# Patient Record
Sex: FEMALE | Race: WHITE | NOT HISPANIC OR LATINO | Employment: OTHER | URBAN - METROPOLITAN AREA
[De-identification: names, ages, dates, MRNs, and addresses within clinical notes are randomized per-mention and may not be internally consistent; named-entity substitution may affect disease eponyms.]

---

## 2018-11-11 ENCOUNTER — HOSPITAL ENCOUNTER (EMERGENCY)
Facility: MEDICAL CENTER | Age: 78
End: 2018-11-11
Attending: EMERGENCY MEDICINE
Payer: MEDICARE

## 2018-11-11 ENCOUNTER — APPOINTMENT (OUTPATIENT)
Dept: RADIOLOGY | Facility: MEDICAL CENTER | Age: 78
End: 2018-11-11
Attending: EMERGENCY MEDICINE
Payer: MEDICARE

## 2018-11-11 VITALS
RESPIRATION RATE: 16 BRPM | HEIGHT: 64 IN | BODY MASS INDEX: 23.94 KG/M2 | DIASTOLIC BLOOD PRESSURE: 93 MMHG | TEMPERATURE: 98.5 F | WEIGHT: 140.21 LBS | OXYGEN SATURATION: 94 % | HEART RATE: 75 BPM | SYSTOLIC BLOOD PRESSURE: 135 MMHG

## 2018-11-11 DIAGNOSIS — S01.81XA FACIAL LACERATION, INITIAL ENCOUNTER: ICD-10-CM

## 2018-11-11 PROCEDURE — 90715 TDAP VACCINE 7 YRS/> IM: CPT | Performed by: EMERGENCY MEDICINE

## 2018-11-11 PROCEDURE — 303353 HCHG DERMABOND SKIN ADHESIVE

## 2018-11-11 PROCEDURE — 99283 EMERGENCY DEPT VISIT LOW MDM: CPT

## 2018-11-11 PROCEDURE — 700102 HCHG RX REV CODE 250 W/ 637 OVERRIDE(OP)

## 2018-11-11 PROCEDURE — 90471 IMMUNIZATION ADMIN: CPT

## 2018-11-11 PROCEDURE — 70450 CT HEAD/BRAIN W/O DYE: CPT

## 2018-11-11 PROCEDURE — 304999 HCHG REPAIR-SIMPLE/INTERMED LEVEL 1

## 2018-11-11 PROCEDURE — A9270 NON-COVERED ITEM OR SERVICE: HCPCS

## 2018-11-11 PROCEDURE — 700111 HCHG RX REV CODE 636 W/ 250 OVERRIDE (IP): Performed by: EMERGENCY MEDICINE

## 2018-11-11 RX ORDER — IBUPROFEN 600 MG/1
600 TABLET ORAL ONCE
Status: COMPLETED | OUTPATIENT
Start: 2018-11-11 | End: 2018-11-11

## 2018-11-11 RX ADMIN — IBUPROFEN 600 MG: 600 TABLET, FILM COATED ORAL at 17:00

## 2018-11-11 RX ADMIN — CLOSTRIDIUM TETANI TOXOID ANTIGEN (FORMALDEHYDE INACTIVATED), CORYNEBACTERIUM DIPHTHERIAE TOXOID ANTIGEN (FORMALDEHYDE INACTIVATED), BORDETELLA PERTUSSIS TOXOID ANTIGEN (GLUTARALDEHYDE INACTIVATED), BORDETELLA PERTUSSIS FILAMENTOUS HEMAGGLUTININ ANTIGEN (FORMALDEHYDE INACTIVATED), BORDETELLA PERTUSSIS PERTACTIN ANTIGEN, AND BORDETELLA PERTUSSIS FIMBRIAE 2/3 ANTIGEN 0.5 ML: 5; 2; 2.5; 5; 3; 5 INJECTION, SUSPENSION INTRAMUSCULAR at 15:34

## 2018-11-11 ASSESSMENT — LIFESTYLE VARIABLES: DO YOU DRINK ALCOHOL: NO

## 2018-11-11 ASSESSMENT — PAIN SCALES - GENERAL: PAINLEVEL_OUTOF10: 7

## 2018-11-11 NOTE — ED PROVIDER NOTES
"ED Provider Note    CHIEF COMPLAINT  Chief Complaint   Patient presents with   • T-5000 GLF       HPI  Regla Meier is a 78 y.o. female who presents with a headache.  The patient was walking to go to a restaurant when she tripped and struck the left side of her head on the ground.  This caused a approximate 3 center laceration above her left eyebrow.  She states she did not have a loss of conscious but she does have a moderate to severe headache.  She has not any vomiting.  She denies visual changes.  She does have some pain in the left lateral neck region but no midline cervical discomfort.  The patient also has some abrasions to her hands on the palmar aspect with minimal discomfort.  Otherwise she is unaware of any other injuries.  She does not remember the last time she had tetanus prophylaxis.    REVIEW OF SYSTEMS  See HPI for further details. All other systems are negative.     PAST MEDICAL HISTORY  Past Medical History:   Diagnosis Date   • Hyperlipemia        SOCIAL HISTORY  Social History     Social History   • Marital status:      Spouse name: N/A   • Number of children: N/A   • Years of education: N/A     Social History Main Topics   • Smoking status: Never Smoker   • Smokeless tobacco: Not on file   • Alcohol use Yes      Comment: occ   • Drug use: No   • Sexual activity: Not on file     Other Topics Concern   • Not on file     Social History Narrative   • No narrative on file           PHYSICAL EXAM  VITAL SIGNS: /90   Pulse 95   Temp 36.9 °C (98.5 °F) (Temporal)   Resp 14   Ht 1.626 m (5' 4\")   Wt 63.6 kg (140 lb 3.4 oz)   SpO2 96%   BMI 24.07 kg/m²   Constitutional: Well developed, Well nourished, No acute distress, Non-toxic appearance.   HENT: 3 cm laceration to the left side of the forehead, tympanic membranes are intact and nonerythematous bilaterally, Oropharynx moist without exudates or erythema, Nose normal.   Eyes: PERRLA, EOMI, Conjunctiva normal.  Neck: Slight pain " left trapezius region but no midline discomfort and or step-offs  Lymphatic: No lymphadenopathy noted.   Cardiovascular: Normal heart rate, Normal rhythm, No murmurs, No rubs, No gallops.   Thorax & Lungs: Normal breath sounds, No respiratory distress, No wheezing, No chest tenderness.   Abdomen: Bowel sounds normal, Soft, No tenderness, no rebound, no guarding, no distention, No masses, No pulsatile masses.   Skin: Laceration described above, mild abrasions to both hands on the palmar aspect.   Back: No tenderness, No CVA tenderness.   Extremities: Pain to the palmar aspect of both hands that seems to be very mild, no pain with pressure to the dorsal aspect of the metacarpals.  Extremities otherwise atraumatic with symmetric distal pulses, No edema, No tenderness, No cyanosis, No clubbing.   Neurologic: GCS of 15.   Psychiatric: Affect normal, Judgment normal, Mood normal.     RADIOLOGY/  CT-HEAD W/O   Final Result      1.  No evidence of acute intracranial process.      2.  Mild periventricular chronic small vessel ischemic change.        PROCEDURES laceration repair  Wound was irrigated by myself with gauze and saline.  I then closed the laceration with Dermabond.  COURSE & MEDICAL DECISION MAKING  Pertinent Labs & Imaging studies reviewed. (See chart for details)  This a 78-year-old female who presents after a fall.  She did have a headache on my exam and based on her age and the risk of an intracranial hemorrhage a CT scan was ordered.  This does not show any acute hemorrhagic injury.  The patient did have primary closure of the facial laceration.  She will be discharged home with wound care instructions.  She also some tenderness the lateral aspect of the cervical spine but no midline tenderness to support a fracture.  At the time of discharge the patient continues to be neurovascular intact.  She will be discharged with clear instructions to return if she is acutely worse.    The patient also received tetanus  prophylaxis.    FINAL IMPRESSION  1.  Mild concussion  2.  Cervical strain  3.  3 cm facial laceration     Disposition  The patient will be discharged in stable condition    Electronically signed by: Hieu Watson, 11/11/2018 3:22 PM

## 2018-11-11 NOTE — ED TRIAGE NOTES
"Chief Complaint   Patient presents with   • T-5000 GLF     Pt was walking to  chinese food, states that she tripped on a speed bump and fell striking her left forehead, bilateral hands, and left knee and shoulder. Pt reports lac to left eyelid. Ambulatory with steady gait.   Blood pressure 129/90, pulse 95, temperature 36.9 °C (98.5 °F), temperature source Temporal, resp. rate 14, height 1.626 m (5' 4\"), weight 63.6 kg (140 lb 3.4 oz), SpO2 96 %.    Pt informed of wait times. Educated on triage process.  Asked to return to triage RN for any new or worsening of symptoms. Thanked for patience.        "

## 2018-11-12 ENCOUNTER — PATIENT OUTREACH (OUTPATIENT)
Dept: HEALTH INFORMATION MANAGEMENT | Facility: OTHER | Age: 78
End: 2018-11-12

## 2018-11-12 NOTE — ED NOTES
Patient was educated on discharge instructions.  Patient was informed about diagnosis, symptom management, risks, and home care instructions.  Patient verbalized understanding and signed discharge instructions. Copy of discharge instructions in chart.  Patient ambulated out with steady gait.  Patient has personal belongings.

## 2019-02-04 ENCOUNTER — TELEPHONE (OUTPATIENT)
Dept: SCHEDULING | Facility: IMAGING CENTER | Age: 79
End: 2019-02-04

## 2019-04-10 ENCOUNTER — OFFICE VISIT (OUTPATIENT)
Dept: MEDICAL GROUP | Facility: PHYSICIAN GROUP | Age: 79
End: 2019-04-10
Payer: MEDICARE

## 2019-04-10 VITALS
SYSTOLIC BLOOD PRESSURE: 120 MMHG | HEIGHT: 63 IN | WEIGHT: 134 LBS | BODY MASS INDEX: 23.74 KG/M2 | OXYGEN SATURATION: 98 % | RESPIRATION RATE: 14 BRPM | HEART RATE: 76 BPM | TEMPERATURE: 98.4 F | DIASTOLIC BLOOD PRESSURE: 72 MMHG

## 2019-04-10 DIAGNOSIS — Z86.79 HISTORY OF RETINAL VEIN OCCLUSION: ICD-10-CM

## 2019-04-10 DIAGNOSIS — Z23 NEED FOR VACCINATION: ICD-10-CM

## 2019-04-10 DIAGNOSIS — Z12.31 ENCOUNTER FOR SCREENING MAMMOGRAM FOR BREAST CANCER: ICD-10-CM

## 2019-04-10 DIAGNOSIS — R51.9 GENERALIZED HEADACHES: ICD-10-CM

## 2019-04-10 DIAGNOSIS — H57.9 EYE DISORDER: ICD-10-CM

## 2019-04-10 DIAGNOSIS — M85.80 OSTEOPENIA, UNSPECIFIED LOCATION: ICD-10-CM

## 2019-04-10 PROBLEM — H34.8192 HISTORY OF RETINAL VEIN OCCLUSION: Status: ACTIVE | Noted: 2019-04-10

## 2019-04-10 PROCEDURE — 99214 OFFICE O/P EST MOD 30 MIN: CPT | Performed by: PHYSICIAN ASSISTANT

## 2019-04-10 ASSESSMENT — PATIENT HEALTH QUESTIONNAIRE - PHQ9: CLINICAL INTERPRETATION OF PHQ2 SCORE: 0

## 2019-04-10 NOTE — PROGRESS NOTES
Chief Complaint   Patient presents with   • Establish Care     pt states no concerns       HISTORY OF THE PRESENT ILLNESS: Regla Meier is a 78 y.o. female new patient to our practice. This pleasant patient is here today to establish care and to discuss the evaluation and management of:    Patient is a pleasant 78-year-old female here today to establish care.  She tells me she has a positive medical history for left retinal vein occlusion and osteopenia.  She also mentions that she had another I disorder of right eye that was followed closely by a retinal specialist.  States she had broken blood vessels of retina that was treated with laser therapy in 2017.  States she was following closely with her retinal specialist every 4 months.  She tells me she has loss of central vision left eye due to vein occlusion.  States symptoms are still improving.  Patient does not need a referral to an ophthalmologist.  She tells me that she is seeing in Dr. Chavez at [facility] and will follow up closely.  She tells me that she uses vision essentials gold 2 capsules by mouth twice daily.  States she buys medication from [facility] off-line.  States she is been taking medication for several years and finds it beneficial.    States last DEXA scan was 2 years ago.  Positive for osteopenia but states osteopenia has been stable for many years.  She tells me that she was advised to take 2 packets of gold daily regimen but she forgets to take the second packets of 6 months ago she started taking 1 packet/day.  Patient advised medications from [facility] off-line.  States she has been taking medication for several years and finds it beneficial.  Would like to continue.    She mentions that she gets generalized headaches a few times a week.  States during those times she will take over-the-counter Excedrin and experiences alleviation of symptoms.  She tells me that diet is healthy and she is a regular exercise  routine.  Exercise routine consist of walking and stretching.  Admits that she could work on hydration.        Past Medical History:   Diagnosis Date   • Hyperlipemia        Past Surgical History:   Procedure Laterality Date   • CYSTECTOMY     • CATARACT EXTRACTION WITH IOL         Family Status   Relation Status   • Mo    • Fa    • Bro    • Bro Alive   • Aquilino Alive   • Aquilino Alive     Family History   Problem Relation Age of Onset   • Other Mother         TIA    • Other Father         Abdominal aneurysm    • Alcohol abuse Brother    • Heart Disease Brother    • No Known Problems Daughter    • No Known Problems Daughter        Social History   Substance Use Topics   • Smoking status: Never Smoker   • Smokeless tobacco: Never Used   • Alcohol use Yes      Comment: occ       Allergies: Pcn [penicillins]    Current Outpatient Prescriptions Ordered in [facility]   Medication Sig Dispense Refill   • Zoster Vac Recomb Adjuvanted (SHINGRIX) 50 MCG/0.5ML Recon Susp 0.5 mL by Intramuscular route Once for 1 dose. 0.5 mL 1     No current [facility]-ordered facility-administered medications on file.        Review of Systems   Constitutional: Negative for fever, chills, weight loss and malaise/fatigue.   HENT: Negative for ear pain, nosebleeds, congestion, sore throat and neck pain.    Eyes: Negative for blurred vision.   Respiratory: Negative for cough, sputum production, shortness of breath and wheezing.    Cardiovascular: Negative for chest pain, palpitations, orthopnea and leg swelling.   Gastrointestinal: Negative for heartburn, nausea, vomiting and abdominal pain.   Genitourinary: Negative for dysuria, urgency and frequency.   Musculoskeletal: Negative for myalgias, back pain and joint pain.   Skin: Negative for rash and itching.   Neurological: Negative for dizziness, tingling, tremors, sensory change, focal weakness and headaches.   Endo/Heme/Allergies: Does not bruise/bleed easily.   Psychiatric/Behavioral:  "Negative for depression, anxiety, or memory loss.     All other systems reviewed and are negative except as in HPI.    Exam: /72   Pulse 76   Temp 36.9 °C (98.4 °F)   Resp 14   Ht 1.6 m (5' 3\")   Wt 60.8 kg (134 lb)   SpO2 98%  Body mass index is 23.74 kg/m².  General: Normal appearing. No distress.  HEENT: Normocephalic. Eyes conjunctiva clear lids without ptosis, pupils equal and reactive to light accommodation, ears normal shape and contour, canals are clear bilaterally, tympanic membranes are benign, nasal mucosa benign, oropharynx is without erythema, edema or exudates.  Patient is wearing glasses during today's appointment.  Neck: Supple without JVD or bruit. Thyroid is not enlarged.  Pulmonary: Clear to ausculation.  Normal effort. No rales, ronchi, or wheezing.  Cardiovascular: Regular rate and rhythm without murmur.   Abdomen: Soft, nontender, nondistended. Normal bowel sounds. Liver and spleen are not palpable  Neurologic: Grossly nonfocal  Lymph: No cervical, supraclavicular or axillary lymph nodes are palpable  Skin: Warm and dry.  No obvious lesions.  Musculoskeletal: Normal gait. No extremity cyanosis, clubbing, or edema.  Psych: Normal mood and affect. Alert and oriented x3. Judgment and insight is normal.      Medical decision-making and discussion:  1. History of retinal vein occlusion  Continue following up with ophthalmology as indicated.  Continue current medication regimen.  Continue to monitor.    2. Eye disorder  Same as # 1.    3. Osteopenia, unspecified location  Continue current medication.  Advised patient to make sure she is getting adequate vitamin D and calcium.  Continue to monitor.    4. Generalized headaches  Continue work on diet, exercise, hydration, and sleep hygiene.  Continue taking Excedrin as needed.  Continue to monitor.    5. Encounter for screening mammogram for breast cancer  [place] importance of being screened for breast cancer with patient.  Mammogram has been " ordered.  - MA-SCREEN MAMMO W/CAD-BILAT; Future    6. Need for vaccination  She was provided a Shingrix prescription during today's appointment.  Advised her to contact her medical insurance to discuss cost of vaccinations.  - Zoster Vac Recomb Adjuvanted (SHINGRIX) 50 MCG/0.5ML Recon Susp; 0.5 mL by Intramuscular route Once for 1 dose.  Dispense: 0.5 mL; Refill: 1      She tells me that her colonoscopy is up-to-date.  Patient states her last provider should be sending medical records.  States pneumonia vaccinations are up-to-date.    Please note that this dictation was created using voice recognition software. I have made every reasonable attempt to correct obvious errors, but I expect that there are errors of grammar and possibly content that I did not discover before finalizing the note.        Return in about 6 months (around 10/10/2019).

## 2019-11-26 ENCOUNTER — OFFICE VISIT (OUTPATIENT)
Dept: MEDICAL GROUP | Facility: PHYSICIAN GROUP | Age: 79
End: 2019-11-26
Payer: MEDICARE

## 2019-11-26 VITALS
TEMPERATURE: 98.4 F | WEIGHT: 134.26 LBS | BODY MASS INDEX: 23.79 KG/M2 | HEIGHT: 63 IN | RESPIRATION RATE: 16 BRPM | SYSTOLIC BLOOD PRESSURE: 120 MMHG | HEART RATE: 79 BPM | OXYGEN SATURATION: 98 % | DIASTOLIC BLOOD PRESSURE: 70 MMHG

## 2019-11-26 DIAGNOSIS — L23.1 ALLERGIC CONTACT DERMATITIS DUE TO ADHESIVES: ICD-10-CM

## 2019-11-26 DIAGNOSIS — H93.8X3 SENSATION OF FULLNESS IN BOTH EARS: ICD-10-CM

## 2019-11-26 DIAGNOSIS — Z86.79 HISTORY OF RETINAL VEIN OCCLUSION: ICD-10-CM

## 2019-11-26 DIAGNOSIS — E78.2 MIXED HYPERLIPIDEMIA: ICD-10-CM

## 2019-11-26 DIAGNOSIS — Z00.00 MEDICARE ANNUAL WELLNESS VISIT, SUBSEQUENT: ICD-10-CM

## 2019-11-26 DIAGNOSIS — H54.8 LEGALLY BLIND IN LEFT EYE, AS DEFINED IN USA: ICD-10-CM

## 2019-11-26 PROBLEM — E78.5 HYPERLIPEMIA: Status: ACTIVE | Noted: 2019-11-26

## 2019-11-26 PROCEDURE — G0439 PPPS, SUBSEQ VISIT: HCPCS | Performed by: PHYSICIAN ASSISTANT

## 2019-11-26 PROCEDURE — 69210 REMOVE IMPACTED EAR WAX UNI: CPT | Performed by: PHYSICIAN ASSISTANT

## 2019-11-26 RX ORDER — VIT C/E/CUPERIC/ZINC/LUTEIN 226-90-0.8
CAPSULE ORAL
Refills: 3 | COMMUNITY
Start: 2019-11-04 | End: 2022-07-09

## 2019-11-26 ASSESSMENT — ACTIVITIES OF DAILY LIVING (ADL): BATHING_REQUIRES_ASSISTANCE: 0

## 2019-11-26 ASSESSMENT — PATIENT HEALTH QUESTIONNAIRE - PHQ9: CLINICAL INTERPRETATION OF PHQ2 SCORE: 0

## 2019-11-26 ASSESSMENT — ENCOUNTER SYMPTOMS: GENERAL WELL-BEING: EXCELLENT

## 2019-11-26 NOTE — LETTER
[facility], [facility]  Amaris Sanchez P.A.-C.  [address]  [place] [address]  Fax: [phone]   Authorization for Release/Disclosure of   Protected Health Information   Name: DANIELITO MEIER : 1940 SSN: [id number]   Address: [address] [place], [place] [address] [place] [address] Phone:    [phone] (home)    I authorize the entity listed below to release/disclose the PHI below to:   [facility]/Amaris Sanchez P.A.-C. and Amaris Sanchez P.A.-C.   Provider or Entity Name:  [facility]-   Address   [facility], [place], [place] Phone:      Fax:     Reason for request: continuity of care   Information to be released:    [  ] LAST COLONOSCOPY,  including any PATH REPORT and follow-up  [  ] LAST FIT/COLOGUARD RESULT [  ] LAST DEXA  [  ] LAST MAMMOGRAM  [  ] LAST PAP  [  ] LAST LABS [  ] RETINA EXAM REPORT  [  ] IMMUNIZATION RECORDS  [XXX  ] Release all info      [  ] Check here and initial the line next to each item to release ALL health information INCLUDING  _____ Care and treatment for drug and / or alcohol abuse  _____ HIV testing, infection status, or AIDS  _____ Genetic Testing    DATES OF SERVICE OR TIME PERIOD TO BE DISCLOSED: _____________  I understand and acknowledge that:  * This Authorization may be revoked at any time by you in writing, except if your health information has already been used or disclosed.  * Your health information that will be used or disclosed as a result of you signing this authorization could be re-disclosed by the recipient. If this occurs, your re-disclosed health information may no longer be protected by State or Federal laws.  * You may refuse to sign this Authorization. Your refusal will not affect your ability to obtain treatment.  * This Authorization becomes effective upon signing and will  on (date) __________.      If no date is indicated, this Authorization will  one (1) year from the signature date.    Name: Danielito Meier    Signature:   Date:     11/26/2019       PLEASE FAX REQUESTED RECORDS BACK TO: [phone]

## 2019-11-26 NOTE — PROGRESS NOTES
Chief Complaint   Patient presents with   • Annual Wellness Visit         HPI:  Regla is a 79 y.o. here for Medicare Annual Wellness Visit    During today's appointment patient is complaining of bilateral ear fullness.  States in the past she has had ear lavages.  States several months ago she is over-the-counter Debrox but symptoms have not improved.  Patient is inquiring about ear lavage.    She tells me one week ago she developed an erythematous itchy rash of left clavicle region.  States rash developed after she placed a Band-Aid over actinic keratosis lesion that she been treating with oregano oil.  States she is tried over-the-counter Benadryl with improvement in itching symptoms.  States symptoms have gradually improved.  Patient is inquiring what treatment options.    Patient Active Problem List    Diagnosis Date Noted   • Hyperlipemia 11/26/2019   • Legally blind in left eye, as defined in [place] 11/26/2019   • History of retinal vein occlusion 04/10/2019       Current Outpatient Medications   Medication Sig Dispense Refill   • Multiple Vitamins-Minerals (PRESERVISION/LUTEIN) Cap TAKE 1 CAPSULE BY MOUTH TWICE DAILY FOR 6 MONTHS  3     No current facility-administered medications for this visit.         The patient is not taking medication(s) due to:  Patient takes OTC Multiple Vitamins-Minerals (PRESERVISION/LUTEIN) Cap once daily.  Current supplements as per medication list.     Allergies: Pcn [penicillins]    Current social contact/activities: travel, family,      Is patient current with immunizations? No, due for FLU. Patient is interested in receiving NONE today.    She  reports that she has never smoked. She has never used smokeless tobacco. She reports current alcohol use of about 0.6 oz of alcohol per week. She reports that she does not use drugs.  Counseling given: Yes        DPA/Advanced directive: Patient has Advanced Directive on file.     ROS:    Gait: Uses no assistive device   Ostomy: No    Other tubes: No   Amputations: No   Chronic oxygen use No   Last eye exam 10/19 Pt goes Q 4 months   Wears hearing aids: No   : Denies any urinary leakage during the last 6 months      Screening:      Little interest or pleasure in doing things?  0 - not at all  Feeling down, depressed, or hopeless? 0 - not at all  Patient Health Questionnaire Score: 0    If depressive symptoms identified deferred to follow up visit unless specifically addressed in assessment and plan.    Interpretation of PHQ-9 Total Score   Score Severity   1-4 No Depression   5-9 Mild Depression   10-14 Moderate Depression   15-19 Moderately Severe Depression   20-27 Severe Depression    Screening for Cognitive Impairment    Three Minute Recall (village, kitchen, baby)  3/3 Village kitchen baby  Oscar clock face with all 12 numbers and set the hands to show 10 past 10.  Yes Time 10:10  5/5  If cognitive concerns identified, deferred for follow up unless specifically addressed in assessment and plan.    Fall Risk Assessment    Has the patient had two or more falls in the last year or any fall with injury in the last year?  Yes  If fall risk identified, deferred for follow up unless specifically addressed in assessment and plan.    Safety Assessment    Throw rugs on floor.  Yes  Handrails on all stairs.  No  Good lighting in all hallways.  Yes  Difficulty hearing.  No  Patient counseled about all safety risks that were identified.    Functional Assessment ADLs    Are there any barriers preventing you from cooking for yourself or meeting nutritional needs?  No.    Are there any barriers preventing you from driving safely or obtaining transportation?  No.    Are there any barriers preventing you from using a telephone or calling for help?  No.    Are there any barriers preventing you from shopping?  No.    Are there any barriers preventing you from taking care of your own finances?  No.    Are there any barriers preventing you from managing your  "medications?  No.    Are there any barriers preventing you from showering, bathing or dressing yourself?  No.    Are you currently engaging in any exercise or physical activity?  Yes.  Walking 45 min 3 time a week and stretches daily.   What is your perception of your health?  Excellent.    Health Maintenance Summary                Annual Wellness Visit Overdue 1940     IMM ZOSTER VACCINES Overdue 10/19/1991     BONE DENSITY Overdue 10/19/2006     IMM PNEUMOCOCCAL VACCINE: 65+ Years Overdue 10/19/2006     IMM INFLUENZA Overdue 9/1/2019     IMM DTaP/Tdap/Td Vaccine Next Due 11/11/2028      Done 11/11/2018 Imm Admin: Tdap Vaccine          Patient Care Team:  Amaris Sanchez P.A.-C. as PCP - General (Family Medicine)    Social History     Tobacco Use   • Smoking status: Never Smoker   • Smokeless tobacco: Never Used   Substance Use Topics   • Alcohol use: Yes     Alcohol/week: 0.6 oz     Types: 1 Glasses of wine per week     Comment: occ   • Drug use: No     Family History   Problem Relation Age of Onset   • Other Mother         TIA    • Other Father         Abdominal aneurysm    • Alcohol abuse Brother    • Heart Disease Brother    • No Known Problems Daughter    • No Known Problems Daughter      She  has a past medical history of Hyperlipemia.   Past Surgical History:   Procedure Laterality Date   • CYSTECTOMY  1963   • CATARACT EXTRACTION WITH IOL             Exam:     /70 (BP Location: Right arm, Patient Position: Sitting, BP Cuff Size: Adult)   Pulse 79   Temp 36.9 °C (98.4 °F) (Temporal)   Resp 16   Ht 1.588 m (5' 2.5\")   Wt 60.9 kg (134 lb 4.2 oz)   SpO2 98%  Body mass index is 24.17 kg/m².    Hearing good.    Dentition good  Alert, oriented in no acute distress.  Eye contact is good, speech goal directed, affect calm    Assessment and Plan. The following treatment and monitoring plan is recommended:      1. Medicare annual wellness visit, subsequent  HRA reviewed and appropriate. Reviewed " medical history and current medications with patient. Reviewed immunizations with patient.  Ambulatory and Anticipatory Guidelines have been discussed with patient, see discussion below.      - Comp Metabolic Panel; Future  - CBC WITH DIFFERENTIAL; Future  - Lipid Profile; Future    2. History of retinal vein occlusion  Chronic with stable problem.  Patient follows up with an optometrist regularly.  She tells me that several years ago she had a retinal vein occlusion and unfortunately due to complications she lost her central vision but can see peripherally out of the left eye.  Patient takes over-the-counter eye vitamins daily.    3. Legally blind in left eye, as defined in [place]  Same as #2.    4. Mixed hyperlipidemia  Patient is past due for lab work.  She tells me that she has a positive medical history for hyperlipidemia.  States her HDL is always high and she has never needed a statin medication.  She tells me that her diet is healthy and she is a regular exercise routine.  Lab work has been ordered to further evaluate patient.    - Comp Metabolic Panel; Future  - CBC WITH DIFFERENTIAL; Future  - Lipid Profile; Future    5. 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  Do not use more than 2 weeks consecutively.  After using hydrocortisone cream using oil-based lotion on top of hydrocortisone cream.  Suggested Aquaphor, Aveeno, Cetaphil, CeraVea, Vaseline.  Just for patient to use over-the-counter Benadryl and Zantac.  Avoid known irritants.    Follow-up for worsening symptoms,lack of expected recovery, or should new symptoms or problems arise.      6. Sensation of fullness in both ears  Patient has bilateral cerumen impaction. It is affecting the patient's hearing. It is also bothersome to the patient. After discussing the risks benefits alternatives and obtaining informed consent procedure for cerumen impaction was  undertaken. Both ears were flushed with water using a 50 mL syringe and tube. Cerumen was then disimpacted  using a plastic loop personally by me. Reinspection showed adequate removal.      She tells me that her bone density scan is up-to-date.  States bone density was completed 2 years ago.  No abnormal findings.  HUEY has been signed by patient to obtain past medical records.      Services suggested: No services needed at this time  Health Care Screening recommendations as per orders if indicated.  Referrals offered: PT/OT/Nutrition counseling/Behavioral Health/Smoking cessation as per orders if indicated.    Discussion today about general wellness and lifestyle habits:    · Prevent falls and reduce trip hazards; Cautioned about securing or removing rugs.  · Have a working fire alarm and carbon monoxide detector;   · Engage in regular physical activity and social activities       Follow-up: Return in about 1 year (around 11/26/2020), or if symptoms worsen or fail to improve.

## 2019-12-02 ENCOUNTER — HOSPITAL ENCOUNTER (OUTPATIENT)
Dept: LAB | Facility: MEDICAL CENTER | Age: 79
End: 2019-12-02
Attending: PHYSICIAN ASSISTANT
Payer: MEDICARE

## 2019-12-02 DIAGNOSIS — Z00.00 MEDICARE ANNUAL WELLNESS VISIT, SUBSEQUENT: ICD-10-CM

## 2019-12-02 DIAGNOSIS — E78.2 MIXED HYPERLIPIDEMIA: ICD-10-CM

## 2019-12-02 LAB
ALBUMIN SERPL BCP-MCNC: 3.4 G/DL (ref 3.2–4.9)
ALBUMIN/GLOB SERPL: 1.3 G/DL
ALP SERPL-CCNC: 48 U/L (ref 30–99)
ALT SERPL-CCNC: 12 U/L (ref 2–50)
ANION GAP SERPL CALC-SCNC: 5 MMOL/L (ref 0–11.9)
AST SERPL-CCNC: 23 U/L (ref 12–45)
BASOPHILS # BLD AUTO: 1.4 % (ref 0–1.8)
BASOPHILS # BLD: 0.07 K/UL (ref 0–0.12)
BILIRUB SERPL-MCNC: 0.5 MG/DL (ref 0.1–1.5)
BUN SERPL-MCNC: 19 MG/DL (ref 8–22)
CALCIUM SERPL-MCNC: 9 MG/DL (ref 8.5–10.5)
CHLORIDE SERPL-SCNC: 104 MMOL/L (ref 96–112)
CHOLEST SERPL-MCNC: 218 MG/DL (ref 100–199)
CO2 SERPL-SCNC: 28 MMOL/L (ref 20–33)
CREAT SERPL-MCNC: 0.8 MG/DL (ref 0.5–1.4)
EOSINOPHIL # BLD AUTO: 0.18 K/UL (ref 0–0.51)
EOSINOPHIL NFR BLD: 3.7 % (ref 0–6.9)
ERYTHROCYTE [DISTWIDTH] IN BLOOD BY AUTOMATED COUNT: 44.3 FL (ref 35.9–50)
FASTING STATUS PATIENT QL REPORTED: NORMAL
GLOBULIN SER CALC-MCNC: 2.6 G/DL (ref 1.9–3.5)
GLUCOSE SERPL-MCNC: 85 MG/DL (ref 65–99)
HCT VFR BLD AUTO: 42.9 % (ref 37–47)
HDLC SERPL-MCNC: 60 MG/DL
HGB BLD-MCNC: 14 G/DL (ref 12–16)
IMM GRANULOCYTES # BLD AUTO: 0.02 K/UL (ref 0–0.11)
IMM GRANULOCYTES NFR BLD AUTO: 0.4 % (ref 0–0.9)
LDLC SERPL CALC-MCNC: 139 MG/DL
LYMPHOCYTES # BLD AUTO: 1.7 K/UL (ref 1–4.8)
LYMPHOCYTES NFR BLD: 34.8 % (ref 22–41)
MCH RBC QN AUTO: 31 PG (ref 27–33)
MCHC RBC AUTO-ENTMCNC: 32.6 G/DL (ref 33.6–35)
MCV RBC AUTO: 95.1 FL (ref 81.4–97.8)
MONOCYTES # BLD AUTO: 0.46 K/UL (ref 0–0.85)
MONOCYTES NFR BLD AUTO: 9.4 % (ref 0–13.4)
NEUTROPHILS # BLD AUTO: 2.46 K/UL (ref 2–7.15)
NEUTROPHILS NFR BLD: 50.3 % (ref 44–72)
NRBC # BLD AUTO: 0 K/UL
NRBC BLD-RTO: 0 /100 WBC
PLATELET # BLD AUTO: 194 K/UL (ref 164–446)
PMV BLD AUTO: 10 FL (ref 9–12.9)
POTASSIUM SERPL-SCNC: 4.5 MMOL/L (ref 3.6–5.5)
PROT SERPL-MCNC: 6 G/DL (ref 6–8.2)
RBC # BLD AUTO: 4.51 M/UL (ref 4.2–5.4)
SODIUM SERPL-SCNC: 137 MMOL/L (ref 135–145)
TRIGL SERPL-MCNC: 94 MG/DL (ref 0–149)
WBC # BLD AUTO: 4.9 K/UL (ref 4.8–10.8)

## 2019-12-02 PROCEDURE — 80053 COMPREHEN METABOLIC PANEL: CPT

## 2019-12-02 PROCEDURE — 80061 LIPID PANEL: CPT

## 2019-12-02 PROCEDURE — 85025 COMPLETE CBC W/AUTO DIFF WBC: CPT

## 2019-12-02 PROCEDURE — 36415 COLL VENOUS BLD VENIPUNCTURE: CPT

## 2019-12-15 ENCOUNTER — OFFICE VISIT (OUTPATIENT)
Dept: URGENT CARE | Facility: PHYSICIAN GROUP | Age: 79
End: 2019-12-15
Payer: MEDICARE

## 2019-12-15 VITALS
WEIGHT: 134 LBS | HEART RATE: 74 BPM | OXYGEN SATURATION: 98 % | BODY MASS INDEX: 23.74 KG/M2 | RESPIRATION RATE: 16 BRPM | TEMPERATURE: 98.6 F | DIASTOLIC BLOOD PRESSURE: 78 MMHG | HEIGHT: 63 IN | SYSTOLIC BLOOD PRESSURE: 120 MMHG

## 2019-12-15 DIAGNOSIS — M79.18 PAIN OF RIGHT DELTOID: ICD-10-CM

## 2019-12-15 DIAGNOSIS — M89.8X2 PAIN OF RIGHT HUMERUS: ICD-10-CM

## 2019-12-15 PROCEDURE — 99214 OFFICE O/P EST MOD 30 MIN: CPT | Performed by: PHYSICIAN ASSISTANT

## 2019-12-15 ASSESSMENT — ENCOUNTER SYMPTOMS
TINGLING: 0
NAUSEA: 0
FEVER: 0
VOMITING: 0
CHILLS: 0
MUSCLE WEAKNESS: 0
NUMBNESS: 0

## 2019-12-16 ENCOUNTER — HOSPITAL ENCOUNTER (OUTPATIENT)
Dept: RADIOLOGY | Facility: MEDICAL CENTER | Age: 79
End: 2019-12-16
Attending: PHYSICIAN ASSISTANT
Payer: MEDICARE

## 2019-12-16 DIAGNOSIS — M79.18 PAIN OF RIGHT DELTOID: ICD-10-CM

## 2019-12-16 DIAGNOSIS — M89.8X2 PAIN OF RIGHT HUMERUS: ICD-10-CM

## 2019-12-16 PROCEDURE — 73060 X-RAY EXAM OF HUMERUS: CPT | Mod: RT

## 2019-12-16 NOTE — PROGRESS NOTES
Subjective:   Regla Meier is a 79 y.o. female who presents for Shoulder Injury (Rt shoulder, fell, not sure if landed on shoulder or not X today )        Patient states she was 3 steps up on a step stool when she lost her balance.  She fell and is not certain how she landed.  Possibly on an outstretched hand.  She is unsure if she directly impacted her shoulder.  Since then she is been experiencing significant upper arm pain that is worse with movement.  Mild pain over right thenar eminence as well.  She denies distal numbness and tingling, decreased strength, pain with movement of the wrist.  She has history of osteopenia.    Shoulder Injury    The incident occurred at home. The right shoulder is affected. The incident occurred less than 1 hour ago. The injury mechanism was a fall. The quality of the pain is described as aching. The pain does not radiate. The pain is moderate. Pertinent negatives include no chest pain, muscle weakness, numbness or tingling. The symptoms are aggravated by movement, overhead lifting and palpation. She has tried ice and NSAIDs for the symptoms. The treatment provided moderate relief.     Review of Systems   Constitutional: Negative for chills and fever.   Cardiovascular: Negative for chest pain.   Gastrointestinal: Negative for nausea and vomiting.   Neurological: Negative for tingling and numbness.       PMH:  has a past medical history of Hyperlipemia.  MEDS:   Current Outpatient Medications:   •  Multiple Vitamins-Minerals (PRESERVISION/LUTEIN) Cap, TAKE 1 CAPSULE BY MOUTH TWICE DAILY FOR 6 MONTHS, Disp: , Rfl: 3  ALLERGIES:   Allergies   Allergen Reactions   • Pcn [Penicillins] Hives     SURGHX:   Past Surgical History:   Procedure Laterality Date   • CYSTECTOMY  1963   • CATARACT EXTRACTION WITH IOL       SOCHX:  reports that she has never smoked. She has never used smokeless tobacco. She reports current alcohol use of about 0.6 oz of alcohol per week. She reports that  "she does not use drugs.  FH: Family history was reviewed, no pertinent findings to report   Objective:   /78   Pulse 74   Temp 37 °C (98.6 °F)   Resp 16   Ht 1.588 m (5' 2.5\")   Wt 60.8 kg (134 lb)   SpO2 98%   BMI 24.12 kg/m²   Physical Exam  Vitals signs reviewed.   Constitutional:       General: She is not in acute distress.     Appearance: Normal appearance. She is well-developed. She is not toxic-appearing.   HENT:      Head: Normocephalic and atraumatic.      Right Ear: External ear normal.      Left Ear: External ear normal.      Nose: Nose normal.   Eyes:      General: Lids are normal.      Conjunctiva/sclera: Conjunctivae normal.   Neck:      Musculoskeletal: Neck supple.   Cardiovascular:      Rate and Rhythm: Normal rate and regular rhythm.   Pulmonary:      Effort: Pulmonary effort is normal. No respiratory distress.      Breath sounds: Normal breath sounds.   Musculoskeletal:      Comments: Patient moderately tender to palpation over right deltoid and significantly tender to palpation over proximal humerus.  Shoulder range of motion significantly limited.  Cross body movement elicits most pain.  GHJ, AC joint, clavicle, elbow, wrist nontender to palpation.  Mildly tender over right thenar eminence.  No snuffbox tenderness, no pain with resisted extension.  Radial, median, ulnar nerves intact bilaterally.  Radial pulses +2 bilaterally.   Skin:     General: Skin is warm and dry.      Capillary Refill: Capillary refill takes less than 2 seconds.   Neurological:      Mental Status: She is alert and oriented to person, place, and time.      Cranial Nerves: No cranial nerve deficit.      Sensory: No sensory deficit.   Psychiatric:         Speech: Speech normal.         Behavior: Behavior normal.         Thought Content: Thought content normal.         Judgment: Judgment normal.           Assessment/Plan:   1. Pain of right humerus  - DX-HUMERUS 2+ RIGHT; Future    2. Pain of right deltoid  - " DX-HUMERUS 2+ RIGHT; Future  Patient immobilized in sling.  X-ray is down in clinic.  She will have x-rays done tomorrow at [facility].    XR: No fracture or dislocation by my read.   Radiology review:  12/16/2019 10:07 AM     HISTORY/REASON FOR EXAM:  Pain/Deformity Following Trauma. Fall yesterday with pain     TECHNIQUE/EXAM DESCRIPTION AND NUMBER OF VIEWS:  2 views of the RIGHT humerus.     COMPARISON: None     FINDINGS:  No acute fracture or subluxation is seen.     Bone marrow density is diminished     Normal soft tissues     IMPRESSION:     No radiographic evidence of acute traumatic injury.        Patient contacted with results-I received patient's voicemail.  I did leave a message indicating that x-rays are negative and I would like her to perform arm pendulums and wall walks gently, within pain limitations.  She may use sling as needed when out and about, however I do not want her to overuse it.  I would like her to follow-up with her primary care later this week or early next week to ensure that symptoms are resolving.  If patient has any concerns prior to seeing PCP or develops new symptoms I would like her to be seen again sooner either by primary or returning to clinic for reevaluation.    Differential diagnosis, natural history, supportive care, and indications for immediate follow-up discussed.

## 2020-02-15 ENCOUNTER — OFFICE VISIT (OUTPATIENT)
Dept: URGENT CARE | Facility: PHYSICIAN GROUP | Age: 80
End: 2020-02-15
Payer: MEDICARE

## 2020-02-15 VITALS
HEART RATE: 60 BPM | TEMPERATURE: 98.6 F | SYSTOLIC BLOOD PRESSURE: 124 MMHG | HEIGHT: 63 IN | DIASTOLIC BLOOD PRESSURE: 68 MMHG | RESPIRATION RATE: 16 BRPM | OXYGEN SATURATION: 98 % | WEIGHT: 134 LBS | BODY MASS INDEX: 23.74 KG/M2

## 2020-02-15 DIAGNOSIS — R39.9 SYMPTOMS INVOLVING URINARY SYSTEM: ICD-10-CM

## 2020-02-15 DIAGNOSIS — N30.01 ACUTE CYSTITIS WITH HEMATURIA: ICD-10-CM

## 2020-02-15 LAB
APPEARANCE UR: NORMAL
BILIRUB UR STRIP-MCNC: NORMAL MG/DL
COLOR UR AUTO: YELLOW
GLUCOSE UR STRIP.AUTO-MCNC: NORMAL MG/DL
KETONES UR STRIP.AUTO-MCNC: NORMAL MG/DL
LEUKOCYTE ESTERASE UR QL STRIP.AUTO: NORMAL
NITRITE UR QL STRIP.AUTO: NORMAL
PH UR STRIP.AUTO: 5.5 [PH] (ref 5–8)
PROT UR QL STRIP: NORMAL MG/DL
RBC UR QL AUTO: NORMAL
SP GR UR STRIP.AUTO: 1.02
UROBILINOGEN UR STRIP-MCNC: NORMAL MG/DL

## 2020-02-15 PROCEDURE — 81002 URINALYSIS NONAUTO W/O SCOPE: CPT | Performed by: EMERGENCY MEDICINE

## 2020-02-15 PROCEDURE — 99202 OFFICE O/P NEW SF 15 MIN: CPT | Performed by: EMERGENCY MEDICINE

## 2020-02-15 RX ORDER — SULFAMETHOXAZOLE AND TRIMETHOPRIM 800; 160 MG/1; MG/1
1 TABLET ORAL EVERY 12 HOURS
Qty: 6 TAB | Refills: 0 | Status: SHIPPED | OUTPATIENT
Start: 2020-02-15 | End: 2020-02-18

## 2020-02-15 ASSESSMENT — ENCOUNTER SYMPTOMS
CHILLS: 0
ABDOMINAL PAIN: 0
ANOREXIA: 0
VOMITING: 0
DIARRHEA: 0
FEVER: 0
FLANK PAIN: 0
CHANGE IN BOWEL HABIT: 0
NAUSEA: 0

## 2020-02-16 NOTE — PROGRESS NOTES
"Subjective:      Regla Meier is a 79 y.o. female who presents with UTI (x 5 days ago, frequency, urgency, blood in urine yesterday and pressure )            UTI   This is a new problem. Episode onset: 5 days. The problem occurs daily. The problem has been gradually worsening. Associated symptoms include urinary symptoms. Pertinent negatives include no abdominal pain, anorexia, change in bowel habit, chills, fever, nausea, rash or vomiting. Nothing aggravates the symptoms. She has tried drinking for the symptoms. The treatment provided mild relief.       Review of Systems   Constitutional: Negative for chills and fever.   Gastrointestinal: Negative for abdominal pain, anorexia, change in bowel habit, diarrhea, nausea and vomiting.   Genitourinary: Positive for dysuria, frequency and hematuria. Negative for flank pain and urgency.        No vaginal discharge or bleeding.   Skin: Negative for rash.     Past Medical History:   Diagnosis Date   • Hyperlipemia       Allergy:  Pcn [penicillins]     Current Outpatient Medications:   •  sulfamethoxazole-trimethoprim, 1 Tab, Oral, Q12HRS  •  PreserVision/Lutein, TAKE 1 CAPSULE BY MOUTH TWICE DAILY FOR 6 MONTHS, Taking   family history includes Alcohol abuse in her brother; Heart Disease in her brother; No Known Problems in her daughter and daughter; Other in her father and mother.   Social History     Tobacco Use   • Smoking status: Never Smoker   • Smokeless tobacco: Never Used   Substance Use Topics   • Alcohol use: Yes     Alcohol/week: 0.6 oz     Types: 1 Glasses of wine per week     Comment: occ   • Drug use: No         Objective:     /68 (BP Location: Right arm, Patient Position: Sitting, BP Cuff Size: Adult)   Pulse 60   Temp 37 °C (98.6 °F) (Tympanic)   Resp 16   Ht 1.588 m (5' 2.5\")   Wt 60.8 kg (134 lb)   SpO2 98%   Breastfeeding No   BMI 24.12 kg/m²      Physical Exam  Constitutional:       General: She is not in acute distress.     " Appearance: She is well-developed. She is not ill-appearing.   Cardiovascular:      Rate and Rhythm: Normal rate and regular rhythm.      Heart sounds: Normal heart sounds.   Pulmonary:      Effort: Pulmonary effort is normal.      Breath sounds: Normal breath sounds.   Abdominal:      General: There is no distension.      Palpations: Abdomen is soft.      Tenderness: There is no abdominal tenderness. There is no right CVA tenderness or left CVA tenderness.   Skin:     General: Skin is warm and dry.   Neurological:      Mental Status: She is alert.   Psychiatric:         Behavior: Behavior is cooperative.                 Assessment/Plan:       1. Acute cystitis with hematuria  Recommended supportive care measures, including rest, increasing oral fluid intake and use of over-the-counter medications for relief of symptoms.  - sulfamethoxazole-trimethoprim (BACTRIM DS) 800-160 MG tablet; Take 1 Tab by mouth every 12 hours for 3 days.  Dispense: 6 Tab; Refill: 0    2. Symptoms involving urinary system  Positive blood, positive LE- POCT Urinalysis

## 2021-01-11 DIAGNOSIS — Z23 NEED FOR VACCINATION: ICD-10-CM

## 2021-01-14 PROCEDURE — 91300 PFIZER SARS-COV-2 VACCINE: CPT

## 2021-01-14 PROCEDURE — 0001A PFIZER SARS-COV-2 VACCINE: CPT

## 2021-01-15 ENCOUNTER — IMMUNIZATION (OUTPATIENT)
Dept: FAMILY PLANNING/WOMEN'S HEALTH CLINIC | Facility: IMMUNIZATION CENTER | Age: 81
End: 2021-01-15
Payer: MEDICARE

## 2021-01-15 DIAGNOSIS — Z23 ENCOUNTER FOR VACCINATION: Primary | ICD-10-CM

## 2021-02-04 ENCOUNTER — IMMUNIZATION (OUTPATIENT)
Dept: FAMILY PLANNING/WOMEN'S HEALTH CLINIC | Facility: IMMUNIZATION CENTER | Age: 81
End: 2021-02-04
Attending: INTERNAL MEDICINE
Payer: MEDICARE

## 2021-02-04 DIAGNOSIS — Z23 ENCOUNTER FOR VACCINATION: Primary | ICD-10-CM

## 2021-02-04 PROCEDURE — 0002A PFIZER SARS-COV-2 VACCINE: CPT | Performed by: INTERNAL MEDICINE

## 2021-02-04 PROCEDURE — 91300 PFIZER SARS-COV-2 VACCINE: CPT | Performed by: INTERNAL MEDICINE

## 2021-05-19 ENCOUNTER — APPOINTMENT (RX ONLY)
Dept: URBAN - METROPOLITAN AREA CLINIC 22 | Facility: CLINIC | Age: 81
Setting detail: DERMATOLOGY
End: 2021-05-19

## 2021-05-19 DIAGNOSIS — L82.0 INFLAMED SEBORRHEIC KERATOSIS: ICD-10-CM

## 2021-05-19 DIAGNOSIS — Z71.89 OTHER SPECIFIED COUNSELING: ICD-10-CM

## 2021-05-19 DIAGNOSIS — D22 MELANOCYTIC NEVI: ICD-10-CM

## 2021-05-19 DIAGNOSIS — L663 OTHER SPECIFIED DISEASES OF HAIR AND HAIR FOLLICLES: ICD-10-CM

## 2021-05-19 DIAGNOSIS — L57.0 ACTINIC KERATOSIS: ICD-10-CM

## 2021-05-19 DIAGNOSIS — L81.4 OTHER MELANIN HYPERPIGMENTATION: ICD-10-CM

## 2021-05-19 DIAGNOSIS — L738 OTHER SPECIFIED DISEASES OF HAIR AND HAIR FOLLICLES: ICD-10-CM

## 2021-05-19 DIAGNOSIS — L82.1 OTHER SEBORRHEIC KERATOSIS: ICD-10-CM

## 2021-05-19 DIAGNOSIS — L73.9 FOLLICULAR DISORDER, UNSPECIFIED: ICD-10-CM

## 2021-05-19 PROBLEM — D22.72 MELANOCYTIC NEVI OF LEFT LOWER LIMB, INCLUDING HIP: Status: ACTIVE | Noted: 2021-05-19

## 2021-05-19 PROBLEM — D22.62 MELANOCYTIC NEVI OF LEFT UPPER LIMB, INCLUDING SHOULDER: Status: ACTIVE | Noted: 2021-05-19

## 2021-05-19 PROBLEM — D22.61 MELANOCYTIC NEVI OF RIGHT UPPER LIMB, INCLUDING SHOULDER: Status: ACTIVE | Noted: 2021-05-19

## 2021-05-19 PROBLEM — D22.5 MELANOCYTIC NEVI OF TRUNK: Status: ACTIVE | Noted: 2021-05-19

## 2021-05-19 PROBLEM — L02.821 FURUNCLE OF HEAD [ANY PART, EXCEPT FACE]: Status: ACTIVE | Noted: 2021-05-19

## 2021-05-19 PROBLEM — D22.71 MELANOCYTIC NEVI OF RIGHT LOWER LIMB, INCLUDING HIP: Status: ACTIVE | Noted: 2021-05-19

## 2021-05-19 PROCEDURE — ? SUNSCREEN TREATMENT REGIMEN

## 2021-05-19 PROCEDURE — 17000 DESTRUCT PREMALG LESION: CPT | Mod: 59

## 2021-05-19 PROCEDURE — ? LIQUID NITROGEN

## 2021-05-19 PROCEDURE — ? COUNSELING

## 2021-05-19 PROCEDURE — 17110 DESTRUCTION B9 LES UP TO 14: CPT

## 2021-05-19 PROCEDURE — 99203 OFFICE O/P NEW LOW 30 MIN: CPT | Mod: 25

## 2021-05-19 ASSESSMENT — LOCATION SIMPLE DESCRIPTION DERM
LOCATION SIMPLE: LEFT EYEBROW
LOCATION SIMPLE: LEFT UPPER ARM
LOCATION SIMPLE: INFERIOR FOREHEAD
LOCATION SIMPLE: UPPER BACK
LOCATION SIMPLE: POSTERIOR SCALP
LOCATION SIMPLE: RIGHT THIGH
LOCATION SIMPLE: ABDOMEN
LOCATION SIMPLE: LEFT THIGH
LOCATION SIMPLE: LEFT FOREARM
LOCATION SIMPLE: RIGHT UPPER ARM
LOCATION SIMPLE: RIGHT UPPER BACK
LOCATION SIMPLE: CHEST
LOCATION SIMPLE: RIGHT FOREARM

## 2021-05-19 ASSESSMENT — LOCATION DETAILED DESCRIPTION DERM
LOCATION DETAILED: RIGHT VENTRAL PROXIMAL FOREARM
LOCATION DETAILED: LOWER STERNUM
LOCATION DETAILED: LEFT ANTERIOR DISTAL THIGH
LOCATION DETAILED: LEFT ANTERIOR PROXIMAL UPPER ARM
LOCATION DETAILED: RIGHT ANTERIOR PROXIMAL UPPER ARM
LOCATION DETAILED: POSTERIOR MID-PARIETAL SCALP
LOCATION DETAILED: LEFT VENTRAL PROXIMAL FOREARM
LOCATION DETAILED: SUBXIPHOID
LOCATION DETAILED: INFERIOR THORACIC SPINE
LOCATION DETAILED: LEFT CENTRAL EYEBROW
LOCATION DETAILED: SUPERIOR THORACIC SPINE
LOCATION DETAILED: RIGHT LATERAL UPPER BACK
LOCATION DETAILED: EPIGASTRIC SKIN
LOCATION DETAILED: RIGHT ANTERIOR PROXIMAL THIGH
LOCATION DETAILED: INFERIOR MID FOREHEAD

## 2021-05-19 ASSESSMENT — LOCATION ZONE DERM
LOCATION ZONE: LEG
LOCATION ZONE: ARM
LOCATION ZONE: TRUNK
LOCATION ZONE: FACE
LOCATION ZONE: SCALP

## 2021-05-19 ASSESSMENT — SEVERITY ASSESSMENT: SEVERITY: MILD

## 2021-05-19 NOTE — PROCEDURE: MIPS QUALITY
Quality 226: Preventive Care And Screening: Tobacco Use: Screening And Cessation Intervention: Patient screened for tobacco use and is an ex/non-smoker
Detail Level: Detailed
Quality 111:Pneumonia Vaccination Status For Older Adults: Pneumococcal Vaccination Previously Received
Quality 130: Documentation Of Current Medications In The Medical Record: Current Medications with Name, Dosage, Frequency, or Route not Documented, Reason not Given

## 2021-05-19 NOTE — PROCEDURE: LIQUID NITROGEN
Render Note In Bullet Format When Appropriate: No
Post-Care Instructions: I reviewed with the patient in detail post-care instructions. Patient is to wear sunprotection, and avoid picking at any of the treated lesions. Pt may apply Vaseline to crusted or scabbing areas.
Detail Level: Detailed
Number Of Freeze-Thaw Cycles: 2 freeze-thaw cycles
Duration Of Freeze Thaw-Cycle (Seconds): 0
Consent: The patient's consent was obtained including but not limited to risks of crusting, scabbing, blistering, scarring, darker or lighter pigmentary change, recurrence, incomplete removal and infection.
Medical Necessity Clause: This procedure was medically necessary because the lesions that were treated were:
Medical Necessity Information: It is in your best interest to select a reason for this procedure from the list below. All of these items fulfill various CMS LCD requirements except the new and changing color options.

## 2021-08-24 ENCOUNTER — APPOINTMENT (OUTPATIENT)
Dept: MEDICAL GROUP | Facility: PHYSICIAN GROUP | Age: 81
End: 2021-08-24
Payer: MEDICARE

## 2021-08-24 ENCOUNTER — OFFICE VISIT (OUTPATIENT)
Dept: MEDICAL GROUP | Facility: PHYSICIAN GROUP | Age: 81
End: 2021-08-24

## 2021-08-24 VITALS
TEMPERATURE: 97.7 F | WEIGHT: 129.2 LBS | BODY MASS INDEX: 22.06 KG/M2 | HEART RATE: 80 BPM | DIASTOLIC BLOOD PRESSURE: 68 MMHG | OXYGEN SATURATION: 93 % | HEIGHT: 64 IN | SYSTOLIC BLOOD PRESSURE: 118 MMHG | RESPIRATION RATE: 20 BRPM

## 2021-08-24 DIAGNOSIS — Z78.0 POSTMENOPAUSAL: ICD-10-CM

## 2021-08-24 DIAGNOSIS — R60.0 PEDAL EDEMA: ICD-10-CM

## 2021-08-24 PROCEDURE — 99213 OFFICE O/P EST LOW 20 MIN: CPT | Performed by: FAMILY MEDICINE

## 2021-08-24 RX ORDER — FUROSEMIDE 20 MG/1
TABLET ORAL
COMMUNITY
Start: 2021-06-22 | End: 2021-12-02 | Stop reason: SDUPTHER

## 2021-08-24 ASSESSMENT — FIBROSIS 4 INDEX: FIB4 SCORE: 2.7

## 2021-08-24 ASSESSMENT — PATIENT HEALTH QUESTIONNAIRE - PHQ9: CLINICAL INTERPRETATION OF PHQ2 SCORE: 0

## 2021-08-24 NOTE — LETTER
[facility]  Amaris Sanchez P.A.-C.  [address]  [place] [address]  Fax: [phone]   Authorization for Release/Disclosure of   Protected Health Information   Name: DANIELITO CRAWFORD : 1940 SSN: [id number]   Address: [address] [place] [address] [place] [address] Phone:    [phone] (home)    I authorize the entity listed below to release/disclose the PHI below to:   [facility]/Amaris Sanchez P.A.-C. and Miladis Cisneros M.D.   Provider or Entity Name:  [facility] - [place], [place]  Urgent Care visit in    Address   City, State, Zip   Phone:      Fax:     Reason for request: continuity of care   Information to be released:    [  ] LAST COLONOSCOPY,  including any PATH REPORT and follow-up  [  ] LAST FIT/COLOGUARD RESULT [  ] LAST DEXA  [  ] LAST MAMMOGRAM  [  ] LAST PAP  [  ] LAST LABS [  ] RETINA EXAM REPORT  [  ] IMMUNIZATION RECORDS  [XX] Release all info      [  ] Check here and initial the line next to each item to release ALL health information INCLUDING  _____ Care and treatment for drug and / or alcohol abuse  _____ HIV testing, infection status, or AIDS  _____ Genetic Testing    DATES OF SERVICE OR TIME PERIOD TO BE DISCLOSED: _____________  I understand and acknowledge that:  * This Authorization may be revoked at any time by you in writing, except if your health information has already been used or disclosed.  * Your health information that will be used or disclosed as a result of you signing this authorization could be re-disclosed by the recipient. If this occurs, your re-disclosed health information may no longer be protected by State or Federal laws.  * You may refuse to sign this Authorization. Your refusal will not affect your ability to obtain treatment.  * This Authorization becomes effective upon signing and will  on (date) __________.      If no date is indicated, this Authorization will  one (1) year from the signature date.     Name: Regla Karen Renea    Signature:   Date:     8/24/2021       PLEASE FAX REQUESTED RECORDS BACK TO: [phone]

## 2021-08-24 NOTE — PROGRESS NOTES
"Subjective:     CC: pedal edema    HPI:   Regla presents today with     Pedal edema  Onset: 2 months  Location: bilateral feet/ankles  Duration: intermittent  Timing: improves overnight  Quality: swelling  Triggers: worse - heat/humidity, being on feet  Associated symptoms: no orthopnea, no PND, +toe numbness - when feet are swollen    She [place] for 2 [place]. She saw urgent care and was prescribed furosemide which she has been using as needed. She reports they did blood work. They told her to use compression stockings. If she uses the compression stockings ad stays off her feet, it works. She flew back a few days ago and had swelling, but didn't use compression stockings.      Current Outpatient Medications Ordered in Epic   Medication Sig Dispense Refill   • furosemide (LASIX) 20 MG Tab      • Multiple Vitamins-Minerals (PRESERVISION/LUTEIN) Cap TAKE 1 CAPSULE BY MOUTH TWICE DAILY FOR 6 MONTHS  3     No current [facility]-ordered facility-administered medications on file.       Health Maintenance: Completed    ROS:  Gen: no fevers/chills   ENT: no bloody nose  CV: no chest pain      Objective:     Exam:  /68 (BP Location: Left arm, Patient Position: Sitting, BP Cuff Size: Adult)   Pulse 80   Temp 36.5 °C (97.7 °F) (Temporal)   Resp 20   Ht 1.626 m (5' 4\")   Wt 58.6 kg (129 lb 3.2 oz)   SpO2 93%   BMI 22.18 kg/m²  Body mass index is 22.18 kg/m².    Gen: Alert and oriented, No apparent distress.  Neck: Neck is supple without lymphadenopathy.  Lungs: Normal effort, CTA bilaterally, no wheezes, rhonchi, or rales  CV: Regular rate and rhythm. No murmurs, rubs, or gallops.  Ext: No clubbing, cyanosis, edema.    Assessment & Plan:     80 y.o. female with the following -     1. Pedal edema  This is an acute condition. [place], for approximately 12 months. While she was there she noticed intermittent swelling of her " bilateral feet and ankles. It would improve overnight. Seem to be worse with the heat and humidity and when she would stand her feet for long time. No associated orthopnea or paroxysmal nocturnal dyspnea. She has no swelling today during her visit. Therefore, I suspect she has chronic venous insufficiency. She was seen in urgent care while in [place] and they told her to use compression stockings and provided furosemide for her.  -I have encouraged her to continue using compression stockings  -She can use the furosemide as needed if the swelling is bad    2. Postmenopausal  She reports has been more than 5 years since her last DEXA so 1 has been ordered today.  - DS-BONE DENSITY STUDY (DEXA); Future    Return if symptoms worsen or fail to improve.    Please note that this dictation was created using voice recognition software. I have made every reasonable attempt to correct obvious errors, but I expect that there are errors of grammar and possibly content that I did not discover before finalizing the note.

## 2021-08-24 NOTE — ASSESSMENT & PLAN NOTE
Onset: 2 months  Location: bilateral feet/ankles  Duration: intermittent  Timing: improves overnight  Quality: swelling  Triggers: worse - heat/humidity, being on feet  Associated symptoms: no orthopnea, no PND, +toe numbness - when feet are swollen    She [place] had been [place] for 2 [place]. She saw urgent care and was prescribed furosemide which she has been using as needed. She reports they did blood work. They told her to use compression stockings. If she uses the compression stockings ad stays off her feet, it works. She flew back a few days ago and had swelling, but didn't use compression stockings.

## 2021-08-31 ENCOUNTER — HOSPITAL ENCOUNTER (OUTPATIENT)
Dept: RADIOLOGY | Facility: MEDICAL CENTER | Age: 81
End: 2021-08-31
Attending: FAMILY MEDICINE
Payer: MEDICARE

## 2021-08-31 DIAGNOSIS — Z78.0 POSTMENOPAUSAL: ICD-10-CM

## 2021-08-31 DIAGNOSIS — Z00.00 WELLNESS EXAMINATION: ICD-10-CM

## 2021-08-31 DIAGNOSIS — E78.2 MIXED HYPERLIPIDEMIA: ICD-10-CM

## 2021-08-31 PROCEDURE — 77080 DXA BONE DENSITY AXIAL: CPT

## 2021-09-03 ENCOUNTER — HOSPITAL ENCOUNTER (OUTPATIENT)
Dept: LAB | Facility: MEDICAL CENTER | Age: 81
End: 2021-09-03
Attending: PHYSICIAN ASSISTANT
Payer: MEDICARE

## 2021-09-03 DIAGNOSIS — Z00.00 WELLNESS EXAMINATION: ICD-10-CM

## 2021-09-03 DIAGNOSIS — E78.2 MIXED HYPERLIPIDEMIA: ICD-10-CM

## 2021-09-03 LAB
ALBUMIN SERPL BCP-MCNC: 3.6 G/DL (ref 3.2–4.9)
ALBUMIN/GLOB SERPL: 1.4 G/DL
ALP SERPL-CCNC: 64 U/L (ref 30–99)
ALT SERPL-CCNC: 14 U/L (ref 2–50)
ANION GAP SERPL CALC-SCNC: 14 MMOL/L (ref 7–16)
AST SERPL-CCNC: 32 U/L (ref 12–45)
BASOPHILS # BLD AUTO: 0.9 % (ref 0–1.8)
BASOPHILS # BLD: 0.04 K/UL (ref 0–0.12)
BILIRUB SERPL-MCNC: 0.5 MG/DL (ref 0.1–1.5)
BUN SERPL-MCNC: 17 MG/DL (ref 8–22)
CALCIUM SERPL-MCNC: 9.5 MG/DL (ref 8.5–10.5)
CHLORIDE SERPL-SCNC: 102 MMOL/L (ref 96–112)
CHOLEST SERPL-MCNC: 244 MG/DL (ref 100–199)
CO2 SERPL-SCNC: 24 MMOL/L (ref 20–33)
CREAT SERPL-MCNC: 0.64 MG/DL (ref 0.5–1.4)
EOSINOPHIL # BLD AUTO: 0.21 K/UL (ref 0–0.51)
EOSINOPHIL NFR BLD: 5.2 % (ref 0–6.9)
ERYTHROCYTE [DISTWIDTH] IN BLOOD BY AUTOMATED COUNT: 46.3 FL (ref 35.9–50)
FASTING STATUS PATIENT QL REPORTED: NORMAL
GLOBULIN SER CALC-MCNC: 2.5 G/DL (ref 1.9–3.5)
GLUCOSE SERPL-MCNC: 96 MG/DL (ref 65–99)
HCT VFR BLD AUTO: 43.5 % (ref 37–47)
HDLC SERPL-MCNC: 72 MG/DL
HGB BLD-MCNC: 14 G/DL (ref 12–16)
LDLC SERPL CALC-MCNC: 155 MG/DL
LYMPHOCYTES # BLD AUTO: 1.35 K/UL (ref 1–4.8)
LYMPHOCYTES NFR BLD: 33 % (ref 22–41)
MANUAL DIFF BLD: NORMAL
MCH RBC QN AUTO: 30.7 PG (ref 27–33)
MCHC RBC AUTO-ENTMCNC: 32.2 G/DL (ref 33.6–35)
MCV RBC AUTO: 95.4 FL (ref 81.4–97.8)
METAMYELOCYTES NFR BLD MANUAL: 0.9 %
MONOCYTES # BLD AUTO: 0.43 K/UL (ref 0–0.85)
MONOCYTES NFR BLD AUTO: 10.4 % (ref 0–13.4)
MORPHOLOGY BLD-IMP: NORMAL
NEUTROPHILS # BLD AUTO: 2.03 K/UL (ref 2–7.15)
NEUTROPHILS NFR BLD: 49.6 % (ref 44–72)
NRBC # BLD AUTO: 0 K/UL
NRBC BLD-RTO: 0 /100 WBC
PLATELET # BLD AUTO: 206 K/UL (ref 164–446)
PLATELET BLD QL SMEAR: NORMAL
PMV BLD AUTO: 10.1 FL (ref 9–12.9)
POTASSIUM SERPL-SCNC: 4.7 MMOL/L (ref 3.6–5.5)
PROT SERPL-MCNC: 6.1 G/DL (ref 6–8.2)
RBC # BLD AUTO: 4.56 M/UL (ref 4.2–5.4)
SODIUM SERPL-SCNC: 140 MMOL/L (ref 135–145)
TRIGL SERPL-MCNC: 86 MG/DL (ref 0–149)
WBC # BLD AUTO: 4.1 K/UL (ref 4.8–10.8)

## 2021-09-03 PROCEDURE — 80053 COMPREHEN METABOLIC PANEL: CPT

## 2021-09-03 PROCEDURE — 85027 COMPLETE CBC AUTOMATED: CPT

## 2021-09-03 PROCEDURE — 36415 COLL VENOUS BLD VENIPUNCTURE: CPT

## 2021-09-03 PROCEDURE — 80061 LIPID PANEL: CPT

## 2021-09-03 PROCEDURE — 85007 BL SMEAR W/DIFF WBC COUNT: CPT

## 2021-09-10 ENCOUNTER — OFFICE VISIT (OUTPATIENT)
Dept: MEDICAL GROUP | Facility: PHYSICIAN GROUP | Age: 81
End: 2021-09-10
Payer: MEDICARE

## 2021-09-10 VITALS
OXYGEN SATURATION: 97 % | HEART RATE: 85 BPM | TEMPERATURE: 98 F | DIASTOLIC BLOOD PRESSURE: 60 MMHG | RESPIRATION RATE: 16 BRPM | HEIGHT: 62 IN | BODY MASS INDEX: 23.85 KG/M2 | WEIGHT: 129.6 LBS | SYSTOLIC BLOOD PRESSURE: 116 MMHG

## 2021-09-10 DIAGNOSIS — R60.0 PEDAL EDEMA: ICD-10-CM

## 2021-09-10 PROCEDURE — 99212 OFFICE O/P EST SF 10 MIN: CPT | Performed by: STUDENT IN AN ORGANIZED HEALTH CARE EDUCATION/TRAINING PROGRAM

## 2021-09-10 ASSESSMENT — FIBROSIS 4 INDEX: FIB4 SCORE: 3.28

## 2021-09-10 NOTE — PROGRESS NOTES
Chief Complaint   Patient presents with   • Annual Exam   • Follow-Up     swollen ankles        HPI:  Regla is a 80 y.o. here for Medicare Annual Wellness Visit    ***    Patient Active Problem List    Diagnosis Date Noted   • Pedal edema 08/24/2021   • Hyperlipemia 11/26/2019   • Legally blind in left eye, as defined in [place] 11/26/2019   • History of retinal vein occlusion 04/10/2019       Current Outpatient Medications   Medication Sig Dispense Refill   • furosemide (LASIX) 20 MG Tab      • Multiple Vitamins-Minerals (PRESERVISION/LUTEIN) Cap TAKE 1 CAPSULE BY MOUTH TWICE DAILY FOR 6 MONTHS  3     No current facility-administered medications for this visit.        Patient is taking medications as noted in medication list.  Current supplements as per medication list.     Allergies: Pcn [penicillins]    Current social contact/activities: [facility] family activities      Is patient current with immunizations? Yes.    She  reports that she has never smoked. She has never used smokeless tobacco. She reports current alcohol use of about 0.6 oz of alcohol per week. She reports that she does not use drugs.  Counseling given: Not Answered      DPA/Advanced directive: Patient has Living Will, but it is not on file. Instructed to bring in a copy to scan into their chart.    ROS:    Gait: Uses no assistive device ***  Ostomy: No ***  Other tubes: No ***  Amputations: No ***  Chronic oxygen use No ***  Last eye exam legally blind left eye every 6 mo ***  Wears hearing aids: No ***  : Reports urinary leakage during the last 6 months that has somewhat interfered with their daily activities or sleep.  ***    Screening:  ***  Depression Screening  Little interest or pleasure in doing things?     Feeling down, depressed, or hopeless?    Trouble falling or staying asleep, or sleeping too much?     Feeling tired or having little energy?     Poor appetite or overeating?     Feeling bad about yourself - or that you are a failure  or have let yourself or your family down?    Trouble concentrating on things, such as reading the newspaper or watching television?    Moving or speaking so slowly that other people could have noticed.  Or the opposite - being so fidgety or restless that you have been moving around a lot more than usual?     Thoughts that you would be better off dead, or of hurting yourself?     Patient Health Questionnaire Score:      If depressive symptoms identified deferred to follow up visit unless specifically addressed in assessment and plan.    Interpretation of PHQ-9 Total Score   Score Severity   1-4 No Depression   5-9 Mild Depression   10-14 Moderate Depression   15-19 Moderately Severe Depression   20-27 Severe Depression      Screening for Cognitive Impairment  Three Minute Recall (captain, aixa, picture)   /3    Draw clock face with all 12 numbers and set the hands to show 5 past 8.       If cognitive concerns identified, deferred for follow up unless specifically addressed in assessment and plan.    Fall Risk Assessment  Has the patient had two or more falls in the last year or any fall with injury in the last year?     If fall risk identified, deferred for follow up unless specifically addressed in assessment and plan.    Safety Assessment  Throw rugs on floor.     Handrails on all stairs.     Good lighting in all hallways.     Difficulty hearing.     Patient counseled about all safety risks that were identified.    Functional Assessment ADLs  Are there any barriers preventing you from cooking for yourself or meeting nutritional needs?   .    Are there any barriers preventing you from driving safely or obtaining transportation?   .    Are there any barriers preventing you from using a telephone or calling for help?   .    Are there any barriers preventing you from shopping?   .    Are there any barriers preventing you from taking care of your own finances?   .    Are there any barriers preventing you from managing  "your medications?     .    Are there any barriers preventing you from showering, bathing or dressing yourself?   .    Are you currently engaging in any exercise or physical activity?   .     What is your perception of your health?   .    Health Maintenance Summary                IMM ZOSTER VACCINES Overdue 10/19/1991     IMM PNEUMOCOCCAL VACCINE: 65+ Years Overdue 10/19/2006     Annual Wellness Visit Overdue 12/2/2020      Done 12/2/2019 Visit Dx: Medicare annual wellness visit, subsequent     Patient has more history with this topic...    IMM INFLUENZA Overdue 9/1/2021      Done 3/3/2020 Imm Admin: Influenza Vaccine Adult HD    BONE DENSITY Next Due 8/31/2026      Done 8/31/2021 DS-BONE DENSITY STUDY (DEXA)    IMM DTaP/Tdap/Td Vaccine Next Due 11/11/2028      Done 11/11/2018 Imm Admin: Tdap Vaccine          Patient Care Team:  Amaris Sanchez P.A.-C. as PCP - General (Family Medicine)    Social History     Tobacco Use   • Smoking status: Never Smoker   • Smokeless tobacco: Never Used   Vaping Use   • Vaping Use: Never used   Substance Use Topics   • Alcohol use: Yes     Alcohol/week: 0.6 oz     Types: 1 Glasses of wine per week     Comment: occ   • Drug use: No     Family History   Problem Relation Age of Onset   • Other Mother         TIA    • Other Father         Abdominal aneurysm    • Alcohol abuse Brother    • Heart Disease Brother    • No Known Problems Daughter    • No Known Problems Daughter      She  has a past medical history of Hyperlipemia.   Past Surgical History:   Procedure Laterality Date   • CYSTECTOMY  1963   • CATARACT EXTRACTION WITH IOL           Exam:   /60 (BP Location: Left arm, Patient Position: Sitting, BP Cuff Size: Adult)   Pulse 85   Temp 36.7 °C (98 °F) (Temporal)   Resp 16   Ht 1.575 m (5' 2\")   Wt 58.8 kg (129 lb 9.6 oz)   SpO2 97%  Body mass index is 23.7 kg/m².    Hearing good.    Dentition good  Alert, oriented in no acute distress.  Eye contact is good, speech goal " directed, affect calm  ***    Assessment and Plan. The following treatment and monitoring plan is recommended:  ***  There are no diagnoses linked to this encounter.     Services suggested: { AWV COORDINATION OF SERVICES:03154}  Health Care Screening recommendations as per orders if indicated.  Referrals offered: PT/OT/Nutrition counseling/Behavioral Health/Smoking cessation as per orders if indicated.    Discussion today about general wellness and lifestyle habits:    · Prevent falls and reduce trip hazards; Cautioned about securing or removing rugs.  · Have a working fire alarm and carbon monoxide detector;   · Engage in regular physical activity and social activities     Follow-up: Return if symptoms worsen or fail to improve.

## 2021-09-10 NOTE — PROGRESS NOTES
"CC:  There were no encounter diagnoses.    HISTORY OF THE PRESENT ILLNESS: Patient is a 80 y.o. female. This pleasant patient is here today to discuss ***. His/her  PCP is***.    Onset:***  Location:***  Duration:***  Character:***  Aggravating factors:***  Relieving Factors:***  Treatments attempted:***  Situation:***      No problem-specific Assessment & Plan notes found for this encounter.      Current Outpatient Medications Ordered in Epic   Medication Sig Dispense Refill   • furosemide (LASIX) 20 MG Tab      • Multiple Vitamins-Minerals (PRESERVISION/LUTEIN) Cap TAKE 1 CAPSULE BY MOUTH TWICE DAILY FOR 6 MONTHS  3     No current [facility]-ordered facility-administered medications on file.         ROS:   Gen: no fevers/chills, unplanned changes in weight  Eyes: no changes in vision  ENT: no sore throat, no hearing loss, no bloody nose  Pulm: no sob, no cough  CV: no chest pain/pressure, no palpitations  GI: no nausea/vomiting, no diarrhea  : no dysuria/nocturia > once per night  MSk: no myalgias  Skin: no rash  Neuro: no headaches, no numbness/tingling  Heme/Lymph: no easy bruising      Objective:     Exam: /60 (BP Location: Left arm, Patient Position: Sitting, BP Cuff Size: Adult)   Pulse 85   Temp 36.7 °C (98 °F) (Temporal)   Resp 16   Ht 1.575 m (5' 2\")   Wt 58.8 kg (129 lb 9.6 oz)   SpO2 97%  Body mass index is 23.7 kg/m².    General: Normal appearing. No distress.  HEENT: Normocephalic. Eyes conjunctiva clear lids without ptosis, pupils equal and reactive to light accommodation, ears normal shape and contour, canals are clear bilaterally, tympanic membranes are benign, nasal mucosa benign, oropharynx is without erythema, edema or exudates.   Neck: Supple without JVD or bruit. Thyroid is not enlarged.  Pulmonary: Clear to ausculation.  Normal effort. No rales, ronchi, or wheezing.  Cardiovascular: Regular rate and rhythm without murmur. Radial pulses are intact and equal bilaterally.  Abdomen: Soft, " nontender, nondistended. Normal bowel sounds. Liver and spleen are not palpable  Neurologic: Grossly nonfocal  Lymph: No cervical or supraclavicular lymph nodes are palpable  Skin: Warm and dry.  No obvious lesions.  Musculoskeletal: Normal gait. No extremity cyanosis, clubbing, or edema.  Psych: Normal mood and affect. Alert and oriented x3. Judgment and insight is normal.    A chaperone was offered to the patient during today's exam. {CHAPERONE:[id number]}    Labs: ***    Assessment & Plan:   80 y.o. female with the following -    There are no diagnoses linked to this encounter.    I spent a total of *** minutes with record review, exam, communication with the patient, communication with other providers, and documentation of this encounter.    No follow-ups on file.    Please note that this dictation was created using voice recognition software. I have made every reasonable attempt to correct obvious errors, but I expect that there are errors of grammar and possibly content that I did not discover before finalizing the note.    Antoni Cotter PA-C 9/10/2021

## 2021-09-14 NOTE — PROGRESS NOTES
"CC: Bilateral pedal edema, laboratory follow-up.    HISTORY OF THE PRESENT ILLNESS: Patient is a 80 y.o. female.  Her  PCP is Amaris Sanchez PA-C.    1.  Bilateral ankle swelling.  This is been ongoing since her recent travels, several months now.  She has had increased swelling of the lower leg and ankles.  Equal bilaterally without any pain.  She has been using furosemide 20 mg daily as needed to alleviate symptoms.  She says that this does work pretty well.    2.  Laboratory follow-up  Ms. Meier wishes to have her CBC, CMP and lipid profile from 9/3/2021 explained.    No problem-specific Assessment & Plan notes found for this encounter.      Current Outpatient Medications Ordered in Epic   Medication Sig Dispense Refill   • furosemide (LASIX) 20 MG Tab      • Multiple Vitamins-Minerals (PRESERVISION/LUTEIN) Cap TAKE 1 CAPSULE BY MOUTH TWICE DAILY FOR 6 MONTHS  3     No current [facility]-ordered facility-administered medications on file.     ROS:   Gen: no fevers/chills, unplanned changes in weight  Eyes: no changes in vision  ENT: no sore throat, no hearing loss, no bloody nose  Pulm: no sob, no cough  CV: no chest pain/pressure, no palpitations  GI: no nausea/vomiting, no diarrhea  : no dysuria/nocturia > once per night  MSk: No myalgias, bilateral pedal swelling.  Skin: no rash  Neuro: no headaches, no numbness/tingling  Heme/Lymph: no easy bruising      Objective:     Exam: /60 (BP Location: Left arm, Patient Position: Sitting, BP Cuff Size: Adult)   Pulse 85   Temp 36.7 °C (98 °F) (Temporal)   Resp 16   Ht 1.575 m (5' 2\")   Wt 58.8 kg (129 lb 9.6 oz)   SpO2 97%  Body mass index is 23.7 kg/m².    General: Normal appearing. No distress.  HEENT: Normocephalic. Eyes conjunctiva clear lids without ptosis, pupils equal and reactive to light accommodation, ears normal shape and contour, canals are clear bilaterally, tympanic membranes are benign, nasal mucosa benign, oropharynx is without erythema, edema or " exudates.   Neck: Supple without JVD or bruit. Thyroid is not enlarged.  Pulmonary: Clear to ausculation.  Normal effort. No rales, ronchi, or wheezing.  Cardiovascular: Regular rate and rhythm without murmur. Radial pulses are intact and equal bilaterally.  Abdomen: Soft, nontender, nondistended. Normal bowel sounds. Liver and spleen are not palpable  Neurologic: Grossly nonfocal  Lymph: No cervical or supraclavicular lymph nodes are palpable  Skin: Warm and dry.  No obvious lesions.  Musculoskeletal: Normal gait. No extremity cyanosis, clubbing, or edema.  Psych: Normal mood and affect. Alert and oriented x3. Judgment and insight is normal.    A chaperone was offered to the patient during today's exam. Patient declined chaperone.    Labs:   9/3/2021:  -CBC demonstrates diminished WBCs of 4.1  -CMP is within normal limits  -Lipid panel shows cholesterol 244, triglycerides of 86, HDL of 72, LDL of 155.    Assessment & Plan:   80 y.o. female with the following -    1.  Bilateral pedal edema  -Chronic, stable.  Ms. Meier has been satisfied with the quality of therapy that she is perceived by using furosemide 20 mg daily as needed for the resolution of her pedal edema.  She was just concerned about taking this medication long-term.  We discussed the benefits and potential side effects and she seems content with continuing this medication.    2.  Laboratory follow-up  -I spent significant amount of time answering Mrs. Meier questions about her recent laboratory studies.  We discussed her lipid profile in particular and I offered her numerous treatment options.  She felt comfortable not treating this for now.    Return if symptoms worsen or fail to improve.    Please note that this dictation was created using voice recognition software. I have made every reasonable attempt to correct obvious errors, but I expect that there are errors of grammar and possibly content that I did not discover before finalizing the  note.    Antoni Cotter PA-C 9/13/2021

## 2021-09-26 ENCOUNTER — APPOINTMENT (OUTPATIENT)
Dept: RADIOLOGY | Facility: MEDICAL CENTER | Age: 81
End: 2021-09-26
Attending: EMERGENCY MEDICINE
Payer: MEDICARE

## 2021-09-26 ENCOUNTER — HOSPITAL ENCOUNTER (EMERGENCY)
Facility: MEDICAL CENTER | Age: 81
End: 2021-09-26
Attending: EMERGENCY MEDICINE
Payer: MEDICARE

## 2021-09-26 VITALS
HEIGHT: 63 IN | RESPIRATION RATE: 16 BRPM | SYSTOLIC BLOOD PRESSURE: 123 MMHG | WEIGHT: 129.63 LBS | DIASTOLIC BLOOD PRESSURE: 76 MMHG | TEMPERATURE: 97.5 F | OXYGEN SATURATION: 96 % | HEART RATE: 68 BPM | BODY MASS INDEX: 22.97 KG/M2

## 2021-09-26 DIAGNOSIS — S32.010A CLOSED COMPRESSION FRACTURE OF BODY OF L1 VERTEBRA (HCC): ICD-10-CM

## 2021-09-26 DIAGNOSIS — S22.080A COMPRESSION FRACTURE OF T12 VERTEBRA, INITIAL ENCOUNTER (HCC): ICD-10-CM

## 2021-09-26 LAB — EKG IMPRESSION: NORMAL

## 2021-09-26 PROCEDURE — 93005 ELECTROCARDIOGRAM TRACING: CPT

## 2021-09-26 PROCEDURE — 72100 X-RAY EXAM L-S SPINE 2/3 VWS: CPT

## 2021-09-26 PROCEDURE — 96374 THER/PROPH/DIAG INJ IV PUSH: CPT

## 2021-09-26 PROCEDURE — 93005 ELECTROCARDIOGRAM TRACING: CPT | Performed by: EMERGENCY MEDICINE

## 2021-09-26 PROCEDURE — 99284 EMERGENCY DEPT VISIT MOD MDM: CPT

## 2021-09-26 PROCEDURE — 36415 COLL VENOUS BLD VENIPUNCTURE: CPT

## 2021-09-26 ASSESSMENT — FIBROSIS 4 INDEX: FIB4 SCORE: 3.28

## 2021-09-26 NOTE — ED PROVIDER NOTES
ED Provider Note    Scribed for Gerard Wu M.D. by Mathieu Leon. 9/26/2021  11:33 AM    Primary care provider: Amaris Sanchez P.A.-C.  Means of arrival: Walk in  History obtained from: Patient  History limited by: None    CHIEF COMPLAINT  Chief Complaint   Patient presents with    Low Back Pain    Electric Shock     Pt may have hit electric fence around animal pen, feels was throw back landing on back       HPI  Regla Meier is a 80 y.o. female with a history of osteopenia who presents to the Emergency Department for lower back pain onset earlier today. Per the patient, she had been harvesting vegetables from her garden and was tossing something into a chicken pen, which is bordered by an electric fence, when she suddenly found herself on her back next to the pen; she notes she did not feel a shock and that she hit the back of her head against the sand around the pen. She reports additional symptoms of numbness in bilateral lower extremities (resolved 10 minutes after the event), and denies loss of consciousness or upper back pain. No other exacerbating or alleviating factors were noted. She has a history of compression fractures.     REVIEW OF SYSTEMS  Pertinent positives include low back pain, bilateral lower extremity numbness.   Pertinent negatives include no loss of consciousness or upper back pain.    All systems otherwise negative. See HPI for further details.     PAST MEDICAL HISTORY   has a past medical history of Hyperlipemia.    SURGICAL HISTORY   has a past surgical history that includes cystectomy (1963) and cataract extraction with iol.    SOCIAL HISTORY  Social History     Tobacco Use    Smoking status: Never Smoker    Smokeless tobacco: Never Used   Vaping Use    Vaping Use: Never used   Substance Use Topics    Alcohol use: Yes     Alcohol/week: 0.6 oz     Types: 1 Glasses of wine per week    Drug use: No      Social History     Substance and Sexual Activity   Drug Use No       FAMILY  "HISTORY  Family History   Problem Relation Age of Onset    Other Mother         TIA     Other Father         Abdominal aneurysm     Alcohol abuse Brother     Heart Disease Brother     No Known Problems Daughter     No Known Problems Daughter        CURRENT MEDICATIONS  Current Outpatient Medications   Medication Instructions    furosemide (LASIX) 20 MG Tab No dose, route, or frequency recorded.    Multiple Vitamins-Minerals (PRESERVISION/LUTEIN) Cap TAKE 1 CAPSULE BY MOUTH TWICE DAILY FOR 6 MONTHS     ALLERGIES  Allergies   Allergen Reactions    Pcn [Penicillins] Hives       PHYSICAL EXAM  VITAL SIGNS: /79   Pulse 81   Temp 36.3 °C (97.4 °F) (Temporal)   Resp 15   Ht 1.6 m (5' 3\")   Wt 58.8 kg (129 lb 10.1 oz)   SpO2 96%   BMI 22.96 kg/m²     Nursing note and vitals reviewed.  Constitutional: Well-developed and well-nourished. No distress.   HENT: Head is normocephalic and atraumatic. Oropharynx is clear and moist without exudate or erythema.   Eyes: Pupils are equal, round, and reactive to light. Conjunctiva are normal.   Cardiovascular: Normal rate and regular rhythm. No murmur heard. Normal radial pulses.  Pulmonary/Chest: Breath sounds normal. No wheezes or rales.   Abdominal: Soft and non-tender. No distention    Musculoskeletal: 5/5 bilateral lower extremity strength, Extremities exhibit normal range of motion without edema or tenderness.   Back: Tenderness over L5, no saddle anesthesia.    Neurological: Awake, alert and oriented to person, place, and time. No focal deficits noted.  Skin: Skin is warm and dry. No rash.   Psychiatric: Normal mood and affect. Appropriate for clinical situation    DIAGNOSTIC STUDIES / PROCEDURES    EKG Interpretation  Interpreted by me as below  Results for orders placed or performed during the hospital encounter of 09/26/21   EKG   Result Value Ref Range    Report       [facility] Emergency Dept.    Test Date:  2021-09-26  Pt Name:    " DANIELITO CRAWFORD              Department: [facility]  MRN:        [id number]                      Room:  Gender:     Female                       Technician: [id number]  :        [id number]                   Requested By:ER TRIAGE PROTOCOL  Order #:    [id number]                    Reading MD: MICHAEL VAN MD    Measurements  Intervals                                Axis  Rate:       76                           P:          71  AK:         170                          QRS:        -65  QRSD:       111                          T:          14  QT:         406  QTc:        457    Interpretive Statements  Sinus rhythm  Atrial premature complex  LAD, consider left anterior fascicular block  Low voltage, extremity and precordial leads  Probable anteroseptal infarct, old  No previous ECG available for comparison  Electronically Signed On 2021 12:40:29 PDT by MICHAEL VAN MD       All labs reviewed by me.    RADIOLOGY  DX-LUMBAR SPINE-2 OR 3 VIEWS   Final Result      1.  T12 and L1 compression fracture      2.  Degenerative subluxation at L4-5      3.  Levoconvex scoliosis      4.  Facet arthropathy at all lumbar level        The radiologist's interpretation of all radiological studies have been reviewed by me.    COURSE & MEDICAL DECISION MAKING  Nursing notes, VS, PMSFHx reviewed in chart.     11:33 AM - Patient seen and examined at bedside.  Ordered DX-lumbar spine and an EKG to evaluate her symptoms. The differential diagnoses include but are not limited to: compression fracture     12:24 PM - Patient's x-ray reveals T12 and L1 compression fractures. Paged ortho for consult.    12:34 PM - Recheck: Patient re-evaluated at beside. Discussed patient's condition and treatment plan. Patient's radiology results discussed. The patient understood and is in agreement. At this time, she states she had a previous L1 compression fracture following a skiing accident years ago     12:40 PM - I discussed the patient's case and the  above findings with Dr. Casey (Spine specialist) who recommends placing the patient in a TLSO brace, and would like the patient to call to schedule a follow up appointment.     12:55 PM - Patient re-evaluated at bedside. Discussed the patient's condition and treatment plan, including my plans for discharge. Patient's radiology results discussed. Advised she follow up with Dr. Casey tomorrow morning. Gave further discharge instructions and return precautions.The patient understood and is comfortable with discharge. At this time, the patient was given the opportunity to ask questions.     The patient will return for new or worsening symptoms and is stable at the time of discharge.    The patient is referred to a primary physician for blood pressure management, diabetic screening, and for all other preventative health concerns.      DISPOSITION:  Patient will be discharged home in stable condition.    FOLLOW UP:  Amaris Sanchez P.A.-C.  [address]  [place] [address] [phone]    Schedule an appointment as soon as possible for a visit       [facility], Emergency Dept  [address]  [facility] [id number] [phone]    If symptoms worsen    Denny Casey M.D.  [address]  [address]  [place] [id number] [phone]    Call on 9/27/2021  on-call spine specialist    FINAL IMPRESSION  1. Compression fracture of T12 vertebra, initial encounter (HCC)    2. Closed compression fracture of body of L1 vertebra (HCC)          Mathieu ZIMMERMAN (Flor), am scribing for, and in the presence of, Gerard Wu M.D..    Electronically signed by: Mathieu Leon (Scribe), 9/26/2021    Gerard ZIMMERMAN M.D. personally performed the services described in this documentation, as scribed by Mathieu Leon in my presence, and it is both accurate and complete.    C    The note accurately reflects work and decisions made by me.  Gerard Wu M.D.  9/26/2021  1:25 PM

## 2021-09-26 NOTE — ED NOTES
Discharged in good condition with follow up instructions, pt verbalizes understanding of all, ambulates out with steady gait accompanied by spouse.

## 2021-09-26 NOTE — ED TRIAGE NOTES
"Chief Complaint   Patient presents with   • Low Back Pain   • Electric Shock     Pt may have hit electric fence around animal pen, feels was throw back landing on back     /79   Pulse 81   Temp 36.3 °C (97.4 °F) (Temporal)   Resp 15   Ht 1.6 m (5' 3\")   Wt 58.8 kg (129 lb 10.1 oz)   SpO2 96%   BMI 22.96 kg/m²     Pt denies LOC, states did feel numb BLE, states is \"better now.\"    Has this patient been vaccinated for COVID YES  If not, would they like to be vaccinated while in the ER if eligible?  NA  Would the patient like to speak with the ERP about the possibility of receiving the COVID vaccine today before making a decision? NA    "

## 2021-09-27 ENCOUNTER — TELEPHONE (OUTPATIENT)
Dept: MEDICAL GROUP | Facility: PHYSICIAN GROUP | Age: 81
End: 2021-09-27

## 2021-09-27 NOTE — TELEPHONE ENCOUNTER
Phone Number Called: [phone]    Call outcome: Left detailed message for patient. Informed to call back with any additional questions.    Message: Would like to schedule follow-up visit with your primary care provider following your ED visit on 9/26, please call [phone] or me @ [phone] to schedule appointment.

## 2021-10-05 ENCOUNTER — TELEPHONE (OUTPATIENT)
Dept: MEDICAL GROUP | Facility: PHYSICIAN GROUP | Age: 81
End: 2021-10-05

## 2021-10-05 NOTE — TELEPHONE ENCOUNTER
Phone conversation with patient & scheduled appointment with primary care provider in follow-up of ED visit on 9/26, patient still having pain.

## 2021-10-06 ENCOUNTER — OFFICE VISIT (OUTPATIENT)
Dept: MEDICAL GROUP | Facility: PHYSICIAN GROUP | Age: 81
End: 2021-10-06
Payer: MEDICARE

## 2021-10-06 VITALS
WEIGHT: 129 LBS | RESPIRATION RATE: 18 BRPM | BODY MASS INDEX: 23.74 KG/M2 | OXYGEN SATURATION: 98 % | HEART RATE: 87 BPM | DIASTOLIC BLOOD PRESSURE: 68 MMHG | HEIGHT: 62 IN | SYSTOLIC BLOOD PRESSURE: 110 MMHG | TEMPERATURE: 98 F

## 2021-10-06 DIAGNOSIS — S32.010A CLOSED COMPRESSION FRACTURE OF BODY OF L1 VERTEBRA (HCC): ICD-10-CM

## 2021-10-06 DIAGNOSIS — S22.080D COMPRESSION FRACTURE OF T12 VERTEBRA WITH ROUTINE HEALING, SUBSEQUENT ENCOUNTER: ICD-10-CM

## 2021-10-06 PROCEDURE — 99214 OFFICE O/P EST MOD 30 MIN: CPT | Performed by: PHYSICIAN ASSISTANT

## 2021-10-06 RX ORDER — TRAMADOL HYDROCHLORIDE 50 MG/1
50-100 TABLET ORAL EVERY 6 HOURS PRN
Qty: 42 TABLET | Refills: 0 | Status: SHIPPED | OUTPATIENT
Start: 2021-10-06 | End: 2021-10-13

## 2021-10-06 ASSESSMENT — FIBROSIS 4 INDEX: FIB4 SCORE: 3.28

## 2021-10-06 ASSESSMENT — PAIN SCALES - GENERAL: PAINLEVEL: 6=MODERATE PAIN

## 2021-10-06 NOTE — PROGRESS NOTES
Chief Complaint   Patient presents with   • Hospital Follow-up     pt fell x 1 wk ago        HISTORY OF PRESENT ILLNESS: Regla Meier is an established 80 y.o. female here to discuss the evaluation and management of:    Patient is a pleasant 80-year-old female here today to follow-up on compression fracture of T12. Patient fell backwards on 9/26/2021 while tossing something into her chicken pen.  She was found by her  and then went to the emergency room.  She was seen in the emergency room on 9/26/2021 and an x-ray was completed.  Results are as follows:    RADIOLOGY  DX-LUMBAR SPINE-2 OR 3 VIEWS   Final Result       1.  T12 and L1 compression fracture       2.  Degenerative subluxation at L4-5       3.  Levoconvex scoliosis       4.  Facet arthropathy at all lumbar level         Patient states in 2000 she sustained an L1 compression fracture.  During emergency room visit patient's case was discussed with spine specialist Dr. Casey and patient was placed in a TLC O brace.  Patient was advised to follow-up with Dr. Casey the following morning.  She tells me that she called to get in with Dr. Casey but was given the run around and now has to do an establishing appointment and cannot be seen until October 28.  Patient still experiencing pain symptoms.  She tells me she is taking Excedrin or Advil every 6 hours with some improvement of pain symptoms but experiencing stomach pain when taking these medications.  She is using a 0 gravity chair, icing, and using TLC O brace when upright as recommended by ER provider.  Patient feels pain symptoms have gradually improved but now pain is predominantly located over bilateral hips and radiates to her lumbar spine region.  Describes pain as a constant aching to throbbing pain.  She denies muscle atrophy, muscle weakness, saddle paresthesia, incontinence.  Patient is inquiring about pain management options.      Patient Active Problem List    Diagnosis Date Noted    • Pedal edema 2021   • Hyperlipemia 2019   • Legally blind in left eye, as defined in [place] 2019   • History of retinal vein occlusion 04/10/2019       Allergies:Pcn [penicillins]    Current Outpatient Medications   Medication Sig Dispense Refill   • traMADol (ULTRAM) 50 MG Tab Take 1-2 Tablets by mouth every 6 hours as needed for Moderate Pain or Severe Pain for up to 7 days. 42 Tablet 0   • furosemide (LASIX) 20 MG Tab      • Multiple Vitamins-Minerals (PRESERVISION/LUTEIN) Cap TAKE 1 CAPSULE BY MOUTH TWICE DAILY FOR 6 MONTHS  3     No current facility-administered medications for this visit.       Social History     Tobacco Use   • Smoking status: Never Smoker   • Smokeless tobacco: Never Used   Vaping Use   • Vaping Use: Never used   Substance Use Topics   • Alcohol use: Yes     Alcohol/week: 0.6 oz     Types: 1 Glasses of wine per week   • Drug use: No       Family Status   Relation Name Status   • Mo 93 y/o    • Fa 92 y/o    • Bro     • Bro  Alive   • Aquilino  Alive   • Aquilino  Alive     Family History   Problem Relation Age of Onset   • Other Mother         TIA    • Other Father         Abdominal aneurysm    • Alcohol abuse Brother    • Heart Disease Brother    • No Known Problems Daughter    • No Known Problems Daughter        ROS:  Review of Systems   Constitutional: Negative for fever, chills, weight loss and malaise/fatigue.   HENT: Negative for ear pain, nosebleeds, congestion, sore throat and neck pain.    Eyes: Negative for blurred vision.   Respiratory: Negative for cough, sputum production, shortness of breath and wheezing.    Cardiovascular: Negative for chest pain, palpitations, orthopnea and leg swelling.   Gastrointestinal: Negative for heartburn, nausea, vomiting and abdominal pain.   Genitourinary: Negative for dysuria, urgency and frequency.   Musculoskeletal: Negative for joint pain.   Skin: Negative for rash and itching.   Neurological: Negative for  "dizziness, tingling, tremors, sensory change, focal weakness and headaches.   Endo/Heme/Allergies: Does not bruise/bleed easily.   Psychiatric/Behavioral: Negative for depression, suicidal ideas and memory loss.  The patient is not nervous/anxious and does not have insomnia.    All other systems reviewed and are negative except as in HPI.    Exam: /68 (BP Location: Left arm, Patient Position: Sitting, BP Cuff Size: Adult)   Pulse 87   Temp 36.7 °C (98 °F) (Temporal)   Resp 18   Ht 1.575 m (5' 2\")   Wt 58.5 kg (129 lb)   SpO2 98%  Body mass index is 23.59 kg/m².  General: Normal appearing. No distress.  HEENT: Normocephalic. Eyes conjunctiva clear lids without ptosis, ears normal shape and contour.  Neck: Supple without JVD. Thyroid is not enlarged.  Pulmonary: Clear to ausculation.  Normal effort. No rales, ronchi, or wheezing.  Cardiovascular: Regular rate and rhythm without murmur.   Abdomen: Nondistended.   Neurologic: Grossly nonfocal.  Cranial nerves are normal.   Skin: Warm and dry.  No rashes or suspicious skin lesions.  Musculoskeletal: No extremity cyanosis, clubbing, or edema.  Patient is wearing a TLC O brace.  Positive for antalgic gait.  Psych: Normal mood and affect. Alert and oriented x3. Judgment and insight is normal.    Medical decision-making and discussion:  1. Compression fracture of T12 vertebra with routine healing, subsequent encounter  2. Closed compression fracture of body of L1 vertebra (HCC)  PDMP reviewed.  No red flags.  During today's appointment patient has been prescribed tramadol 50 mg tab and  given instructions on how to take medication.  Advised patient when taking tramadol to take lowest dose as possible and as infrequent as possible.  Discussed with patient if tramadol 50 mg once every 6 hours does not alleviate pain symptoms to increase to 100 mg every 6 hours as needed.  Discussed side effects and adverse reaction medication with patient.  Advised patient to not " drink alcohol, combine other sedating medications or operate machine while taking prescribed medication.  Patient verbally consented she would take medication as prescribed.  Urgent referral to neurosurgery has been placed.  Advised patient to continue wearing TLCO brace as indicated.  Advised patient avoid lifting/pushing/moving heavy objects.  Advised patient to continue icing as needed.    MRI was not completed during ER visit.    Follow-up for worsening symptoms,lack of expected recovery, or should new symptoms or problems arise.      - REFERRAL TO ORTHOPEDICS  - traMADol (ULTRAM) 50 MG Tab; Take 1-2 Tablets by mouth every 6 hours as needed for Moderate Pain or Severe Pain for up to 7 days.  Dispense: 42 Tablet; Refill: 0      Please note that this dictation was created using voice recognition software. I have made every reasonable attempt to correct obvious errors, but I expect that there are errors of grammar and possibly content that I did not discover before finalizing the note.    Assessment/Plan:  1. Compression fracture of T12 vertebra with routine healing, subsequent encounter  REFERRAL TO ORTHOPEDICS    traMADol (ULTRAM) 50 MG Tab   2. Closed compression fracture of body of L1 vertebra (HCC)  REFERRAL TO ORTHOPEDICS    traMADol (ULTRAM) 50 MG Tab       Return if symptoms worsen or fail to improve.

## 2021-11-18 ENCOUNTER — TELEPHONE (OUTPATIENT)
Dept: SCHEDULING | Facility: IMAGING CENTER | Age: 81
End: 2021-11-18

## 2021-12-02 ENCOUNTER — OFFICE VISIT (OUTPATIENT)
Dept: MEDICAL GROUP | Facility: PHYSICIAN GROUP | Age: 81
End: 2021-12-02
Payer: MEDICARE

## 2021-12-02 VITALS
DIASTOLIC BLOOD PRESSURE: 70 MMHG | BODY MASS INDEX: 23.77 KG/M2 | RESPIRATION RATE: 18 BRPM | TEMPERATURE: 97.8 F | HEART RATE: 93 BPM | HEIGHT: 62 IN | OXYGEN SATURATION: 97 % | WEIGHT: 129.2 LBS | SYSTOLIC BLOOD PRESSURE: 112 MMHG

## 2021-12-02 DIAGNOSIS — Z76.89 ENCOUNTER TO ESTABLISH CARE: ICD-10-CM

## 2021-12-02 DIAGNOSIS — Z23 NEED FOR VACCINATION: ICD-10-CM

## 2021-12-02 DIAGNOSIS — R60.9 PERIPHERAL EDEMA: ICD-10-CM

## 2021-12-02 DIAGNOSIS — R60.0 PEDAL EDEMA: ICD-10-CM

## 2021-12-02 DIAGNOSIS — H54.8 LEGALLY BLIND IN LEFT EYE, AS DEFINED IN USA: ICD-10-CM

## 2021-12-02 DIAGNOSIS — E78.2 MIXED HYPERLIPIDEMIA: ICD-10-CM

## 2021-12-02 DIAGNOSIS — E55.9 VITAMIN D DEFICIENCY: ICD-10-CM

## 2021-12-02 PROCEDURE — G0008 ADMIN INFLUENZA VIRUS VAC: HCPCS | Performed by: NURSE PRACTITIONER

## 2021-12-02 PROCEDURE — 99214 OFFICE O/P EST MOD 30 MIN: CPT | Mod: 25 | Performed by: NURSE PRACTITIONER

## 2021-12-02 PROCEDURE — 90662 IIV NO PRSV INCREASED AG IM: CPT | Performed by: NURSE PRACTITIONER

## 2021-12-02 RX ORDER — VITAMIN B COMPLEX
1000 TABLET ORAL DAILY
COMMUNITY
End: 2022-07-09

## 2021-12-02 RX ORDER — FUROSEMIDE 20 MG/1
20 TABLET ORAL
Qty: 30 TABLET | Refills: 1 | Status: SHIPPED | OUTPATIENT
Start: 2021-12-02 | End: 2022-03-02

## 2021-12-02 ASSESSMENT — FIBROSIS 4 INDEX: FIB4 SCORE: 3.32

## 2021-12-02 NOTE — PROGRESS NOTES
Chief Complaint   Patient presents with   • Establish Care   • Medication Refill     LASIX       HISTORY OF PRESENT ILLNESS: Patient is a 81 y.o. female, established patient who presents today to discuss medical problems as listed below:    Health Maintenance:  COMPLETED     Pedal edema  New to me.  Patient is on furosemide 20 mg twice a week for bilateral ankle swelling.  Most recent labs for September 2021, MP WNL, normal potassium.  She is periodically supplementing with potassium over-the-counter.  Needing refills today.    Hyperlipemia  Results for DANIELITO CRAWFORD (MRN [id number]) as of 12/2/2021 11:47   Ref. Range 9/3/2021 06:39   Cholesterol,Tot Latest Ref Range: 100 - 199 mg/dL 244 (H)   Triglycerides Latest Ref Range: 0 - 149 mg/dL 86   HDL Latest Ref Range: >=40 mg/dL 72   LDL Latest Ref Range: <100 mg/dL 155 (H)   Previously on statin, stopped years ago and currently taking nothing.  Patient declines statins alternatives Rx at this time.  She is taking fish oil supplementation.    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  Blind in left eye, initially diagnosed in early 70s.  Patient had a retinal vein occlusion.  Currently monitored by ophthalmology, [facility] Dr. 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     • CVS LUTEIN-ZEAXANTHIN PO Take 40 mg by mouth.     • QUERCETIN PO Take 475 mg by mouth.     • Coenzyme Q10-Vitamin E (COQ10-VITAMIN E) 100-10 MG-UNIT Cap Take  by mouth.     • vitamin D3 (CHOLECALCIFEROL) 1000 Unit (25 mcg) Tab Take 1,000 Units by mouth every day.     • furosemide (LASIX) 20 MG Tab Take 1 Tablet by mouth two times a week for  90 days. 30 Tablet 1   • Multiple Vitamins-Minerals (PRESERVISION/LUTEIN) Cap TAKE 1 CAPSULE BY MOUTH TWICE DAILY FOR 6 MONTHS  3     No current facility-administered medications for this visit.       Social History     Tobacco Use   • Smoking status: Never Smoker   • Smokeless tobacco: Never Used   Vaping Use   • Vaping Use: Never used   Substance Use Topics   • Alcohol use: Yes     Alcohol/week: 0.6 oz     Types: 1 Glasses of wine per week   • Drug use: No     Social History     Social History Narrative   • Not on file       Family History   Problem Relation Age of Onset   • Other Mother         TIA    • Other Father         Abdominal aneurysm    • Alcohol abuse Brother    • Heart Disease Brother    • No Known Problems Daughter    • No Known Problems Daughter        Allergies, past medical history, past surgical history, family history, social history reviewed and updated.    Review of Systems:     - Constitutional: Negative for fever, chills, unexpected weight change, and fatigue/generalized weakness.     - HEENT: Legally blind in left eye.  Negative for headaches, vision changes, hearing changes, ear pain, ear discharge, rhinorrhea, sinus congestion, sore throat, and neck pain.      - Respiratory: Negative for cough, sputum production, chest congestion, dyspnea, wheezing, and crackles.      - Cardiovascular: Negative for chest pain, palpitations, orthopnea, and bilateral lower extremity edema.     - Gastrointestinal: Negative for heartburn, nausea, vomiting, abdominal pain, hematochezia, melena, diarrhea, constipation, and greasy/foul-smelling stools.     - Genitourinary: Negative for dysuria, polyuria, hematuria, pyuria, urinary urgency, and urinary incontinence.    - Musculoskeletal: Negative for myalgias, back pain, and joint pain.     - Skin: Negative for rash, itching, cyanotic skin color change.     - Neurological: Negative for dizziness, tingling, tremors, focal sensory deficit, focal weakness and  "headaches.     - Endo/Heme/Allergies: Does not bruise/bleed easily.     - Psychiatric/Behavioral: Negative for depression, suicidal/homicidal ideation and memory loss.      All other systems reviewed and are negative    Exam:    /70   Pulse 93   Temp 36.6 °C (97.8 °F) (Temporal)   Resp 18   Ht 1.575 m (5' 2\")   Wt 58.6 kg (129 lb 3.2 oz)   SpO2 97%   BMI 23.63 kg/m²  Body mass index is 23.63 kg/m².    Physical Exam:  Constitutional: Well-developed and well-nourished. Not diaphoretic. No distress.   Skin: Skin is warm and dry. No rash noted.  Head: Atraumatic without lesions.  Eyes: Conjunctivae and extraocular motions are normal. Pupils are equal, round, and reactive to light. No scleral icterus.   Ears:  External ears unremarkable. Tympanic membranes clear and intact.  Nose: Nares patent. Septum midline. Turbinates without erythema nor edema. No discharge.   Mouth/Throat: Dentition is normal. Tongue normal. Oropharynx is clear and moist. Posterior pharynx without erythema or exudates.  Neck: Supple, trachea midline. Normal range of motion. No thyromegaly present. No lymphadenopathy--cervical or supraclavicular.  Cardiovascular: Regular rate and rhythm, S1 and S2 without murmur, rubs, or gallops.    Chest: Effort normal. Clear to auscultation throughout. No adventitious sounds. No CVA tenderness.  Abdomen: Soft, non tender, and without distention. Active bowel sounds in all four quadrants. No rebound, guarding, masses or HSM.  : Negative for dysuria, polyuria, hematuria, pyuria, urinary urgency, and urinary incontinence.  Extremities: No cyanosis, clubbing, erythema, nor edema. Distal pulses intact and symmetric.   Musculoskeletal: All major joints AROM full in all directions without pain.  Neurological: Alert and oriented x 3. DTRs 2+/3 and symmetric. No cranial nerve deficit. 5/5 myotomes. Sensation intact. Negative Rhomberg.  Psychiatric:  Behavior, mood, and affect are appropriate.  MA/nursing " note and vitals reviewed.    LABS: 9/2021  results reviewed and discussed with the patient, questions answered.    Assessment/Plan:  1. Need for vaccination  - Influenza Vaccine, High Dose (65+ Only)    2. Peripheral edema  Stable on current regimen.  Continue.  Refills given.  - furosemide (LASIX) 20 MG Tab; Take 1 Tablet by mouth two times a week for 90 days.  Dispense: 30 Tablet; Refill: 1    3. Pedal edema  Well-controlled with furosemide.  As discussed in 2    4. Mixed hyperlipidemia  - Lipid Profile; Future  - Comp Metabolic Panel; Future    5. Vitamin D deficiency  - VITAMIN D,25 HYDROXY; Future    6. Legally blind in left eye, as defined in [place]  Followed by pulmonology     7. Encounter to establish care       Discussed with patient possible alternative diagnoses, pt is to take all medications as prescribed. If symptoms persist FU w/PCP, if symptoms worsen go to emergency room. If experiencing any side effects from prescribed medications reports to the office immediately or go to emergency room.  Reviewed indication, dosage, usage and potential adverse effects of prescribed medications. Reviewed risks and benefits of treatment plan. Patient verbalizes understanding of all instruction and verbally agrees to plan.    No follow-ups on file. annual

## 2021-12-02 NOTE — ASSESSMENT & PLAN NOTE
New to me.  Patient is on furosemide 20 mg twice a week for bilateral ankle swelling.  Most recent labs for September 2021, MP WNL, normal potassium.  She is periodically supplementing with potassium over-the-counter.  Needing refills today.

## 2021-12-02 NOTE — ASSESSMENT & PLAN NOTE
Results for DANIELITO CRAWFORD (MRN [id number]) as of 12/2/2021 11:47   Ref. Range 9/3/2021 06:39   Cholesterol,Tot Latest Ref Range: 100 - 199 mg/dL 244 (H)   Triglycerides Latest Ref Range: 0 - 149 mg/dL 86   HDL Latest Ref Range: >=40 mg/dL 72   LDL Latest Ref Range: <100 mg/dL 155 (H)   Previously on statin, stopped years ago and currently taking nothing.  Patient declines statins alternatives Rx at this time.  She is taking fish oil supplementation.

## 2021-12-02 NOTE — ASSESSMENT & PLAN NOTE
Chronic problem.  Blind in left eye, initially diagnosed in early 70s.  Patient had a retinal vein occlusion.  Currently monitored by ophthalmology, [facility] Dr. Wheeler

## 2021-12-07 ENCOUNTER — OFFICE VISIT (OUTPATIENT)
Dept: URGENT CARE | Facility: PHYSICIAN GROUP | Age: 81
End: 2021-12-07
Payer: MEDICARE

## 2021-12-07 VITALS
SYSTOLIC BLOOD PRESSURE: 116 MMHG | TEMPERATURE: 98.4 F | HEART RATE: 88 BPM | WEIGHT: 128 LBS | RESPIRATION RATE: 20 BRPM | HEIGHT: 62 IN | BODY MASS INDEX: 23.55 KG/M2 | OXYGEN SATURATION: 96 % | DIASTOLIC BLOOD PRESSURE: 58 MMHG

## 2021-12-07 DIAGNOSIS — Z20.822 CLOSE EXPOSURE TO COVID-19 VIRUS: Primary | ICD-10-CM

## 2021-12-07 DIAGNOSIS — R05.9 COUGH: ICD-10-CM

## 2021-12-07 LAB
EXTERNAL QUALITY CONTROL: NORMAL
SARS-COV+SARS-COV-2 AG RESP QL IA.RAPID: NEGATIVE

## 2021-12-07 PROCEDURE — 87426 SARSCOV CORONAVIRUS AG IA: CPT | Performed by: STUDENT IN AN ORGANIZED HEALTH CARE EDUCATION/TRAINING PROGRAM

## 2021-12-07 PROCEDURE — 99213 OFFICE O/P EST LOW 20 MIN: CPT | Mod: CS | Performed by: STUDENT IN AN ORGANIZED HEALTH CARE EDUCATION/TRAINING PROGRAM

## 2021-12-07 RX ORDER — BENZONATATE 100 MG/1
100 CAPSULE ORAL 3 TIMES DAILY PRN
Qty: 60 CAPSULE | Refills: 0 | Status: SHIPPED | OUTPATIENT
Start: 2021-12-07 | End: 2022-06-03

## 2021-12-07 RX ORDER — DEXTROMETHORPHAN POLISTIREX 30 MG/5ML
60 SUSPENSION ORAL 2 TIMES DAILY
Qty: 280 ML | Refills: 0 | Status: SHIPPED | OUTPATIENT
Start: 2021-12-07 | End: 2022-06-03

## 2021-12-07 ASSESSMENT — ENCOUNTER SYMPTOMS: COUGH: 1

## 2021-12-07 ASSESSMENT — FIBROSIS 4 INDEX: FIB4 SCORE: 3.32

## 2021-12-07 NOTE — PROGRESS NOTES
"Subjective     Relga Meier is a 81 y.o. female who presents with Cough (body aches,x3days,req covid test,exposed to covid positive,)            81-year-old female was mild history of several days of cough no subjective fevers no myalgias patient does have significant history of Covid vaccination x2 last Covid vaccine being received in January 2021.  Presents to clinic for further evaluation and also Covid testing.  Patient subjective cough began approximately 12/3/2021 to 12/4/2021.    Cough        Review of Systems   Respiratory: Positive for cough.    All other systems reviewed and are negative.             Objective     /58 (BP Location: Right arm, Patient Position: Sitting, BP Cuff Size: Adult)   Pulse 88   Temp 36.9 °C (98.4 °F) (Temporal)   Resp 20   Ht 1.575 m (5' 2\")   Wt 58.1 kg (128 lb)   SpO2 96%   BMI 23.41 kg/m²      Physical Exam  Vitals reviewed.   Constitutional:       General: She is not in acute distress.     Appearance: She is not ill-appearing.   Pulmonary:      Effort: Pulmonary effort is normal. No respiratory distress.      Breath sounds: No stridor. No wheezing or rhonchi.                             Assessment & Plan        1. Cough  Symptomatic relief only patient provided with Tessalon Perles in addition to Robitussin continue present medical management patient advised that she should begin to experience shortness of breath immediately call EMS or go to the nearest emergency department.  - POCT SARS-COV Antigen JOSE ALEJANDRO (Symptomatic Only)    2.  Close exposure COVID-19.  Patient with mild cough no subjective fevers myalgias patient provided Tessalon Perles in addition to Robitussin for symptomatic relief.  Continue to monitor closely.  Advised patient that she should become significantly short of breath she should merely call EMS or go to the nearest emergency department.  Patient does have significant risk factors Covid 19 as her  is positive.  Patient given began " experiencing symptoms of cough on 12/4/2021.

## 2022-06-03 ENCOUNTER — OFFICE VISIT (OUTPATIENT)
Dept: MEDICAL GROUP | Facility: PHYSICIAN GROUP | Age: 82
End: 2022-06-03
Payer: MEDICARE

## 2022-06-03 VITALS
DIASTOLIC BLOOD PRESSURE: 68 MMHG | OXYGEN SATURATION: 98 % | TEMPERATURE: 97.5 F | HEART RATE: 78 BPM | RESPIRATION RATE: 20 BRPM | WEIGHT: 126.2 LBS | SYSTOLIC BLOOD PRESSURE: 110 MMHG | BODY MASS INDEX: 23.22 KG/M2 | HEIGHT: 62 IN

## 2022-06-03 DIAGNOSIS — R60.0 EDEMA OF BOTH LOWER EXTREMITIES: ICD-10-CM

## 2022-06-03 DIAGNOSIS — G57.93 NEUROPATHY OF BOTH FEET: ICD-10-CM

## 2022-06-03 PROCEDURE — 99214 OFFICE O/P EST MOD 30 MIN: CPT | Performed by: NURSE PRACTITIONER

## 2022-06-03 RX ORDER — FUROSEMIDE 20 MG/1
20 TABLET ORAL PRN
COMMUNITY
End: 2022-06-03 | Stop reason: SDUPTHER

## 2022-06-03 RX ORDER — FUROSEMIDE 20 MG/1
20 TABLET ORAL DAILY
Qty: 30 TABLET | Refills: 1 | Status: SHIPPED | OUTPATIENT
Start: 2022-06-03 | End: 2022-06-10 | Stop reason: SDUPTHER

## 2022-06-03 RX ORDER — POTASSIUM CHLORIDE 750 MG/1
10 TABLET, FILM COATED, EXTENDED RELEASE ORAL DAILY
Qty: 30 TABLET | Refills: 1 | Status: SHIPPED | OUTPATIENT
Start: 2022-06-03 | End: 2022-06-09 | Stop reason: SDUPTHER

## 2022-06-03 ASSESSMENT — FIBROSIS 4 INDEX: FIB4 SCORE: 3.32

## 2022-06-03 ASSESSMENT — PATIENT HEALTH QUESTIONNAIRE - PHQ9: CLINICAL INTERPRETATION OF PHQ2 SCORE: 0

## 2022-06-03 NOTE — PROGRESS NOTES
Chief Complaint   Patient presents with   • Foot Swelling     Both feet and both legs x 3 wks ago        HISTORY OF PRESENT ILLNESS: Patient is a 81 y.o. female, established patient who presents today to discuss medical problems as listed below:    Health Maintenance:  COMPLETED     Edema of both lower extremities  New problem.  Swelling of bilateral lower extremities for about a month.  No CP or dyspnea, no cough.  Previously bilateral ankle swelling at the end of the day.  Now lower extremity edema is persistent.   Previously was giving furosemide 20 mg as needed for ankle swelling coronary once or twice per month.    Neuropathy of both feet  New problem.  Neuropathy in both feet but in the last year.  Tingling in bilateral feet.  Patient saw functional medicine provider, tested vitamin D and cholesterol.  She was prescribed herbal supplements.  Not interested in prescription at this point would like to try functional medicine first.      Patient Active Problem List    Diagnosis Date Noted   • Edema of both lower extremities 06/03/2022   • Neuropathy of both feet 06/03/2022   • Close exposure to COVID-19 virus 12/07/2021   • Cough 12/07/2021   • Pedal edema 08/24/2021   • Hyperlipemia 11/26/2019   • Legally blind in left eye, as defined in [place] 11/26/2019   • History of retinal vein occlusion 04/10/2019        Allergies: Pcn [penicillins]    Current Outpatient Medications   Medication Sig Dispense Refill   • furosemide (LASIX) 20 MG Tab Take 1 Tablet by mouth every day. 30 Tablet 1   • potassium chloride ER (KLOR-CON) 10 MEQ tablet Take 1 Tablet by mouth every day. 30 Tablet 1   • Turmeric 500 MG Cap Take  by mouth.     • CVS LUTEIN-ZEAXANTHIN PO Take 40 mg by mouth.     • QUERCETIN PO Take 475 mg by mouth.     • Coenzyme Q10-Vitamin E (COQ10-VITAMIN E) 100-10 MG-UNIT Cap Take  by mouth.     • vitamin D3 (CHOLECALCIFEROL) 1000 Unit (25 mcg) Tab Take 1,000 Units by mouth every day.     • Multiple Vitamins-Minerals  "(PRESERVISION/LUTEIN) Cap TAKE 1 CAPSULE BY MOUTH TWICE DAILY FOR 6 MONTHS  3     No current facility-administered medications for this visit.       Social History     Tobacco Use   • Smoking status: Never Smoker   • Smokeless tobacco: Never Used   Vaping Use   • Vaping Use: Never used   Substance Use Topics   • Alcohol use: Yes     Alcohol/week: 0.6 oz     Types: 1 Glasses of wine per week   • Drug use: No     Social History     Social History Narrative   • Not on file       Family History   Problem Relation Age of Onset   • Other Mother         TIA    • Other Father         Abdominal aneurysm    • Alcohol abuse Brother    • Heart Disease Brother    • No Known Problems Daughter    • No Known Problems Daughter        Allergies, past medical history, past surgical history, family history, social history reviewed and updated.    Review of Systems:     - Constitutional: Negative for fever, chills, unexpected weight change, and fatigue/generalized weakness.     - Respiratory: Negative for cough, sputum production, chest congestion, dyspnea, wheezing, and crackles.      - Cardiovascular: bilateral lower extremity edema, neuropathy.Negative for chest pain, palpitations, orthopnea     - Psychiatric/Behavioral: Negative for depression, suicidal/homicidal ideation and memory loss.      All other systems reviewed and are negative    Exam:    /68   Pulse 78   Temp 36.4 °C (97.5 °F) (Temporal)   Resp 20   Ht 1.575 m (5' 2\")   Wt 57.2 kg (126 lb 3.2 oz)   SpO2 98%   BMI 23.08 kg/m²  Body mass index is 23.08 kg/m².    Physical Exam:  Constitutional: Well-developed and well-nourished. Not diaphoretic. No distress.   Cardiovascular: Regular rate and rhythm, S1 and S2 without murmur, rubs, or gallops.    Chest: Effort normal. Clear to auscultation throughout. No adventitious sounds.   Extremities: Bilateral lower extremity edema.  No cyanosis, clubbing, erythema, nor edema. Distal pulses intact and symmetric. "   Neurological: Alert and oriented x 3.   Psychiatric:  Behavior, mood, and affect are appropriate.  MA/nursing note and vitals reviewed.    LABS: 2021  results reviewed and discussed with the patient, questions answered.    Assessment/Plan:  1. Edema of both lower extremities  Will rule out CHF.  Extremity elevation.  Please monitor potassium for symptom control.  We will check kidney function.  Will benefit from sequential device for home.  - EC-ECHOCARDIOGRAM COMPLETE W/O CONT; Future  - Comp Metabolic Panel; Future  - proBrain Natriuretic Peptide, NT; Future  - furosemide (LASIX) 20 MG Tab; Take 1 Tablet by mouth every day.  Dispense: 30 Tablet; Refill: 1  - potassium chloride ER (KLOR-CON) 10 MEQ tablet; Take 1 Tablet by mouth every day.  Dispense: 30 Tablet; Refill: 1    2. Neuropathy of both feet  Patient is currently following with functional medicine.  Not interested in prescription medicine at this time.  will follow-up in 3 months.  Patient will bring list of supplements from functional medicine, will also follow-up on efficacy.       Discussed with patient possible alternative diagnoses, pt is to take all medications as prescribed. If symptoms persist FU w/PCP, if symptoms worsen go to emergency room. If experiencing any side effects from prescribed medications reports to the office immediately or go to emergency room.  Reviewed indication, dosage, usage and potential adverse effects of prescribed medications. Reviewed risks and benefits of treatment plan. Patient verbalizes understanding of all instruction and verbally agrees to plan.    No follow-ups on file. 3 wks

## 2022-06-03 NOTE — ASSESSMENT & PLAN NOTE
New problem.  Swelling of bilateral lower extremities for about a month.  No CP or dyspnea, no cough.  Previously bilateral ankle swelling at the end of the day.  Now lower extremity edema is persistent.   Previously was giving furosemide 20 mg as needed for ankle swelling coronary once or twice per month.

## 2022-06-03 NOTE — ASSESSMENT & PLAN NOTE
New problem.  Neuropathy in both feet but in the last year.  Tingling in bilateral feet.  Patient saw functional medicine provider, tested vitamin D and cholesterol.  She was prescribed herbal supplements.  Not interested in prescription at this point would like to try functional medicine first.

## 2022-06-06 ENCOUNTER — PATIENT MESSAGE (OUTPATIENT)
Dept: MEDICAL GROUP | Facility: PHYSICIAN GROUP | Age: 82
End: 2022-06-06
Payer: MEDICARE

## 2022-06-08 ENCOUNTER — HOSPITAL ENCOUNTER (OUTPATIENT)
Dept: LAB | Facility: MEDICAL CENTER | Age: 82
End: 2022-06-08
Attending: NURSE PRACTITIONER
Payer: MEDICARE

## 2022-06-08 DIAGNOSIS — R60.0 EDEMA OF BOTH LOWER EXTREMITIES: ICD-10-CM

## 2022-06-08 LAB
ALBUMIN SERPL BCP-MCNC: 3.3 G/DL (ref 3.2–4.9)
ALBUMIN/GLOB SERPL: 1.3 G/DL
ALP SERPL-CCNC: 88 U/L (ref 30–99)
ALT SERPL-CCNC: 20 U/L (ref 2–50)
ANION GAP SERPL CALC-SCNC: 10 MMOL/L (ref 7–16)
AST SERPL-CCNC: 42 U/L (ref 12–45)
BILIRUB SERPL-MCNC: 0.4 MG/DL (ref 0.1–1.5)
BUN SERPL-MCNC: 27 MG/DL (ref 8–22)
CALCIUM SERPL-MCNC: 9.3 MG/DL (ref 8.5–10.5)
CHLORIDE SERPL-SCNC: 99 MMOL/L (ref 96–112)
CO2 SERPL-SCNC: 27 MMOL/L (ref 20–33)
CREAT SERPL-MCNC: 0.81 MG/DL (ref 0.5–1.4)
GFR SERPLBLD CREATININE-BSD FMLA CKD-EPI: 73 ML/MIN/1.73 M 2
GLOBULIN SER CALC-MCNC: 2.6 G/DL (ref 1.9–3.5)
GLUCOSE SERPL-MCNC: 82 MG/DL (ref 65–99)
NT-PROBNP SERPL IA-MCNC: 2388 PG/ML (ref 0–125)
POTASSIUM SERPL-SCNC: 4.3 MMOL/L (ref 3.6–5.5)
PROT SERPL-MCNC: 5.9 G/DL (ref 6–8.2)
SODIUM SERPL-SCNC: 136 MMOL/L (ref 135–145)

## 2022-06-08 PROCEDURE — 83880 ASSAY OF NATRIURETIC PEPTIDE: CPT | Mod: GA

## 2022-06-08 PROCEDURE — 36415 COLL VENOUS BLD VENIPUNCTURE: CPT | Mod: GA

## 2022-06-08 PROCEDURE — 80053 COMPREHEN METABOLIC PANEL: CPT

## 2022-06-09 DIAGNOSIS — R60.0 EDEMA OF BOTH LOWER EXTREMITIES: ICD-10-CM

## 2022-06-09 RX ORDER — POTASSIUM CHLORIDE 750 MG/1
10 TABLET, FILM COATED, EXTENDED RELEASE ORAL DAILY
Qty: 30 TABLET | Refills: 1 | Status: SHIPPED | OUTPATIENT
Start: 2022-06-09 | End: 2022-06-10 | Stop reason: SDUPTHER

## 2022-06-10 ENCOUNTER — OFFICE VISIT (OUTPATIENT)
Dept: MEDICAL GROUP | Facility: PHYSICIAN GROUP | Age: 82
End: 2022-06-10
Payer: MEDICARE

## 2022-06-10 VITALS
HEART RATE: 83 BPM | SYSTOLIC BLOOD PRESSURE: 112 MMHG | DIASTOLIC BLOOD PRESSURE: 60 MMHG | OXYGEN SATURATION: 96 % | WEIGHT: 120.4 LBS | BODY MASS INDEX: 22.16 KG/M2 | RESPIRATION RATE: 20 BRPM | TEMPERATURE: 98.2 F | HEIGHT: 62 IN

## 2022-06-10 DIAGNOSIS — R60.0 EDEMA OF BOTH LOWER EXTREMITIES: ICD-10-CM

## 2022-06-10 DIAGNOSIS — I50.810 RIGHT-SIDED CONGESTIVE HEART FAILURE, UNSPECIFIED HF CHRONICITY (HCC): ICD-10-CM

## 2022-06-10 PROCEDURE — 99214 OFFICE O/P EST MOD 30 MIN: CPT | Performed by: NURSE PRACTITIONER

## 2022-06-10 RX ORDER — CARVEDILOL 3.12 MG/1
3.12 TABLET ORAL 2 TIMES DAILY WITH MEALS
Qty: 60 TABLET | Refills: 1 | Status: SHIPPED | OUTPATIENT
Start: 2022-06-10 | End: 2022-07-20

## 2022-06-10 RX ORDER — FUROSEMIDE 20 MG/1
20 TABLET ORAL 2 TIMES DAILY
Qty: 60 TABLET | Refills: 1 | Status: ON HOLD | OUTPATIENT
Start: 2022-06-10 | End: 2022-07-11 | Stop reason: SDUPTHER

## 2022-06-10 RX ORDER — FUROSEMIDE 20 MG/1
20 TABLET ORAL DAILY
Qty: 30 TABLET | Refills: 1 | Status: SHIPPED | OUTPATIENT
Start: 2022-06-10 | End: 2022-06-10 | Stop reason: SDUPTHER

## 2022-06-10 RX ORDER — POTASSIUM CHLORIDE 750 MG/1
10 TABLET, FILM COATED, EXTENDED RELEASE ORAL DAILY
Qty: 30 TABLET | Refills: 1 | Status: SHIPPED | OUTPATIENT
Start: 2022-06-10 | End: 2022-07-09

## 2022-06-10 ASSESSMENT — FIBROSIS 4 INDEX: FIB4 SCORE: 3.65

## 2022-06-10 NOTE — PROGRESS NOTES
Chief Complaint   Patient presents with   • Lab Results       HISTORY OF PRESENT ILLNESS: Patient is a 81 y.o. female, established patient who presents today to discuss medical problems as listed below:    Health Maintenance:  COMPLETED     Edema of both lower extremities   Latest Reference Range & Units 06/08/22 07:42   NT-proBNP 0 - 125 pg/mL 2388 (H)   (H): Data is abnormally high    Echo scheduled for September 29 the first available.  Patient denies CP, dyspnea, cough, wheezing or chest congestion, no orthopnea.  Bilateral ankle swelling decreased but still persist.  Lasix 20 mg helped.  She is taking potassium supplement.        Patient Active Problem List    Diagnosis Date Noted   • Edema of both lower extremities 06/03/2022   • Neuropathy of both feet 06/03/2022   • Close exposure to COVID-19 virus 12/07/2021   • Cough 12/07/2021   • Pedal edema 08/24/2021   • Hyperlipemia 11/26/2019   • Legally blind in left eye, as defined in [place] 11/26/2019   • History of retinal vein occlusion 04/10/2019        Allergies: Pcn [penicillins]    Current Outpatient Medications   Medication Sig Dispense Refill   • potassium chloride ER (KLOR-CON) 10 MEQ tablet Take 1 Tablet by mouth every day. 30 Tablet 1   • carvedilol (COREG) 3.125 MG Tab Take 1 Tablet by mouth 2 times a day with meals. 60 Tablet 1   • furosemide (LASIX) 20 MG Tab Take 1 Tablet by mouth 2 times a day. 60 Tablet 1   • Turmeric 500 MG Cap Take  by mouth.     • CVS LUTEIN-ZEAXANTHIN PO Take 40 mg by mouth.     • QUERCETIN PO Take 475 mg by mouth.     • Coenzyme Q10-Vitamin E (COQ10-VITAMIN E) 100-10 MG-UNIT Cap Take  by mouth.     • vitamin D3 (CHOLECALCIFEROL) 1000 Unit (25 mcg) Tab Take 1,000 Units by mouth every day.     • Multiple Vitamins-Minerals (PRESERVISION/LUTEIN) Cap TAKE 1 CAPSULE BY MOUTH TWICE DAILY FOR 6 MONTHS  3     No current facility-administered medications for this visit.       Social History     Tobacco Use   • Smoking status: Never Smoker  "  • Smokeless tobacco: Never Used   Vaping Use   • Vaping Use: Never used   Substance Use Topics   • Alcohol use: Yes     Alcohol/week: 0.6 oz     Types: 1 Glasses of wine per week   • Drug use: No     Social History     Social History Narrative   • Not on file       Family History   Problem Relation Age of Onset   • Other Mother         TIA    • Other Father         Abdominal aneurysm    • Alcohol abuse Brother    • Heart Disease Brother    • No Known Problems Daughter    • No Known Problems Daughter        Allergies, past medical history, past surgical history, family history, social history reviewed and updated.    Review of Systems:     - Constitutional: Negative for fever, chills, unexpected weight change, and fatigue/generalized weakness.     - Respiratory: Negative for cough, sputum production, chest congestion, dyspnea, wheezing, and crackles.      - Cardiovascular: Negative for chest pain, palpitations, orthopnea.positive for bilateral lower extremity edema.     - Psychiatric/Behavioral: Negative for depression, suicidal/homicidal ideation and memory loss.      All other systems reviewed and are negative    Exam:    /60   Pulse 83   Temp 36.8 °C (98.2 °F) (Temporal)   Resp 20   Ht 1.575 m (5' 2\")   Wt 54.6 kg (120 lb 6.4 oz)   SpO2 96%   BMI 22.02 kg/m²  Body mass index is 22.02 kg/m².    Physical Exam:  Constitutional: Well-developed and well-nourished. Not diaphoretic. No distress.   Cardiovascular: Regular rate and rhythm, S1 and S2 without murmur, rubs, or gallops.    Chest: Effort normal. Clear to auscultation throughout. No adventitious sounds.   Extremities: Bilateral lower extremity edema, erythema in both feet, mildly improved from last visit.  Musculoskeletal: All major joints AROM full in all directions without pain.  Neurological: Alert and oriented x 3.   Psychiatric:  Behavior, mood, and affect are appropriate.  MA/nursing note and vitals reviewed.    LABS: 6/2022  results " reviewed and discussed with the patient, questions answered.    Assessment/Plan:  1. Edema of both lower extremities  - potassium chloride ER (KLOR-CON) 10 MEQ tablet; Take 1 Tablet by mouth every day.  Dispense: 30 Tablet; Refill: 1  - REFERRAL TO CARDIOLOGY  - furosemide (LASIX) 20 MG Tab; Take 1 Tablet by mouth 2 times a day.  Dispense: 60 Tablet; Refill: 1    2. Right-sided congestive heart failure, unspecified HF chronicity (HCC)  Limit salt intake, focus on fresh fruit and vegetables.  Avoid canned foods.  Compression knee highs, sequential compression device for home.  Will start on carvedilol and increase Lasix.  If experiencing dizziness, drop blood pressure, hold the dose.  We will follow-up in 7 to 10 days for efficacy and tolerance.  We will repeat CMP and obtain TSH.  Patient will follow up with cardiology.  Patient's  is seeing Dr. Andrade aL.  - potassium chloride ER (KLOR-CON) 10 MEQ tablet; Take 1 Tablet by mouth every day.  Dispense: 30 Tablet; Refill: 1  - REFERRAL TO CARDIOLOGY  - carvedilol (COREG) 3.125 MG Tab; Take 1 Tablet by mouth 2 times a day with meals.  Dispense: 60 Tablet; Refill: 1  - TSH; Future  - TSH WITH REFLEX TO FT4; Future  - Comp Metabolic Panel; Future  - furosemide (LASIX) 20 MG Tab; Take 1 Tablet by mouth 2 times a day.  Dispense: 60 Tablet; Refill: 1       Discussed with patient possible alternative diagnoses, pt is to take all medications as prescribed. If symptoms persist FU w/PCP, if symptoms worsen go to emergency room. If experiencing any side effects from prescribed medications reports to the office immediately or go to emergency room.  Reviewed indication, dosage, usage and potential adverse effects of prescribed medications. Reviewed risks and benefits of treatment plan. Patient verbalizes understanding of all instruction and verbally agrees to plan.    No follow-ups on file.

## 2022-06-10 NOTE — ASSESSMENT & PLAN NOTE
Latest Reference Range & Units 06/08/22 07:42   NT-proBNP 0 - 125 pg/mL 2388 (H)   (H): Data is abnormally high    Echo scheduled for September 29 the first available.  Patient denies CP, dyspnea, cough, wheezing or chest congestion, no orthopnea.  Bilateral ankle swelling decreased but still persist.  Lasix 20 mg helped.  She is taking potassium supplement.

## 2022-06-15 ENCOUNTER — HOSPITAL ENCOUNTER (OUTPATIENT)
Dept: LAB | Facility: MEDICAL CENTER | Age: 82
End: 2022-06-15
Attending: NURSE PRACTITIONER
Payer: MEDICARE

## 2022-06-15 DIAGNOSIS — I50.810 RIGHT-SIDED CONGESTIVE HEART FAILURE, UNSPECIFIED HF CHRONICITY (HCC): ICD-10-CM

## 2022-06-15 LAB
ALBUMIN SERPL BCP-MCNC: 3.1 G/DL (ref 3.2–4.9)
ALBUMIN/GLOB SERPL: 1.1 G/DL
ALP SERPL-CCNC: 83 U/L (ref 30–99)
ALT SERPL-CCNC: 19 U/L (ref 2–50)
ANION GAP SERPL CALC-SCNC: 9 MMOL/L (ref 7–16)
AST SERPL-CCNC: 37 U/L (ref 12–45)
BILIRUB SERPL-MCNC: 0.4 MG/DL (ref 0.1–1.5)
BUN SERPL-MCNC: 23 MG/DL (ref 8–22)
CALCIUM SERPL-MCNC: 9.2 MG/DL (ref 8.5–10.5)
CHLORIDE SERPL-SCNC: 103 MMOL/L (ref 96–112)
CO2 SERPL-SCNC: 25 MMOL/L (ref 20–33)
CREAT SERPL-MCNC: 0.76 MG/DL (ref 0.5–1.4)
FASTING STATUS PATIENT QL REPORTED: NORMAL
GFR SERPLBLD CREATININE-BSD FMLA CKD-EPI: 79 ML/MIN/1.73 M 2
GLOBULIN SER CALC-MCNC: 2.7 G/DL (ref 1.9–3.5)
GLUCOSE SERPL-MCNC: 85 MG/DL (ref 65–99)
POTASSIUM SERPL-SCNC: 4.1 MMOL/L (ref 3.6–5.5)
PROT SERPL-MCNC: 5.8 G/DL (ref 6–8.2)
SODIUM SERPL-SCNC: 137 MMOL/L (ref 135–145)
TSH SERPL DL<=0.005 MIU/L-ACNC: 4.83 UIU/ML (ref 0.38–5.33)

## 2022-06-15 PROCEDURE — 36415 COLL VENOUS BLD VENIPUNCTURE: CPT

## 2022-06-15 PROCEDURE — 80053 COMPREHEN METABOLIC PANEL: CPT

## 2022-06-15 PROCEDURE — 84443 ASSAY THYROID STIM HORMONE: CPT

## 2022-06-21 ENCOUNTER — OFFICE VISIT (OUTPATIENT)
Dept: MEDICAL GROUP | Facility: PHYSICIAN GROUP | Age: 82
End: 2022-06-21
Payer: MEDICARE

## 2022-06-21 VITALS
BODY MASS INDEX: 22.97 KG/M2 | SYSTOLIC BLOOD PRESSURE: 110 MMHG | HEIGHT: 62 IN | DIASTOLIC BLOOD PRESSURE: 68 MMHG | RESPIRATION RATE: 20 BRPM | HEART RATE: 77 BPM | WEIGHT: 124.8 LBS | OXYGEN SATURATION: 97 % | TEMPERATURE: 98.2 F

## 2022-06-21 DIAGNOSIS — I50.89 OTHER HEART FAILURE (HCC): ICD-10-CM

## 2022-06-21 DIAGNOSIS — E88.09 SERUM ALBUMIN DECREASED: ICD-10-CM

## 2022-06-21 PROCEDURE — 99214 OFFICE O/P EST MOD 30 MIN: CPT | Performed by: NURSE PRACTITIONER

## 2022-06-21 ASSESSMENT — FIBROSIS 4 INDEX: FIB4 SCORE: 3.3

## 2022-06-21 NOTE — ASSESSMENT & PLAN NOTE
Recent heart failure diagnoses.  Last visit was placed on carvedilol twice daily, tolerating well, VSS.  Denies CP, no dyspnea.  Bilateral ankle swelling slightly improving.  On Lasix 20 mg twice daily, only taking once daily.  On potassium supplement.  Recent labs with normal electrolytes.  She is following recommended diet avoiding excessive salt, no canned food.  Plain regular water is okay no restriction.  Avoid sugary or salty drinks, no canned drinks.  Scheduled for echo for September 4.  Scheduled with [facility], Dr. Cherry on September 14.  Patient has a massage chair with leg compressions which helps her.  Was recommended sequential compression device for home.

## 2022-06-21 NOTE — PROGRESS NOTES
Chief Complaint   Patient presents with   • Lab Results       HISTORY OF PRESENT ILLNESS: Patient is a 81 y.o. female, established patient who presents today to discuss medical problems as listed below:    Other heart failure (HCC)  Recent heart failure diagnoses.  Last visit was placed on carvedilol twice daily, tolerating well, VSS.  Denies CP, no dyspnea.  Bilateral ankle swelling slightly improving.  On Lasix 20 mg twice daily, only taking once daily.  On potassium supplement.  Recent labs with normal electrolytes.  She is following recommended diet avoiding excessive salt, no canned food.  Plain regular water is okay no restriction.  Avoid sugary or salty drinks, no canned drinks.  Scheduled for echo for September 4.  Scheduled with [facility], Dr. Cherry on September 14.  Patient has a massage chair with leg compressions which helps her.  Was recommended sequential compression device for home.      Patient Active Problem List    Diagnosis Date Noted   • Other heart failure (HCC) 06/21/2022   • Edema of both lower extremities 06/03/2022   • Neuropathy of both feet 06/03/2022   • Close exposure to COVID-19 virus 12/07/2021   • Cough 12/07/2021   • Pedal edema 08/24/2021   • Hyperlipemia 11/26/2019   • Legally blind in left eye, as defined in [place] 11/26/2019   • History of retinal vein occlusion 04/10/2019        Allergies: Pcn [penicillins]    Current Outpatient Medications   Medication Sig Dispense Refill   • potassium chloride ER (KLOR-CON) 10 MEQ tablet Take 1 Tablet by mouth every day. 30 Tablet 1   • carvedilol (COREG) 3.125 MG Tab Take 1 Tablet by mouth 2 times a day with meals. 60 Tablet 1   • furosemide (LASIX) 20 MG Tab Take 1 Tablet by mouth 2 times a day. 60 Tablet 1   • Turmeric 500 MG Cap Take  by mouth.     • [facility] LUTEIN-ZEAXANTHIN PO Take 40 mg by mouth.     • QUERCETIN PO Take 475 mg by mouth.     • Coenzyme Q10-Vitamin E (COQ10-VITAMIN E) 100-10 MG-UNIT Cap Take  by mouth.     • vitamin D3  "(CHOLECALCIFEROL) 1000 Unit (25 mcg) Tab Take 1,000 Units by mouth every day.     • Multiple Vitamins-Minerals (PRESERVISION/LUTEIN) Cap TAKE 1 CAPSULE BY MOUTH TWICE DAILY FOR 6 MONTHS  3     No current facility-administered medications for this visit.       Social History     Tobacco Use   • Smoking status: Never Smoker   • Smokeless tobacco: Never Used   Vaping Use   • Vaping Use: Never used   Substance Use Topics   • Alcohol use: Yes     Alcohol/week: 0.6 oz     Types: 1 Glasses of wine per week   • Drug use: No     Social History     Social History Narrative   • Not on file       Family History   Problem Relation Age of Onset   • Other Mother         TIA    • Other Father         Abdominal aneurysm    • Alcohol abuse Brother    • Heart Disease Brother    • No Known Problems Daughter    • No Known Problems Daughter        Allergies, past medical history, past surgical history, family history, social history reviewed and updated.    Review of Systems:     - Constitutional: Negative for fever, chills, unexpected weight change, and fatigue/generalized weakness.      - Respiratory: Negative for cough, sputum production, chest congestion, dyspnea, wheezing, and crackles.      - Cardiovascular: Bilateral ankle edema.  Negative for chest pain, palpitations, orthopnea    - Psychiatric/Behavioral: Negative for depression, suicidal/homicidal ideation and memory loss.      All other systems reviewed and are negative    Exam:    /68   Pulse 77   Temp 36.8 °C (98.2 °F) (Temporal)   Resp 20   Ht 1.575 m (5' 2\")   Wt 56.6 kg (124 lb 12.8 oz)   SpO2 97%   BMI 22.83 kg/m²  Body mass index is 22.83 kg/m².    Physical Exam:  Constitutional: Well-developed and well-nourished. Not diaphoretic. No distress.   Cardiovascular: Regular rate and rhythm, S1 and S2 without murmur, rubs, or gallops.    Chest: Effort normal. Clear to auscultation throughout. No adventitious sounds.   Extremities: Bilateral ankle edema, slight " erythema in bilateral feet.  Distal pulses intact.     Neurological: Alert and oriented x 3.   Psychiatric:  Behavior, mood, and affect are appropriate.  MA/nursing note and vitals reviewed.    LABS:  6/15/22 results reviewed and discussed with the patient, questions answered.    Assessment/Plan:  1. Other heart failure (HCC)  Improving.  Okay to increase Lasix to twice daily, increase potassium to twice daily as well.  Frequent elevation of the feet above the heart.  Reviewed HF recommended diet, avoid canned.  Solid food, fried food.  Stable hydrated.  Will monitor kidney function in 3 months  Pending echo in September.  Patient will follow-up with cardiology in September.  - Comp Metabolic Panel; Future    2. Serum albumin decreased  Discussed dietary intervention to increase protein in diet, boiled eggs chicken and fish.  Avoid canned or salty foods.  We will follow-up on labs in 3 months.  - Comp Metabolic Panel; Future       Discussed with patient possible alternative diagnoses, pt is to take all medications as prescribed. If symptoms persist FU w/PCP, if symptoms worsen go to emergency room. If experiencing any side effects from prescribed medications reports to the office immediately or go to emergency room.  Reviewed indication, dosage, usage and potential adverse effects of prescribed medications. Reviewed risks and benefits of treatment plan. Patient verbalizes understanding of all instruction and verbally agrees to plan.    No follow-ups on file. 6 mos

## 2022-07-09 ENCOUNTER — APPOINTMENT (OUTPATIENT)
Dept: RADIOLOGY | Facility: MEDICAL CENTER | Age: 82
DRG: 948 | End: 2022-07-09
Attending: HOSPITALIST
Payer: MEDICARE

## 2022-07-09 ENCOUNTER — HOSPITAL ENCOUNTER (INPATIENT)
Facility: MEDICAL CENTER | Age: 82
LOS: 1 days | DRG: 948 | End: 2022-07-11
Attending: EMERGENCY MEDICINE | Admitting: HOSPITALIST
Payer: MEDICARE

## 2022-07-09 ENCOUNTER — APPOINTMENT (OUTPATIENT)
Dept: RADIOLOGY | Facility: MEDICAL CENTER | Age: 82
DRG: 948 | End: 2022-07-09
Attending: EMERGENCY MEDICINE
Payer: MEDICARE

## 2022-07-09 DIAGNOSIS — R60.0 EDEMA OF BOTH LOWER EXTREMITIES: ICD-10-CM

## 2022-07-09 DIAGNOSIS — R79.89 ELEVATED TROPONIN: ICD-10-CM

## 2022-07-09 DIAGNOSIS — I50.9 CONGESTIVE HEART FAILURE, UNSPECIFIED HF CHRONICITY, UNSPECIFIED HEART FAILURE TYPE (HCC): ICD-10-CM

## 2022-07-09 DIAGNOSIS — I50.810 RIGHT-SIDED CONGESTIVE HEART FAILURE, UNSPECIFIED HF CHRONICITY (HCC): ICD-10-CM

## 2022-07-09 DIAGNOSIS — R60.0 LEG EDEMA: ICD-10-CM

## 2022-07-09 DIAGNOSIS — R60.9 PERIPHERAL EDEMA: ICD-10-CM

## 2022-07-09 LAB
ALBUMIN SERPL BCP-MCNC: 3.4 G/DL (ref 3.2–4.9)
ALBUMIN/GLOB SERPL: 1.2 G/DL
ALP SERPL-CCNC: 92 U/L (ref 30–99)
ALT SERPL-CCNC: 21 U/L (ref 2–50)
ANION GAP SERPL CALC-SCNC: 8 MMOL/L (ref 7–16)
AST SERPL-CCNC: 36 U/L (ref 12–45)
BASOPHILS # BLD AUTO: 1 % (ref 0–1.8)
BASOPHILS # BLD: 0.06 K/UL (ref 0–0.12)
BILIRUB SERPL-MCNC: 0.4 MG/DL (ref 0.1–1.5)
BUN SERPL-MCNC: 27 MG/DL (ref 8–22)
CALCIUM SERPL-MCNC: 9.1 MG/DL (ref 8.4–10.2)
CHLORIDE SERPL-SCNC: 99 MMOL/L (ref 96–112)
CO2 SERPL-SCNC: 28 MMOL/L (ref 20–33)
CREAT SERPL-MCNC: 0.71 MG/DL (ref 0.5–1.4)
EKG IMPRESSION: NORMAL
EOSINOPHIL # BLD AUTO: 0.16 K/UL (ref 0–0.51)
EOSINOPHIL NFR BLD: 2.6 % (ref 0–6.9)
ERYTHROCYTE [DISTWIDTH] IN BLOOD BY AUTOMATED COUNT: 43.7 FL (ref 35.9–50)
GFR SERPLBLD CREATININE-BSD FMLA CKD-EPI: 86 ML/MIN/1.73 M 2
GLOBULIN SER CALC-MCNC: 2.8 G/DL (ref 1.9–3.5)
GLUCOSE SERPL-MCNC: 95 MG/DL (ref 65–99)
HCT VFR BLD AUTO: 40.1 % (ref 37–47)
HGB BLD-MCNC: 13.4 G/DL (ref 12–16)
IMM GRANULOCYTES # BLD AUTO: 0.02 K/UL (ref 0–0.11)
IMM GRANULOCYTES NFR BLD AUTO: 0.3 % (ref 0–0.9)
LYMPHOCYTES # BLD AUTO: 1.91 K/UL (ref 1–4.8)
LYMPHOCYTES NFR BLD: 30.5 % (ref 22–41)
MCH RBC QN AUTO: 31.2 PG (ref 27–33)
MCHC RBC AUTO-ENTMCNC: 33.4 G/DL (ref 33.6–35)
MCV RBC AUTO: 93.3 FL (ref 81.4–97.8)
MONOCYTES # BLD AUTO: 0.63 K/UL (ref 0–0.85)
MONOCYTES NFR BLD AUTO: 10 % (ref 0–13.4)
NEUTROPHILS # BLD AUTO: 3.49 K/UL (ref 2–7.15)
NEUTROPHILS NFR BLD: 55.6 % (ref 44–72)
NRBC # BLD AUTO: 0 K/UL
NRBC BLD-RTO: 0 /100 WBC
NT-PROBNP SERPL IA-MCNC: 3716 PG/ML (ref 0–125)
PLATELET # BLD AUTO: 218 K/UL (ref 164–446)
PMV BLD AUTO: 9.1 FL (ref 9–12.9)
POTASSIUM SERPL-SCNC: 4.3 MMOL/L (ref 3.6–5.5)
PROT SERPL-MCNC: 6.2 G/DL (ref 6–8.2)
RBC # BLD AUTO: 4.3 M/UL (ref 4.2–5.4)
SODIUM SERPL-SCNC: 135 MMOL/L (ref 135–145)
TROPONIN T SERPL-MCNC: 61 NG/L (ref 6–19)
TROPONIN T SERPL-MCNC: 63 NG/L (ref 6–19)
TROPONIN T SERPL-MCNC: 68 NG/L (ref 6–19)
WBC # BLD AUTO: 6.3 K/UL (ref 4.8–10.8)

## 2022-07-09 PROCEDURE — 83880 ASSAY OF NATRIURETIC PEPTIDE: CPT

## 2022-07-09 PROCEDURE — G0378 HOSPITAL OBSERVATION PER HR: HCPCS

## 2022-07-09 PROCEDURE — 99285 EMERGENCY DEPT VISIT HI MDM: CPT

## 2022-07-09 PROCEDURE — 84484 ASSAY OF TROPONIN QUANT: CPT

## 2022-07-09 PROCEDURE — 80053 COMPREHEN METABOLIC PANEL: CPT

## 2022-07-09 PROCEDURE — 96376 TX/PRO/DX INJ SAME DRUG ADON: CPT

## 2022-07-09 PROCEDURE — 93970 EXTREMITY STUDY: CPT

## 2022-07-09 PROCEDURE — 700111 HCHG RX REV CODE 636 W/ 250 OVERRIDE (IP): Performed by: HOSPITALIST

## 2022-07-09 PROCEDURE — 36415 COLL VENOUS BLD VENIPUNCTURE: CPT

## 2022-07-09 PROCEDURE — 700111 HCHG RX REV CODE 636 W/ 250 OVERRIDE (IP): Performed by: EMERGENCY MEDICINE

## 2022-07-09 PROCEDURE — 71045 X-RAY EXAM CHEST 1 VIEW: CPT

## 2022-07-09 PROCEDURE — 85025 COMPLETE CBC W/AUTO DIFF WBC: CPT

## 2022-07-09 PROCEDURE — 99220 PR INITIAL OBSERVATION CARE,LEVL III: CPT | Performed by: HOSPITALIST

## 2022-07-09 PROCEDURE — 96374 THER/PROPH/DIAG INJ IV PUSH: CPT

## 2022-07-09 PROCEDURE — 93005 ELECTROCARDIOGRAM TRACING: CPT | Performed by: EMERGENCY MEDICINE

## 2022-07-09 RX ORDER — ACETAMINOPHEN 325 MG/1
650 TABLET ORAL EVERY 6 HOURS PRN
Status: DISCONTINUED | OUTPATIENT
Start: 2022-07-09 | End: 2022-07-09

## 2022-07-09 RX ORDER — ALUMINA, MAGNESIA, AND SIMETHICONE 2400; 2400; 240 MG/30ML; MG/30ML; MG/30ML
30 SUSPENSION ORAL EVERY 4 HOURS PRN
Status: ACTIVE | OUTPATIENT
Start: 2022-07-09 | End: 2022-07-09

## 2022-07-09 RX ORDER — BISACODYL 10 MG
10 SUPPOSITORY, RECTAL RECTAL
Status: DISCONTINUED | OUTPATIENT
Start: 2022-07-09 | End: 2022-07-11 | Stop reason: HOSPADM

## 2022-07-09 RX ORDER — FUROSEMIDE 40 MG/1
40 TABLET ORAL
Status: DISCONTINUED | OUTPATIENT
Start: 2022-07-09 | End: 2022-07-09

## 2022-07-09 RX ORDER — POTASSIUM CHLORIDE 750 MG/1
10 TABLET, EXTENDED RELEASE ORAL 2 TIMES DAILY
Status: ON HOLD | COMMUNITY
End: 2022-07-11 | Stop reason: SDUPTHER

## 2022-07-09 RX ORDER — FUROSEMIDE 10 MG/ML
40 INJECTION INTRAMUSCULAR; INTRAVENOUS ONCE
Status: COMPLETED | OUTPATIENT
Start: 2022-07-09 | End: 2022-07-09

## 2022-07-09 RX ORDER — FUROSEMIDE 10 MG/ML
40 INJECTION INTRAMUSCULAR; INTRAVENOUS
Status: DISCONTINUED | OUTPATIENT
Start: 2022-07-09 | End: 2022-07-11

## 2022-07-09 RX ORDER — AMOXICILLIN 250 MG
2 CAPSULE ORAL 2 TIMES DAILY
Status: DISCONTINUED | OUTPATIENT
Start: 2022-07-09 | End: 2022-07-11 | Stop reason: HOSPADM

## 2022-07-09 RX ORDER — ACETAMINOPHEN 325 MG/1
650 TABLET ORAL EVERY 6 HOURS PRN
Status: DISCONTINUED | OUTPATIENT
Start: 2022-07-09 | End: 2022-07-11 | Stop reason: HOSPADM

## 2022-07-09 RX ORDER — CARVEDILOL 3.12 MG/1
3.12 TABLET ORAL 2 TIMES DAILY WITH MEALS
Status: DISCONTINUED | OUTPATIENT
Start: 2022-07-09 | End: 2022-07-11 | Stop reason: HOSPADM

## 2022-07-09 RX ORDER — POLYETHYLENE GLYCOL 3350 17 G/17G
1 POWDER, FOR SOLUTION ORAL
Status: DISCONTINUED | OUTPATIENT
Start: 2022-07-09 | End: 2022-07-11 | Stop reason: HOSPADM

## 2022-07-09 RX ADMIN — FUROSEMIDE 40 MG: 10 INJECTION, SOLUTION INTRAMUSCULAR; INTRAVENOUS at 09:49

## 2022-07-09 RX ADMIN — FUROSEMIDE 40 MG: 10 INJECTION, SOLUTION INTRAMUSCULAR; INTRAVENOUS at 17:16

## 2022-07-09 ASSESSMENT — ENCOUNTER SYMPTOMS
CHILLS: 0
FOCAL WEAKNESS: 0
FEVER: 0
DIZZINESS: 0
FLANK PAIN: 0
NAUSEA: 0
NERVOUS/ANXIOUS: 0
BRUISES/BLEEDS EASILY: 0
COUGH: 0
EYE REDNESS: 0
SHORTNESS OF BREATH: 0
STRIDOR: 0
EYE DISCHARGE: 0
ABDOMINAL PAIN: 0
MYALGIAS: 0
HEADACHES: 0
VOMITING: 0

## 2022-07-09 ASSESSMENT — COGNITIVE AND FUNCTIONAL STATUS - GENERAL
SUGGESTED CMS G CODE MODIFIER MOBILITY: CH
DAILY ACTIVITIY SCORE: 24
SUGGESTED CMS G CODE MODIFIER DAILY ACTIVITY: CH
MOBILITY SCORE: 24

## 2022-07-09 ASSESSMENT — LIFESTYLE VARIABLES
HOW MANY TIMES IN THE PAST YEAR HAVE YOU HAD 5 OR MORE DRINKS IN A DAY: 0
ALCOHOL_USE: NO
HAVE YOU EVER FELT YOU SHOULD CUT DOWN ON YOUR DRINKING: NO
TOTAL SCORE: 0
AVERAGE NUMBER OF DAYS PER WEEK YOU HAVE A DRINK CONTAINING ALCOHOL: 0
EVER HAD A DRINK FIRST THING IN THE MORNING TO STEADY YOUR NERVES TO GET RID OF A HANGOVER: NO
TOTAL SCORE: 0
ON A TYPICAL DAY WHEN YOU DRINK ALCOHOL HOW MANY DRINKS DO YOU HAVE: 0
CONSUMPTION TOTAL: NEGATIVE
HAVE PEOPLE ANNOYED YOU BY CRITICIZING YOUR DRINKING: NO
TOTAL SCORE: 0
EVER FELT BAD OR GUILTY ABOUT YOUR DRINKING: NO

## 2022-07-09 ASSESSMENT — PATIENT HEALTH QUESTIONNAIRE - PHQ9
1. LITTLE INTEREST OR PLEASURE IN DOING THINGS: NOT AT ALL
2. FEELING DOWN, DEPRESSED, IRRITABLE, OR HOPELESS: NOT AT ALL
SUM OF ALL RESPONSES TO PHQ9 QUESTIONS 1 AND 2: 0

## 2022-07-09 ASSESSMENT — FIBROSIS 4 INDEX
FIB4 SCORE: 2.88
FIB4 SCORE: 3.3

## 2022-07-09 NOTE — PROGRESS NOTES
4 Eyes Skin Assessment Completed by JULIETTE White and Sneha SEGOVIA.    Head WDL  Ears WDL  Nose WDL  Mouth WDL  Neck WDL  Breast/Chest WDL  Shoulder Blades WDL  Spine WDL  (R) Arm/Elbow/Hand Edema  (L) Arm/Elbow/Hand WDL  Abdomen WDL  Groin WDL  Scrotum/Coccyx/Buttocks rash on right lower back   (R) Leg Weeping  (L) Leg Edema  (R) Heel/Foot/Toe WDL  (L) Heel/Foot/Toe WDL          Devices In Places Tele Box      Interventions In Place N/A    Possible Skin Injury No    Pictures Uploaded Into Epic No, needs to be completed  Wound Consult Placed N/A  RN Wound Prevention Protocol Ordered No

## 2022-07-09 NOTE — PROGRESS NOTES
Report form ER nurse. Pt arrived to room 315-1, Pt AAOx4, patient walked to bed/bathroom, gait steady. Pt denying pain at this time.

## 2022-07-09 NOTE — ASSESSMENT & PLAN NOTE
Concerning for heart failure with the presence of bilateral lower extremity edema  Intravenous diuretics.  We will check echocardiography  Daily strict input and output  Daily standing weights.  Daily BMP to watch renal function, electrolytes.  Replace electrolytes as needed.  F/u echo

## 2022-07-09 NOTE — ED NOTES
Patient resting on gurney in NAD. US at bedside. Patient denies needs. Call light in reach. Patient updated to POC.

## 2022-07-09 NOTE — ASSESSMENT & PLAN NOTE
Concerning for heart failure with the presence of elevated BNP  Intravenous diuretics.  We will check echocardiography  Patient also has pain in her lower extremities, I will check a venous ultrasound to rule out deep vein thrombosis.  Daily strict input and output  Daily standing weights.  Daily BMP to watch renal function, electrolytes.  Replace electrolytes as needed.   US neg for dvt   Low albumin  Cr is wnl.   Likely cardiac in origin f/u echo.  Continue diuresis.

## 2022-07-09 NOTE — PROGRESS NOTES
Telemetry Shift Summary    Rhythm  SR  HR Range 80  Ectopy N/A  Measurements 0.20/0.14/0.42        Normal Values  Rhythm SR  HR Range    Measurements 0.12-0.20 / 0.06-0.10  / 0.30-0.52

## 2022-07-09 NOTE — ED NOTES
Pt medicated per MAR. Updated on POC. Commode at bedside. Pt educated on using call light for needs.

## 2022-07-09 NOTE — ED PROVIDER NOTES
ED Provider Note      Primary care provider: SHANNON Ontiveros  Means of arrival: private vehicle  History obtained from: patient  History limited by: none    CHIEF COMPLAINT  Chief Complaint   Patient presents with   • Leg Swelling     To BLE that worsened overnight       HPI  Regla Meier is a 81 y.o. female who presents to the Emergency Department for evaluation of leg swelling bilaterally.  Patient reports that she has been having swelling to her legs for the last couple of months.  She has followed up with her primary care physician 3 times over the last month.  She was started on Lasix 20 mg once daily, after labs were checked and electrolytes are within normal limits her Lasix was increased to 20 mg twice daily on 6/21/2022.  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  She does have follow-up with cardiology and scheduled echocardiogram for September of this year.  No known history of heart failure or cardiac disease.  She came in today because her leg swelling seem to get slightly worse yesterday after prolonged sitting at a movie theater yesterday for a few hours.  This morning after some rest overnight the leg swelling has improved.    REVIEW OF SYSTEMS  Review of Systems   Constitutional: Negative for chills and fever.   Respiratory: Negative for shortness of breath.    Cardiovascular: Negative for chest pain.   Gastrointestinal: Negative for abdominal pain, nausea and vomiting.   Musculoskeletal:        + leg swelling   Neurological: Negative for dizziness and headaches.   All other systems reviewed and are negative.        PAST MEDICAL HISTORY   has a past medical history of Hyperlipemia.    SURGICAL HISTORY   has a past surgical history that includes cystectomy (1963) and cataract extraction with iol.    SOCIAL HISTORY  Social  "History     Tobacco Use   • Smoking status: Never Smoker   • Smokeless tobacco: Never Used   Vaping Use   • Vaping Use: Never used   Substance Use Topics   • Alcohol use: Yes     Alcohol/week: 0.6 oz     Types: 1 Glasses of wine per week   • Drug use: No      Social History     Substance and Sexual Activity   Drug Use No       FAMILY HISTORY  Family History   Problem Relation Age of Onset   • Other Mother         TIA    • Other Father         Abdominal aneurysm    • Alcohol abuse Brother    • Heart Disease Brother    • No Known Problems Daughter    • No Known Problems Daughter        CURRENT MEDICATIONS  See medication list    ALLERGIES  Allergies   Allergen Reactions   • Pcn [Penicillins] Hives       PHYSICAL EXAM  VITAL SIGNS: /84   Pulse 89   Temp 36.7 °C (98.1 °F) (Temporal)   Resp 18   Ht 1.575 m (5' 2\")   Wt 56.1 kg (123 lb 10.9 oz)   SpO2 98%   BMI 22.62 kg/m²   Vitals reviewed by myself.  Physical Exam  Nursing note and vitals reviewed.  Constitutional: Well-developed and well-nourished. No acute distress.   HENT: Head is normocephalic and atraumatic.  Eyes: extra-ocular movements intact  Cardiovascular: regular rate and  regular rhythm. No murmur heard.  Pulmonary/Chest: Breath sounds normal. No wheezes or rales.   Abdominal: Soft and non-tender. No distention.    Musculoskeletal: Extremities exhibit normal range of motion, pitting edema bilateral lower extremities tracking to the knees  Neurological: Awake and alert  Skin: Skin is warm and dry. No rash.       DIAGNOSTIC STUDIES /  LABS  Labs Reviewed   CBC WITH DIFFERENTIAL - Abnormal; Notable for the following components:       Result Value    MCHC 33.4 (*)     All other components within normal limits   COMP METABOLIC PANEL - Abnormal; Notable for the following components:    Bun 27 (*)     All other components within normal limits   TROPONIN - Abnormal; Notable for the following components:    Troponin T 68 (*)     All other components " within normal limits   PROBRAIN NATRIURETIC PEPTIDE, NT - Abnormal; Notable for the following components:    NT-proBNP 3716 (*)     All other components within normal limits   ESTIMATED GFR   TROPONIN   TROPONIN       All labs reviewed by me.    EKG Interpretation:  Interpreted by myself    12 Lead EKG interpreted by me to show:  EKG at 8:22 AM: Normal sinus rhythm, heart rate 73, left axis deviation, normal intervals, , , QTc 471, no acute ST-T segment changes, no evidence of acute arrhythmia or ischemia  My impression of this EKG: Does not indicate ischemia or arrhythmia at this time.    RADIOLOGY  DX-CHEST-PORTABLE (1 VIEW)   Final Result      Cardiomegaly with interstitial prominence.        The radiologist's interpretation of all radiological studies have been reviewed by me.          COURSE & MEDICAL DECISION MAKING  Nursing notes, VS, PMSFHx reviewed in chart.    Patient is a 81-year-old female who comes in for evaluation of leg swelling.  Differential diagnosis includes peripheral edema, electrolyte abnormality, new onset heart failure.  Diagnostic work-up includes labs, EKG and chest x-ray.    Patient's initial vitals are within normal limits.  EKG returns and demonstrates no evidence of acute arrhythmia or ischemia.  Labs returned notable for an elevated BNP of 3716 with elevated troponin of 68.  This is likely new heart failure, troponin elevation likely secondary to fluid overload as patient has no chest pain and EKG is nonischemic.  Patient will be hospitalized for management of new heart failure.  She is given dose of Lasix in the emergency department.  Discussed the case with Dr. Villafana who has accepted patient for hospitalization.  Patient is in guarded condition.      FINAL IMPRESSION  1. Peripheral edema    2. Congestive heart failure, unspecified HF chronicity, unspecified heart failure type (HCC)    3. Elevated troponin

## 2022-07-09 NOTE — ASSESSMENT & PLAN NOTE
In the indeterminate range 68  Denies chest pain.  EKG which shows a sinus rhythm with a rate of 73, there is no ST elevation, there is flattening of T wave in lead II, 3 and aVF, low voltage QRS.  QTc 471.  Stat EKG, troponin for chest pain or shortness of breath  Continuous cardiac monitoring, will trend troponins and check echocardiography  May need a stress test inpatient versus outpatient according to clinical course and echo results  She will need stress test in am.   F/u echo.

## 2022-07-09 NOTE — ED TRIAGE NOTES
Pt in with C/O BLE leg swelling. Pt states that her leg swelling was worse last night and her legs were also throbbing. She states that the leg swelling has been ongoing for a few months but was concerned last night.

## 2022-07-09 NOTE — ED NOTES
Med rec updated and complete, per pt   Allergies reviewed, per pt   Pt reports that she takes her POTASSIUM 10MEQ 1 tablet twice a day, not once a day

## 2022-07-09 NOTE — H&P
"Hospital Medicine History & Physical Note    Date of Service  7/9/2022    Primary Care Physician  SHANNON Ontiveros    Consultants  None     Code Status  Full Code    Chief Complaint  Chief Complaint   Patient presents with   • Leg Swelling     To BLE that worsened overnight     History of Presenting Illness  Regla Meier is a 81 y.o. female with a past medical history of hyperlipidemia who presented 7/9/2022 with progressively worsening lower extremity swelling.  Patient reports that she has been having progressively worsening lower extremity swelling over the past 2 months however on the day prior to admission and after returning from watching a movie \"[employer]\" she started to develop pain in addition to worsening swelling in both of her lower extremities.  Pain is mild, described as tight in character.  Pain does not radiate to other places.  No relieving or aggravating factors.  Patient denies having shortness of breath palpitations, orthopnea or paroxysmal nocturnal dyspnea.    I discussed the plan of care with emergency department physician, the patient and patient  who was present at bedside in the emergency room.    Review of Systems  Review of Systems   Constitutional: Negative for chills and fever.   Eyes: Negative for discharge and redness.   Respiratory: Negative for cough, shortness of breath and stridor.    Cardiovascular: Positive for leg swelling (and pain ). Negative for chest pain.   Gastrointestinal: Negative for abdominal pain and vomiting.   Genitourinary: Negative for flank pain.   Musculoskeletal: Negative for myalgias.   Skin: Negative.    Neurological: Negative for focal weakness.   Endo/Heme/Allergies: Does not bruise/bleed easily.   Psychiatric/Behavioral: The patient is not nervous/anxious.      Past Medical History   has a past medical history of Hyperlipemia.    Surgical History   has a past surgical history that includes cystectomy (1963) and cataract extraction " with iol.     Family History  family history includes Alcohol abuse in her brother; Heart Disease in her brother; No Known Problems in her daughter and daughter; Other in her father and mother.     Social History   reports that she has never smoked. She has never used smokeless tobacco. She reports current alcohol use of about 0.6 oz of alcohol per week. She reports that she does not use drugs.    Allergies  Allergies   Allergen Reactions   • Pcn [Penicillins] Hives     Medications  Prior to Admission Medications   Prescriptions Last Dose Informant Patient Reported? Taking?   [facility] LUTEIN-ZEAXANTHIN PO 7/8/2022 at 0800 Patient Yes No   Sig: Take 1 Capsule by mouth every day.   TURMERIC PO 7/8/2022 at 0800 Patient Yes No   Sig: Take 1 Capsule by mouth every day.   asa/apap/caffeine (EXCEDRIN) 250-250-65 MG Tab 7/9/2022 at 0600 Patient Yes Yes   Sig: Take 1-2 Tablets by mouth every 6 hours as needed for Headache.   carvedilol (COREG) 3.125 MG Tab 7/8/2022 at 1900 Patient No No   Sig: Take 1 Tablet by mouth 2 times a day with meals.   furosemide (LASIX) 20 MG Tab 7/8/2022 at 1900 Patient No No   Sig: Take 1 Tablet by mouth 2 times a day.   potassium chloride SA (K-DUR) 10 MEQ Tab CR 7/8/2022 at 1900 Patient Yes Yes   Sig: Take 10 mEq by mouth 2 times a day.      Facility-Administered Medications: None     Physical Exam  Temp:  [36.7 °C (98.1 °F)] 36.7 °C (98.1 °F)  Pulse:  [89] 89  Resp:  [18] 18  BP: (126)/(84) 126/84  SpO2:  [98 %] 98 %  Blood Pressure : 126/84   Temperature: 36.7 °C (98.1 °F)   Pulse: 89   Respiration: 18   Pulse Oximetry: 98 %     Physical Exam  Constitutional:       General: She is not in acute distress.  HENT:      Head: Normocephalic and atraumatic.      Right Ear: External ear normal.      Left Ear: External ear normal.      Nose: No congestion or rhinorrhea.      Mouth/Throat:      Mouth: Mucous membranes are moist.      Pharynx: No oropharyngeal exudate or posterior oropharyngeal erythema.    Eyes:      General: No scleral icterus.        Right eye: No discharge.         Left eye: No discharge.      Conjunctiva/sclera: Conjunctivae normal.      Pupils: Pupils are equal, round, and reactive to light.   Cardiovascular:      Heart sounds:     No friction rub. No gallop.   Pulmonary:      Effort: Pulmonary effort is normal.   Abdominal:      General: Abdomen is flat. There is no distension.      Tenderness: There is no guarding.   Musculoskeletal:         General: Swelling present.      Cervical back: Neck supple. No rigidity. No muscular tenderness.      Right lower leg: Edema present.      Left lower leg: Edema present.   Skin:     Capillary Refill: Capillary refill takes 2 to 3 seconds.      Coloration: Skin is not jaundiced or pale.      Findings: No bruising or erythema.   Neurological:      Mental Status: She is alert and oriented to person, place, and time.   Psychiatric:         Mood and Affect: Mood normal.         Judgment: Judgment normal.       Laboratory:  Recent Labs     07/09/22  0826   WBC 6.3   RBC 4.30   HEMOGLOBIN 13.4   HEMATOCRIT 40.1   MCV 93.3   MCH 31.2   MCHC 33.4*   RDW 43.7   PLATELETCT 218   MPV 9.1     Recent Labs     07/09/22  0826   SODIUM 135   POTASSIUM 4.3   CHLORIDE 99   CO2 28   GLUCOSE 95   BUN 27*   CREATININE 0.71   CALCIUM 9.1     Recent Labs     07/09/22  0826   ALTSGPT 21   ASTSGOT 36   ALKPHOSPHAT 92   TBILIRUBIN 0.4   GLUCOSE 95         Recent Labs     07/09/22  0826   NTPROBNP 3716*         Recent Labs     07/09/22  0826   TROPONINT 68*     Imaging:  DX-CHEST-PORTABLE (1 VIEW)   Final Result      Cardiomegaly with interstitial prominence.      US-EXTREMITY VENOUS LOWER BILAT    (Results Pending)   EC-ECHOCARDIOGRAM COMPLETE W/O CONT    (Results Pending)     I personally reviewed patient EKG which shows a sinus rhythm with a rate of 73, there is no ST elevation, there is flattening of T wave in lead II, 3 and aVF, low voltage QRS.  QTc  471.    Assessment/Plan:  Justification for Admission Status  I anticipate this patient is appropriate for observation status at this time because Likely discharge after 1 midnight    * Lower extremity edema concerning for possible heart failure- (present on admission)  Assessment & Plan  Concerning for heart failure with the presence of elevated BNP  Intravenous diuretics.  We will check echocardiography  Patient also has pain in her lower extremities, I will check a venous ultrasound to rule out deep vein thrombosis.  Daily strict input and output  Daily standing weights.  Daily BMP to watch renal function, electrolytes.  Replace electrolytes as needed.     Elevated troponin- (present on admission)  Assessment & Plan  In the indeterminate range 68  Denies chest pain.  EKG which shows a sinus rhythm with a rate of 73, there is no ST elevation, there is flattening of T wave in lead II, 3 and aVF, low voltage QRS.  QTc 471.  Stat EKG, troponin for chest pain or shortness of breath  Continuous cardiac monitoring, will trend troponins and check echocardiography  May need a stress test inpatient versus outpatient according to clinical course and echo results    Elevated brain natriuretic peptide (BNP) level- (present on admission)  Assessment & Plan  Concerning for heart failure with the presence of bilateral lower extremity edema  Intravenous diuretics.  We will check echocardiography  Daily strict input and output  Daily standing weights.  Daily BMP to watch renal function, electrolytes.  Replace electrolytes as needed.    Hyperlipemia- (present on admission)  Assessment & Plan  Cardiac diet    VTE prophylaxis: SCDs/TEDs and Xarelto 10 mg daily as prophylaxis

## 2022-07-10 ENCOUNTER — APPOINTMENT (OUTPATIENT)
Dept: CARDIOLOGY | Facility: MEDICAL CENTER | Age: 82
DRG: 948 | End: 2022-07-10
Attending: HOSPITALIST
Payer: MEDICARE

## 2022-07-10 PROBLEM — R60.9 EDEMA: Status: ACTIVE | Noted: 2022-07-10

## 2022-07-10 LAB
ALBUMIN SERPL BCP-MCNC: 2.9 G/DL (ref 3.2–4.9)
ALBUMIN/GLOB SERPL: 1.2 G/DL
ALP SERPL-CCNC: 73 U/L (ref 30–99)
ALT SERPL-CCNC: 16 U/L (ref 2–50)
ANION GAP SERPL CALC-SCNC: 11 MMOL/L (ref 7–16)
AST SERPL-CCNC: 30 U/L (ref 12–45)
BILIRUB SERPL-MCNC: 0.5 MG/DL (ref 0.1–1.5)
BUN SERPL-MCNC: 24 MG/DL (ref 8–22)
CALCIUM SERPL-MCNC: 8.5 MG/DL (ref 8.4–10.2)
CHLORIDE SERPL-SCNC: 100 MMOL/L (ref 96–112)
CO2 SERPL-SCNC: 27 MMOL/L (ref 20–33)
CREAT SERPL-MCNC: 0.71 MG/DL (ref 0.5–1.4)
ERYTHROCYTE [DISTWIDTH] IN BLOOD BY AUTOMATED COUNT: 43.4 FL (ref 35.9–50)
GFR SERPLBLD CREATININE-BSD FMLA CKD-EPI: 86 ML/MIN/1.73 M 2
GLOBULIN SER CALC-MCNC: 2.5 G/DL (ref 1.9–3.5)
GLUCOSE SERPL-MCNC: 89 MG/DL (ref 65–99)
HCT VFR BLD AUTO: 37.1 % (ref 37–47)
HGB BLD-MCNC: 12.4 G/DL (ref 12–16)
LV EJECT FRACT  99904: 65
LV EJECT FRACT MOD 2C 99903: 61.16
LV EJECT FRACT MOD 4C 99902: 59.89
LV EJECT FRACT MOD BP 99901: 58.59
MAGNESIUM SERPL-MCNC: 2 MG/DL (ref 1.5–2.5)
MCH RBC QN AUTO: 31.1 PG (ref 27–33)
MCHC RBC AUTO-ENTMCNC: 33.4 G/DL (ref 33.6–35)
MCV RBC AUTO: 93 FL (ref 81.4–97.8)
PLATELET # BLD AUTO: 193 K/UL (ref 164–446)
PMV BLD AUTO: 9.3 FL (ref 9–12.9)
POTASSIUM SERPL-SCNC: 3.6 MMOL/L (ref 3.6–5.5)
PROT SERPL-MCNC: 5.4 G/DL (ref 6–8.2)
RBC # BLD AUTO: 3.99 M/UL (ref 4.2–5.4)
SODIUM SERPL-SCNC: 138 MMOL/L (ref 135–145)
TROPONIN T SERPL-MCNC: 59 NG/L (ref 6–19)
WBC # BLD AUTO: 5.7 K/UL (ref 4.8–10.8)

## 2022-07-10 PROCEDURE — 85027 COMPLETE CBC AUTOMATED: CPT

## 2022-07-10 PROCEDURE — 99233 SBSQ HOSP IP/OBS HIGH 50: CPT | Performed by: HOSPITALIST

## 2022-07-10 PROCEDURE — 93306 TTE W/DOPPLER COMPLETE: CPT

## 2022-07-10 PROCEDURE — 96376 TX/PRO/DX INJ SAME DRUG ADON: CPT

## 2022-07-10 PROCEDURE — A9270 NON-COVERED ITEM OR SERVICE: HCPCS | Performed by: HOSPITALIST

## 2022-07-10 PROCEDURE — 770020 HCHG ROOM/CARE - TELE (206)

## 2022-07-10 PROCEDURE — 83735 ASSAY OF MAGNESIUM: CPT

## 2022-07-10 PROCEDURE — 700102 HCHG RX REV CODE 250 W/ 637 OVERRIDE(OP): Performed by: HOSPITALIST

## 2022-07-10 PROCEDURE — 80053 COMPREHEN METABOLIC PANEL: CPT

## 2022-07-10 PROCEDURE — 93306 TTE W/DOPPLER COMPLETE: CPT | Mod: 26 | Performed by: INTERNAL MEDICINE

## 2022-07-10 PROCEDURE — 84484 ASSAY OF TROPONIN QUANT: CPT

## 2022-07-10 PROCEDURE — 700111 HCHG RX REV CODE 636 W/ 250 OVERRIDE (IP): Performed by: HOSPITALIST

## 2022-07-10 RX ADMIN — SENNOSIDES AND DOCUSATE SODIUM 2 TABLET: 50; 8.6 TABLET ORAL at 05:49

## 2022-07-10 RX ADMIN — FUROSEMIDE 40 MG: 10 INJECTION, SOLUTION INTRAMUSCULAR; INTRAVENOUS at 16:16

## 2022-07-10 RX ADMIN — FUROSEMIDE 40 MG: 10 INJECTION, SOLUTION INTRAMUSCULAR; INTRAVENOUS at 05:49

## 2022-07-10 ASSESSMENT — ENCOUNTER SYMPTOMS
SHORTNESS OF BREATH: 0
BACK PAIN: 0
VOMITING: 0
DIZZINESS: 0
WHEEZING: 0
DEPRESSION: 0
PND: 0
BLURRED VISION: 0
CLAUDICATION: 0
HEARTBURN: 0
CHILLS: 0
MYALGIAS: 0
PALPITATIONS: 0
COUGH: 0
NAUSEA: 0
HEADACHES: 0
BRUISES/BLEEDS EASILY: 0
FEVER: 0
DOUBLE VISION: 0
HEMOPTYSIS: 0

## 2022-07-10 NOTE — PROGRESS NOTES
"Hospital Medicine Daily Progress Note    Date of Service  7/10/2022    Chief Complaint  Regla Meier is a 81 y.o. female admitted 7/9/2022 with lower extremity edema.     Hospital Course  Regla Meier is a 81 y.o. female with a past medical history of hyperlipidemia who presented 7/9/2022 with progressively worsening lower extremity swelling.  Patient reports that she has been having progressively worsening lower extremity swelling over the past 2 months however on the day prior to admission and after returning from watching a movie \"Top-Gun\" she started to develop pain in addition to worsening swelling in both of her lower extremities.  Pain is mild, described as tight in character.  Pain does not radiate to other places.  No relieving or aggravating factors.  Patient denies having shortness of breath palpitations, orthopnea or paroxysmal nocturnal dyspnea.      Interval Problem Update  7/10 in bed, troponin elevated but no chest pain, lower ext edema improved with diuresis, Cr is wnl, will f/u echo, she will need stress test most likely, no sob, no palpitation no abdominal pain.     I have discussed this patient's plan of care and discharge plan at IDT rounds today with Case Management, Nursing, Nursing leadership, and other members of the IDT team.    Consultants/Specialty  none    Code Status  Full Code    Disposition  Patient is not medically cleared for discharge.   Anticipate discharge to to home with close outpatient follow-up.  I have placed the appropriate orders for post-discharge needs.    Review of Systems  Review of Systems   Constitutional: Negative for chills and fever.   HENT: Negative for congestion and nosebleeds.    Eyes: Negative for blurred vision and double vision.   Respiratory: Negative for cough, hemoptysis, shortness of breath and wheezing.    Cardiovascular: Positive for leg swelling. Negative for chest pain, palpitations, claudication and PND.   Gastrointestinal: " Negative for heartburn, nausea and vomiting.   Genitourinary: Negative for hematuria and urgency.   Musculoskeletal: Negative for back pain and myalgias.   Skin: Negative for rash.   Neurological: Negative for dizziness and headaches.   Endo/Heme/Allergies: Does not bruise/bleed easily.   Psychiatric/Behavioral: Negative for depression.        Physical Exam  Temp:  [36.1 °C (97 °F)-36.7 °C (98.1 °F)] 36.1 °C (97 °F)  Pulse:  [72-77] 72  Resp:  [16-22] 18  BP: ()/(53-74) 108/53  SpO2:  [96 %-100 %] 100 %    Physical Exam  Vitals and nursing note reviewed.   Constitutional:       General: She is not in acute distress.     Appearance: Normal appearance.   HENT:      Head: Normocephalic.      Mouth/Throat:      Pharynx: Oropharynx is clear.   Eyes:      General: No scleral icterus.        Right eye: No discharge.         Left eye: No discharge.      Conjunctiva/sclera: Conjunctivae normal.   Cardiovascular:      Rate and Rhythm: Normal rate and regular rhythm.      Pulses: Normal pulses.      Heart sounds: Normal heart sounds.   Pulmonary:      Effort: Pulmonary effort is normal. No respiratory distress.      Breath sounds: Normal breath sounds. No wheezing or rales.   Abdominal:      General: Bowel sounds are normal. There is no distension.      Palpations: Abdomen is soft.      Tenderness: There is no abdominal tenderness. There is no guarding.   Musculoskeletal:      Cervical back: Normal range of motion and neck supple.      Right lower leg: Edema present.      Left lower leg: Edema present.   Skin:     General: Skin is warm and dry.      Capillary Refill: Capillary refill takes less than 2 seconds.      Coloration: Skin is not jaundiced.   Neurological:      General: No focal deficit present.      Mental Status: She is alert and oriented to person, place, and time.      Cranial Nerves: No cranial nerve deficit.   Psychiatric:         Mood and Affect: Mood normal.         Fluids    Intake/Output Summary (Last  24 hours) at 7/10/2022 1055  Last data filed at 7/10/2022 0800  Gross per 24 hour   Intake 860 ml   Output 1500 ml   Net -640 ml       Laboratory  Recent Labs     07/09/22  0826 07/10/22  0202   WBC 6.3 5.7   RBC 4.30 3.99*   HEMOGLOBIN 13.4 12.4   HEMATOCRIT 40.1 37.1   MCV 93.3 93.0   MCH 31.2 31.1   MCHC 33.4* 33.4*   RDW 43.7 43.4   PLATELETCT 218 193   MPV 9.1 9.3     Recent Labs     07/09/22  0826 07/10/22  0202   SODIUM 135 138   POTASSIUM 4.3 3.6   CHLORIDE 99 100   CO2 28 27   GLUCOSE 95 89   BUN 27* 24*   CREATININE 0.71 0.71   CALCIUM 9.1 8.5                   Imaging  EC-ECHOCARDIOGRAM COMPLETE W/O CONT         US-EXTREMITY VENOUS LOWER BILAT   Final Result      No evidence of bilateral lower extremity deep venous thrombosis.      DX-CHEST-PORTABLE (1 VIEW)   Final Result      Cardiomegaly with interstitial prominence.           Assessment/Plan  * Lower extremity edema concerning for possible heart failure- (present on admission)  Assessment & Plan  Concerning for heart failure with the presence of elevated BNP  Intravenous diuretics.  We will check echocardiography  Patient also has pain in her lower extremities, I will check a venous ultrasound to rule out deep vein thrombosis.  Daily strict input and output  Daily standing weights.  Daily BMP to watch renal function, electrolytes.  Replace electrolytes as needed.   US neg for dvt   Low albumin  Cr is wnl.   Likely cardiac in origin f/u echo.  Continue diuresis.     Elevated troponin- (present on admission)  Assessment & Plan  In the indeterminate range 68  Denies chest pain.  EKG which shows a sinus rhythm with a rate of 73, there is no ST elevation, there is flattening of T wave in lead II, 3 and aVF, low voltage QRS.  QTc 471.  Stat EKG, troponin for chest pain or shortness of breath  Continuous cardiac monitoring, will trend troponins and check echocardiography  May need a stress test inpatient versus outpatient according to clinical course and  echo results  She will need stress test in am.   F/u echo.     Elevated brain natriuretic peptide (BNP) level- (present on admission)  Assessment & Plan  Concerning for heart failure with the presence of bilateral lower extremity edema  Intravenous diuretics.  We will check echocardiography  Daily strict input and output  Daily standing weights.  Daily BMP to watch renal function, electrolytes.  Replace electrolytes as needed.  F/u echo    Hyperlipemia- (present on admission)  Assessment & Plan  Cardiac diet         VTE prophylaxis: Xarelto 10 mg daily as prophylaxis    I have performed a physical exam and reviewed and updated ROS and Plan today (7/10/2022). In review of yesterday's note (7/9/2022), there are no changes except as documented above.

## 2022-07-10 NOTE — FLOWSHEET NOTE
Telemetry Shift Summary     Rhythm: SR  HR Range: 70  Ectopy: rPVC, rPAC  Measurements: 0.20/0.14/0.42              Normal Values  Rhythm SR  HR Range    Measurements 0.12-0.20 / 0.06-0.10  / 0.30-0.52

## 2022-07-10 NOTE — HOSPITAL COURSE
"Regla Meier is a 81 y.o. female with a past medical history of hyperlipidemia who presented 7/9/2022 with progressively worsening lower extremity swelling.  Patient reports that she has been having progressively worsening lower extremity swelling over the past 2 months however on the day prior to admission and after returning from watching a movie \"Top-Gun\" she started to develop pain in addition to worsening swelling in both of her lower extremities.  Pain is mild, described as tight in character.  Pain does not radiate to other places.  No relieving or aggravating factors.  Patient denies having shortness of breath palpitations, orthopnea or paroxysmal nocturnal dyspnea.  "

## 2022-07-11 ENCOUNTER — APPOINTMENT (OUTPATIENT)
Dept: RADIOLOGY | Facility: MEDICAL CENTER | Age: 82
DRG: 948 | End: 2022-07-11
Attending: HOSPITALIST
Payer: MEDICARE

## 2022-07-11 VITALS
DIASTOLIC BLOOD PRESSURE: 54 MMHG | BODY MASS INDEX: 22.8 KG/M2 | RESPIRATION RATE: 18 BRPM | OXYGEN SATURATION: 96 % | SYSTOLIC BLOOD PRESSURE: 95 MMHG | TEMPERATURE: 98.5 F | WEIGHT: 123.9 LBS | HEIGHT: 62 IN | HEART RATE: 80 BPM

## 2022-07-11 PROBLEM — R79.89 ELEVATED TROPONIN: Status: RESOLVED | Noted: 2022-07-09 | Resolved: 2022-07-11

## 2022-07-11 PROBLEM — R60.9 EDEMA: Status: RESOLVED | Noted: 2022-07-10 | Resolved: 2022-07-11

## 2022-07-11 PROBLEM — R79.89 ELEVATED BRAIN NATRIURETIC PEPTIDE (BNP) LEVEL: Status: RESOLVED | Noted: 2022-07-09 | Resolved: 2022-07-11

## 2022-07-11 PROBLEM — R60.0 LOWER EXTREMITY EDEMA: Status: RESOLVED | Noted: 2022-07-09 | Resolved: 2022-07-11

## 2022-07-11 PROCEDURE — 94760 N-INVAS EAR/PLS OXIMETRY 1: CPT

## 2022-07-11 PROCEDURE — A9270 NON-COVERED ITEM OR SERVICE: HCPCS | Performed by: HOSPITALIST

## 2022-07-11 PROCEDURE — 700111 HCHG RX REV CODE 636 W/ 250 OVERRIDE (IP): Performed by: HOSPITALIST

## 2022-07-11 PROCEDURE — 700102 HCHG RX REV CODE 250 W/ 637 OVERRIDE(OP): Performed by: HOSPITALIST

## 2022-07-11 PROCEDURE — A9502 TC99M TETROFOSMIN: HCPCS

## 2022-07-11 PROCEDURE — 700111 HCHG RX REV CODE 636 W/ 250 OVERRIDE (IP)

## 2022-07-11 PROCEDURE — 78452 HT MUSCLE IMAGE SPECT MULT: CPT | Mod: 26 | Performed by: INTERNAL MEDICINE

## 2022-07-11 PROCEDURE — 99239 HOSP IP/OBS DSCHRG MGMT >30: CPT | Performed by: HOSPITALIST

## 2022-07-11 PROCEDURE — 93018 CV STRESS TEST I&R ONLY: CPT | Performed by: INTERNAL MEDICINE

## 2022-07-11 RX ORDER — FUROSEMIDE 20 MG/1
20 TABLET ORAL DAILY
Qty: 30 TABLET | Refills: 0
Start: 2022-07-11 | End: 2022-12-06

## 2022-07-11 RX ORDER — FUROSEMIDE 10 MG/ML
40 INJECTION INTRAMUSCULAR; INTRAVENOUS
Status: DISCONTINUED | OUTPATIENT
Start: 2022-07-12 | End: 2022-07-11

## 2022-07-11 RX ORDER — REGADENOSON 0.08 MG/ML
INJECTION, SOLUTION INTRAVENOUS
Status: DISCONTINUED
Start: 2022-07-11 | End: 2022-07-11

## 2022-07-11 RX ORDER — POTASSIUM CHLORIDE 750 MG/1
10 TABLET, EXTENDED RELEASE ORAL DAILY
Qty: 1 EACH | Refills: 0
Start: 2022-07-11 | End: 2022-11-02

## 2022-07-11 RX ADMIN — FUROSEMIDE 40 MG: 10 INJECTION, SOLUTION INTRAMUSCULAR; INTRAVENOUS at 05:32

## 2022-07-11 RX ADMIN — REGADENOSON 0.4 MG: 0.08 INJECTION, SOLUTION INTRAVENOUS at 09:44

## 2022-07-11 RX ADMIN — SENNOSIDES AND DOCUSATE SODIUM 2 TABLET: 50; 8.6 TABLET ORAL at 05:32

## 2022-07-11 NOTE — FLOWSHEET NOTE
Telemetry Shift Summary     Rhythm: SR  HR Range: 70-80  Ectopy: rPAC, rPVC  Measurements: 0.20/0.14/0.40              Normal Values  Rhythm SR  HR Range    Measurements 0.12-0.20 / 0.06-0.10  / 0.30-0.52

## 2022-07-11 NOTE — PROGRESS NOTES
Telemetry Shift Summary     Rhythm  SR, BBB  HR Range 77-83  Ectopy rPVC/PAC  Measurements 0.20/0.12/0.44           Normal Values  Rhythm SR  HR Range    Measurements 0.12-0.20 / 0.06-0.10  / 0.30-0.52

## 2022-07-11 NOTE — CARE PLAN
The patient is Stable - Low risk of patient condition declining or worsening    Shift Goals  Clinical Goals: monitor output  Patient Goals: to discharge soon    Progress made toward(s) clinical / shift goals:        Problem: Knowledge Deficit - Standard  Goal: Patient and family/care givers will demonstrate understanding of plan of care, disease process/condition, diagnostic tests and medications  Outcome: Progressing     Problem: Communication  Goal: The ability to communicate needs accurately and effectively will improve  Outcome: Progressing     Problem: Hemodynamics  Goal: Patient's hemodynamics, fluid balance and neurologic status will be stable or improve  Outcome: Progressing     Problem: Respiratory  Goal: Patient will achieve/maintain optimum respiratory ventilation and gas exchange  Outcome: Progressing     Problem: Urinary Elimination  Goal: Establish and maintain regular urinary output  Outcome: Progressing     Problem: Bowel Elimination  Goal: Establish and maintain regular bowel function  Outcome: Progressing     Problem: Mobility  Goal: Patient's capacity to carry out activities will improve  Outcome: Progressing     Problem: Self Care  Goal: Patient will have the ability to perform ADLs independently or with assistance (bathe, groom, dress, toilet and feed)  Outcome: Progressing       Patient is not progressing towards the following goals:

## 2022-07-11 NOTE — DISCHARGE PLANNING
Case Management Discharge Planning    Admission Date: 7/9/2022  GMLOS: 2.2  ALOS: 1    6-Clicks ADL Score: 24  6-Clicks Mobility Score: 24      Anticipated Discharge Dispo:      DME Needed: No    Action(s) Taken:     1030: Per MD during rounds, patient is pending stress test. If patient is negative, patient can discharge to home with no needs.     RN CM will continue to follow as needed.     Escalations Completed: None    Medically Clear: No    Next Steps: pending results of stress test    Barriers to Discharge: medical clearance     Is the patient up for discharge tomorrow: No, today

## 2022-07-12 NOTE — DOCUMENTATION QUERY
[facility]                                                                       Query Response Note      PATIENT:               DANIELITO CRAWFORD  ACCT #:                  [id number]  MRN:                     [id number]  :                      1940  ADMIT DATE:       2022 8:03 AM  DISCH DATE:        2022 6:44 PM  RESPONDING  PROVIDER #:        [id number]           QUERY TEXT:    Congestive Heart Failure is documented in the Medical Record. Please document the type and acuity (includes probable or suspected).     NOTE:  If an appropriate response is not listed below, please respond with a new note.    Thank you.    The patient's Clinical Indicators include:  81 y.o. female admitted 2022 with lower extremity edema. - Dr. Henning, Hospitalist Note, 7/10/22    Patient will be hospitalized for management of new heart failure.  She is given a dose of Lasix. - Dr. Vincent, ED Note 22    Discharge diagnosis: lower extremity edema concerning for possible heart failure. - Dr. Henning, Discharge summary, 22    ECHO 7/10/22: EF 65%    BNP: 3716    Risk factors: elevated BNP, lower extremity edema    Treatment: IV Lasix    Thank you,  Ceasar Perea  Clinical   Connect via [employer]  Options provided:   -- Acute Diastolic heart failure   -- Chronic Diastolic heart failure   -- Acute on Chronic Diastolic heart failure   -- Acute Systolic heart failure   -- Chronic Systolic heart failure   -- Acute on Chronic Systolic heart failure   -- Acute Systolic and Diastolic heart failure   -- Chronic Systolic and Diastolic heart failure   -- Acute on Chronic Systolic and diastolic heart failure   -- Other, please specify   -- Unable to determine      Query created by: Ceasar Perea on 2022 7:50 AM    RESPONSE TEXT:    Other, please specify          Electronically signed by:  HOOD HENNING MD  7/12/2022 4:28 PM

## 2022-07-12 NOTE — DISCHARGE INSTRUCTIONS
Discharge Instructions    Discharged to home by car with relative. Discharged via wheelchair, hospital escort: Yes.  Special equipment needed: Not Applicable    Be sure to schedule a follow-up appointment with your primary care doctor or any specialists as instructed.     Discharge Plan:   Diet Plan: Discussed  Activity Level: Discussed  Confirmed Follow up Appointment: Patient to Call and Schedule Appointment  Confirmed Symptoms Management: Discussed  Medication Reconciliation Updated: Yes    I understand that a diet low in cholesterol, fat, and sodium is recommended for good health. Unless I have been given specific instructions below for another diet, I accept this instruction as my diet prescription.   Other diet: Cardiac    Special Instructions: None    -Is this patient being discharged with medication to prevent blood clots?  No    Is patient discharged on Warfarin / Coumadin?   No

## 2022-07-12 NOTE — DISCHARGE SUMMARY
"Discharge Summary    CHIEF COMPLAINT ON ADMISSION  Chief Complaint   Patient presents with   • Leg Swelling     To BLE that worsened overnight       Reason for Admission  Swelling      Admission Date  7/9/2022    CODE STATUS  Full Code    HPI & HOSPITAL COURSE      Please see original H&P for specific information.   Regla Meier is a 81 y.o. female with a past medical history of hyperlipidemia who presented 7/9/2022 with progressively worsening lower extremity swelling.  Patient reports that she has been having progressively worsening lower extremity swelling over the past 2 months however on the day prior to admission and after returning from watching a movie \"Top-Gun\" she started to develop pain in addition to worsening swelling in both of her lower extremities.  Pain is mild, described as tight in character.  Pain does not radiate to other places.  No relieving or aggravating factors.  Patient denies having shortness of breath palpitations, orthopnea or paroxysmal nocturnal dyspnea.    Patient was started on IV Lasix, there was concern for possible heart failure, patient had echocardiogram that showed normal ejection fraction without wall motion abnormalities, patient had elevated troponin and elevated BNP there was concern for possible ischemic changes due to these laboratory findings, patient went for stress test that came back negative, patient's kidney function is within normal limits, patient has low albumin no protein in urine likely dietary deficiency, patient was advised to increase protein in her diet and decrease salt intake, patient's blood pressure is soft but she is completely asymptomatic she is able to ambulate without any dizziness or lightheadedness she is feeling well and is eager to go back home, patient is going to be discharged home today she is asked to hold her Coreg for this evening and to restart tomorrow if her blood pressure is more stable meaning higher than 110/60, also " holding her Lasix at least for 2 more days and dose has been decreased to once daily as well as holding her potassium supplementation which should be taken only when she is on Lasix, patient has been advised to follow-up with her primary care physician, patient has expressed understanding of her plan of care and agree with either question have been answered.    Heart failure ruled out    Therefore, she is discharged in good and stable condition to home with close outpatient follow-up.    The patient met 2-midnight criteria for an inpatient stay at the time of discharge.    Discharge Date  7/11/2022    FOLLOW UP ITEMS POST DISCHARGE  Primary care physician    DISCHARGE DIAGNOSES  Principal Problem (Resolved):    Lower extremity edema concerning for possible heart failure POA: Yes  Active Problems:    Hyperlipemia POA: Yes  Resolved Problems:    Elevated brain natriuretic peptide (BNP) level POA: Yes    Elevated troponin POA: Yes    Edema POA: Yes      FOLLOW UP  Future Appointments   Date Time Provider Department Center   9/9/2022  2:15 PM V EXAM 9 ECHO [facility]   9/14/2022  1:15 PM Andrade Cherry M.D. [facility] None     No follow-up provider specified.    MEDICATIONS ON DISCHARGE     Medication List      CHANGE how you take these medications      Instructions   furosemide 20 MG Tabs  What changed: when to take this  Commonly known as: LASIX   Doctor's comments: No new prescription, modified from 20 mg twice a day to once daily.  Take 1 Tablet by mouth every day.  Dose: 20 mg     potassium chloride SA 10 MEQ Tbcr  What changed: when to take this  Commonly known as: K-DUR   Doctor's comments: No new prescription, change from  twice a day to once daily.  Take 1 Tablet by mouth every day.  Dose: 10 mEq        CONTINUE taking these medications      Instructions   asa/apap/caffeine 250-250-65 MG Tabs  Commonly known as: EXCEDRIN   Take 1-2 Tablets by mouth every 6 hours as needed for Headache.  Dose: 1-2  Tablet     carvedilol 3.125 MG Tabs  Commonly known as: COREG   Take 1 Tablet by mouth 2 times a day with meals.  Dose: 3.125 mg     [facility] LUTEIN-ZEAXANTHIN PO   Take 1 Capsule by mouth every day.  Dose: 1 Capsule     TURMERIC PO   Take 1 Capsule by mouth every day.  Dose: 1 Capsule            Allergies  Allergies   Allergen Reactions   • Pcn [Penicillins] Hives       DIET  Orders Placed This Encounter   Procedures   • Diet Order Diet: Cardiac; Miscellaneous modifications: (optional): No Decaf, No Caffeine(for test), Gluten Free     Standing Status:   Standing     Number of Occurrences:   1     Order Specific Question:   Diet:     Answer:   Cardiac [6]     Order Specific Question:   Miscellaneous modifications: (optional)     Answer:   No Decaf, No Caffeine(for test) [11]     Order Specific Question:   Miscellaneous modifications: (optional)     Answer:   Gluten Free [9]       ACTIVITY  As tolerated.  Weight bearing as tolerated    CONSULTATIONS  None    PROCEDURES  None    LABORATORY  Lab Results   Component Value Date    SODIUM 138 07/10/2022    POTASSIUM 3.6 07/10/2022    CHLORIDE 100 07/10/2022    CO2 27 07/10/2022    GLUCOSE 89 07/10/2022    BUN 24 (H) 07/10/2022    CREATININE 0.71 07/10/2022        Lab Results   Component Value Date    WBC 5.7 07/10/2022    HEMOGLOBIN 12.4 07/10/2022    HEMATOCRIT 37.1 07/10/2022    PLATELETCT 193 07/10/2022        Total time of the discharge process exceeds 40 minutes.

## 2022-07-20 ENCOUNTER — OFFICE VISIT (OUTPATIENT)
Dept: MEDICAL GROUP | Facility: PHYSICIAN GROUP | Age: 82
End: 2022-07-20
Payer: MEDICARE

## 2022-07-20 VITALS
SYSTOLIC BLOOD PRESSURE: 102 MMHG | DIASTOLIC BLOOD PRESSURE: 58 MMHG | HEIGHT: 62 IN | HEART RATE: 77 BPM | OXYGEN SATURATION: 98 % | WEIGHT: 124.8 LBS | RESPIRATION RATE: 18 BRPM | TEMPERATURE: 98.6 F | BODY MASS INDEX: 22.97 KG/M2

## 2022-07-20 DIAGNOSIS — Z09 HOSPITAL DISCHARGE FOLLOW-UP: ICD-10-CM

## 2022-07-20 DIAGNOSIS — E88.09 PROTEINS SERUM PLASMA LOW: ICD-10-CM

## 2022-07-20 PROCEDURE — 99214 OFFICE O/P EST MOD 30 MIN: CPT | Performed by: NURSE PRACTITIONER

## 2022-07-20 ASSESSMENT — FIBROSIS 4 INDEX: FIB4 SCORE: 3.11

## 2022-07-20 NOTE — PROGRESS NOTES
Chief Complaint   Patient presents with   • Follow-Up     Fv from her stay at the hospital       HISTORY OF PRESENT ILLNESS: Patient is a 81 y.o. female, established patient who presents today to discuss medical problems as listed below:    Health Maintenance:  COMPLETED     Hospital discharge follow-up  Hospital records reviewed from [facility] 7/9/22. Pt was seen in ED for swollen b/l lower extremities. This issues started 2 mos ago and worsened after she came back from the movie theater.   ECHO with normal EF, BNP and trop evelated, stress test negative.  Pt was advised to decrease NA and increase protein intake.   She was also advise to stop Coreg and Lasiz with K for a few days.  Her VSS WNL today.    Pt restarted Lasix and K last wk.  Did not start Coreg as BP today 102/58, HR 77.  Scheduled appt with cardiology Dr. Cherry,  Gema CP, SOB.       Patient Active Problem List    Diagnosis Date Noted   • Hospital discharge follow-up 07/20/2022   • Leg edema 07/09/2022   • Other heart failure (HCC) 06/21/2022   • Edema of both lower extremities 06/03/2022   • Neuropathy of both feet 06/03/2022   • Close exposure to COVID-19 virus 12/07/2021   • Cough 12/07/2021   • Pedal edema 08/24/2021   • Hyperlipemia 11/26/2019   • Legally blind in left eye, as defined in [place] 11/26/2019   • History of retinal vein occlusion 04/10/2019        Allergies: Pcn [penicillins]    Current Outpatient Medications   Medication Sig Dispense Refill   • furosemide (LASIX) 20 MG Tab Take 1 Tablet by mouth every day. 30 Tablet 0   • potassium chloride SA (K-DUR) 10 MEQ Tab CR Take 1 Tablet by mouth every day. 1 Each 0   • asa/apap/caffeine (EXCEDRIN) 250-250-65 MG Tab Take 1-2 Tablets by mouth every 6 hours as needed for Headache.     • TURMERIC PO Take 1 Capsule by mouth every day.     • [facility] LUTEIN-ZEAXANTHIN PO Take 1 Capsule by mouth every day.       No current facility-administered medications for this visit.       Social History     Tobacco Use  "  • Smoking status: Never Smoker   • Smokeless tobacco: Never Used   Vaping Use   • Vaping Use: Never used   Substance Use Topics   • Alcohol use: Yes     Alcohol/week: 0.6 oz     Types: 1 Glasses of wine per week   • Drug use: No     Social History     Social History Narrative   • Not on file       Family History   Problem Relation Age of Onset   • Other Mother         TIA    • Other Father         Abdominal aneurysm    • Alcohol abuse Brother    • Heart Disease Brother    • No Known Problems Daughter    • No Known Problems Daughter        Allergies, past medical history, past surgical history, family history, social history reviewed and updated.    Review of Systems:     - Constitutional: Negative for fever, chills, unexpected weight change, and fatigue/generalized weakness.     - Respiratory: Negative for cough, sputum production, chest congestion, dyspnea, wheezing, and crackles.      - Cardiovascular: Negative for chest pain, palpitations, orthopnea, and bilateral lower extremity edema.       - Psychiatric/Behavioral: Negative for depression, suicidal/homicidal ideation and memory loss.      All other systems reviewed and are negative    Exam:    /58 (BP Location: Left arm, Patient Position: Sitting)   Pulse 77   Temp 37 °C (98.6 °F) (Temporal)   Resp 18   Ht 1.575 m (5' 2\")   Wt 56.6 kg (124 lb 12.8 oz)   SpO2 98%   BMI 22.83 kg/m²  Body mass index is 22.83 kg/m².    Physical Exam:  Constitutional: Well-developed and well-nourished. Not diaphoretic. No distress.   Cardiovascular: Regular rate and rhythm, S1 and S2 without murmur, rubs, or gallops.    Chest: Effort normal. Clear to auscultation throughout. No adventitious sounds.   Neurological: Alert and oriented x 3.   Psychiatric:  Behavior, mood, and affect are appropriate.  MA/nursing note and vitals reviewed.       Assessment/Plan:  1. Hospital discharge follow-up  - Comp Metabolic Panel; Future    2. Proteins serum plasma low  - Comp " Metabolic Panel; Future       Discussed with patient possible alternative diagnoses, pt is to take all medications as prescribed. If symptoms persist FU w/PCP, if symptoms worsen go to emergency room. If experiencing any side effects from prescribed medications reports to the office immediately or go to emergency room.  Reviewed indication, dosage, usage and potential adverse effects of prescribed medications. Reviewed risks and benefits of treatment plan. Patient verbalizes understanding of all instruction and verbally agrees to plan.    No follow-ups on file. 2 mos

## 2022-07-20 NOTE — ASSESSMENT & PLAN NOTE
Hospital records reviewed from [facility] 7/9/22. Pt was seen in ED for swollen b/l lower extremities. This issues started 2 mos ago and worsened after she came [place] from the movie theater.   ECHO with normal EF, BNP and trop evelated, stress test negative.  Pt was advised to decrease NA and increase protein intake.   She was also advise to stop Coreg and Lasiz with K for a few days.  Her VSS WNL today.    Pt restarted Lasix and K last wk.  Did not start Coreg as BP today 102/58, HR 77.  Scheduled appt with cardiology Dr. Cherry,  Gema CP, SOB.

## 2022-08-15 ENCOUNTER — HOSPITAL ENCOUNTER (INPATIENT)
Facility: MEDICAL CENTER | Age: 82
LOS: 4 days | DRG: 062 | End: 2022-08-19
Attending: EMERGENCY MEDICINE | Admitting: INTERNAL MEDICINE
Payer: MEDICARE

## 2022-08-15 ENCOUNTER — APPOINTMENT (OUTPATIENT)
Dept: RADIOLOGY | Facility: MEDICAL CENTER | Age: 82
DRG: 062 | End: 2022-08-15
Attending: EMERGENCY MEDICINE
Payer: MEDICARE

## 2022-08-15 DIAGNOSIS — I63.9 ACUTE CVA (CEREBROVASCULAR ACCIDENT) (HCC): Primary | ICD-10-CM

## 2022-08-15 LAB
ABO + RH BLD: NORMAL
ABO GROUP BLD: NORMAL
ALBUMIN SERPL BCP-MCNC: 3.5 G/DL (ref 3.2–4.9)
ALBUMIN/GLOB SERPL: 1.5 G/DL
ALP SERPL-CCNC: 85 U/L (ref 30–99)
ALT SERPL-CCNC: 24 U/L (ref 2–50)
ANION GAP SERPL CALC-SCNC: 10 MMOL/L (ref 7–16)
APTT PPP: 36.9 SEC (ref 24.7–36)
AST SERPL-CCNC: 36 U/L (ref 12–45)
BASOPHILS # BLD AUTO: 1 % (ref 0–1.8)
BASOPHILS # BLD: 0.06 K/UL (ref 0–0.12)
BILIRUB SERPL-MCNC: 0.3 MG/DL (ref 0.1–1.5)
BLD GP AB SCN SERPL QL: NORMAL
BUN SERPL-MCNC: 22 MG/DL (ref 8–22)
CALCIUM SERPL-MCNC: 8.7 MG/DL (ref 8.5–10.5)
CHLORIDE SERPL-SCNC: 102 MMOL/L (ref 96–112)
CO2 SERPL-SCNC: 22 MMOL/L (ref 20–33)
CREAT SERPL-MCNC: 0.74 MG/DL (ref 0.5–1.4)
EKG IMPRESSION: NORMAL
EOSINOPHIL # BLD AUTO: 0.07 K/UL (ref 0–0.51)
EOSINOPHIL NFR BLD: 1.2 % (ref 0–6.9)
ERYTHROCYTE [DISTWIDTH] IN BLOOD BY AUTOMATED COUNT: 44.4 FL (ref 35.9–50)
GFR SERPLBLD CREATININE-BSD FMLA CKD-EPI: 81 ML/MIN/1.73 M 2
GLOBULIN SER CALC-MCNC: 2.3 G/DL (ref 1.9–3.5)
GLUCOSE SERPL-MCNC: 151 MG/DL (ref 65–99)
HCT VFR BLD AUTO: 50.2 % (ref 37–47)
HGB BLD-MCNC: 16.8 G/DL (ref 12–16)
IMM GRANULOCYTES # BLD AUTO: 0.01 K/UL (ref 0–0.11)
IMM GRANULOCYTES NFR BLD AUTO: 0.2 % (ref 0–0.9)
INR PPP: 0.99 (ref 0.87–1.13)
LYMPHOCYTES # BLD AUTO: 1.91 K/UL (ref 1–4.8)
LYMPHOCYTES NFR BLD: 33.3 % (ref 22–41)
MCH RBC QN AUTO: 31.1 PG (ref 27–33)
MCHC RBC AUTO-ENTMCNC: 33.5 G/DL (ref 33.6–35)
MCV RBC AUTO: 93 FL (ref 81.4–97.8)
MONOCYTES # BLD AUTO: 0.45 K/UL (ref 0–0.85)
MONOCYTES NFR BLD AUTO: 7.8 % (ref 0–13.4)
NEUTROPHILS # BLD AUTO: 3.24 K/UL (ref 2–7.15)
NEUTROPHILS NFR BLD: 56.5 % (ref 44–72)
NRBC # BLD AUTO: 0 K/UL
NRBC BLD-RTO: 0 /100 WBC
PLATELET # BLD AUTO: 193 K/UL (ref 164–446)
PMV BLD AUTO: 9.3 FL (ref 9–12.9)
POTASSIUM SERPL-SCNC: 4 MMOL/L (ref 3.6–5.5)
PROT SERPL-MCNC: 5.8 G/DL (ref 6–8.2)
PROTHROMBIN TIME: 13.1 SEC (ref 12–14.6)
RBC # BLD AUTO: 5.4 M/UL (ref 4.2–5.4)
RH BLD: NORMAL
SODIUM SERPL-SCNC: 134 MMOL/L (ref 135–145)
TROPONIN T SERPL-MCNC: 55 NG/L (ref 6–19)
TSH SERPL DL<=0.005 MIU/L-ACNC: 3.9 UIU/ML (ref 0.38–5.33)
WBC # BLD AUTO: 5.7 K/UL (ref 4.8–10.8)

## 2022-08-15 PROCEDURE — 94760 N-INVAS EAR/PLS OXIMETRY 1: CPT

## 2022-08-15 PROCEDURE — 85730 THROMBOPLASTIN TIME PARTIAL: CPT

## 2022-08-15 PROCEDURE — 84443 ASSAY THYROID STIM HORMONE: CPT

## 2022-08-15 PROCEDURE — 700117 HCHG RX CONTRAST REV CODE 255: Performed by: EMERGENCY MEDICINE

## 2022-08-15 PROCEDURE — 0042T CT-CEREBRAL PERFUSION ANALYSIS: CPT

## 2022-08-15 PROCEDURE — 70450 CT HEAD/BRAIN W/O DYE: CPT | Mod: MG

## 2022-08-15 PROCEDURE — 96374 THER/PROPH/DIAG INJ IV PUSH: CPT

## 2022-08-15 PROCEDURE — 82962 GLUCOSE BLOOD TEST: CPT

## 2022-08-15 PROCEDURE — 86900 BLOOD TYPING SEROLOGIC ABO: CPT

## 2022-08-15 PROCEDURE — 93005 ELECTROCARDIOGRAM TRACING: CPT | Performed by: EMERGENCY MEDICINE

## 2022-08-15 PROCEDURE — 36415 COLL VENOUS BLD VENIPUNCTURE: CPT

## 2022-08-15 PROCEDURE — 770022 HCHG ROOM/CARE - ICU (200)

## 2022-08-15 PROCEDURE — 70498 CT ANGIOGRAPHY NECK: CPT | Mod: MG

## 2022-08-15 PROCEDURE — 700111 HCHG RX REV CODE 636 W/ 250 OVERRIDE (IP): Mod: JG | Performed by: STUDENT IN AN ORGANIZED HEALTH CARE EDUCATION/TRAINING PROGRAM

## 2022-08-15 PROCEDURE — 70496 CT ANGIOGRAPHY HEAD: CPT | Mod: MG

## 2022-08-15 PROCEDURE — 99291 CRITICAL CARE FIRST HOUR: CPT | Performed by: STUDENT IN AN ORGANIZED HEALTH CARE EDUCATION/TRAINING PROGRAM

## 2022-08-15 PROCEDURE — 71045 X-RAY EXAM CHEST 1 VIEW: CPT

## 2022-08-15 PROCEDURE — 86901 BLOOD TYPING SEROLOGIC RH(D): CPT

## 2022-08-15 PROCEDURE — 99291 CRITICAL CARE FIRST HOUR: CPT

## 2022-08-15 PROCEDURE — 3E03317 INTRODUCTION OF OTHER THROMBOLYTIC INTO PERIPHERAL VEIN, PERCUTANEOUS APPROACH: ICD-10-PCS | Performed by: STUDENT IN AN ORGANIZED HEALTH CARE EDUCATION/TRAINING PROGRAM

## 2022-08-15 PROCEDURE — 80053 COMPREHEN METABOLIC PANEL: CPT

## 2022-08-15 PROCEDURE — 85025 COMPLETE CBC W/AUTO DIFF WBC: CPT

## 2022-08-15 PROCEDURE — 85610 PROTHROMBIN TIME: CPT

## 2022-08-15 PROCEDURE — 86850 RBC ANTIBODY SCREEN: CPT

## 2022-08-15 PROCEDURE — 700105 HCHG RX REV CODE 258: Performed by: INTERNAL MEDICINE

## 2022-08-15 PROCEDURE — 99291 CRITICAL CARE FIRST HOUR: CPT | Performed by: INTERNAL MEDICINE

## 2022-08-15 PROCEDURE — 84484 ASSAY OF TROPONIN QUANT: CPT

## 2022-08-15 RX ORDER — SODIUM CHLORIDE 9 MG/ML
500 INJECTION, SOLUTION INTRAVENOUS ONCE
Status: COMPLETED | OUTPATIENT
Start: 2022-08-15 | End: 2022-08-15

## 2022-08-15 RX ORDER — PERPHENAZINE/AMITRIPTYLINE HCL 4 MG-25 MG
40 TABLET ORAL DAILY
COMMUNITY

## 2022-08-15 RX ADMIN — SODIUM CHLORIDE 500 ML: 9 INJECTION, SOLUTION INTRAVENOUS at 17:00

## 2022-08-15 RX ADMIN — IOHEXOL 40 ML: 350 INJECTION, SOLUTION INTRAVENOUS at 14:43

## 2022-08-15 RX ADMIN — TENECTEPLASE 14 MG: KIT at 14:51

## 2022-08-15 RX ADMIN — IOHEXOL 95 ML: 350 INJECTION, SOLUTION INTRAVENOUS at 14:49

## 2022-08-15 ASSESSMENT — LIFESTYLE VARIABLES
CONSUMPTION TOTAL: INCOMPLETE
TOTAL SCORE: 0
TOTAL SCORE: 0
DOES PATIENT WANT TO STOP DRINKING: NO
HAVE YOU EVER FELT YOU SHOULD CUT DOWN ON YOUR DRINKING: NO
TOTAL SCORE: 0
HAVE PEOPLE ANNOYED YOU BY CRITICIZING YOUR DRINKING: NO
EVER FELT BAD OR GUILTY ABOUT YOUR DRINKING: NO
SUBSTANCE_ABUSE: 0
EVER HAD A DRINK FIRST THING IN THE MORNING TO STEADY YOUR NERVES TO GET RID OF A HANGOVER: NO
ALCOHOL_USE: YES

## 2022-08-15 ASSESSMENT — COGNITIVE AND FUNCTIONAL STATUS - GENERAL
SUGGESTED CMS G CODE MODIFIER MOBILITY: CL
STANDING UP FROM CHAIR USING ARMS: A LOT
MOVING FROM LYING ON BACK TO SITTING ON SIDE OF FLAT BED: A LOT
TURNING FROM BACK TO SIDE WHILE IN FLAT BAD: A LOT
PERSONAL GROOMING: A LOT
EATING MEALS: A LOT
DAILY ACTIVITIY SCORE: 12
SUGGESTED CMS G CODE MODIFIER DAILY ACTIVITY: CL
WALKING IN HOSPITAL ROOM: A LOT
CLIMB 3 TO 5 STEPS WITH RAILING: A LOT
TOILETING: A LOT
DRESSING REGULAR LOWER BODY CLOTHING: A LOT
MOBILITY SCORE: 12
DRESSING REGULAR UPPER BODY CLOTHING: A LOT
HELP NEEDED FOR BATHING: A LOT
MOVING TO AND FROM BED TO CHAIR: A LOT

## 2022-08-15 ASSESSMENT — ENCOUNTER SYMPTOMS
NAUSEA: 0
BLURRED VISION: 0
PHOTOPHOBIA: 0
SPEECH CHANGE: 1
BACK PAIN: 0
ORTHOPNEA: 0
DEPRESSION: 0
HEADACHES: 0
ABDOMINAL PAIN: 0
WEAKNESS: 1
VOMITING: 0
FEVER: 0
HALLUCINATIONS: 0
MYALGIAS: 0
DOUBLE VISION: 0
SEIZURES: 0
CLAUDICATION: 0
COUGH: 0
FOCAL WEAKNESS: 1
DIZZINESS: 1
TREMORS: 1
LOSS OF CONSCIOUSNESS: 0
SPUTUM PRODUCTION: 0
SHORTNESS OF BREATH: 0
SINUS PAIN: 0
TINGLING: 1

## 2022-08-15 ASSESSMENT — FIBROSIS 4 INDEX
FIB4 SCORE: 3.11
FIB4 SCORE: 3.05

## 2022-08-15 ASSESSMENT — PAIN DESCRIPTION - PAIN TYPE
TYPE: ACUTE PAIN
TYPE: ACUTE PAIN

## 2022-08-15 NOTE — ASSESSMENT & PLAN NOTE
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  Discussed with neurology who recommends holding off on aspirin for a few days.  Can add DVT prophylaxis tomorrow, 8/17 if her neuro exam remains unchanged.  Concern for residual clot in her PCA.  -serial neurologic exams  -statin  -maintain normothermia, normoglycemia, normonatremia  -ECHO pending  -PT OT SLP

## 2022-08-15 NOTE — CONSULTS
Neurology Initial Consult H&P  Neurohospitalist Service, [facility]    Referring Physician: Araceli Ace M.D.    Chief Complaint   Patient presents with    Possible Stroke     Pt arrives w/ L sided weakness, LKW 1400. Bs  mg/dL per EMS. Pt arrives AAOx4 with a GCS of 15.         HPI: Regla Meier is a 81 y.o. woman with a history of hypertension who presents with acute onset left-sided weakness and right gaze preference.  She reports that she is not sure exactly what happened or when it started, but according to EMS symptom onset was at 2:00 PM per family.    Patient states that she feels funny but otherwise does not know what is happening.  She denies any recent head traumas, surgeries, history of bleeding into her brain, blood thinner use.  She does not have any major medical issues and has never had any symptoms like this in the past.    Review of systems: In addition to what is detailed in the HPI above, all other systems reviewed and are negative.    Past Medical History:    has a past medical history of Hyperlipemia.    FHx:  family history includes Alcohol abuse in her brother; Heart Disease in her brother; No Known Problems in her daughter and daughter; Other in her father and mother.    SHx:   reports that she has never smoked. She has never used smokeless tobacco. She reports current alcohol use of about 0.6 oz per week. She reports that she does not use drugs.    Allergies:  Allergies   Allergen Reactions    Pcn [Penicillins] Hives       Medications:    Current Facility-Administered Medications:     tenecteplase (TNKASE) injection (stroke) 14 mg, 0.25 mg/kg, Intravenous, Once, Mak Marcum D.O.    Current Outpatient Medications:     furosemide (LASIX) 20 MG Tab, Take 1 Tablet by mouth every day., Disp: 30 Tablet, Rfl: 0    potassium chloride SA (K-DUR) 10 MEQ Tab CR, Take 1 Tablet by mouth every day., Disp: 1 Each, Rfl: 0    asa/apap/caffeine (EXCEDRIN)  "250-250-65 MG Tab, Take 1-2 Tablets by mouth every 6 hours as needed for Headache., Disp: , Rfl:     TURMERIC PO, Take 1 Capsule by mouth every day., Disp: , Rfl:     [employer] LUTEIN-ZEAXANTHIN PO, Take 1 Capsule by mouth every day., Disp: , Rfl:     Physical Examination:     General: Patient is awake and in no acute distress  Eye: Examination of optic disks not indicated at this time given acuity of consult  Neck: There is normal range of motion  CV: regular rate   Extremities:  clear, dry, intact, without peripheral edema    NEUROLOGICAL EXAM:     Ht 1.575 m (5' 2\")   Wt 55.8 kg (123 lb)   BMI 22.50 kg/m²       Mental status: Awake, alert and fully oriented  Speech and language: Speech is mildly dysarthric and fluent. The patient is able to name and repeat, and follow commands  Cranial nerve exam: Pupils are equal, round and reactive to light bilaterally.  No blink to threat on the left.  There is no nystagmus. Extraocular muscles are intact with a right gaze preference.  Slight left lower facial droop.  Sensation in the face is intact to light touch. Palate elevates symmetrically. Tongue is midline.  Motor exam: Left upper and lower extremity 3/5.  There is drift to the bed of the left arm and leg.  Tone is normal. No abnormal movements were seen on exam.  Sensory exam: Less reaction to noxious stim in the left arm and leg with a spatial neglect.  Deep tendon reflexes:  2+ throughout. Toes down-going bilaterally.  Coordination: No gross dysmetria noted  Gait: Deferred due to patient preference    NIHSS: National Institutes of Health Stroke Scale    [0] 1a:Level of Consciousness    0-alert 1-drowsy   2-stupor   3-coma  [0] 1b:LOC Questions                  0-both  1-one      2-neither  [1] 1c:LOC Commands                   0-both  1-one      2-neither  [1] 2: Best Gaze                     0-nl    1-partial  2-forced  [1] 3: Visual Fields                   0-nl    1-partial  2-complete 3-bilat  [1] 4: Facial Paresis   "              0-nl    1-minor    2-partial  3-full  MOTOR                       0-nl  [0] 5: Right Arm           1-drift  [2] 6: Left Arm             2-some effort vs gravity  [0] 7: Right Leg           3-no effort vs gravity  [2] 8: Left Leg             4-no movement                             x-untestable  [0] 9: Limb Ataxia                    0-abs   1-1_limb   2-2+_limbs       x-untestable  [1] 10:Sensory                        0-nl    1-partial  2-dense  [0] 11:Best Language/Aphasia         0-nl    1-mild/mod 2-severe   3-mute  [1] 12:Dysarthria                     0-nl    1-mild/mod 2-severe       x-untestable  [1] 13:Neglect/Inattention            0-none  1-partial  2-complete  [11] TOTAL    NIH Time 1415     Objective Data:    Labs:  No results found for: PROTHROMBTM, INR   Lab Results   Component Value Date/Time    WBC 5.7 07/10/2022 02:02 AM    RBC 3.99 (L) 07/10/2022 02:02 AM    HEMOGLOBIN 12.4 07/10/2022 02:02 AM    HEMATOCRIT 37.1 07/10/2022 02:02 AM    MCV 93.0 07/10/2022 02:02 AM    MCH 31.1 07/10/2022 02:02 AM    MCHC 33.4 (L) 07/10/2022 02:02 AM    MPV 9.3 07/10/2022 02:02 AM    NEUTSPOLYS 55.60 07/09/2022 08:26 AM    LYMPHOCYTES 30.50 07/09/2022 08:26 AM    MONOCYTES 10.00 07/09/2022 08:26 AM    EOSINOPHILS 2.60 07/09/2022 08:26 AM    BASOPHILS 1.00 07/09/2022 08:26 AM      Lab Results   Component Value Date/Time    SODIUM 138 07/10/2022 02:02 AM    POTASSIUM 3.6 07/10/2022 02:02 AM    CHLORIDE 100 07/10/2022 02:02 AM    CO2 27 07/10/2022 02:02 AM    GLUCOSE 89 07/10/2022 02:02 AM    BUN 24 (H) 07/10/2022 02:02 AM    CREATININE 0.71 07/10/2022 02:02 AM      Lab Results   Component Value Date/Time    CHOLSTRLTOT 244 (H) 09/03/2021 06:39 AM     (H) 09/03/2021 06:39 AM    HDL 72 09/03/2021 06:39 AM    TRIGLYCERIDE 86 09/03/2021 06:39 AM       Lab Results   Component Value Date/Time    ALKPHOSPHAT 73 07/10/2022 02:02 AM    ASTSGOT 30 07/10/2022 02:02 AM    ALTSGPT 16 07/10/2022 02:02 AM     TBILIRUBIN 0.5 07/10/2022 02:02 AM        Imaging/Testing:    I interpreted and/or reviewed the patient's neuroimaging    CT-HEAD W/O   Final Result         1.  No acute intracranial process.      2. Diffuse atrophy and periventricular white matter changes consistent with chronic small vessel disease.      DX-CHEST-PORTABLE (1 VIEW)    (Results Pending)   CT-CEREBRAL PERFUSION ANALYSIS    (Results Pending)   CT-CTA HEAD WITH & W/O-POST PROCESS    (Results Pending)   CT-CTA NECK WITH & W/O-POST PROCESSING    (Results Pending)       Assessment and Plan:    Regla Meier is a 81 y.o. with HTN senting with acute onset left-sided weakness and hemineglect in the setting of a right P2 occlusion.  Last seen normal was at 2:00 PM.  I reviewed all neuroimaging.  CT head reveals no significant obvious area of infarction.  CTA head and neck reveals minimal atherosclerotic burden with a right P2 occlusion.  CT perfusion shows a small area of ischemic penumbra in the right PCA territory without any measured core infarction.    I reviewed the risk and benefits of administering thrombolytics with the patient and her family at bedside.  Risks include but are not limited to 6% risk of hemorrhagic conversion possibly progressing to death.  Patient and her family agree with the administration of tenecteplase.  I also spoke with neuro interventional radiology who feel that they are not able to adequately mechanically remove the clot.    TNK administered 1455    Problem list:  1.  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  -Allow permissive HTN up to 110-180/ for first 24 hours.   -Hold all antiplatelet and anticoagulation medications until repeat CTH.  -Repeat CTH at 24 hours or at the sign of any neuro-change to rule out hemorrhagic conversion. If no hemorrhage at 24 hours, may initiate ASA 81mg daily and DVT  ppx.     - If hemorrhagic conversion is noted, lower BP parameters to below 140/90.    Recommendations:  Observation - Admit to ICU for q1H neurochecks and vitals.  Vitals - Allow permissive HTN up to 110-180/ for first 24 hours. Hold home anti-hypertensives if appropriate. Half home beta blockers to avoid reflex tachycardia if needed for rate control. Telemetry monitoring.  Diet - NPO until appropriate bedside swallow. SLP consult and modified diet or NG tube if fails bedside swallow.  Treatment - Atorvastatin 80mg, or comparable high-intensity statin with a long-term LDL goal < 70.  Laboratory studies - Check Lipid panel, A1C  Other imaging - Check MRI Brain without contrast, ECHOcardiogram  Counselling - Tobacco cessation counseling, if applicable.  Dispo - PT/OT to aid in disposition.      Upon my evaluation, this patient had a high probability of imminent or life-threatening deterioration due to ischemic stroke which required my direct attention, intervention, and personal management.  I personally provided 40 minutes of total critical care time outside of time spent on separately billable/documented procedures. Time includes: review of laboratory data, review of radiology studies, discussion with consultants, discussion with family/patient, monitoring for potential decompensation.  Interventions were performed as documented in the chart.      The evaluation of the patient, and recommended management, was discussed with the resident staff.     Mak Marcmu D.O.  Neurohospitalist, Acute Care Services

## 2022-08-15 NOTE — ED TRIAGE NOTES
Chief Complaint   Patient presents with    Possible Stroke     Pt arrives w/ L sided weakness, LKW 1400. Bs  mg/dL per EMS. Pt arrives AAOx4 with a GCS of 15.

## 2022-08-15 NOTE — ED NOTES
Med Rec completed per patient and family   Allergies reviewed  No ORAL antibiotics in last 30 days

## 2022-08-15 NOTE — CONSULTS
Critical Care Consultation    Date of consult: 8/15/2022    Referring Physician  Araceli Ace M.D    Reason for Consultation  Acute CVA s/p TPA    History of Presenting Illness  81 y.o. female who presented 8/15/2022 with Hx of chronic leg edema on lasix and potassium replacement, dyslipidemia and what sounds like left retinal vein occulusion and some vision loss. No prior stroke, AMI or afib. She developed acute right arm movements at 120pm after lunch. She also noticed slurred speech. She was activated as a stroke code found to have left sided symptoms and neglect. She was perfectly fine this am with no prior symptoms of stroke. In ER she had a NIH of 11, found to have right P2 occlusion and right PCA territory stroke without core. She was given TNK 1455. After discussing with family at bedside she would like to be full code.     Code Status  Full Code    Review of Systems  Review of Systems   Constitutional:  Negative for fever and malaise/fatigue.   HENT:  Negative for hearing loss and sinus pain.    Eyes:  Negative for blurred vision, double vision and photophobia.   Respiratory:  Negative for cough, sputum production and shortness of breath.    Cardiovascular:  Negative for chest pain, orthopnea and claudication.   Gastrointestinal:  Negative for abdominal pain, nausea and vomiting.   Genitourinary:  Negative for dysuria and hematuria.   Musculoskeletal:  Negative for back pain, joint pain and myalgias.   Neurological:  Positive for dizziness, tingling, tremors, speech change, focal weakness and weakness. Negative for seizures, loss of consciousness and headaches.   Psychiatric/Behavioral:  Negative for depression, hallucinations and substance abuse.      Past Medical History   has a past medical history of Hyperlipemia.    Surgical History   has a past surgical history that includes cystectomy (1963) and cataract extraction with iol.    Family History  family history includes Alcohol abuse in her brother;  Heart Disease in her brother; No Known Problems in her daughter and daughter; Other in her father and mother.    Social History   reports that she has never smoked. She has never used smokeless tobacco. She reports current alcohol use of about 0.6 oz per week. She reports that she does not use drugs.    Medications  Home Medications       Reviewed by Hermila Mistry (Pharmacy Tech) on 08/15/22 at 1519  Med List Status: Complete     Medication Last Dose Status   asa/apap/caffeine (EXCEDRIN) 250-250-65 MG Tab 8/15/2022 Active   furosemide (LASIX) 20 MG Tab 8/14/2022 Active   Lutein 40 MG Cap 8/14/2022 Active   potassium chloride SA (K-DUR) 10 MEQ Tab CR 8/14/2022 Active   TURMERIC PO 8/15/2022 Active                  No current facility-administered medications for this encounter.     Current Outpatient Medications   Medication Sig Dispense Refill    Lutein 40 MG Cap Take 40 mg by mouth every day.      furosemide (LASIX) 20 MG Tab Take 1 Tablet by mouth every day. 30 Tablet 0    potassium chloride SA (K-DUR) 10 MEQ Tab CR Take 1 Tablet by mouth every day. 1 Each 0    asa/apap/caffeine (EXCEDRIN) 250-250-65 MG Tab Take 1-2 Tablets by mouth every 6 hours as needed for Headache.      TURMERIC PO Take 1 Capsule by mouth every day.         Allergies  Allergies   Allergen Reactions    Pcn [Penicillins] Hives       Vital Signs last 24 hours  Temp:  [36.4 °C (97.6 °F)] 36.4 °C (97.6 °F)  Pulse:  [79-94] 79  Resp:  [15-20] 15  BP: (104-126)/(62-72) 104/62  SpO2:  [93 %-96 %] 96 %    Physical Exam  Physical Exam  Vitals and nursing note reviewed.   Constitutional:       Appearance: Normal appearance.      Comments: Sitting up with family at bedside   HENT:      Head: Normocephalic.      Mouth/Throat:      Mouth: Mucous membranes are moist.   Eyes:      Pupils: Pupils are equal, round, and reactive to light.   Cardiovascular:      Rate and Rhythm: Normal rate.      Heart sounds: No murmur heard.  Pulmonary:      Effort: No  respiratory distress.      Breath sounds: No stridor. No wheezing or rhonchi.   Abdominal:      General: There is no distension.      Palpations: There is no mass.      Tenderness: There is no abdominal tenderness. There is no guarding or rebound.      Hernia: No hernia is present.   Musculoskeletal:         General: No swelling or tenderness.   Skin:     Coloration: Skin is not jaundiced or pale.   Neurological:      Mental Status: She is alert.      Comments: She is alert and oriented and can provide detailed events with clear fluent speech, she has a right gaze preference and left vision loss that does not cross midline to left. She has no facial droop, tongue midline, she has neglect to left side but this can be overcome with prompting to almost complete full strength, she has a left arm drift, sensation intact but with left neglect.    Psychiatric:         Mood and Affect: Mood normal.       Fluids  No intake or output data in the 24 hours ending 08/15/22 1555    Laboratory  Recent Results (from the past 48 hour(s))   COMP METABOLIC PANEL    Collection Time: 08/15/22  2:25 PM   Result Value Ref Range    Sodium 134 (L) 135 - 145 mmol/L    Potassium 4.0 3.6 - 5.5 mmol/L    Chloride 102 96 - 112 mmol/L    Co2 22 20 - 33 mmol/L    Anion Gap 10.0 7.0 - 16.0    Glucose 151 (H) 65 - 99 mg/dL    Bun 22 8 - 22 mg/dL    Creatinine 0.74 0.50 - 1.40 mg/dL    Calcium 8.7 8.5 - 10.5 mg/dL    AST(SGOT) 36 12 - 45 U/L    ALT(SGPT) 24 2 - 50 U/L    Alkaline Phosphatase 85 30 - 99 U/L    Total Bilirubin 0.3 0.1 - 1.5 mg/dL    Albumin 3.5 3.2 - 4.9 g/dL    Total Protein 5.8 (L) 6.0 - 8.2 g/dL    Globulin 2.3 1.9 - 3.5 g/dL    A-G Ratio 1.5 g/dL   PROTHROMBIN TIME    Collection Time: 08/15/22  2:25 PM   Result Value Ref Range    PT 13.1 12.0 - 14.6 sec    INR 0.99 0.87 - 1.13   APTT    Collection Time: 08/15/22  2:25 PM   Result Value Ref Range    APTT 36.9 (H) 24.7 - 36.0 sec   COD (ADULT)    Collection Time: 08/15/22  2:25 PM    Result Value Ref Range    ABO Grouping Only O     Rh Grouping Only POS     Antibody Screen-Cod NEG    TROPONIN    Collection Time: 08/15/22  2:25 PM   Result Value Ref Range    Troponin T 55 (H) 6 - 19 ng/L   ESTIMATED GFR    Collection Time: 08/15/22  2:25 PM   Result Value Ref Range    GFR (CKD-EPI) 81 >60 mL/min/1.73 m 2   EKG (NOW)    Collection Time: 08/15/22  2:51 PM   Result Value Ref Range    Report       [facility]    Test Date:  2022-08-15  Pt Name:    DANIELITO CRAWFORD              Department: ER  MRN:        [id number]                      Room:       [facility]  Gender:     Female                       Technician: [id number]  :        [id number]                   Requested By:EDUARDO PULLIAM  Order #:    [id number]                    Reading MD: EDUARDO PULLIAM MD    Measurements  Intervals                                Axis  Rate:       89                           P:          72  LA:         168                          QRS:        265  QRSD:       110                          T:          106  QT:         396  QTc:        482    Interpretive Statements  Rate 89, Normal sinus rhythm, no ST elevation or depression, diffuse T wave  flattening, low voltage in limb leads, no significant changes compared to  prior..  Electronically Signed On 8- 15:04:39 PDT by EDUARDO PULLIAM MD     ABO Rh Confirm    Collection Time: 08/15/22  2:55 PM   Result Value Ref Range    ABO Rh Confirm O POS        Imaging  CT reviewed    Assessment/Plan  * Acute CVA (cerebrovascular accident) (HCC)- (present on admission)  Assessment & Plan  Last time normal: 120pm 8/15  TPA given at: 1455  Goal if TPA SBP <180/105   Aspirin per protocol  HD intensity Statin even if negative for dyslipidemia   DVT SCD's/prophylaxis per protocol  Nutrition at goal 48hrs  Blood glucose goal 150-180's check HgA1c  Rx fever or even schedule tylenol (aspiration pna/uti mc)  Neuro checks per protocol  Echo w/ bubble  study  MRI pending  Mobilize > 24hrs post stroke  Consider flat position risk benefits for CNS perfusion          Discussed patient condition and risk of morbidity and/or mortality with Family, RN, Pharmacy, Code status disscussed, and Patient.    The patient remains critically ill from acute stroke s/p TPA with serial neurologic monitoring  Critical care time = 55 minutes in directly providing and coordinating critical care and extensive data review.  No time overlap and excludes procedures.

## 2022-08-15 NOTE — ED PROVIDER NOTES
ED Physician Note    Chief Concern:   Left-sided weakness    HPI:  Regla Meier is a very pleasant 81-year-old woman who presents to the emergency department for evaluation of left-sided weakness.  She was brought in as a stroke activation, which was activated in the prehospital setting.  Last known normal was 2:00 PM, about 20 to 30 minutes prior to arrival.  She developed onset of left-sided is involving the left upper extremity, and left lower extremity.  On arrival to the emergency department she does appear to have some left-sided neglect.  She does not report any associated headache, no chest pain, no thoracic back pain.  She is not currently on any anticoagulation, she has no prior history of CVA.  She has had no recent fevers, no history of atrial fibrillation, no history of other cardiac arrhythmia.  She is unable to identify any reliably exacerbating or alleviating factors.  She does not describe any vision changes, she does not have any speech deficit and is easy to understand, she answers questions appropriately.  She has had no recent fevers.    Review of Systems:  See HPI for pertinent positives and negatives. All other systems negative.    Past Medical History:   has a past medical history of Hyperlipemia.    Social History:  Social History     Tobacco Use    Smoking status: Never    Smokeless tobacco: Never   Vaping Use    Vaping Use: Never used   Substance and Sexual Activity    Alcohol use: Yes     Alcohol/week: 0.6 oz     Types: 1 Glasses of wine per week    Drug use: No    Sexual activity: Yes     Partners: Male     Comment:  for 57 years. Documented on 4/10/19.       Surgical History:   has a past surgical history that includes cystectomy (1963) and cataract extraction with iol.    Current Medications:  Home Medications       Reviewed by Shi Rodriguez R.N. (Registered Nurse) on 08/15/22 at 1435  Med List Status: Partial     Medication Last Dose Status   asa/apap/caffeine  "(EXCEDRIN) 250-250-65 MG Tab  Active   [facility] LUTEIN-ZEAXANTHIN PO  Active   furosemide (LASIX) 20 MG Tab  Active   potassium chloride SA (K-DUR) 10 MEQ Tab CR  Active   TURMERIC PO  Active                    Allergies:  Allergies   Allergen Reactions    Pcn [Penicillins] Hives       Physical Exam:  Vital Signs: /71   Pulse 94   Temp 36.4 °C (97.6 °F) (Temporal)   Resp 16   Ht 1.575 m (5' 2\")   Wt 55.8 kg (123 lb)   SpO2 93%   BMI 22.50 kg/m²   Constitutional: Alert, no acute distress  HENT: Normocephalic, atraumatic, no facial asymmetry  Eyes: Pupils equal and reactive, normal conjunctiva, she does have a rightward gaze at rest  Neck: Supple, normal range of motion, no stridor  Cardiovascular: Extremities are warm and well perfused, no murmur appreciated, normal cardiac auscultation  Pulmonary: No respiratory distress, normal work of breathing, no accessory muscule usage, breath sounds clear and equal bilaterally, no wheezing, no coarse breath sounds  Abdomen: Soft, non-distended, non-tender to palpation, no peritoneal signs  Skin: Warm, dry, no rashes or lesions  Musculoskeletal: Normal range of motion in all extremities, no swelling or deformity noted  Neurologic: No significant facial asymmetry, right-sided gaze deviation at rest, can overcome with extraocular movement testing, no nystagmus, no nausea, no vertigo, 5 out of 5 right upper and right lower extremity strength, 3 out of 5 left upper and left lower extremity strength, she does have left-sided neglect, normal speech, answers questions appropriately  Psychiatric: Normal and appropriate mood and affect    Medical records reviewed for continuity of care.  Most recent primary care visit is from 7/11/2022.    EKG: Rate 89, normal sinus rhythm, no ST elevation or depression, diffuse T wave flattening, low voltage in limb leads, no significant changes compared to prior.    Labs:  Labs Reviewed   COMP METABOLIC PANEL - Abnormal; Notable for the " following components:       Result Value    Sodium 134 (*)     Glucose 151 (*)     Total Protein 5.8 (*)     All other components within normal limits    Narrative:     Indicate which anticoagulants the patient is on:->UNKNOWN   APTT - Abnormal; Notable for the following components:    APTT 36.9 (*)     All other components within normal limits    Narrative:     Indicate which anticoagulants the patient is on:->UNKNOWN   TROPONIN - Abnormal; Notable for the following components:    Troponin T 55 (*)     All other components within normal limits    Narrative:     Indicate which anticoagulants the patient is on:->UNKNOWN   PROTHROMBIN TIME    Narrative:     Indicate which anticoagulants the patient is on:->UNKNOWN   ESTIMATED GFR    Narrative:     Indicate which anticoagulants the patient is on:->UNKNOWN   CBC WITH DIFFERENTIAL    Narrative:     Indicate which anticoagulants the patient is on:->UNKNOWN   COD (ADULT)   ABO RH CONFIRM       Radiology:  CT-CTA NECK WITH & W/O-POST PROCESSING   Final Result      CT angiogram of the neck within normal limits.      CT-CTA HEAD WITH & W/O-POST PROCESS   Final Result      1.  Thrombosed right posterior cerebral artery in the P2 segment. The artery is reconstituted more distally.         Comment: The exam was discussed with Dr. Ace at 2:59 PM.      CT-CEREBRAL PERFUSION ANALYSIS   Final Result      1.  Cerebral blood flow less than 30% likely representing completed infarct = 0 mL.      2.  T Max more than 6 seconds likely representing combination of completed infarct and ischemia = 23 mL.      3.  Mismatched volume likely representing ischemic brain/penumbra = 23 mL      4.  Please note that the cerebral perfusion was performed on the limited brain tissue around the basal ganglia region. Infarct/ischemia outside the CT perfusion sections can be missed in this study.      CT-HEAD W/O   Final Result         1.  No acute intracranial process.      2. Diffuse atrophy and  periventricular white matter changes consistent with chronic small vessel disease.      DX-CHEST-PORTABLE (1 VIEW)    (Results Pending)        ED Medications Administered:  Medications   tenecteplase (TNKASE) injection (stroke) 14 mg (14 mg Intravenous Given 8/15/22 1451)   iohexol (OMNIPAQUE) 350 mg/mL (IV) (40 mL Intravenous Given 8/15/22 1443)   iohexol (OMNIPAQUE) 350 mg/mL (IV) (95 mL Intravenous Given 8/15/22 1449)       Differential diagnosis:  CVA, large vessel occlusion, TIA, electrolyte abnormality, hypoglycemia is a consideration, however point-of-care Accu-Chek in the prehospital setting was within normal limits    MDM:  Ms. Meier presents to the emergency department as a stroke activation.  She was seen in the emergency department on arrival concurrently with Dr. Marcum, stroke neurologist.  NIH stroke scale 11 on arrival, there is concern for large vessel occlusion.  She was taken immediately for CTA of the head and neck, as well as CT perfusion study.    On laboratory evaluation CMP does not show any significant electrolyte abnormalities.  Glucose is 151.  High-sensitivity troponin is slightly elevated to 55.  CBC pending at this time.    CT the head without contrast demonstrates no acute intracranial process.  CT perfusion study demonstrates mismatch volume likely representing ischemic brain/penumbra of 23 mL.  I discussed the CTA with radiologist, this demonstrates a thrombosed right posterior cerebral artery in the P2 segment.  Dr. Marcum discussed the imaging with interventional radiology, clot is not amenable to IR retrieval.  Thrombolytics were administered in the emergency department.    On my reassessment she is not having any headache, no new or worsening symptoms.  Plan at this time is for admission to ICU for continued monitoring after thrombolytic administration, and regular neurochecks.    Critical Care  I spent 30 minutes of critical care time with this patient. This does not include  time spent on separately reported billable procedures.   This includes pulse ox interpretation, medical record review when available, interpretation of labs and imaging, specialist consultation, and hemodynamic monitoring.      Disposition:  Admit to critical care unit in critical condition    Final Impression:  1. Acute CVA (cerebrovascular accident) (HCC)        Electronically signed by: Araceli Ace MD, 8/15/2022 3:08 PM

## 2022-08-16 ENCOUNTER — APPOINTMENT (OUTPATIENT)
Dept: RADIOLOGY | Facility: MEDICAL CENTER | Age: 82
DRG: 062 | End: 2022-08-16
Attending: INTERNAL MEDICINE
Payer: MEDICARE

## 2022-08-16 PROBLEM — H54.8 LEGALLY BLIND IN LEFT EYE, AS DEFINED IN USA: Chronic | Status: ACTIVE | Noted: 2019-11-26

## 2022-08-16 PROBLEM — R60.0 LEG EDEMA: Chronic | Status: ACTIVE | Noted: 2022-07-09

## 2022-08-16 LAB
ANION GAP SERPL CALC-SCNC: 9 MMOL/L (ref 7–16)
BUN SERPL-MCNC: 16 MG/DL (ref 8–22)
CALCIUM SERPL-MCNC: 8.2 MG/DL (ref 8.5–10.5)
CHLORIDE SERPL-SCNC: 106 MMOL/L (ref 96–112)
CHOLEST SERPL-MCNC: 190 MG/DL (ref 100–199)
CO2 SERPL-SCNC: 25 MMOL/L (ref 20–33)
CREAT SERPL-MCNC: 0.65 MG/DL (ref 0.5–1.4)
ERYTHROCYTE [DISTWIDTH] IN BLOOD BY AUTOMATED COUNT: 46.1 FL (ref 35.9–50)
EST. AVERAGE GLUCOSE BLD GHB EST-MCNC: 100 MG/DL
GFR SERPLBLD CREATININE-BSD FMLA CKD-EPI: 88 ML/MIN/1.73 M 2
GLUCOSE BLD STRIP.AUTO-MCNC: 102 MG/DL (ref 65–99)
GLUCOSE BLD STRIP.AUTO-MCNC: 85 MG/DL (ref 65–99)
GLUCOSE BLD STRIP.AUTO-MCNC: 88 MG/DL (ref 65–99)
GLUCOSE SERPL-MCNC: 93 MG/DL (ref 65–99)
HBA1C MFR BLD: 5.1 % (ref 4–5.6)
HCT VFR BLD AUTO: 37.5 % (ref 37–47)
HDLC SERPL-MCNC: 67 MG/DL
HGB BLD-MCNC: 12.4 G/DL (ref 12–16)
LDLC SERPL CALC-MCNC: 106 MG/DL
MCH RBC QN AUTO: 31.7 PG (ref 27–33)
MCHC RBC AUTO-ENTMCNC: 33.1 G/DL (ref 33.6–35)
MCV RBC AUTO: 95.9 FL (ref 81.4–97.8)
PLATELET # BLD AUTO: 184 K/UL (ref 164–446)
PMV BLD AUTO: 9.1 FL (ref 9–12.9)
POTASSIUM SERPL-SCNC: 4 MMOL/L (ref 3.6–5.5)
RBC # BLD AUTO: 3.91 M/UL (ref 4.2–5.4)
SODIUM SERPL-SCNC: 140 MMOL/L (ref 135–145)
TRIGL SERPL-MCNC: 86 MG/DL (ref 0–149)
WBC # BLD AUTO: 5.7 K/UL (ref 4.8–10.8)

## 2022-08-16 PROCEDURE — A9270 NON-COVERED ITEM OR SERVICE: HCPCS | Performed by: NURSE PRACTITIONER

## 2022-08-16 PROCEDURE — 700111 HCHG RX REV CODE 636 W/ 250 OVERRIDE (IP): Performed by: NURSE PRACTITIONER

## 2022-08-16 PROCEDURE — 92610 EVALUATE SWALLOWING FUNCTION: CPT

## 2022-08-16 PROCEDURE — 700102 HCHG RX REV CODE 250 W/ 637 OVERRIDE(OP): Performed by: NURSE PRACTITIONER

## 2022-08-16 PROCEDURE — 82962 GLUCOSE BLOOD TEST: CPT | Mod: 91

## 2022-08-16 PROCEDURE — 99232 SBSQ HOSP IP/OBS MODERATE 35: CPT | Performed by: NURSE PRACTITIONER

## 2022-08-16 PROCEDURE — 80048 BASIC METABOLIC PNL TOTAL CA: CPT

## 2022-08-16 PROCEDURE — 85027 COMPLETE CBC AUTOMATED: CPT

## 2022-08-16 PROCEDURE — 36415 COLL VENOUS BLD VENIPUNCTURE: CPT

## 2022-08-16 PROCEDURE — 80061 LIPID PANEL: CPT

## 2022-08-16 PROCEDURE — 70551 MRI BRAIN STEM W/O DYE: CPT | Mod: MG

## 2022-08-16 PROCEDURE — 83036 HEMOGLOBIN GLYCOSYLATED A1C: CPT

## 2022-08-16 PROCEDURE — 770022 HCHG ROOM/CARE - ICU (200)

## 2022-08-16 PROCEDURE — 99291 CRITICAL CARE FIRST HOUR: CPT | Performed by: NURSE PRACTITIONER

## 2022-08-16 RX ORDER — ONDANSETRON 2 MG/ML
4 INJECTION INTRAMUSCULAR; INTRAVENOUS EVERY 4 HOURS PRN
Status: DISCONTINUED | OUTPATIENT
Start: 2022-08-16 | End: 2022-08-19 | Stop reason: HOSPADM

## 2022-08-16 RX ORDER — OXYCODONE HYDROCHLORIDE 5 MG/1
5 TABLET ORAL EVERY 4 HOURS PRN
Status: DISCONTINUED | OUTPATIENT
Start: 2022-08-16 | End: 2022-08-18

## 2022-08-16 RX ORDER — HYDRALAZINE HYDROCHLORIDE 20 MG/ML
10-20 INJECTION INTRAMUSCULAR; INTRAVENOUS EVERY 4 HOURS PRN
Status: DISCONTINUED | OUTPATIENT
Start: 2022-08-16 | End: 2022-08-19 | Stop reason: HOSPADM

## 2022-08-16 RX ORDER — LABETALOL HYDROCHLORIDE 5 MG/ML
10-20 INJECTION, SOLUTION INTRAVENOUS EVERY 4 HOURS PRN
Status: DISCONTINUED | OUTPATIENT
Start: 2022-08-16 | End: 2022-08-19 | Stop reason: HOSPADM

## 2022-08-16 RX ORDER — ACETAMINOPHEN 325 MG/1
650 TABLET ORAL EVERY 4 HOURS PRN
Status: DISCONTINUED | OUTPATIENT
Start: 2022-08-16 | End: 2022-08-19 | Stop reason: HOSPADM

## 2022-08-16 RX ADMIN — OXYCODONE 5 MG: 5 TABLET ORAL at 15:29

## 2022-08-16 RX ADMIN — ACETAMINOPHEN 650 MG: 325 TABLET, FILM COATED ORAL at 15:29

## 2022-08-16 RX ADMIN — ACETAMINOPHEN 650 MG: 325 TABLET, FILM COATED ORAL at 10:41

## 2022-08-16 RX ADMIN — ONDANSETRON HYDROCHLORIDE 4 MG: 2 SOLUTION INTRAMUSCULAR; INTRAVENOUS at 01:03

## 2022-08-16 RX ADMIN — ACETAMINOPHEN 650 MG: 325 TABLET, FILM COATED ORAL at 19:41

## 2022-08-16 ASSESSMENT — ENCOUNTER SYMPTOMS
CHILLS: 0
PALPITATIONS: 0
MEMORY LOSS: 0
INSOMNIA: 0
NAUSEA: 0
HEMOPTYSIS: 0
EYE PAIN: 0
COUGH: 0
VOMITING: 0
ABDOMINAL PAIN: 0
EYE DISCHARGE: 0
ORTHOPNEA: 0
NECK PAIN: 0
SENSORY CHANGE: 1
TINGLING: 1
NERVOUS/ANXIOUS: 0
BACK PAIN: 0
FEVER: 0

## 2022-08-16 ASSESSMENT — PAIN DESCRIPTION - PAIN TYPE
TYPE: ACUTE PAIN

## 2022-08-16 ASSESSMENT — FIBROSIS 4 INDEX: FIB4 SCORE: 3.19

## 2022-08-16 NOTE — THERAPY
PT Contact Note:    PT consult received. Pt with active bedrest orders until 1520 today. Will initate PT evaluation as medically appropriate and indicated. Thanks    Chanda Fletcher, PT, DPT    [employer] [id number]

## 2022-08-16 NOTE — DISCHARGE PLANNING
[facility] Transitional Care Coordination    Referral from: Dr. Sullivan    Insurance Provider on Facesheet: ZAIRA/Sampson    Potential Rehab Diagnosis: CVA?    Chart review indicates patient may have on going medical management and may have therapy needs to possibly meet inpatient rehab facility criteria with the goal of returning to community.    D/C support will need to be verified: S.I.L.    Physiatry consultation pended per protocol.  W/U & TX pending.      Thank you for the referral.

## 2022-08-16 NOTE — THERAPY
Missed Therapy     Patient Name: Regla Meier  Age:  81 y.o., Sex:  female  Medical Record #: [id number]  Today's Date: 8/16/2022    OT eval acknowledged, pts activity orders set to begin on 8/16 starting at 1520 following TNKase administration, will hold and follow up once patient cleared for OOB activity.      Willy Osorio OTD, OTR/L

## 2022-08-16 NOTE — HOSPITAL COURSE
Regla Meier is an 81 year old female with PMH blindness left eye (retinal occlusion) and chronic BLE edema on Lasix who presented to the ED 8/15/22 with left-sided weakness. LKW was 20-30 minutes prior to arrival. On presentation she had left-sided weakness and right gaze preference. NIH 11. She received TPA at 1455 on 8/15. CT head showed thrombosed right Posterior cerebral artery in the P2 segment.     Brain MRI showing acute infarct right medial temporal lobe and medial occipital lobe as well as acute infarct right thalamus with petechial hemorrhage.  Discussed with neurology who recommends holding off on aspirin for a few days.  Can add DVT prophylaxis tomorrow, 8/17 if her neuro exam remains unchanged.  Concern for residual clot in her PCA.  BP goal 110-160.

## 2022-08-16 NOTE — PROGRESS NOTES
Critical Care Progress Note    Date of admission  8/15/2022    Chief Complaint  Left sided weakness    Hospital Course  Regla Meier is an 81 year old female with PMH blindness left eye (retinal occlusion) and chronic BLE edema on Lasix who presented to the ED 8/15/22 with left-sided weakness. LKW was 20-30 minutes prior to arrival. On presentation she had left-sided weakness and right gaze preference. NIH 11. She received TPA at 1455 on 8/15. CT head showed thrombosed right Posterior cerebral artery in the P2 segment.     Brain MRI showing acute infarct right medial temporal lobe and medial occipital lobe as well as acute infarct right thalamus with petechial hemorrhage.  Discussed with neurology who recommends holding off on aspirin for a few days.  Can add DVT prophylaxis tomorrow, 8/17 if her neuro exam remains unchanged.  Concern for residual clot in her PCA.  BP goal 110-160.    Interval Problem Update  No acute events overnight  Neuro: A/O x 4, left facial droop, left sided neglect  Sensation improving - some numbness/tingling LUE and right hand/fingers. Headaches 3/10.   Chronic left eye blindness. Right visual cut, moving all extremities RUE/RLE 5/5, LUE 3-4/5.  SBP goal 110-160  SR 80's  Passed SLP - regular diet  I/O: 500/1475  Voiding   Mobility 1 until 1455 per TPA protocol    Review of Systems  Review of Systems   Constitutional:  Negative for chills and fever.   HENT: Negative.     Eyes:  Negative for pain and discharge.        Chronic left eye vision loss  Right eye vision loss   Respiratory:  Negative for cough and hemoptysis.    Cardiovascular:  Negative for chest pain, palpitations and orthopnea.   Gastrointestinal:  Negative for abdominal pain, nausea and vomiting.   Genitourinary:  Negative for frequency and urgency.   Musculoskeletal:  Negative for back pain and neck pain.   Neurological:  Positive for tingling and sensory change.   Psychiatric/Behavioral:  Negative for memory loss. The  patient is not nervous/anxious and does not have insomnia.    All other systems reviewed and are negative.     Vital Signs for last 24 hours   Temp:  [35.8 °C (96.4 °F)-36.4 °C (97.6 °F)] 36.1 °C (97 °F)  Pulse:  [64-94] 83  Resp:  [] 105  BP: (104-152)/(55-89) 116/61  SpO2:  [91 %-99 %] 94 %    Hemodynamic parameters for last 24 hours       Respiratory Information for the last 24 hours       Physical Exam   Physical Exam  Vitals and nursing note reviewed.   Constitutional:       General: She is not in acute distress.     Appearance: Normal appearance. She is ill-appearing. She is not toxic-appearing.      Comments: Sitting up in bed in no acute distress eating lunch   HENT:      Head: Normocephalic.      Right Ear: Hearing normal.      Left Ear: Hearing normal.      Nose: Nose normal.      Mouth/Throat:      Lips: Pink. No lesions.      Mouth: Mucous membranes are moist.   Eyes:      Comments: Left eye blindness  Right eye visual cut   Cardiovascular:      Rate and Rhythm: Normal rate and regular rhythm.      Pulses: Normal pulses.      Heart sounds: Normal heart sounds.   Pulmonary:      Effort: Pulmonary effort is normal.      Breath sounds: Normal breath sounds. No wheezing.   Abdominal:      General: Bowel sounds are normal.      Palpations: Abdomen is soft.      Tenderness: There is no abdominal tenderness. There is no guarding or rebound.   Musculoskeletal:      Right lower leg: No edema.      Left lower leg: No edema.      Comments: RUE/RLE 5/5, LUE 3-4/5   Skin:     General: Skin is warm and dry.      Capillary Refill: Capillary refill takes less than 2 seconds.   Neurological:      Mental Status: She is alert.      Cranial Nerves: Cranial nerves 2-12 are intact.      Sensory: Sensory deficit present.   Psychiatric:         Mood and Affect: Mood normal.         Behavior: Behavior is cooperative.         Cognition and Memory: Cognition normal.         Judgment: Judgment normal.        Medications  Current Facility-Administered Medications   Medication Dose Route Frequency Provider Last Rate Last Admin    ondansetron (ZOFRAN) syringe/vial injection 4 mg  4 mg Intravenous Q4HRS PRN Annalee Ericksonona   4 mg at 08/16/22 0103    acetaminophen (Tylenol) tablet 650 mg  650 mg Oral Q4HRS PRN Chela Bradford, A.P.R.N.   650 mg at 08/16/22 1041    oxyCODONE immediate-release (ROXICODONE) tablet 5 mg  5 mg Oral Q4HRS PRN Chela Bradford, A.P.R.N.        hydrALAZINE (APRESOLINE) injection 10-20 mg  10-20 mg Intravenous Q4HRS PRN Chela Bradford, A.P.R.N.        labetalol (NORMODYNE/TRANDATE) injection 10-20 mg  10-20 mg Intravenous Q4HRS PRN Chela Bradford, A.P.R.N.        dextrose 10 % BOLUS 25 g  25 g Intravenous Q15 MIN PRN Annalee Farr           Fluids    Intake/Output Summary (Last 24 hours) at 8/16/2022 1357  Last data filed at 8/16/2022 0600  Gross per 24 hour   Intake 500 ml   Output 1475 ml   Net -975 ml       Laboratory          Recent Labs     08/15/22  1425 08/16/22  0505   SODIUM 134* 140   POTASSIUM 4.0 4.0   CHLORIDE 102 106   CO2 22 25   BUN 22 16   CREATININE 0.74 0.65   CALCIUM 8.7 8.2*     Recent Labs     08/15/22  1425 08/16/22  0505   ALTSGPT 24  --    ASTSGOT 36  --    ALKPHOSPHAT 85  --    TBILIRUBIN 0.3  --    GLUCOSE 151* 93     Recent Labs     08/15/22  1425 08/16/22  0505   WBC 5.7 5.7   NEUTSPOLYS 56.50  --    LYMPHOCYTES 33.30  --    MONOCYTES 7.80  --    EOSINOPHILS 1.20  --    BASOPHILS 1.00  --    ASTSGOT 36  --    ALTSGPT 24  --    ALKPHOSPHAT 85  --    TBILIRUBIN 0.3  --      Recent Labs     08/15/22  1425 08/16/22  0505   RBC 5.40 3.91*   HEMOGLOBIN 16.8* 12.4   HEMATOCRIT 50.2* 37.5   PLATELETCT 193 184   PROTHROMBTM 13.1  --    APTT 36.9*  --    INR 0.99  --        Imaging  X-Ray:  I have personally reviewed the images and compared with prior images.  CT:    Reviewed  MRI:   Reviewed    Assessment/Plan  * Acute CVA (cerebrovascular accident) (HCC)- (present on  admission)  Assessment & Plan  -Last time normal: 120pm 8/15  -TPA given at: 1455  -BP goal 110-160  -Brain MRI showing acute infarct right medial temporal lobe and medial occipital lobe as well as acute infarct right thalamus with petechial hemorrhage.  Discussed with neurology who recommends holding off on aspirin for a few days.  Can add DVT prophylaxis tomorrow, 8/17 if her neuro exam remains unchanged.  Concern for residual clot in her PCA.  -serial neurologic exams  -statin  -maintain normothermia, normoglycemia, normonatremia  -ECHO pending  -PT OT SLP      Leg edema- (present on admission)  Assessment & Plan  -chronic and stable at her baseline  -holding her home Lasix and KCL    Legally blind in left eye, as defined in [place]- (present on admission)  Assessment & Plan  -chronic and stable at her baseline       VTE:  Contraindicated  Ulcer: Not Indicated  Lines: None    I have performed a physical exam and reviewed and updated ROS and Plan today (8/16/2022). In review of yesterday's note (8/15/2022), there are no changes except as documented above.     Discussed patient condition and risk of morbidity and/or mortality with Family, RN, RT, Pharmacy, Charge nurse / hot rounds, Patient, neurology, and my attending Dr. Rosas  The patient remains critically ill.  Critical care time = 49 minutes in directly providing and coordinating critical care and extensive data review.  No time overlap and excludes procedures.    Please note that this dictation was created using voice recognition software. I have made every reasonable attempt to correct obvious errors, but there may be errors of grammar and possibly content that I did not discover before finalizing the note.    MADDIE Wilson.

## 2022-08-16 NOTE — THERAPY
Speech Language Pathology   Clinical Swallow Evaluation     Patient Name: Regla Meier  AGE:  81 y.o., SEX:  female  Medical Record #: [id number]  Today's Date: 2022          Assessment    HPI: 81 y.o. female admitted on 8/15/22 for R PCA stroke s/p TNK @1455 8/15.     CMH: acute CVA.     PMH: chronic leg edema, dyslipidemia. Not previously seen by service per EMR.    Imagin/15/22 CXR: Increased bilateral interstitial markings are suggestive of mild pulmonary edema. Mild cardiomegaly    8/15/22 CT/A Head: Thrombosed right posterior cerebral artery in the P2 segment. The artery is reconstituted more distally.    8/15/22 CT/A Neck: CT angiogram of the neck within normal limits.      Level of Consciousness: Alert  Affect/Behavior: Appropriate, Calm, Cooperative  Follows Directives: Yes  Orientation: Oriented x 4  Hearing: Functional hearing  Vision: Functional vision      Prior Living Situation & Level of Function:  Patient reports living at home with her , Ki. They live in the cottage behind her daughter, Ladonna. Patient reports no prior speech therapy services.      Oral Mechanism Evaluation  Facial Symmetry:  L facial droop  Facial Sensation: Equal  Labial Observations: L sided weakness  Lingual Observations: Midline  Dentition: Good  Comments:      Voice  Quality: WFL  Resonance: WFL  Intensity: Appropriate  Comments:      Current Method of Nutrition   NPO until cleared by speech pathology      Subjective  Patient reports eating a regular diet prior to hospital admission.  and daughter at bedside. Patient reports she is eager to eat.        Assessment  Positioning: Melo's (60-90 degrees)  Bolus Administration: Patient  Oxygen Requirements:  1 L Nasal Cannula  Factor(s) Affecting Performance: None    Swallowing Trials  Ice: WFL  Thin Liquid (TN0): WFL  Pureed (PU4): WFL  Regular (RG7): WFL    Comments: Oral care provided. SLP prompted to self-feed if able. Patient used left hand  most of assessment, though she has LSW. Switched to right hand later in assessment with more efficient self feeding. Adequate oral bolus containment, complete AP transfer, and timely mastication. No clinical indicators of laryngeal penetration/aspiration or signs concerning for pharyngeal inefficiency. Vocal quality clear throughout assessment. Patient appears appropriate to initiate regular/thin liquid diet.        Clinical Impressions  Patient presents with no oral phase deficits, no clinical indicators of laryngeal penetration/aspiration, nor signs concerning for pharyngeal inefficiency. No dysphonia. Recommend initiation of regular/thin liquid diet.        Recommendations  1.  Initiate Regular/Thin liquid diet  2.  Instrumentation: None indicated at this time  3.  Swallowing Instructions & Precautions:   Supervision: Distant supervision - check on patient 2-3 times per meal  Positioning: Fully upright and midline during oral intake  Medication: Whole with liquid  Strategies: Small bites/sips, Slow rate of intake, No straws  Oral Care: BID  4.  SLP to follow to assess diet tolerance and complete cognitive-linguistic assessment.       Plan    Recommend Speech Therapy 3 times per week until therapy goals are met for the following treatments:  Dysphagia Training and Cognitive-Linguistic Training.    Discharge Recommendations: (P)  (TBD pending clinical progress/cognitive evaluation)       Objective       08/16/22 1036   Charge Group   SLP Oral Pharyngeal Evaluation Oral Pharyngeal Evaluation   Total Treatment Time   SLP Time Spent Yes   SLP Oral Pharyngeal Evaluation (Mins) 23   Initial Contact Note    Initial Contact Note  Order Received and Verified, Speech Therapy Evaluation in Progress with Full Report to Follow.   Vitals   O2 (LPM) 1   O2 Delivery Device Silicone Nasal Cannula   Pain 0 - 10 Group   Therapist Pain Assessment Post Activity Pain Same as Prior to Activity;0   Prior Living Situation   Prior Services  "None   Lives with - Patient's Self Care Capacity Spouse  (in cottage behind daughter's house)   Prior Level Of Function   Communication Within Functional Limits   Swallow Within Functional Limits   Dentition Intact   Dentures None   Hearing Within Functional Limits for Evaluation   Hearing Aid None   Vision Within Functional Limits for Evaluation   Patient's Primary Language English   Dysphagia Rating   Nutritional Liquid Intake Rating Scale Non thickened beverages   Nutritional Food Intake Rating Scale Total oral diet with no restrictions   Patient / Family Goals   Patient / Family Goal #1 \"I haven't eaten since yesterday\"   Short Term Goals   Short Term Goal # 1 Patient will consume regular/thin liquid diet without overt indicators of aspiration or decline in pulmonary status.   Education Group   Education Provided Dysphagia;CVA Right Hemisphere   CVA / Rt Hemisphere Patient Response Patient;Family;Significant Other;Acceptance;Explanation;Verbal Demonstration   Dysphagia Patient Response Family;Patient;Significant Other;Eager;Explanation;Verbal Demonstration   Anticipated Discharge Needs   Discharge Recommendations   (TBD pending clinical progress/cognitive evaluation)   Interdisciplinary Plan of Care Collaboration   IDT Collaboration with  Nursing;Family / Caregiver   Patient Position at End of Therapy In Bed;Bed Alarm On;Call Light within Reach;Tray Table within Reach;Phone within Reach;Family / Friend in Room   Collaboration Comments RN updated     "

## 2022-08-16 NOTE — PROGRESS NOTES
4 Eyes Skin Assessment Completed by JULIETTE Hawkins and JULIETTE Ingram.    Head WDL  Ears WDL  Nose WDL  Mouth Redness  Neck WDL  Breast/Chest Bruising  Shoulder Blades WDL  Spine WDL  (R) Arm/Elbow/Hand WDL  (L) Arm/Elbow/Hand WDL  Abdomen WDL  Groin WDL  Scrotum/Coccyx/Buttocks WDL  (R) Leg WDL  (L) Leg WDL  (R) Heel/Foot/Toe WDL  (L) Heel/Foot/Toe WDL          Devices In Places ECG, Blood Pressure Cuff, Pulse Ox, and SCD's      Interventions In Place Sacral Mepilex, Pillows, Q2 Turns, Low Air Loss Mattress, and Heels Loaded W/Pillows    Possible Skin Injury No    Pictures Uploaded Into Epic N/A  Wound Consult Placed N/A  RN Wound Prevention Protocol Ordered No

## 2022-08-16 NOTE — PROGRESS NOTES
Chief Complaint   Patient presents with    Possible Stroke     Pt arrives w/ L sided weakness, LKW 1400. Bs  mg/dL per EMS. Pt arrives AAOx4 with a GCS of 15.           Neurology Daily Progress Note  Neurohospitalist Service, [facility]    History of present illness:  Regla Meier is a 81 y.o. woman with a history of hypertension who presents with acute onset left-sided weakness and right gaze preference.  She reports that she is not sure exactly what happened or when it started, but according to EMS symptom onset was at 2:00 PM per family.    Patient states that she feels funny but otherwise does not know what is happening.  She denies any recent head traumas, surgeries, history of bleeding into her brain, blood thinner use.  She does not have any major medical issues and has never had any symptoms like this in the past.    Past medical history:   Past Medical History:   Diagnosis Date    Hyperlipemia        Past surgical history:   Past Surgical History:   Procedure Laterality Date    CYSTECTOMY  1963    CATARACT EXTRACTION WITH IOL         Family history:   Family History   Problem Relation Age of Onset    Other Mother         TIA     Other Father         Abdominal aneurysm     Alcohol abuse Brother     Heart Disease Brother     No Known Problems Daughter     No Known Problems Daughter        Social history:   Social History     Socioeconomic History    Marital status:      Spouse name: Not on file    Number of children: Not on file    Years of education: Not on file    Highest education level: Not on file   Occupational History    Not on file   Tobacco Use    Smoking status: Never    Smokeless tobacco: Never   Vaping Use    Vaping Use: Never used   Substance and Sexual Activity    Alcohol use: Yes     Alcohol/week: 0.6 oz     Types: 1 Glasses of wine per week    Drug use: No    Sexual activity: Yes     Partners: Male     Comment:  for 57 years. Documented on  4/10/19.   Other Topics Concern    Not on file   Social History Narrative    Not on file     Social Determinants of Health     Financial Resource Strain: Not on file   Food Insecurity: Not on file   Transportation Needs: Not on file   Physical Activity: Not on file   Stress: Not on file   Social Connections: Not on file   Intimate Partner Violence: Not on file   Housing Stability: Not on file       Current medications:   Current Facility-Administered Medications   Medication Dose    ondansetron (ZOFRAN) syringe/vial injection 4 mg  4 mg    dextrose 10 % BOLUS 25 g  25 g       Medication Allergy:  Allergies   Allergen Reactions    Pcn [Penicillins] Hives       Review of systems:   Constitutional: denies fever, night sweats, weight loss.   Eyes: denies acute vision change, eye pain or secretion.   Ears, Nose, Mouth, Throat: denies nasal secretion, nasal bleeding, difficulty swallowing, hearing loss, tinnitus, vertigo, ear pain, acute dental problems, oral ulcers or lesions.   Endocrine: denies recent weight changes, heat or cold intolerance, polyuria, polydypsia, polyphagia,abnormal hair growth.  Cardiovascular: denies new onset of chest pain, palpitations, syncope, or dyspnea of exertion.  Pulmonary: denies shortness of breath, new onset of cough, hemoptysis, wheezing, chest pain or flu-like symptoms.   GI: denies nausea, vomiting, diarrhea, GI bleeding, change in appetite, abdominal pain, and change in bowel habits.  : denies dysuria, urinary incontinence, hematuria.  Heme/oncology: denies history of easy bruising or bleeding. No history of cancer, DVTor PE.  Allergy/immunology: denies hives/urticaria, or itching.   Dermatologic: denies new rash, or new skin lesions.  Musculoskeletal:denies joint swelling or pain, muscle pain, neck and back pain.   Neurologic: denies headaches, acute visual changes, facial droopiness, muscle weakness (focal or generalized), paresthesias, anesthesia, ataxia, change in speech or  language, memory loss, abnormal movements, seizures, loss of consciousness, or episodes of confusion.   Psychiatric: denies symptoms of depression, anxiety, hallucinations, mood swings or changes, suicidal or homicidal thoughts.     Physical examination:   Vitals:    08/16/22 0530 08/16/22 0600 08/16/22 0700 08/16/22 0800   BP: 123/70 126/67 113/67 112/65   Pulse: 66 66 69 77   Resp: 15 15 15 15   Temp:    36.3 °C (97.3 °F)   TempSrc:    Temporal   SpO2: 95% 98% 96% 95%   Weight:       Height:         General: Patient in no acute distress, pleasant and cooperative.  HEENT: Normocephalic, no signs of acute trauma.   Neck: supple, no meningeal signs or carotid bruits. There is normal range of motion. No tenderness on exam.   Chest: clear to auscultation. No cough.   CV: RRR, no murmurs.   Skin: no signs of acute rashes or trauma.   Musculoskeletal: joints exhibit full range of motion, without any pain to palpation. There are no signs of joint or muscle swelling. There is no tenderness to deep palpation of muscles.   Psychiatric: No hallucinatory behavior. Denies symptoms of depression or suicidal ideation. Mood and affect appear normal on exam.     NEUROLOGICAL EXAM:   Mental status, orientation: Awake, alert and fully oriented.   Speech and language: speech clear. The patient is able to name, repeat and comprehend.   Memory: There is intact recollection of recent and remote events.   Cranial nerve exam: Pupils are 3 mm bilaterally and equally reactive to light and accommodation. No blink to threat on left. Left field vision cute in bilateral eyes. There is no nystagmus on primary or secondary gaze. Intact full EOM, gaze midline, no facial droop. Sensation in the face is intact to light touch. Uvula is midline. Palate elevates symmetrically. Tongue is midline and without any signs of tongue biting or fasciculations. Sternocleidomastoid muscles exhibit is normal strength bilaterally. Shoulder shrug is intact  bilaterally.   Motor exam: Strength is 5/5 in all extremities. Tone is normal. No abnormal movements were seen on exam.   Sensory exam reveals normal sense of light touch, proprioception, vibration and pinprick in all extremities.   Deep tendon reflexes:  2+ throughout. Plantar responses are flexor. There is no clonus.   Coordination: No obvious dysmetria. Normal rapidly alternating movements.   Gait: Not examined due to patient condition.      NIH Stroke Scale    1a Level of Consciousness 0  1b Orientation Questions 0  1c Response to Commands 0   2 Gaze 0  3 Visual Fields 1  4 Facial Movement 0  5 Motor Function (arm)   a Left 1  b Right 0  6 Motor Function (leg)   a Left 0  b Right 0  7 Limb Ataxia 0   8 Sensory 0   9 Language 0  10 Articulation0   11 Extinction/Inattention 0    Score: 2    ANCILLARY DATA REVIEWED:     Lab Data Review:  Recent Results (from the past 24 hour(s))   CBC WITH DIFFERENTIAL    Collection Time: 08/15/22  2:25 PM   Result Value Ref Range    WBC 5.7 4.8 - 10.8 K/uL    RBC 5.40 4.20 - 5.40 M/uL    Hemoglobin 16.8 (H) 12.0 - 16.0 g/dL    Hematocrit 50.2 (H) 37.0 - 47.0 %    MCV 93.0 81.4 - 97.8 fL    MCH 31.1 27.0 - 33.0 pg    MCHC 33.5 (L) 33.6 - 35.0 g/dL    RDW 44.4 35.9 - 50.0 fL    Platelet Count 193 164 - 446 K/uL    MPV 9.3 9.0 - 12.9 fL    Neutrophils-Polys 56.50 44.00 - 72.00 %    Lymphocytes 33.30 22.00 - 41.00 %    Monocytes 7.80 0.00 - 13.40 %    Eosinophils 1.20 0.00 - 6.90 %    Basophils 1.00 0.00 - 1.80 %    Immature Granulocytes 0.20 0.00 - 0.90 %    Nucleated RBC 0.00 /100 WBC    Neutrophils (Absolute) 3.24 2.00 - 7.15 K/uL    Lymphs (Absolute) 1.91 1.00 - 4.80 K/uL    Monos (Absolute) 0.45 0.00 - 0.85 K/uL    Eos (Absolute) 0.07 0.00 - 0.51 K/uL    Baso (Absolute) 0.06 0.00 - 0.12 K/uL    Immature Granulocytes (abs) 0.01 0.00 - 0.11 K/uL    NRBC (Absolute) 0.00 K/uL   COMP METABOLIC PANEL    Collection Time: 08/15/22  2:25 PM   Result Value Ref Range    Sodium 134 (L) 135 -  145 mmol/L    Potassium 4.0 3.6 - 5.5 mmol/L    Chloride 102 96 - 112 mmol/L    Co2 22 20 - 33 mmol/L    Anion Gap 10.0 7.0 - 16.0    Glucose 151 (H) 65 - 99 mg/dL    Bun 22 8 - 22 mg/dL    Creatinine 0.74 0.50 - 1.40 mg/dL    Calcium 8.7 8.5 - 10.5 mg/dL    AST(SGOT) 36 12 - 45 U/L    ALT(SGPT) 24 2 - 50 U/L    Alkaline Phosphatase 85 30 - 99 U/L    Total Bilirubin 0.3 0.1 - 1.5 mg/dL    Albumin 3.5 3.2 - 4.9 g/dL    Total Protein 5.8 (L) 6.0 - 8.2 g/dL    Globulin 2.3 1.9 - 3.5 g/dL    A-G Ratio 1.5 g/dL   PROTHROMBIN TIME    Collection Time: 08/15/22  2:25 PM   Result Value Ref Range    PT 13.1 12.0 - 14.6 sec    INR 0.99 0.87 - 1.13   APTT    Collection Time: 08/15/22  2:25 PM   Result Value Ref Range    APTT 36.9 (H) 24.7 - 36.0 sec   COD (ADULT)    Collection Time: 08/15/22  2:25 PM   Result Value Ref Range    ABO Grouping Only O     Rh Grouping Only POS     Antibody Screen-Cod NEG    TROPONIN    Collection Time: 08/15/22  2:25 PM   Result Value Ref Range    Troponin T 55 (H) 6 - 19 ng/L   ESTIMATED GFR    Collection Time: 08/15/22  2:25 PM   Result Value Ref Range    GFR (CKD-EPI) 81 >60 mL/min/1.73 m 2   EKG (NOW)    Collection Time: 08/15/22  2:51 PM   Result Value Ref Range    Report       [facility] Emergency Dept.    Test Date:  2022-08-15  Pt Name:    DANIELITO CRAWFORD              Department: ER  MRN:        [id number]                      Room:       RD 12  Gender:     Female                       Technician: [id number]  :        [id number]                   Requested By:EDUARDO PULLIAM  Order #:    [id number]                    Reading MD: EDUARDO PULLIAM MD    Measurements  Intervals                                Axis  Rate:       89                           P:          72  NE:         168                          QRS:        265  QRSD:       110                          T:          106  QT:         396  QTc:        482    Interpretive Statements  Rate 89, Normal sinus rhythm, no ST  elevation or depression, diffuse T wave  flattening, low voltage in limb leads, no significant changes compared to  prior..  Electronically Signed On 8- 15:04:39 PDT by EDUARDO PULLIAM MD     ABO Rh Confirm    Collection Time: 08/15/22  2:55 PM   Result Value Ref Range    ABO Rh Confirm O POS    POCT glucose device results    Collection Time: 08/15/22  7:55 PM   Result Value Ref Range    POC Glucose, Blood 88 65 - 99 mg/dL   TSH WITH REFLEX TO FT4    Collection Time: 08/15/22  9:50 PM   Result Value Ref Range    TSH 3.900 0.380 - 5.330 uIU/mL   POCT glucose device results    Collection Time: 08/16/22 12:12 AM   Result Value Ref Range    POC Glucose, Blood 102 (H) 65 - 99 mg/dL   POCT glucose device results    Collection Time: 08/16/22  5:04 AM   Result Value Ref Range    POC Glucose, Blood 85 65 - 99 mg/dL   CBC WITHOUT DIFFERENTIAL    Collection Time: 08/16/22  5:05 AM   Result Value Ref Range    WBC 5.7 4.8 - 10.8 K/uL    RBC 3.91 (L) 4.20 - 5.40 M/uL    Hemoglobin 12.4 12.0 - 16.0 g/dL    Hematocrit 37.5 37.0 - 47.0 %    MCV 95.9 81.4 - 97.8 fL    MCH 31.7 27.0 - 33.0 pg    MCHC 33.1 (L) 33.6 - 35.0 g/dL    RDW 46.1 35.9 - 50.0 fL    Platelet Count 184 164 - 446 K/uL    MPV 9.1 9.0 - 12.9 fL   Basic Metabolic Panel    Collection Time: 08/16/22  5:05 AM   Result Value Ref Range    Sodium 140 135 - 145 mmol/L    Potassium 4.0 3.6 - 5.5 mmol/L    Chloride 106 96 - 112 mmol/L    Co2 25 20 - 33 mmol/L    Glucose 93 65 - 99 mg/dL    Bun 16 8 - 22 mg/dL    Creatinine 0.65 0.50 - 1.40 mg/dL    Calcium 8.2 (L) 8.5 - 10.5 mg/dL    Anion Gap 9.0 7.0 - 16.0   Lipid Profile    Collection Time: 08/16/22  5:05 AM   Result Value Ref Range    Cholesterol,Tot 190 100 - 199 mg/dL    Triglycerides 86 0 - 149 mg/dL    HDL 67 >=40 mg/dL     (H) <100 mg/dL   ESTIMATED GFR    Collection Time: 08/16/22  5:05 AM   Result Value Ref Range    GFR (CKD-EPI) 88 >60 mL/min/1.73 m 2       Labs reviewed by me.     Imaging reviewed  by me:     DX-CHEST-PORTABLE (1 VIEW)   Final Result      1.  Increased bilateral interstitial markings are suggestive of mild pulmonary edema.      2.  Mild cardiomegaly      CT-CTA NECK WITH & W/O-POST PROCESSING   Final Result      CT angiogram of the neck within normal limits.      CT-CTA HEAD WITH & W/O-POST PROCESS   Final Result      1.  Thrombosed right posterior cerebral artery in the P2 segment. The artery is reconstituted more distally.         Comment: The exam was discussed with Dr. Ace at 2:59 PM.      CT-CEREBRAL PERFUSION ANALYSIS   Final Result      1.  Cerebral blood flow less than 30% likely representing completed infarct = 0 mL.      2.  T Max more than 6 seconds likely representing combination of completed infarct and ischemia = 23 mL.      3.  Mismatched volume likely representing ischemic brain/penumbra = 23 mL      4.  Please note that the cerebral perfusion was performed on the limited brain tissue around the basal ganglia region. Infarct/ischemia outside the CT perfusion sections can be missed in this study.      CT-HEAD W/O   Final Result         1.  No acute intracranial process.      2. Diffuse atrophy and periventricular white matter changes consistent with chronic small vessel disease.      EC-ECHOCARDIOGRAM COMPLETE W/O CONT    (Results Pending)   MR-BRAIN-W/O    (Results Pending)   CT-HEAD W/O    (Results Pending)         ASSESSMENT AND PLAN:    Regla Meier is a 81 y.o. with HTN senting with acute onset left-sided weakness and hemineglect in the setting of a right P2 occlusion.  Last seen normal was at 2:00 PM.  I reviewed all neuroimaging.  CT head reveals no significant obvious area of infarction.  CTA head and neck reveals minimal atherosclerotic burden with a right P2 occlusion.  CT perfusion shows a small area of ischemic penumbra in the right PCA territory without any measured core infarction. Patient received TNK at 1455 on 08/15/2022.    -Neuro checks and vitals  per protocol, then Q2H neurochecks are okay.  -Allow permissive HTN up to 110-160/90  -Hold all antiplatelet and anticoagulation medications  -Blood glucose management, . Check HgA1C  -Continue atorvastatin 80mg, long term goal of LDL <70  -MRI brain demonstrates acute infarct in the right medial temporal lobe and medial occipital lobe. Acute infarct in the right thalamus with petechial hemorrhage within.  - Hold ASA at this time (3-5 days). Plan for DVT chemoprophylaxis tomorrow if exam is stable    Patient examined and discussed with Dr. Mak Marcum, Neurohospitalist. Please call with any questions.    MADDIE Grady.  [place] [facility]

## 2022-08-17 ENCOUNTER — HOSPITAL ENCOUNTER (INPATIENT)
Facility: REHABILITATION | Age: 82
End: 2022-08-17
Attending: PHYSICAL MEDICINE & REHABILITATION | Admitting: PHYSICAL MEDICINE & REHABILITATION
Payer: MEDICARE

## 2022-08-17 LAB
ANION GAP SERPL CALC-SCNC: 6 MMOL/L (ref 7–16)
BUN SERPL-MCNC: 13 MG/DL (ref 8–22)
CALCIUM SERPL-MCNC: 8.4 MG/DL (ref 8.5–10.5)
CHLORIDE SERPL-SCNC: 102 MMOL/L (ref 96–112)
CO2 SERPL-SCNC: 26 MMOL/L (ref 20–33)
CREAT SERPL-MCNC: 0.62 MG/DL (ref 0.5–1.4)
ERYTHROCYTE [DISTWIDTH] IN BLOOD BY AUTOMATED COUNT: 44 FL (ref 35.9–50)
GFR SERPLBLD CREATININE-BSD FMLA CKD-EPI: 90 ML/MIN/1.73 M 2
GLUCOSE SERPL-MCNC: 107 MG/DL (ref 65–99)
HCT VFR BLD AUTO: 38 % (ref 37–47)
HGB BLD-MCNC: 12.4 G/DL (ref 12–16)
MAGNESIUM SERPL-MCNC: 2 MG/DL (ref 1.5–2.5)
MCH RBC QN AUTO: 30.6 PG (ref 27–33)
MCHC RBC AUTO-ENTMCNC: 32.6 G/DL (ref 33.6–35)
MCV RBC AUTO: 93.8 FL (ref 81.4–97.8)
PHOSPHATE SERPL-MCNC: 3.8 MG/DL (ref 2.5–4.5)
PLATELET # BLD AUTO: 189 K/UL (ref 164–446)
PMV BLD AUTO: 8.9 FL (ref 9–12.9)
POTASSIUM SERPL-SCNC: 4.3 MMOL/L (ref 3.6–5.5)
RBC # BLD AUTO: 4.05 M/UL (ref 4.2–5.4)
SODIUM SERPL-SCNC: 134 MMOL/L (ref 135–145)
WBC # BLD AUTO: 7 K/UL (ref 4.8–10.8)

## 2022-08-17 PROCEDURE — 99222 1ST HOSP IP/OBS MODERATE 55: CPT | Performed by: INTERNAL MEDICINE

## 2022-08-17 PROCEDURE — 700102 HCHG RX REV CODE 250 W/ 637 OVERRIDE(OP): Performed by: NURSE PRACTITIONER

## 2022-08-17 PROCEDURE — 97163 PT EVAL HIGH COMPLEX 45 MIN: CPT

## 2022-08-17 PROCEDURE — 85027 COMPLETE CBC AUTOMATED: CPT

## 2022-08-17 PROCEDURE — 84100 ASSAY OF PHOSPHORUS: CPT

## 2022-08-17 PROCEDURE — 83735 ASSAY OF MAGNESIUM: CPT

## 2022-08-17 PROCEDURE — 700111 HCHG RX REV CODE 636 W/ 250 OVERRIDE (IP): Performed by: INTERNAL MEDICINE

## 2022-08-17 PROCEDURE — 770020 HCHG ROOM/CARE - TELE (206)

## 2022-08-17 PROCEDURE — 97535 SELF CARE MNGMENT TRAINING: CPT

## 2022-08-17 PROCEDURE — A9270 NON-COVERED ITEM OR SERVICE: HCPCS | Performed by: NURSE PRACTITIONER

## 2022-08-17 PROCEDURE — 80048 BASIC METABOLIC PNL TOTAL CA: CPT

## 2022-08-17 PROCEDURE — 700102 HCHG RX REV CODE 250 W/ 637 OVERRIDE(OP): Performed by: INTERNAL MEDICINE

## 2022-08-17 PROCEDURE — A9270 NON-COVERED ITEM OR SERVICE: HCPCS | Performed by: INTERNAL MEDICINE

## 2022-08-17 PROCEDURE — 97167 OT EVAL HIGH COMPLEX 60 MIN: CPT

## 2022-08-17 RX ORDER — ATORVASTATIN CALCIUM 40 MG/1
40 TABLET, FILM COATED ORAL EVERY EVENING
Status: DISCONTINUED | OUTPATIENT
Start: 2022-08-17 | End: 2022-08-19

## 2022-08-17 RX ORDER — ENOXAPARIN SODIUM 100 MG/ML
40 INJECTION SUBCUTANEOUS DAILY
Status: DISCONTINUED | OUTPATIENT
Start: 2022-08-17 | End: 2022-08-19 | Stop reason: HOSPADM

## 2022-08-17 RX ADMIN — ACETAMINOPHEN 650 MG: 325 TABLET, FILM COATED ORAL at 01:55

## 2022-08-17 RX ADMIN — ATORVASTATIN CALCIUM 40 MG: 40 TABLET, FILM COATED ORAL at 17:30

## 2022-08-17 RX ADMIN — ACETAMINOPHEN 650 MG: 325 TABLET, FILM COATED ORAL at 09:19

## 2022-08-17 RX ADMIN — ACETAMINOPHEN 650 MG: 325 TABLET, FILM COATED ORAL at 16:40

## 2022-08-17 RX ADMIN — ENOXAPARIN SODIUM 40 MG: 40 INJECTION SUBCUTANEOUS at 17:30

## 2022-08-17 RX ADMIN — OXYCODONE 5 MG: 5 TABLET ORAL at 16:40

## 2022-08-17 RX ADMIN — OXYCODONE 5 MG: 5 TABLET ORAL at 01:56

## 2022-08-17 ASSESSMENT — ENCOUNTER SYMPTOMS
DIZZINESS: 0
INSOMNIA: 0
HEMOPTYSIS: 0
LOSS OF CONSCIOUSNESS: 0
VOMITING: 0
DIARRHEA: 0
NERVOUS/ANXIOUS: 0
FALLS: 0
BLOOD IN STOOL: 0
MYALGIAS: 0
FOCAL WEAKNESS: 1
NAUSEA: 0
WHEEZING: 0
CHILLS: 0
SHORTNESS OF BREATH: 0
PALPITATIONS: 0
EYE REDNESS: 0
TREMORS: 0
SEIZURES: 0
FEVER: 0
HEADACHES: 0
COUGH: 0
ABDOMINAL PAIN: 0
WEAKNESS: 1
CONSTIPATION: 0
EYE PAIN: 0

## 2022-08-17 ASSESSMENT — PAIN DESCRIPTION - PAIN TYPE
TYPE: ACUTE PAIN
TYPE: CHRONIC PAIN

## 2022-08-17 ASSESSMENT — GAIT ASSESSMENTS
GAIT LEVEL OF ASSIST: MINIMAL ASSIST
ASSISTIVE DEVICE: FRONT WHEEL WALKER
DEVIATION: DECREASED BASE OF SUPPORT;DECREASED HEEL STRIKE;DECREASED TOE OFF;SHUFFLED GAIT;BRADYKINETIC
DISTANCE (FEET): 100

## 2022-08-17 ASSESSMENT — COGNITIVE AND FUNCTIONAL STATUS - GENERAL
DAILY ACTIVITIY SCORE: 18
PERSONAL GROOMING: A LITTLE
WALKING IN HOSPITAL ROOM: A LITTLE
SUGGESTED CMS G CODE MODIFIER DAILY ACTIVITY: CK
MOVING TO AND FROM BED TO CHAIR: A LITTLE
DRESSING REGULAR UPPER BODY CLOTHING: A LITTLE
EATING MEALS: A LITTLE
STANDING UP FROM CHAIR USING ARMS: A LITTLE
DRESSING REGULAR LOWER BODY CLOTHING: A LITTLE
SUGGESTED CMS G CODE MODIFIER MOBILITY: CK
MOVING FROM LYING ON BACK TO SITTING ON SIDE OF FLAT BED: A LITTLE
CLIMB 3 TO 5 STEPS WITH RAILING: A LITTLE
TOILETING: A LITTLE
TURNING FROM BACK TO SIDE WHILE IN FLAT BAD: A LITTLE
HELP NEEDED FOR BATHING: A LITTLE
MOBILITY SCORE: 18

## 2022-08-17 ASSESSMENT — ACTIVITIES OF DAILY LIVING (ADL): TOILETING: INDEPENDENT

## 2022-08-17 NOTE — ASSESSMENT & PLAN NOTE
S/p tPA  MRI brain demonstrates acute infarct in the right medial temporal lobe and medial occipital lobe. Acute infarct in the right thalamus with petechial hemorrhage within  Will start aspirin tomorrow as per neurology  Neurology following, appreciate recommendations  PM&R recommending outpatient PT/OT  Pending echocardiogram  Place order for Zio patch

## 2022-08-17 NOTE — PROGRESS NOTES
Chief Complaint   Patient presents with    Possible Stroke     Pt arrives w/ L sided weakness, LKW 1400. Bs  mg/dL per EMS. Pt arrives AAOx4 with a GCS of 15.           Neurology Daily Progress Note  Neurohospitalist Service, [facility]    History of present illness:  Regla Meier is a 81 y.o. woman with a history of hypertension who presents with acute onset left-sided weakness and right gaze preference.  She reports that she is not sure exactly what happened or when it started, but according to EMS symptom onset was at 2:00 PM per family.    Patient states that she feels funny but otherwise does not know what is happening.  She denies any recent head traumas, surgeries, history of bleeding into her brain, blood thinner use.  She does not have any major medical issues and has never had any symptoms like this in the past.    Interval Update  8/17/2022: Patient seen and examined at bedside. Pleasant demeanor, daughter and  present. Exam continues to improve. MRI brain obtained yesterday demonstrates acute infarct in the right medial temporal lobe and medial occipital lobe, acute infarct in the right thalamus with petechial hemorrhage within. Will continue to hold ASA at this time (3 more days), DVT prophylaxis started today. Echocardiogram pending.      Past medical history:   Past Medical History:   Diagnosis Date    Hyperlipemia        Past surgical history:   Past Surgical History:   Procedure Laterality Date    CYSTECTOMY  1963    CATARACT EXTRACTION WITH IOL         Family history:   Family History   Problem Relation Age of Onset    Other Mother         TIA     Other Father         Abdominal aneurysm     Alcohol abuse Brother     Heart Disease Brother     No Known Problems Daughter     No Known Problems Daughter        Social history:   Social History     Socioeconomic History    Marital status:      Spouse name: Not on file    Number of children: Not on file     Years of education: Not on file    Highest education level: Not on file   Occupational History    Not on file   Tobacco Use    Smoking status: Never    Smokeless tobacco: Never   Vaping Use    Vaping Use: Never used   Substance and Sexual Activity    Alcohol use: Yes     Alcohol/week: 0.6 oz     Types: 1 Glasses of wine per week    Drug use: No    Sexual activity: Yes     Partners: Male     Comment:  for 57 years. Documented on 4/10/19.   Other Topics Concern    Not on file   Social History Narrative    Not on file     Social Determinants of Health     Financial Resource Strain: Not on file   Food Insecurity: Not on file   Transportation Needs: Not on file   Physical Activity: Not on file   Stress: Not on file   Social Connections: Not on file   Intimate Partner Violence: Not on file   Housing Stability: Not on file       Current medications:   Current Facility-Administered Medications   Medication Dose    atorvastatin (LIPITOR) tablet 40 mg  40 mg    enoxaparin (Lovenox) inj 40 mg  40 mg    ondansetron (ZOFRAN) syringe/vial injection 4 mg  4 mg    acetaminophen (Tylenol) tablet 650 mg  650 mg    oxyCODONE immediate-release (ROXICODONE) tablet 5 mg  5 mg    hydrALAZINE (APRESOLINE) injection 10-20 mg  10-20 mg    labetalol (NORMODYNE/TRANDATE) injection 10-20 mg  10-20 mg    dextrose 10 % BOLUS 25 g  25 g       Medication Allergy:  Allergies   Allergen Reactions    Pcn [Penicillins] Hives       Review of systems:   Constitutional: denies fever, night sweats, weight loss.   Eyes: denies acute vision change, eye pain or secretion.   Ears, Nose, Mouth, Throat: denies nasal secretion, nasal bleeding, difficulty swallowing, hearing loss, tinnitus, vertigo, ear pain, acute dental problems, oral ulcers or lesions.   Endocrine: denies recent weight changes, heat or cold intolerance, polyuria, polydypsia, polyphagia,abnormal hair growth.  Cardiovascular: denies new onset of chest pain, palpitations, syncope, or  dyspnea of exertion.  Pulmonary: denies shortness of breath, new onset of cough, hemoptysis, wheezing, chest pain or flu-like symptoms.   GI: denies nausea, vomiting, diarrhea, GI bleeding, change in appetite, abdominal pain, and change in bowel habits.  : denies dysuria, urinary incontinence, hematuria.  Heme/oncology: denies history of easy bruising or bleeding. No history of cancer, DVTor PE.  Allergy/immunology: denies hives/urticaria, or itching.   Dermatologic: denies new rash, or new skin lesions.  Musculoskeletal:denies joint swelling or pain, muscle pain, neck and back pain.   Neurologic: denies headaches, acute visual changes, facial droopiness, muscle weakness (focal or generalized), paresthesias, anesthesia, ataxia, change in speech or language, memory loss, abnormal movements, seizures, loss of consciousness, or episodes of confusion.   Psychiatric: denies symptoms of depression, anxiety, hallucinations, mood swings or changes, suicidal or homicidal thoughts.     Physical examination:   Vitals:    08/17/22 0900 08/17/22 1000 08/17/22 1100 08/17/22 1130   BP: 125/71 130/74 125/73    Pulse:       Resp: 20 (!) 86 (!) 39    Temp:       TempSrc:       SpO2: 96%   (P) 96%   Weight:       Height:         General: Patient in no acute distress, pleasant and cooperative.  HEENT: Normocephalic, no signs of acute trauma.   Neck: supple, no meningeal signs or carotid bruits. There is normal range of motion. No tenderness on exam.   Chest: clear to auscultation. No cough.   CV: RRR, no murmurs.   Skin: no signs of acute rashes or trauma.   Musculoskeletal: joints exhibit full range of motion, without any pain to palpation. There are no signs of joint or muscle swelling. There is no tenderness to deep palpation of muscles.   Psychiatric: No hallucinatory behavior. Denies symptoms of depression or suicidal ideation. Mood and affect appear normal on exam.     NEUROLOGICAL EXAM:   Mental status, orientation: Awake,  alert and fully oriented.   Speech and language: speech clear. The patient is able to name, repeat and comprehend.   Memory: There is intact recollection of recent and remote events.   Cranial nerve exam: Pupils are 3 mm bilaterally and equally reactive to light and accommodation. No blink to threat on left. Left field homonymous superior quadrantanopia. There is no nystagmus on primary or secondary gaze. Intact full EOM, gaze midline, no facial droop. Sensation in the face is intact to light touch. Uvula is midline. Palate elevates symmetrically. Tongue is midline and without any signs of tongue biting or fasciculations. Shoulder shrug is intact bilaterally.   Motor exam: Strength is 5/5 in all extremities. Tone is normal. No abnormal movements were seen on exam.   Sensory exam reveals normal sense of light touch, proprioception, vibration and pinprick in all extremities.   Deep tendon reflexes:  2+ throughout. Plantar responses are flexor. There is no clonus.   Coordination: No obvious dysmetria. Normal rapidly alternating movements.   Gait: Not examined due to patient condition.      NIH Stroke Scale    1a Level of Consciousness 0  1b Orientation Questions 0  1c Response to Commands 0   2 Gaze 0  3 Visual Fields 1  4 Facial Movement 0  5 Motor Function (arm)   a Left 0  b Right 0  6 Motor Function (leg)   a Left 0  b Right 0  7 Limb Ataxia 0   8 Sensory 0   9 Language 0  10 Articulation0   11 Extinction/Inattention 0    Score: 1    ANCILLARY DATA REVIEWED:     Lab Data Review:  Recent Results (from the past 24 hour(s))   CBC WITHOUT DIFFERENTIAL    Collection Time: 08/17/22  6:20 AM   Result Value Ref Range    WBC 7.0 4.8 - 10.8 K/uL    RBC 4.05 (L) 4.20 - 5.40 M/uL    Hemoglobin 12.4 12.0 - 16.0 g/dL    Hematocrit 38.0 37.0 - 47.0 %    MCV 93.8 81.4 - 97.8 fL    MCH 30.6 27.0 - 33.0 pg    MCHC 32.6 (L) 33.6 - 35.0 g/dL    RDW 44.0 35.9 - 50.0 fL    Platelet Count 189 164 - 446 K/uL    MPV 8.9 (L) 9.0 - 12.9 fL    Basic Metabolic Panel    Collection Time: 08/17/22  6:20 AM   Result Value Ref Range    Sodium 134 (L) 135 - 145 mmol/L    Potassium 4.3 3.6 - 5.5 mmol/L    Chloride 102 96 - 112 mmol/L    Co2 26 20 - 33 mmol/L    Glucose 107 (H) 65 - 99 mg/dL    Bun 13 8 - 22 mg/dL    Creatinine 0.62 0.50 - 1.40 mg/dL    Calcium 8.4 (L) 8.5 - 10.5 mg/dL    Anion Gap 6.0 (L) 7.0 - 16.0   MAGNESIUM    Collection Time: 08/17/22  6:20 AM   Result Value Ref Range    Magnesium 2.0 1.5 - 2.5 mg/dL   PHOSPHORUS    Collection Time: 08/17/22  6:20 AM   Result Value Ref Range    Phosphorus 3.8 2.5 - 4.5 mg/dL   ESTIMATED GFR    Collection Time: 08/17/22  6:20 AM   Result Value Ref Range    GFR (CKD-EPI) 90 >60 mL/min/1.73 m 2       Labs reviewed by me.     Imaging reviewed by me:     MR-BRAIN-W/O   Final Result         Acute infarct in the right medial temporal lobe and medial occipital lobe.      Acute infarct in the right thalamus with petechial hemorrhage within.      Age-related volume loss and chronic microvascular ischemic changes.         DX-CHEST-PORTABLE (1 VIEW)   Final Result      1.  Increased bilateral interstitial markings are suggestive of mild pulmonary edema.      2.  Mild cardiomegaly      CT-CTA NECK WITH & W/O-POST PROCESSING   Final Result      CT angiogram of the neck within normal limits.      CT-CTA HEAD WITH & W/O-POST PROCESS   Final Result      1.  Thrombosed right posterior cerebral artery in the P2 segment. The artery is reconstituted more distally.         Comment: The exam was discussed with Dr. Ace at 2:59 PM.      CT-CEREBRAL PERFUSION ANALYSIS   Final Result      1.  Cerebral blood flow less than 30% likely representing completed infarct = 0 mL.      2.  T Max more than 6 seconds likely representing combination of completed infarct and ischemia = 23 mL.      3.  Mismatched volume likely representing ischemic brain/penumbra = 23 mL      4.  Please note that the cerebral perfusion was performed on the  limited brain tissue around the basal ganglia region. Infarct/ischemia outside the CT perfusion sections can be missed in this study.      CT-HEAD W/O   Final Result         1.  No acute intracranial process.      2. Diffuse atrophy and periventricular white matter changes consistent with chronic small vessel disease.      EC-ECHOCARDIOGRAM COMPLETE W/O CONT    (Results Pending)         ASSESSMENT AND PLAN:    Regla Meier is a 81 y.o. with HTN senting with acute onset left-sided weakness and hemineglect in the setting of a right P2 occlusion.  Last seen normal was at 2:00 PM.  I reviewed all neuroimaging.  CT head reveals no significant obvious area of infarction.  CTA head and neck reveals minimal atherosclerotic burden with a right P2 occlusion.  CT perfusion shows a small area of ischemic penumbra in the right PCA territory without any measured core infarction. Patient received TNK at 1455 on 08/15/2022.    -Okay to transfer to floor. Q4H Neuro checks  -Normalize blood pressure, goal 100-140/ 60-90  -Hold all antiplatelet and anticoagulation medications  -Blood glucose goal . HgA1C 5.1  -Continue atorvastatin 80mg, long term goal of LDL <70  -MRI brain demonstrates acute infarct in the right medial temporal lobe and medial occipital lobe. Acute infarct in the right thalamus with petechial hemorrhage within.  - Hold ASA at this time (3 days). Okay for DVT chemoprophylaxis today  - Awaiting echocardiogram    Patient examined and discussed with Dr. Mak Marcum, Neurohospitalist. Please call with any questions.    MADDIE Grady.  [facility]

## 2022-08-17 NOTE — PROGRESS NOTES
4 Eyes Skin Assessment Completed by JULIETTE Starks and JULIETTE Salinas.    Head WDL  Ears WDL  Nose WDL  Mouth WDL  Neck WDL  Breast/Chest WDL  Shoulder Blades WDL  Spine WDL  (R) Arm/Elbow/Hand WDL  (L) Arm/Elbow/Hand WDL  Abdomen WDL  Groin WDL  Scrotum/Coccyx/Buttocks WDL  (R) Leg WDL  (L) Leg WDL  (R) Heel/Foot/Toe WDL  (L) Heel/Foot/Toe WDL          Devices In Places Tele Box, Blood Pressure Cuff, Pulse Ox, and SCD's      Interventions In Place Pressure Redistribution Mattress    Possible Skin Injury No    Pictures Uploaded Into Epic N/A  Wound Consult Placed N/A  RN Wound Prevention Protocol Ordered No

## 2022-08-17 NOTE — THERAPY
Occupational Therapy   Initial Evaluation     Patient Name: Regla Meier  Age:  81 y.o., Sex:  female  Medical Record #: [id number]  Today's Date: 8/17/2022     Precautions  Precautions: (P) Fall Risk  Comments: L sided neglect and impaired sensation    Assessment    Patient is 81 y.o. female admitted with L-sided weakness and hemineglect s/p receiving TNKase. 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 Pt normally independent with functional mobility and ADLs living in a [facility] cottage on daughters property with spouse. Pt required Philipp for functional mobility and ADLs, pt exhibiting LUE decreased sensation/coordination/proprioception and marginal weakness compared to RUE. Will continue to see for skilled therapy while admitted as well as recommend post-acute placement.     Plan    Recommend Occupational Therapy 3 times per week until therapy goals are met for the following treatments:  Adaptive Equipment, Neuro Re-Education / Balance, Self Care/Activities of Daily Living, Therapeutic Activities, and Therapeutic Exercises.    DC Equipment Recommendations: (P) Unable to determine at this time  Discharge Recommendations: (P) Recommend post-acute placement for additional occupational therapy services prior to discharge home     Objective       08/17/22 0834   Prior Living Situation   Prior Services None   Housing / Facility 1 Story House   Steps Into Home 0   Steps In Home 1   Bathroom Set up Walk In Shower   Equipment Owned None   Lives with - Patient's Self Care Capacity Spouse   Prior Level of ADL Function   Self Feeding Independent   Grooming / Hygiene Independent   Bathing Independent   Dressing Independent   Toileting Independent   Prior Level of IADL Function   Medication Management Independent   Laundry Independent   Kitchen Mobility Independent   Finances Independent   Home Management Independent   Shopping Independent   Prior Level Of Mobility Independent Without Device in  Community   Driving / Transportation Relatives / Others Provide Transportation   Occupation (Pre-Hospital Vocational) Retired Due To Age   History of Falls   History of Falls No   Precautions   Precautions Fall Risk   Pain 0 - 10 Group   Therapist Pain Assessment Post Activity Pain Same as Prior to Activity;Nurse Notified   Cognition    Cognition / Consciousness X   Level of Consciousness Alert   Safety Awareness Impaired   Attention Impaired   Sequencing Impaired   Comments L inattention, cueing needed throughout evaluation   Active ROM Upper Body   Active ROM Upper Body  WDL   Dominant Hand Right   Strength Upper Body   Upper Body Strength  X   Comments RUE WFL, LUE 4/5   Sensation Upper Body   Upper Extremity Sensation  X   Lt Upper Extremity Light Touch Impaired   Lt Upper Extremity Proprioception Impaired   Neurological Concerns   Neurological Concerns Yes   Lt Upper Extremity Gross Motor Control Impaired   Lt Upper Extremity Functional Use Impaired   Coordination Upper Body   Coordination X   Fine Motor Coordination LUE impaired   Gross Motor Coordination LUE impaired   Balance Assessment   Sitting Balance (Static) Fair   Sitting Balance (Dynamic) Fair -   Standing Balance (Static) Poor +   Standing Balance (Dynamic) Poor +   Weight Shift Sitting Fair   Weight Shift Standing Fair   Comments w/ FWW   Bed Mobility    Supine to Sit Contact Guard Assist   Sit to Supine Minimal Assist   ADL Assessment   Grooming Contact Guard Assist   Upper Body Dressing Minimal Assist   Lower Body Dressing Minimal Assist   Toileting Minimal Assist   How much help from another person does the patient currently need...   Putting on and taking off regular lower body clothing? 3   Bathing (including washing, rinsing, and drying)? 3   Toileting, which includes using a toilet, bedpan, or urinal? 3   Putting on and taking off regular upper body clothing? 3   Taking care of personal grooming such as brushing teeth? 3   Eating meals? 3   6  Clicks Daily Activity Score 18   mRS Prior to admission   Prior to admission mRS 0   Modified [place] (mRS)   Modified [place] Score 4   Functional Mobility   Sit to Stand Contact Guard Assist   Bed, Chair, Wheelchair Transfer Minimal Assist   Toilet Transfers Minimal Assist   Transfer Method Stand Step   Mobility bed mobility, hallway mobility, bathroom mobility   Comments w/ FWW   ICU Target Mobility Level   ICU Mobility - Targeted Level Level 4   Activity Tolerance   Sitting in Chair 5 min  (toilet)   Sitting Edge of Bed 5 min   Standing 10 min   Short Term Goals   Short Term Goal # 1 Pt will complete ADL transfers with supervision   Short Term Goal # 2 Pt will complete LB dressing with supervision   Short Term Goal # 3 Pt will complete LB dressing with supervision   Education Group   Education Provided Role of Occupational Therapist   Role of Occupational Therapist Patient Response Patient;Acceptance;Explanation   Problem List   Problem List Decreased Active Daily Living Skills;Decreased Homemaking Skills;Decreased Functional Mobility;Decreased Activity Tolerance;Decreased Upper Extremity Strength Left;Impaired Coordination Left Upper Extremity;Safety Awareness Deficits / Cognition;Impaired Postural Control / Balance;Impaired Sensation Left Upper Extremity   Anticipated Discharge Equipment and Recommendations   DC Equipment Recommendations Unable to determine at this time   Discharge Recommendations Recommend post-acute placement for additional occupational therapy services prior to discharge home   Interdisciplinary Plan of Care Collaboration   IDT Collaboration with  Nursing;Pharmacist   Patient Position at End of Therapy In Bed;Bed Alarm On;Call Light within Reach;Tray Table within Reach;Phone within Reach   Collaboration Comments RN updated

## 2022-08-17 NOTE — THERAPY
"Physical Therapy   Initial Evaluation     Patient Name: Regla Meier  Age:  81 y.o., Sex:  female  Medical Record #: [id number]  Today's Date: 8/17/2022     Precautions: Fall Risk  Comments: L sided neglect and impaired sensation    Assessment  Patient is 81 y.o. female presenting with c/o L-sided weakness and hemineglect s/p receiving TNKase. 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 Pt demonstrates BLE strength equal of 4/5. She reports numbness in L foot on both dorsal and plantar surfaces. Decreased LUE sensation noted as well as impaired proprioception with poor placement of LUE throughout mob. Pt frequently veering to the R during gait with notable L inattention/neglect. Pt may benefit from intensive therapy given she is below baseline as she was able to amb without AD prior. She has potential to progress to a level in which she can DC directly home as well. Will follow 5x/wk for PT.    Plan    Recommend Physical Therapy 5 times per week until therapy goals are met for the following treatments:  Community Re-integration, Equipment, Gait Training, Neuro Re-Education / Balance, Therapeutic Activities, and Therapeutic Exercises    DC Equipment Recommendations: Unable to determine at this time (will need FWW if DCing directly home)  Discharge Recommendations: Recommend post-acute placement for additional physical therapy services prior to discharge home     Objective    Prior Living Situation   Prior Services None   Housing / Facility 1 Story House  (\"cottage\")   Steps Into Home 0   Steps In Home 1  (into shower)   Equipment Owned None   Lives with - Patient's Self Care Capacity Spouse   Comments Pt [place] with [place] [facility] [place] children's property, pt reports spouse available 24/7 and children intermittently available to assist   Prior Level of Functional Mobility   Bed Mobility Independent   Transfer Status Independent   Ambulation Independent   Distance Ambulation (Feet) "   (Community distances)   Assistive Devices Used None   Stairs Independent   History of Falls   History of Falls No   Cognition    Cognition / Consciousness X   Level of Consciousness Alert   Safety Awareness Impaired   Attention Impaired   Sequencing Impaired   Comments L inattention, required repetitive cueing to sequence directional changes to the L   Active ROM Lower Body    Active ROM Lower Body  WDL   Strength Lower Body   Lower Body Strength  X   Comments BLE 4/5 equal and adequate for fxnl mob   Sensation Lower Body   Lower Extremity Sensation   X   Comments reports numbness L foot both dorsal and plantar surfaces, also noted to have impaired tactile and proprioception in LUE, difficulty positioning LUE in fxnl position during mob   Lower Body Muscle Tone   Lower Body Muscle Tone  WDL   Neurological Concerns   Neurological Concerns Yes   Comments Impaired L sensation and L inattention   Coordination Upper Body   Coordination X   Comments LUE impaired   Coordination Lower Body    Coordination Lower Body  WDL   Vision   Vision Comments reports baseline she is legally blind in L eye, able to visualize obstacles on L with cues   Balance Assessment   Sitting Balance (Static) Fair   Sitting Balance (Dynamic) Fair -   Standing Balance (Static) Poor +   Standing Balance (Dynamic) Poor +   Weight Shift Sitting Fair   Weight Shift Standing Fair   Comments CGA/min A required with FWW   Gait Analysis   Gait Level Of Assist Minimal Assist   Assistive Device Front Wheel Walker   Distance (Feet) 100   # of Times Distance was Traveled 2   Deviation Decreased Base Of Support;Decreased Heel Strike;Decreased Toe Off;Shuffled Gait;Bradykinetic   # of Stairs Climbed 1   Level of Assist with Stairs Contact Guard Assist   Weight Bearing Status No restrictions   Vision Deficits Impacting Mobility L visual inattention/neglect   Comments veering to the R frequently   Bed Mobility    Supine to Sit Contact Guard Assist   Sit to Supine  Minimal Assist   Functional Mobility   Sit to Stand Contact Guard Assist   Bed, Chair, Wheelchair Transfer Minimal Assist   Transfer Method Stand Step   Mobility in unit with FWW   ICU Target Mobility Level   ICU Mobility - Targeted Level Level 4   Short Term Goals    Short Term Goal # 1 Pt will perform bed<>chair transfers with LRAD and SPV within 6 visits to increase OOB activity.   Short Term Goal # 2 Pt will amb >150ft with LRAD and SPV within 6 visits to negotiate home environment safely.

## 2022-08-17 NOTE — ASSESSMENT & PLAN NOTE
Per last hospitalization and outpatient records due to poor PO intake  Was on Lasix and K supplementation

## 2022-08-17 NOTE — PROGRESS NOTES
No events overnight.  Pt no longer requires critical care management and will be transferred to the neurology floor under the care of the hospitalist team.  Discussed with patient and family at bedside.

## 2022-08-17 NOTE — CONSULTS
Hospital Medicine Consultation    Date of Service  8/17/2022    Referring Physician  Denny Rosas M.D.    Consulting Physician  Rinku Perry M.D.    Reason for Consultation  Transfer of care    History of Presenting Illness  81 y.o. female who presented 8/15/2022 with Possible Stroke (Pt arrives w/ L sided weakness, LKW 1400. Bs  mg/dL per EMS. Pt arrives AAOx4 with a GCS of 15.  )  She has a history of hypertension and evaluated for swollen lower extremities in the outpatient; no DVT on Dopplers, an echo showed normal EF, negative stress test in July 2022 but she had low protein likely from poor PO protein intake. Otherwise she has no cardiopulmonary history. She presents with acute L sided weakness and R gaze preference. Family reported onset occurred around 1400.   COde stroke was called.  At the ED, she is afebrile and normotensive.  Not hypoglycemic.   Neurology consulted.  Imaging revealed to have right P2 occlusion and right PCA territory stroke without core.   EKG sinus  She was given tPA at 1455.   She was admitted by Dr. Sullivan, Intensivist to ICU.   MRI brain demonstrates acute infarct in the right medial temporal lobe and medial occipital lobe. Acute infarct in the right thalamus with petechial hemorrhage within.Currently they want to hold ASA for a few more days post tPA but can start pharmacologic DVT prophylaxis today 8/17. Statin continued  When I saw her at Neurology she ws eating her breakfast comfortably. She had already did some walking with a walker with PT assistance in the room.    Review of Systems  Review of Systems   Constitutional:  Negative for chills and fever.   HENT:  Negative for congestion, hearing loss and nosebleeds.    Eyes:  Negative for pain and redness.   Respiratory:  Negative for cough, hemoptysis, shortness of breath and wheezing.    Cardiovascular:  Positive for leg swelling. Negative for chest pain and palpitations.   Gastrointestinal:  Negative for  abdominal pain, blood in stool, constipation, diarrhea, nausea and vomiting.   Genitourinary:  Negative for dysuria, frequency and hematuria.   Musculoskeletal:  Negative for falls, joint pain and myalgias.   Skin:  Negative for rash.   Neurological:  Positive for focal weakness and weakness. Negative for dizziness, tremors, seizures, loss of consciousness and headaches.   Psychiatric/Behavioral:  The patient is not nervous/anxious and does not have insomnia.    All other systems reviewed and are negative.    Past Medical History   has a past medical history of Hyperlipemia.    Surgical History   has a past surgical history that includes cystectomy (1963) and cataract extraction with iol.    Family History  family history includes Alcohol abuse in her brother; Heart Disease in her brother; No Known Problems in her daughter and daughter; Other in her father and mother.    Social History   reports that she has never smoked. She has never used smokeless tobacco. She reports current alcohol use of about 0.6 oz per week. She reports that she does not use drugs.    Medications  Prior to Admission Medications   Prescriptions Last Dose Informant Patient Reported? Taking?   Lutein 40 MG Cap 8/14/2022 at AM Patient Yes Yes   Sig: Take 40 mg by mouth every day.   TURMERIC PO 8/15/2022 at AM Patient Yes No   Sig: Take 1 Capsule by mouth every day.   asa/apap/caffeine (EXCEDRIN) 250-250-65 MG Tab 8/15/2022 at AM Patient Yes No   Sig: Take 1-2 Tablets by mouth every 6 hours as needed for Headache.   furosemide (LASIX) 20 MG Tab 8/14/2022 at PM Patient No No   Sig: Take 1 Tablet by mouth every day.   potassium chloride SA (K-DUR) 10 MEQ Tab CR 8/14/2022 at PM Patient No No   Sig: Take 1 Tablet by mouth every day.      Facility-Administered Medications: None       Allergies  Allergies   Allergen Reactions    Pcn [Penicillins] Hives       Physical Exam  Temp:  [35.7 °C (96.3 °F)-36.1 °C (97 °F)] (P) 35.7 °C (96.3 °F)  Pulse:   [65-87] (P) 82  Resp:  [] (P) 20  BP: (100-147)/(58-88) (P) 128/73  SpO2:  [89 %-97 %] (P) 95 %    Physical Exam  Vitals and nursing note reviewed.   Constitutional:       General: She is not in acute distress.     Comments: Elderly, frail   HENT:      Head: Normocephalic and atraumatic.      Right Ear: External ear normal.      Left Ear: External ear normal.      Nose: Nose normal.      Mouth/Throat:      Mouth: Mucous membranes are moist.   Eyes:      General: No scleral icterus.     Conjunctiva/sclera: Conjunctivae normal.      Comments: L blurry vision   Cardiovascular:      Rate and Rhythm: Normal rate and regular rhythm.      Pulses: Normal pulses.      Heart sounds: No murmur heard.    No friction rub. No gallop.   Pulmonary:      Effort: Pulmonary effort is normal. No respiratory distress.      Breath sounds: Normal breath sounds. No stridor. No wheezing, rhonchi or rales.   Chest:      Chest wall: No tenderness.   Abdominal:      General: Abdomen is flat. Bowel sounds are normal. There is no distension.      Palpations: Abdomen is soft.      Tenderness: There is no abdominal tenderness. There is no guarding or rebound.   Musculoskeletal:         General: No swelling, tenderness or deformity. Normal range of motion.      Cervical back: Normal range of motion and neck supple. No rigidity.   Skin:     General: Skin is warm.      Coloration: Skin is not jaundiced.   Neurological:      General: No focal deficit present.      Mental Status: She is alert and oriented to person, place, and time. Mental status is at baseline.      Motor: Weakness (LLE, 3/5) present.   Psychiatric:         Mood and Affect: Mood normal.         Behavior: Behavior normal.         Thought Content: Thought content normal.         Judgment: Judgment normal.       Fluids  Date 08/17/22 0700 - 08/18/22 0659   Shift 8902-1843 7863-7834 1921-9803 24 Hour Total   INTAKE   P.O. 240   240   Shift Total 240   240   OUTPUT   Urine 250   250    Shift Total 250   250   Weight (kg) 56 56 56 56       Laboratory  Recent Labs     08/15/22  1425 08/16/22  0505 08/17/22  0620   WBC 5.7 5.7 7.0   RBC 5.40 3.91* 4.05*   HEMOGLOBIN 16.8* 12.4 12.4   HEMATOCRIT 50.2* 37.5 38.0   MCV 93.0 95.9 93.8   MCH 31.1 31.7 30.6   MCHC 33.5* 33.1* 32.6*   RDW 44.4 46.1 44.0   PLATELETCT 193 184 189   MPV 9.3 9.1 8.9*     Recent Labs     08/15/22  1425 08/16/22  0505 08/17/22  0620   SODIUM 134* 140 134*   POTASSIUM 4.0 4.0 4.3   CHLORIDE 102 106 102   CO2 22 25 26   GLUCOSE 151* 93 107*   BUN 22 16 13   CREATININE 0.74 0.65 0.62   CALCIUM 8.7 8.2* 8.4*     Recent Labs     08/15/22  1425   APTT 36.9*   INR 0.99          Recent Labs     08/16/22  0505   TRIGLYCERIDE 86   HDL 67   *        Imaging  MR-BRAIN-W/O   Final Result         Acute infarct in the right medial temporal lobe and medial occipital lobe.      Acute infarct in the right thalamus with petechial hemorrhage within.      Age-related volume loss and chronic microvascular ischemic changes.         DX-CHEST-PORTABLE (1 VIEW)   Final Result      1.  Increased bilateral interstitial markings are suggestive of mild pulmonary edema.      2.  Mild cardiomegaly      CT-CTA NECK WITH & W/O-POST PROCESSING   Final Result      CT angiogram of the neck within normal limits.      CT-CTA HEAD WITH & W/O-POST PROCESS   Final Result      1.  Thrombosed right posterior cerebral artery in the P2 segment. The artery is reconstituted more distally.         Comment: The exam was discussed with Dr. Ace at 2:59 PM.      CT-CEREBRAL PERFUSION ANALYSIS   Final Result      1.  Cerebral blood flow less than 30% likely representing completed infarct = 0 mL.      2.  T Max more than 6 seconds likely representing combination of completed infarct and ischemia = 23 mL.      3.  Mismatched volume likely representing ischemic brain/penumbra = 23 mL      4.  Please note that the cerebral perfusion was performed on the limited brain tissue  around the basal ganglia region. Infarct/ischemia outside the CT perfusion sections can be missed in this study.      CT-HEAD W/O   Final Result         1.  No acute intracranial process.      2. Diffuse atrophy and periventricular white matter changes consistent with chronic small vessel disease.      EC-ECHOCARDIOGRAM COMPLETE W/O CONT    (Results Pending)       Assessment/Plan  * Acute CVA (cerebrovascular accident) (HCC)- (present on admission)  Assessment & Plan  S/p tPA  ASA held for a few days per neuro, statin and a repeat echo is pending.  Rehab/SNF recommended    Leg edema- (present on admission)  Assessment & Plan  Per last hospitalization and outpatient records due to poor PO intake  Was on Lasix and K supplementation    Legally blind in left eye, as defined in [place]- (present on admission)  Assessment & Plan  Noted

## 2022-08-17 NOTE — DISCHARGE PLANNING
Stroke - patient ambulating 100ft Philipp w/ ffw. Patient has 24/7 support from spouse and intermittent assist from adult children. Physiatry to consult, TCC will follow.

## 2022-08-18 ENCOUNTER — APPOINTMENT (OUTPATIENT)
Dept: CARDIOLOGY | Facility: MEDICAL CENTER | Age: 82
DRG: 062 | End: 2022-08-18
Attending: STUDENT IN AN ORGANIZED HEALTH CARE EDUCATION/TRAINING PROGRAM
Payer: MEDICARE

## 2022-08-18 LAB
ALBUMIN SERPL BCP-MCNC: 2.9 G/DL (ref 3.2–4.9)
BUN SERPL-MCNC: 18 MG/DL (ref 8–22)
CALCIUM SERPL-MCNC: 8.5 MG/DL (ref 8.5–10.5)
CHLORIDE SERPL-SCNC: 102 MMOL/L (ref 96–112)
CO2 SERPL-SCNC: 26 MMOL/L (ref 20–33)
CREAT SERPL-MCNC: 0.72 MG/DL (ref 0.5–1.4)
GFR SERPLBLD CREATININE-BSD FMLA CKD-EPI: 84 ML/MIN/1.73 M 2
GLUCOSE SERPL-MCNC: 98 MG/DL (ref 65–99)
LV EJECT FRACT MOD 2C 99903: 57.03
LV EJECT FRACT MOD 4C 99902: 53.64
LV EJECT FRACT MOD BP 99901: 54.08
PHOSPHATE SERPL-MCNC: 3.5 MG/DL (ref 2.5–4.5)
POTASSIUM SERPL-SCNC: 4.4 MMOL/L (ref 3.6–5.5)
SODIUM SERPL-SCNC: 136 MMOL/L (ref 135–145)

## 2022-08-18 PROCEDURE — A9270 NON-COVERED ITEM OR SERVICE: HCPCS | Performed by: STUDENT IN AN ORGANIZED HEALTH CARE EDUCATION/TRAINING PROGRAM

## 2022-08-18 PROCEDURE — 99232 SBSQ HOSP IP/OBS MODERATE 35: CPT | Performed by: NURSE PRACTITIONER

## 2022-08-18 PROCEDURE — 99232 SBSQ HOSP IP/OBS MODERATE 35: CPT | Performed by: STUDENT IN AN ORGANIZED HEALTH CARE EDUCATION/TRAINING PROGRAM

## 2022-08-18 PROCEDURE — 700102 HCHG RX REV CODE 250 W/ 637 OVERRIDE(OP): Performed by: INTERNAL MEDICINE

## 2022-08-18 PROCEDURE — 770020 HCHG ROOM/CARE - TELE (206)

## 2022-08-18 PROCEDURE — 700102 HCHG RX REV CODE 250 W/ 637 OVERRIDE(OP): Performed by: STUDENT IN AN ORGANIZED HEALTH CARE EDUCATION/TRAINING PROGRAM

## 2022-08-18 PROCEDURE — 36415 COLL VENOUS BLD VENIPUNCTURE: CPT

## 2022-08-18 PROCEDURE — 97535 SELF CARE MNGMENT TRAINING: CPT

## 2022-08-18 PROCEDURE — 93306 TTE W/DOPPLER COMPLETE: CPT | Mod: 26 | Performed by: INTERNAL MEDICINE

## 2022-08-18 PROCEDURE — A9270 NON-COVERED ITEM OR SERVICE: HCPCS | Performed by: PHYSICAL MEDICINE & REHABILITATION

## 2022-08-18 PROCEDURE — 700111 HCHG RX REV CODE 636 W/ 250 OVERRIDE (IP): Performed by: INTERNAL MEDICINE

## 2022-08-18 PROCEDURE — 700102 HCHG RX REV CODE 250 W/ 637 OVERRIDE(OP): Performed by: PHYSICAL MEDICINE & REHABILITATION

## 2022-08-18 PROCEDURE — A9270 NON-COVERED ITEM OR SERVICE: HCPCS | Performed by: INTERNAL MEDICINE

## 2022-08-18 PROCEDURE — 700102 HCHG RX REV CODE 250 W/ 637 OVERRIDE(OP): Performed by: NURSE PRACTITIONER

## 2022-08-18 PROCEDURE — A9270 NON-COVERED ITEM OR SERVICE: HCPCS | Performed by: NURSE PRACTITIONER

## 2022-08-18 PROCEDURE — 99223 1ST HOSP IP/OBS HIGH 75: CPT | Performed by: PHYSICAL MEDICINE & REHABILITATION

## 2022-08-18 PROCEDURE — 80069 RENAL FUNCTION PANEL: CPT

## 2022-08-18 PROCEDURE — 93306 TTE W/DOPPLER COMPLETE: CPT

## 2022-08-18 PROCEDURE — 97116 GAIT TRAINING THERAPY: CPT

## 2022-08-18 RX ORDER — BISACODYL 10 MG
10 SUPPOSITORY, RECTAL RECTAL
Status: DISCONTINUED | OUTPATIENT
Start: 2022-08-18 | End: 2022-08-19 | Stop reason: HOSPADM

## 2022-08-18 RX ORDER — AMOXICILLIN 250 MG
2 CAPSULE ORAL 2 TIMES DAILY
Status: CANCELLED | OUTPATIENT
Start: 2022-08-18

## 2022-08-18 RX ORDER — GABAPENTIN 300 MG/1
300 CAPSULE ORAL 3 TIMES DAILY
Status: DISCONTINUED | OUTPATIENT
Start: 2022-08-18 | End: 2022-08-19 | Stop reason: HOSPADM

## 2022-08-18 RX ORDER — POLYETHYLENE GLYCOL 3350 17 G/17G
1 POWDER, FOR SOLUTION ORAL
Status: CANCELLED | OUTPATIENT
Start: 2022-08-18

## 2022-08-18 RX ORDER — BISACODYL 10 MG
10 SUPPOSITORY, RECTAL RECTAL
Status: CANCELLED | OUTPATIENT
Start: 2022-08-18

## 2022-08-18 RX ORDER — AMOXICILLIN 250 MG
2 CAPSULE ORAL 2 TIMES DAILY
Status: DISCONTINUED | OUTPATIENT
Start: 2022-08-18 | End: 2022-08-19 | Stop reason: HOSPADM

## 2022-08-18 RX ORDER — POLYETHYLENE GLYCOL 3350 17 G/17G
1 POWDER, FOR SOLUTION ORAL
Status: DISCONTINUED | OUTPATIENT
Start: 2022-08-18 | End: 2022-08-19 | Stop reason: HOSPADM

## 2022-08-18 RX ADMIN — GABAPENTIN 300 MG: 300 CAPSULE ORAL at 17:28

## 2022-08-18 RX ADMIN — GABAPENTIN 300 MG: 300 CAPSULE ORAL at 11:40

## 2022-08-18 RX ADMIN — ACETAMINOPHEN 650 MG: 325 TABLET, FILM COATED ORAL at 07:45

## 2022-08-18 RX ADMIN — ATORVASTATIN CALCIUM 40 MG: 40 TABLET, FILM COATED ORAL at 17:27

## 2022-08-18 RX ADMIN — ENOXAPARIN SODIUM 40 MG: 40 INJECTION SUBCUTANEOUS at 17:28

## 2022-08-18 RX ADMIN — OXYCODONE 5 MG: 5 TABLET ORAL at 07:46

## 2022-08-18 RX ADMIN — DOCUSATE SODIUM 50 MG AND SENNOSIDES 8.6 MG 2 TABLET: 8.6; 5 TABLET, FILM COATED ORAL at 17:28

## 2022-08-18 ASSESSMENT — COGNITIVE AND FUNCTIONAL STATUS - GENERAL
MOVING FROM LYING ON BACK TO SITTING ON SIDE OF FLAT BED: A LITTLE
WALKING IN HOSPITAL ROOM: A LITTLE
SUGGESTED CMS G CODE MODIFIER MOBILITY: CK
TURNING FROM BACK TO SIDE WHILE IN FLAT BAD: A LITTLE
STANDING UP FROM CHAIR USING ARMS: A LITTLE
MOVING TO AND FROM BED TO CHAIR: A LITTLE
CLIMB 3 TO 5 STEPS WITH RAILING: A LITTLE
MOBILITY SCORE: 18

## 2022-08-18 ASSESSMENT — PAIN DESCRIPTION - PAIN TYPE
TYPE: ACUTE PAIN

## 2022-08-18 ASSESSMENT — GAIT ASSESSMENTS
DEVIATION: DECREASED BASE OF SUPPORT;BRADYKINETIC
GAIT LEVEL OF ASSIST: CONTACT GUARD ASSIST
DISTANCE (FEET): 200
ASSISTIVE DEVICE: FRONT WHEEL WALKER

## 2022-08-18 ASSESSMENT — ENCOUNTER SYMPTOMS
RESPIRATORY NEGATIVE: 1
CARDIOVASCULAR NEGATIVE: 1
GASTROINTESTINAL NEGATIVE: 1
MUSCULOSKELETAL NEGATIVE: 1
PSYCHIATRIC NEGATIVE: 1
NEUROLOGICAL NEGATIVE: 1
CONSTITUTIONAL NEGATIVE: 1
BLURRED VISION: 1

## 2022-08-18 NOTE — PROGRESS NOTES
Chief Complaint   Patient presents with    Possible Stroke     Pt arrives w/ L sided weakness, LKW 1400. Bs  mg/dL per EMS. Pt arrives AAOx4 with a GCS of 15.           Neurology Daily Progress Note  Neurohospitalist Service, [facility]    History of present illness:  Regla Meier is a 81 y.o. woman with a history of hypertension who presents with acute onset left-sided weakness and right gaze preference.  She reports that she is not sure exactly what happened or when it started, but according to EMS symptom onset was at 2:00 PM per family.    Patient states that she feels funny but otherwise does not know what is happening.  She denies any recent head traumas, surgeries, history of bleeding into her brain, blood thinner use.  She does not have any major medical issues and has never had any symptoms like this in the past.    Interval Update  8/17/2022: Patient seen and examined at bedside. Pleasant demeanor, daughter and  present. Exam continues to improve. MRI brain obtained yesterday demonstrates acute infarct in the right medial temporal lobe and medial occipital lobe, acute infarct in the right thalamus with petechial hemorrhage within. Will continue to hold ASA at this time (3 more days), DVT prophylaxis started today. Echocardiogram pending.    8/18/2022: Patient seen at bedside, not in acute distress. Voices mild left finger parasthesias, treated with gabapentin to good effect by primary team. Awaiting acheocardiogram to complete stroke work-up.       Past medical history:   Past Medical History:   Diagnosis Date    Hyperlipemia        Past surgical history:   Past Surgical History:   Procedure Laterality Date    CYSTECTOMY  1963    CATARACT EXTRACTION WITH IOL         Family history:   Family History   Problem Relation Age of Onset    Other Mother         TIA     Other Father         Abdominal aneurysm     Alcohol abuse Brother     Heart Disease Brother     No  Known Problems Daughter     No Known Problems Daughter        Social history:   Social History     Socioeconomic History    Marital status:      Spouse name: Not on file    Number of children: Not on file    Years of education: Not on file    Highest education level: Not on file   Occupational History    Not on file   Tobacco Use    Smoking status: Never    Smokeless tobacco: Never   Vaping Use    Vaping Use: Never used   Substance and Sexual Activity    Alcohol use: Yes     Alcohol/week: 0.6 oz     Types: 1 Glasses of wine per week    Drug use: No    Sexual activity: Yes     Partners: Male     Comment:  for 57 years. Documented on 4/10/19.   Other Topics Concern    Not on file   Social History Narrative    Not on file     Social Determinants of Health     Financial Resource Strain: Not on file   Food Insecurity: Not on file   Transportation Needs: Not on file   Physical Activity: Not on file   Stress: Not on file   Social Connections: Not on file   Intimate Partner Violence: Not on file   Housing Stability: Not on file       Current medications:   Current Facility-Administered Medications   Medication Dose    gabapentin (NEURONTIN) capsule 300 mg  300 mg    senna-docusate (PERICOLACE or SENOKOT S) 8.6-50 MG per tablet 2 Tablet  2 Tablet    And    polyethylene glycol/lytes (MIRALAX) PACKET 1 Packet  1 Packet    And    magnesium hydroxide (MILK OF MAGNESIA) suspension 30 mL  30 mL    And    bisacodyl (DULCOLAX) suppository 10 mg  10 mg    atorvastatin (LIPITOR) tablet 40 mg  40 mg    enoxaparin (Lovenox) inj 40 mg  40 mg    ondansetron (ZOFRAN) syringe/vial injection 4 mg  4 mg    acetaminophen (Tylenol) tablet 650 mg  650 mg    hydrALAZINE (APRESOLINE) injection 10-20 mg  10-20 mg    labetalol (NORMODYNE/TRANDATE) injection 10-20 mg  10-20 mg    dextrose 10 % BOLUS 25 g  25 g       Medication Allergy:  Allergies   Allergen Reactions    Pcn [Penicillins] Hives       Review of systems:   Constitutional:  denies fever, night sweats, weight loss.   Eyes: denies acute vision change, eye pain or secretion.   Ears, Nose, Mouth, Throat: denies nasal secretion, nasal bleeding, difficulty swallowing, hearing loss, tinnitus, vertigo, ear pain, acute dental problems, oral ulcers or lesions.   Endocrine: denies recent weight changes, heat or cold intolerance, polyuria, polydypsia, polyphagia,abnormal hair growth.  Cardiovascular: denies new onset of chest pain, palpitations, syncope, or dyspnea of exertion.  Pulmonary: denies shortness of breath, new onset of cough, hemoptysis, wheezing, chest pain or flu-like symptoms.   GI: denies nausea, vomiting, diarrhea, GI bleeding, change in appetite, abdominal pain, and change in bowel habits.  : denies dysuria, urinary incontinence, hematuria.  Heme/oncology: denies history of easy bruising or bleeding. No history of cancer, DVTor PE.  Allergy/immunology: denies hives/urticaria, or itching.   Dermatologic: denies new rash, or new skin lesions.  Musculoskeletal:denies joint swelling or pain, muscle pain, neck and back pain.   Neurologic: denies headaches, acute visual changes, facial droopiness, muscle weakness (focal or generalized), paresthesias, anesthesia, ataxia, change in speech or language, memory loss, abnormal movements, seizures, loss of consciousness, or episodes of confusion.   Psychiatric: denies symptoms of depression, anxiety, hallucinations, mood swings or changes, suicidal or homicidal thoughts.     Physical examination:   Vitals:    08/17/22 2000 08/18/22 0000 08/18/22 0400 08/18/22 0703   BP: 101/65 123/80 125/80 123/79   Pulse:   78 85   Resp:   17 18   Temp:   36.8 °C (98.2 °F) 36.7 °C (98.1 °F)   TempSrc:   Temporal Temporal   SpO2: 92%  93% 93%   Weight:       Height:         General: Patient in no acute distress, pleasant and cooperative.  HEENT: Normocephalic, no signs of acute trauma.   Neck: supple, no meningeal signs or carotid bruits. There is normal  range of motion. No tenderness on exam.   Chest: clear to auscultation. No cough.   CV: RRR, no murmurs.   Skin: no signs of acute rashes or trauma.   Musculoskeletal: joints exhibit full range of motion, without any pain to palpation. There are no signs of joint or muscle swelling. There is no tenderness to deep palpation of muscles.   Psychiatric: No hallucinatory behavior. Denies symptoms of depression or suicidal ideation. Mood and affect appear normal on exam.     NEUROLOGICAL EXAM:   Mental status, orientation: Awake, alert and fully oriented.   Speech and language: speech clear. The patient is able to name, repeat and comprehend.   Memory: There is intact recollection of recent and remote events.   Cranial nerve exam: Pupils are 3 mm bilaterally and equally reactive to light and accommodation. No blink to threat on left. Left field homonymous superior quadrantanopia. There is no nystagmus on primary or secondary gaze. Intact full EOM, gaze midline, no facial droop. Sensation in the face is intact to light touch. Uvula is midline. Palate elevates symmetrically. Tongue is midline and without any signs of tongue biting or fasciculations. Shoulder shrug is intact bilaterally.   Motor exam: Strength is 5/5 in all extremities. Tone is normal. No abnormal movements were seen on exam.   Sensory exam reveals normal sense of light touch, proprioception, vibration and pinprick in all extremities.   Deep tendon reflexes:  2+ throughout. Plantar responses are flexor. There is no clonus.   Coordination: No obvious dysmetria. Normal rapidly alternating movements.   Gait: Not examined due to patient condition.      NIH Stroke Scale    1a Level of Consciousness 0  1b Orientation Questions 0  1c Response to Commands 0   2 Gaze 0  3 Visual Fields 1  4 Facial Movement 0  5 Motor Function (arm)   a Left 0  b Right 0  6 Motor Function (leg)   a Left 0  b Right 0  7 Limb Ataxia 0   8 Sensory 0   9 Language 0  10 Articulation0   11  Extinction/Inattention 0    Score: 1    ANCILLARY DATA REVIEWED:     Lab Data Review:  Recent Results (from the past 24 hour(s))   Renal Function Panel    Collection Time: 08/18/22  2:14 AM   Result Value Ref Range    Sodium 136 135 - 145 mmol/L    Potassium 4.4 3.6 - 5.5 mmol/L    Chloride 102 96 - 112 mmol/L    Co2 26 20 - 33 mmol/L    Glucose 98 65 - 99 mg/dL    Creatinine 0.72 0.50 - 1.40 mg/dL    Bun 18 8 - 22 mg/dL    Calcium 8.5 8.5 - 10.5 mg/dL    Phosphorus 3.5 2.5 - 4.5 mg/dL    Albumin 2.9 (L) 3.2 - 4.9 g/dL   ESTIMATED GFR    Collection Time: 08/18/22  2:14 AM   Result Value Ref Range    GFR (CKD-EPI) 84 >60 mL/min/1.73 m 2       Labs reviewed by me.     Imaging reviewed by me:     MR-BRAIN-W/O   Final Result         Acute infarct in the right medial temporal lobe and medial occipital lobe.      Acute infarct in the right thalamus with petechial hemorrhage within.      Age-related volume loss and chronic microvascular ischemic changes.         DX-CHEST-PORTABLE (1 VIEW)   Final Result      1.  Increased bilateral interstitial markings are suggestive of mild pulmonary edema.      2.  Mild cardiomegaly      CT-CTA NECK WITH & W/O-POST PROCESSING   Final Result      CT angiogram of the neck within normal limits.      CT-CTA HEAD WITH & W/O-POST PROCESS   Final Result      1.  Thrombosed right posterior cerebral artery in the P2 segment. The artery is reconstituted more distally.         Comment: The exam was discussed with Dr. Ace at 2:59 PM.      CT-CEREBRAL PERFUSION ANALYSIS   Final Result      1.  Cerebral blood flow less than 30% likely representing completed infarct = 0 mL.      2.  T Max more than 6 seconds likely representing combination of completed infarct and ischemia = 23 mL.      3.  Mismatched volume likely representing ischemic brain/penumbra = 23 mL      4.  Please note that the cerebral perfusion was performed on the limited brain tissue around the basal ganglia region. Infarct/ischemia  outside the CT perfusion sections can be missed in this study.      CT-HEAD W/O   Final Result         1.  No acute intracranial process.      2. Diffuse atrophy and periventricular white matter changes consistent with chronic small vessel disease.      EC-ECHOCARDIOGRAM COMPLETE W/O CONT    (Results Pending)         ASSESSMENT AND PLAN:    Regla Meier is a 81 y.o. with HTN senting with acute onset left-sided weakness and hemineglect in the setting of a right P2 occlusion.  Last seen normal was at 2:00 PM.  I reviewed all neuroimaging.  CT head reveals no significant obvious area of infarction.  CTA head and neck reveals minimal atherosclerotic burden with a right P2 occlusion.  CT perfusion shows a small area of ischemic penumbra in the right PCA territory without any measured core infarction. Patient received TNK at 1455 on 08/15/2022.    -Q4H Neuro checks  -Blood pressure, goal 100-140/ 60-90  -Hold all antiplatelet and anticoagulation medications  -Blood glucose goal . HgA1C 5.1  -Continue atorvastatin 80mg, long term goal of LDL <70  -MRI brain demonstrates acute infarct in the right medial temporal lobe and medial occipital lobe. Acute infarct in the right thalamus with petechial hemorrhage within.  - Plan to start ASA 81mg tomorrow (8/19) in light of small petechial hemorrhagic conversion. Okay for DVT chemoprophylaxis   - Awaiting echocardiogram. Will need Zio patch on discharge    Patient examined and discussed with Dr. Mak Marcum, Neurohospitalist. Please call with any questions.    MADDIE Grady.  [facility]

## 2022-08-18 NOTE — PROGRESS NOTES
Hospital Medicine Daily Progress Note    Date of Service  8/18/2022    Chief Complaint  Regla Meier is a 81 y.o. female admitted 8/15/2022 with CVA    Hospital Course  81 year old female with PMH blindness left eye (retinal occlusion) and chronic BLE edema on Lasix who presented to the ED 8/15/22 with left-sided weakness. LKW was 20-30 minutes prior to arrival. On presentation she had left-sided weakness and right gaze preference. NIH 11. She received TPA at 1455 on 8/15. CT head showed thrombosed right Posterior cerebral artery in the P2 segment.      Brain MRI showing acute infarct right medial temporal lobe and medial occipital lobe as well as acute infarct right thalamus with petechial hemorrhage.  Discussed with neurology who recommends holding off on aspirin for a few days.  Can add DVT prophylaxis tomorrow, 8/17 if her neuro exam remains unchanged.  Concern for residual clot in her PCA.  BP goal 110-160.     Interval Problem Update  Patient was eval at bedside  In no acute distres  Having some right eye medial blindness which is improving slowly  MRI brain demonstrates acute infarct in the right medial temporal lobe and medial occipital lobe. Acute infarct in the right thalamus with petechial hemorrhage within  Will start aspirin tomorrow as per neurology  Neurology following, appreciate recommendations  PM&R recommending outpatient PT/OT  Pending echocardiogram  Place order for Zio patch      I have discussed this patient's plan of care and discharge plan at IDT rounds today with Case Management, Nursing, Nursing leadership, and other members of the IDT team.    Consultants/Specialty  Neurology  PMR    Code Status  Full Code    Disposition  Patient is not medically cleared for discharge.   Anticipate discharge to to home with close outpatient follow-up.  I have placed the appropriate orders for post-discharge needs.    Review of Systems  Review of Systems   Constitutional: Negative.    HENT:  Negative.     Eyes:  Positive for blurred vision.   Respiratory: Negative.     Cardiovascular: Negative.    Gastrointestinal: Negative.    Genitourinary: Negative.    Musculoskeletal: Negative.    Skin: Negative.    Neurological: Negative.    Endo/Heme/Allergies: Negative.    Psychiatric/Behavioral: Negative.        Physical Exam  Temp:  [36.7 °C (98.1 °F)-37 °C (98.6 °F)] 36.7 °C (98.1 °F)  Pulse:  [78-85] 85  Resp:  [17-18] 18  BP: (101-125)/(65-80) 123/79  SpO2:  [91 %-93 %] 93 %    Physical Exam  Constitutional:       Appearance: Normal appearance.   HENT:      Head: Normocephalic and atraumatic.      Mouth/Throat:      Mouth: Mucous membranes are moist.   Eyes:      Extraocular Movements: Extraocular movements intact.      Pupils: Pupils are equal, round, and reactive to light.   Cardiovascular:      Rate and Rhythm: Normal rate and regular rhythm.      Pulses: Normal pulses.      Heart sounds: Normal heart sounds.   Pulmonary:      Effort: Pulmonary effort is normal.      Breath sounds: Normal breath sounds.   Abdominal:      General: Bowel sounds are normal.      Palpations: Abdomen is soft.   Musculoskeletal:         General: No swelling. Normal range of motion.      Cervical back: Normal range of motion and neck supple.      Right lower leg: Edema present.      Left lower leg: Edema present.   Skin:     General: Skin is warm.      Coloration: Skin is not jaundiced.   Neurological:      General: No focal deficit present.      Mental Status: She is alert and oriented to person, place, and time. Mental status is at baseline.      Cranial Nerves: No cranial nerve deficit.      Comments: Left lower extremity weakness   Psychiatric:         Mood and Affect: Mood normal.         Behavior: Behavior normal.         Thought Content: Thought content normal.         Judgment: Judgment normal.       Fluids    Intake/Output Summary (Last 24 hours) at 8/18/2022 1551  Last data filed at 8/18/2022 1355  Gross per 24 hour    Intake 700 ml   Output --   Net 700 ml       Laboratory  Recent Labs     08/16/22  0505 08/17/22  0620   WBC 5.7 7.0   RBC 3.91* 4.05*   HEMOGLOBIN 12.4 12.4   HEMATOCRIT 37.5 38.0   MCV 95.9 93.8   MCH 31.7 30.6   MCHC 33.1* 32.6*   RDW 46.1 44.0   PLATELETCT 184 189   MPV 9.1 8.9*     Recent Labs     08/16/22  0505 08/17/22  0620 08/18/22  0214   SODIUM 140 134* 136   POTASSIUM 4.0 4.3 4.4   CHLORIDE 106 102 102   CO2 25 26 26   GLUCOSE 93 107* 98   BUN 16 13 18   CREATININE 0.65 0.62 0.72   CALCIUM 8.2* 8.4* 8.5             Recent Labs     08/16/22  0505   TRIGLYCERIDE 86   HDL 67   *       Imaging  MR-BRAIN-W/O   Final Result         Acute infarct in the right medial temporal lobe and medial occipital lobe.      Acute infarct in the right thalamus with petechial hemorrhage within.      Age-related volume loss and chronic microvascular ischemic changes.         DX-CHEST-PORTABLE (1 VIEW)   Final Result      1.  Increased bilateral interstitial markings are suggestive of mild pulmonary edema.      2.  Mild cardiomegaly      CT-CTA NECK WITH & W/O-POST PROCESSING   Final Result      CT angiogram of the neck within normal limits.      CT-CTA HEAD WITH & W/O-POST PROCESS   Final Result      1.  Thrombosed right posterior cerebral artery in the P2 segment. The artery is reconstituted more distally.         Comment: The exam was discussed with Dr. Ace at 2:59 PM.      CT-CEREBRAL PERFUSION ANALYSIS   Final Result      1.  Cerebral blood flow less than 30% likely representing completed infarct = 0 mL.      2.  T Max more than 6 seconds likely representing combination of completed infarct and ischemia = 23 mL.      3.  Mismatched volume likely representing ischemic brain/penumbra = 23 mL      4.  Please note that the cerebral perfusion was performed on the limited brain tissue around the basal ganglia region. Infarct/ischemia outside the CT perfusion sections can be missed in this study.      CT-HEAD W/O    Final Result         1.  No acute intracranial process.      2. Diffuse atrophy and periventricular white matter changes consistent with chronic small vessel disease.      EC-ECHOCARDIOGRAM COMPLETE W/O CONT    (Results Pending)        Assessment/Plan  * Acute CVA (cerebrovascular accident) (HCC)- (present on admission)  Assessment & Plan  S/p tPA  MRI brain demonstrates acute infarct in the right medial temporal lobe and medial occipital lobe. Acute infarct in the right thalamus with petechial hemorrhage within  Will start aspirin tomorrow as per neurology  Neurology following, appreciate recommendations  PM&R recommending outpatient PT/OT  Pending echocardiogram  Place order for Zio patch    Leg edema- (present on admission)  Assessment & Plan  Per last hospitalization and outpatient records due to poor PO intake  Was on Lasix and K supplementation    Legally blind in left eye, as defined in [place]- (present on admission)  Assessment & Plan  Noted       VTE prophylaxis: enoxaparin ppx    I have performed a physical exam and reviewed and updated ROS and Plan today (8/18/2022). In review of yesterday's note (8/17/2022), there are no changes except as documented above.

## 2022-08-18 NOTE — CONSULTS
Physical Medicine and Rehabilitation Consultation              Date of initial consultation: 8/18/2022  Consulting provider: Kash Sullivan M.D.  Reason for consultation: assess for acute inpatient rehab appropriateness  LOS: 3 Day(s)    Chief complaint: stroke    HPI: The patient is a 81 y.o. right hand dominant female with a past medical history of hypertension;  who presented on 8/15/2022  2:23 PM with cute onset left-sided weakness and right gaze preference.  Patient was seen by neurology, found to have NIH score of 11.  CTA head found right P2 occlusion, CT perfusion found small area of ischemic penumbra in the right PCA territory.  Patient received tenecteplase.  Follow-up MRI brain found acute infarct in the right medial temporal lobe and medial occipital lobe as well as right thalamus with petechial hemorrhage.  Patient has good support from her spouse who is available 24/7, and their children who are available intermittently.    The patient currently reports headache, legal blindness in right eye, numbness in the left hand and foot. She has been progressing well with therapy and has good support at home, she would like to go home if possible. Spouse drives and can take her to appointments.     ROS  Pertinent positives are mentioned in the HPI, all others reviewed and are negative.    Social Hx:  1 SH  0 NANCY  With: Spouse.  Patient and spouse live in a cottage on their children's property.     THERAPY:  Restrictions: Fall risk  PT: Functional mobility   8/17: Walking 100 feet x 2 with front wheel walker at min assist    OT: ADLs  8/17: Min assist dressing and toileting    SLP:   8/17: Regular diet, thin liquids    IMAGING:  MR brain 8/16/2022  Acute infarct in the right medial temporal lobe and medial occipital lobe.     Acute infarct in the right thalamus with petechial hemorrhage within.     Age-related volume loss and chronic microvascular ischemic changes.    PROCEDURES:  None    PMH:  Past  "Medical History:   Diagnosis Date    Hyperlipemia        PSH:  Past Surgical History:   Procedure Laterality Date    CYSTECTOMY  1963    CATARACT EXTRACTION WITH IOL         FHX:  Family History   Problem Relation Age of Onset    Other Mother         TIA     Other Father         Abdominal aneurysm     Alcohol abuse Brother     Heart Disease Brother     No Known Problems Daughter     No Known Problems Daughter        Medications:  Current Facility-Administered Medications   Medication Dose    atorvastatin (LIPITOR) tablet 40 mg  40 mg    enoxaparin (Lovenox) inj 40 mg  40 mg    ondansetron (ZOFRAN) syringe/vial injection 4 mg  4 mg    acetaminophen (Tylenol) tablet 650 mg  650 mg    oxyCODONE immediate-release (ROXICODONE) tablet 5 mg  5 mg    hydrALAZINE (APRESOLINE) injection 10-20 mg  10-20 mg    labetalol (NORMODYNE/TRANDATE) injection 10-20 mg  10-20 mg    dextrose 10 % BOLUS 25 g  25 g       Allergies:  Allergies   Allergen Reactions    Pcn [Penicillins] Hives         Physical Exam:  Vitals: /79   Pulse 85   Temp 36.7 °C (98.1 °F) (Temporal)   Resp 18   Ht 1.575 m (5' 2\")   Wt 56 kg (123 lb 7.3 oz)   SpO2 93%   Gen: NAD  Head: NC/AT  Eyes/ Nose/ Mouth: PERRLA, moist mucous membranes  Cardio: RRR, good distal perfusion, warm extremities  Pulm: normal respiratory effort, no cyanosis   Abd: Soft NTND, negative borborygmi   Ext: No peripheral edema. No calf tenderness. No clubbing.    Mental status:  A&Ox4 (person, place, date, situation) answers questions appropriately follows commands  Speech: fluent, no aphasia or dysarthria    Motor:      Upper Extremity  Myotome R L   Shoulder flexion C5 5 5   Elbow flexion C5 5 5   Wrist extension C6 5 5   Elbow extension C7 5 5   Finger flexion C8 5 5   Finger abduction T1 5 5     Lower Extremity Myotome R L   Hip flexion L2 4/5 4/5   Knee extension L3 5 5   Ankle dorsiflexion L4 4/5 4/5   Toe extension L5 4/5 4/5   Ankle plantarflexion S1 4/5 4/5     Sensory: "   intact to light touch through out    DTRs:  Right  Left    Brachioradialis  2+  2+   Patella tendon  2+ 2+     Tone: no spasticity noted, no cogwheeling noted    Coordination:   ALTERED finger tyler bilaterally      Labs: Reviewed and significant for   Recent Labs     08/15/22  1425 08/16/22  0505 08/17/22  0620   RBC 5.40 3.91* 4.05*   HEMOGLOBIN 16.8* 12.4 12.4   HEMATOCRIT 50.2* 37.5 38.0   PLATELETCT 193 184 189   PROTHROMBTM 13.1  --   --    APTT 36.9*  --   --    INR 0.99  --   --      Recent Labs     08/16/22  0505 08/17/22  0620 08/18/22  0214   SODIUM 140 134* 136   POTASSIUM 4.0 4.3 4.4   CHLORIDE 106 102 102   CO2 25 26 26   GLUCOSE 93 107* 98   BUN 16 13 18   CREATININE 0.65 0.62 0.72   CALCIUM 8.2* 8.4* 8.5     Recent Results (from the past 24 hour(s))   Renal Function Panel    Collection Time: 08/18/22  2:14 AM   Result Value Ref Range    Sodium 136 135 - 145 mmol/L    Potassium 4.4 3.6 - 5.5 mmol/L    Chloride 102 96 - 112 mmol/L    Co2 26 20 - 33 mmol/L    Glucose 98 65 - 99 mg/dL    Creatinine 0.72 0.50 - 1.40 mg/dL    Bun 18 8 - 22 mg/dL    Calcium 8.5 8.5 - 10.5 mg/dL    Phosphorus 3.5 2.5 - 4.5 mg/dL    Albumin 2.9 (L) 3.2 - 4.9 g/dL   ESTIMATED GFR    Collection Time: 08/18/22  2:14 AM   Result Value Ref Range    GFR (CKD-EPI) 84 >60 mL/min/1.73 m 2         ASSESSMENT:  Patient is a 81 y.o. female admitted with right medial temporal and occipital stroke with small amount of hemorrhagic conversion in the right thalamus.     [facility] Code / Diagnosis to Support: 0001.1 - Stroke: Left Body Involvement (Right Brain)    Rehabilitation: Impaired ADLs and mobility  Patient is declining rehab     All cases are subject to administrative review and recommendations may change    Additional Recommendations:  - Recommend home with outpatient PT/OT. Patient has been making good progress with therapies and is walking ~200' at min assist, which is compatible with a home discharge. She has good support from her  spouse and children. She has good static strength throughout.   - Patient is a high risk for falls and this was reviewed with her. Her incoordination is likely from her occipital stroke and she should use a walker at all times   - Follow up with stroke neurology   - Follow up ordered for Dr. Frank, outpatient PMR physician   - Patient does qualify for IPR, so if she changes her mind or fails to demonstrate progression with therapies, she can be reconsidered for IPR.   -PMR will sign off, please reconsult or reach out via Voalte if further evaluation or medical management is requested    Medical Complexity:    Right PCA stroke s/p tenecteplase   - biggest deficit is incoordination which is a risk for falls   - Patient should use a walker at all times   - close outpatient PT/OT, neurology and PMR follow up   - Statin for secondary stroke prevention. Not on ASA d/t hemorrhagic conversion    Neuropathic pain   - Starting gabapentin 300mg TID. High risk medication, labs reviewed, CrCl 49.3, GFR 84.   - Symptoms will likely resolve in 1-3 months.         DVT PPX: Lovenox       Thank you for allowing us to participate in the care of this patient.     Rainer Pena, DO   Physical Medicine and Rehabilitation     Please note that this dictation was created using voice recognition software. I have made every reasonable attempt to correct obvious errors, but there may be errors of grammar and possibly content that I did not discover before finalizing the note.

## 2022-08-18 NOTE — THERAPY
Physical Therapy   Daily Treatment     Patient Name: Regla Meier  Age:  81 y.o., Sex:  female  Medical Record #: [id number]  Today's Date: 8/18/2022     Precautions: Fall Risk  Comments: L neglect, visual field cuts, impaired sensation    Assessment    Patient progressing well with functional mobility.  She progressed ambulation distance and LLE coordination with gait.  Patient continues to veer to R with gait and intermittently bumped FWW into obstacles on L side.  Provided extensive education on use of FWW vs 4WW, role of outpatient vs home health PT, and compensatory strategies to compensate for visual deficits.  Patient & spouse demonstrated understanding.  PT to continue to follow.    Plan    Continue current treatment plan.    DC Equipment Recommendations:  Front-Wheel Walker  Discharge Recommendations: Recommend outpatient physical therapy services to address higher level deficits (Pt would benefit from placement however if she DC's home would recommend outpatient PT for higher level deficits.)     Objective     08/18/22 1515   Precautions   Precautions Fall Risk   Comments L neglect, visual field cuts, impaired sensation   Cognition    Level of Consciousness Alert   Safety Awareness Impaired   Attention Impaired   Sequencing Impaired   Comments L inattention, cues to avoid obstacles in hallway   Other Treatments   Other Treatments Provided Educated pt on scanning environment to compensate for visual changes.  Educated on role of outpatient PT as pt declined rehab   Balance   Sitting Balance (Static) Fair   Sitting Balance (Dynamic) Fair   Standing Balance (Static) Fair -   Standing Balance (Dynamic) Fair -   Weight Shift Sitting Fair   Weight Shift Standing Fair   Skilled Intervention Verbal Cuing;Tactile Cuing;Compensatory Strategies   Comments standing w/ FWW   Gait Analysis   Gait Level Of Assist Contact Guard Assist   Assistive Device Front Wheel Walker   Distance (Feet) 200   # of Times Distance  was Traveled 2   Deviation Decreased Base Of Support;Bradykinetic (firm L heel contact, increased L step length)   Comments Veering to R frequently, bumping wall on R.  Reported L heel numb   Bed Mobility    Supine to Sit Contact Guard Assist   Sit to Supine (NT, left up in chair)   Skilled Intervention Verbal Cuing   Functional Mobility   Sit to Stand Contact Guard Assist   Bed, Chair, Wheelchair Transfer Contact Guard Assist   Toilet Transfers Contact Guard Assist   Transfer Method Stand Step   Mobility ambulation in hallway with FWW   Skilled Intervention Verbal Cuing;Tactile Cuing;Compensatory Strategies   Activity Tolerance   Sitting in Chair Post session   Sitting Edge of Bed 6 min   Standing 10 min   Short Term Goals    Short Term Goal # 1 Pt will perform bed<>chair transfers with LRAD and SPV within 6 visits to increase OOB activity.   Goal Outcome # 1 Progressing as expected   Short Term Goal # 2 Pt will amb >150ft with LRAD and SPV within 6 visits to negotiate home environment safely.   Goal Outcome # 2 Progressing as expected   Anticipated Discharge Equipment and Recommendations   DC Equipment Recommendations Front-Wheel Walker   Discharge Recommendations Recommend outpatient physical therapy services to address higher level deficits (Pt would benefit from placement however if she DC's home would recommend outpatient PT for higher level deficits.)

## 2022-08-18 NOTE — DISCHARGE PLANNING
Physiatry to consult.  ECHO pending.     1329-Please review the consult from Dr. Pena regarding post acute recommendations.  TCC will no longer follow.  Please reach out to myself with any interval changes/questions.

## 2022-08-18 NOTE — CARE PLAN
Problem: Optimal Care of the Stroke Patient  Goal: Optimal emergency care for the stroke patient  Outcome: Progressing  Goal: Optimal acute care for the stroke patient  Outcome: Progressing     Problem: Knowledge Deficit - Stroke Education  Goal: Patient's knowledge of stroke and risk factors will improve  Outcome: Progressing     Problem: Psychosocial - Patient Condition  Goal: Patient's ability to verbalize feelings about condition will improve  Outcome: Progressing  Goal: Patient's ability to re-evaluate and adapt role responsibilities will improve  Outcome: Progressing     Problem: Discharge Planning - Stroke  Goal: Ensure Stroke Core Measures are met prior to discharge  Outcome: Progressing  Goal: Patient’s continuum of care needs will be met  Outcome: Progressing     Problem: Neuro Status  Goal: Neuro status will remain stable or improve  Outcome: Progressing     Problem: Hemodynamic Monitoring  Goal: Patient's hemodynamics, fluid balance and neurologic status will be stable or improve  Outcome: Progressing     Problem: Respiratory - Stroke Patient  Goal: Patient will achieve/maintain optimum respiratory rate/effort  Outcome: Progressing     Problem: Dysphagia  Goal: Dysphagia will improve  Outcome: Progressing     Problem: Risk for Aspiration  Goal: Patient's risk for aspiration will be absent or decrease  Outcome: Progressing     Problem: Urinary Elimination  Goal: Establish and maintain regular urinary output  Outcome: Progressing     Problem: Bowel Elimination  Goal: Establish and maintain regular bowel function  Outcome: Progressing     Problem: Mobility - Stroke  Goal: Patient's capacity to carry out activities will improve  Outcome: Progressing  Goal: Spasticity will be prevented or improved  Outcome: Progressing  Goal: Subluxation will be prevented or improved  Outcome: Progressing     Problem: Self Care  Goal: Patient will have the ability to perform ADLs independently or with assistance (bathe,  groom, dress, toilet and feed)  Outcome: Progressing     Problem: Knowledge Deficit - Standard  Goal: Patient and family/care givers will demonstrate understanding of plan of care, disease process/condition, diagnostic tests and medications  Outcome: Progressing     Problem: Skin Integrity  Goal: Skin integrity is maintained or improved  Outcome: Progressing     Problem: Fall Risk  Goal: Patient will remain free from falls  Outcome: Progressing     Problem: Pain - Standard  Goal: Alleviation of pain or a reduction in pain to the patient’s comfort goal  Outcome: Progressing   The patient is Watcher - Medium risk of patient condition declining or worsening    Shift Goals  Clinical Goals: maintain stable bp  Patient Goals: mobiliza  Family Goals: REGIS    Progress made toward(s) clinical / shift goals:    Pt ambulates, bed alarm on

## 2022-08-18 NOTE — CARE PLAN
The patient is Stable - Low risk of patient condition declining or worsening    Shift Goals  Clinical Goals: Maintain bp, encourage mobility  Patient Goals: Rest, maintain headache  Family Goals: REGIS    Progress made toward(s) clinical / shift goals:    Problem: Knowledge Deficit - Stroke Education  Goal: Patient's knowledge of stroke and risk factors will improve  Outcome: Progressing     Problem: Psychosocial - Patient Condition  Goal: Patient's ability to verbalize feelings about condition will improve  Outcome: Progressing     Problem: Neuro Status  Goal: Neuro status will remain stable or improve  Outcome: Progressing     Problem: Risk for Aspiration  Goal: Patient's risk for aspiration will be absent or decrease  Outcome: Progressing     Problem: Bowel Elimination  Goal: Establish and maintain regular bowel function  Outcome: Progressing     Problem: Mobility - Stroke  Goal: Patient's capacity to carry out activities will improve  Outcome: Progressing     Problem: Fall Risk  Goal: Patient will remain free from falls  Outcome: Progressing     Problem: Pain - Standard  Goal: Alleviation of pain or a reduction in pain to the patient’s comfort goal  Outcome: Progressing       Patient is not progressing towards the following goals:Pt still has decreased peripheral vision on the left side of the right eye

## 2022-08-19 ENCOUNTER — NON-PROVIDER VISIT (OUTPATIENT)
Dept: CARDIOLOGY | Facility: MEDICAL CENTER | Age: 82
End: 2022-08-19
Payer: MEDICARE

## 2022-08-19 VITALS
HEIGHT: 62 IN | RESPIRATION RATE: 17 BRPM | TEMPERATURE: 97.9 F | BODY MASS INDEX: 22.72 KG/M2 | DIASTOLIC BLOOD PRESSURE: 74 MMHG | HEART RATE: 79 BPM | WEIGHT: 123.46 LBS | OXYGEN SATURATION: 94 % | SYSTOLIC BLOOD PRESSURE: 120 MMHG

## 2022-08-19 DIAGNOSIS — I47.10 SVT (SUPRAVENTRICULAR TACHYCARDIA) (HCC): ICD-10-CM

## 2022-08-19 DIAGNOSIS — I49.3 PVC'S (PREMATURE VENTRICULAR CONTRACTIONS): ICD-10-CM

## 2022-08-19 DIAGNOSIS — I63.9 ACUTE CVA (CEREBROVASCULAR ACCIDENT) (HCC): ICD-10-CM

## 2022-08-19 DIAGNOSIS — I49.1 APC (ATRIAL PREMATURE CONTRACTIONS): ICD-10-CM

## 2022-08-19 PROCEDURE — 700102 HCHG RX REV CODE 250 W/ 637 OVERRIDE(OP): Performed by: NURSE PRACTITIONER

## 2022-08-19 PROCEDURE — A9270 NON-COVERED ITEM OR SERVICE: HCPCS | Performed by: NURSE PRACTITIONER

## 2022-08-19 PROCEDURE — 99239 HOSP IP/OBS DSCHRG MGMT >30: CPT | Performed by: STUDENT IN AN ORGANIZED HEALTH CARE EDUCATION/TRAINING PROGRAM

## 2022-08-19 PROCEDURE — 700102 HCHG RX REV CODE 250 W/ 637 OVERRIDE(OP): Performed by: PHYSICAL MEDICINE & REHABILITATION

## 2022-08-19 PROCEDURE — 97535 SELF CARE MNGMENT TRAINING: CPT | Mod: CO

## 2022-08-19 PROCEDURE — A9270 NON-COVERED ITEM OR SERVICE: HCPCS | Performed by: PHYSICAL MEDICINE & REHABILITATION

## 2022-08-19 PROCEDURE — 700102 HCHG RX REV CODE 250 W/ 637 OVERRIDE(OP): Performed by: STUDENT IN AN ORGANIZED HEALTH CARE EDUCATION/TRAINING PROGRAM

## 2022-08-19 PROCEDURE — A9270 NON-COVERED ITEM OR SERVICE: HCPCS | Performed by: STUDENT IN AN ORGANIZED HEALTH CARE EDUCATION/TRAINING PROGRAM

## 2022-08-19 PROCEDURE — 99232 SBSQ HOSP IP/OBS MODERATE 35: CPT | Performed by: NURSE PRACTITIONER

## 2022-08-19 RX ORDER — ASPIRIN 81 MG/1
81 TABLET ORAL DAILY
Qty: 30 TABLET | Refills: 0 | Status: ON HOLD | OUTPATIENT
Start: 2022-08-19 | End: 2023-02-23

## 2022-08-19 RX ORDER — ATORVASTATIN CALCIUM 80 MG/1
80 TABLET, FILM COATED ORAL EVERY EVENING
Qty: 30 TABLET | Refills: 0 | Status: SHIPPED | OUTPATIENT
Start: 2022-08-19 | End: 2022-09-14

## 2022-08-19 RX ORDER — ATORVASTATIN CALCIUM 80 MG/1
80 TABLET, FILM COATED ORAL EVERY EVENING
Status: DISCONTINUED | OUTPATIENT
Start: 2022-08-19 | End: 2022-08-19 | Stop reason: HOSPADM

## 2022-08-19 RX ADMIN — ASPIRIN 81 MG: 81 TABLET, COATED ORAL at 09:42

## 2022-08-19 RX ADMIN — GABAPENTIN 300 MG: 300 CAPSULE ORAL at 06:30

## 2022-08-19 RX ADMIN — ACETAMINOPHEN 650 MG: 325 TABLET, FILM COATED ORAL at 06:30

## 2022-08-19 RX ADMIN — DOCUSATE SODIUM 50 MG AND SENNOSIDES 8.6 MG 2 TABLET: 8.6; 5 TABLET, FILM COATED ORAL at 06:30

## 2022-08-19 ASSESSMENT — COGNITIVE AND FUNCTIONAL STATUS - GENERAL
DRESSING REGULAR LOWER BODY CLOTHING: A LITTLE
SUGGESTED CMS G CODE MODIFIER DAILY ACTIVITY: CJ
DAILY ACTIVITIY SCORE: 22
HELP NEEDED FOR BATHING: A LITTLE

## 2022-08-19 ASSESSMENT — PAIN DESCRIPTION - PAIN TYPE: TYPE: ACUTE PAIN

## 2022-08-19 NOTE — CARE PLAN
Problem: Optimal Care of the Stroke Patient  Goal: Optimal emergency care for the stroke patient  Outcome: Progressing  Goal: Optimal acute care for the stroke patient  Outcome: Progressing     Problem: Knowledge Deficit - Stroke Education  Goal: Patient's knowledge of stroke and risk factors will improve  Outcome: Progressing     Problem: Psychosocial - Patient Condition  Goal: Patient's ability to verbalize feelings about condition will improve  Outcome: Progressing  Goal: Patient's ability to re-evaluate and adapt role responsibilities will improve  Outcome: Progressing     Problem: Discharge Planning - Stroke  Goal: Ensure Stroke Core Measures are met prior to discharge  Outcome: Progressing  Goal: Patient’s continuum of care needs will be met  Outcome: Progressing     Problem: Neuro Status  Goal: Neuro status will remain stable or improve  Outcome: Progressing     Problem: Hemodynamic Monitoring  Goal: Patient's hemodynamics, fluid balance and neurologic status will be stable or improve  Outcome: Progressing     Problem: Respiratory - Stroke Patient  Goal: Patient will achieve/maintain optimum respiratory rate/effort  Outcome: Progressing     Problem: Dysphagia  Goal: Dysphagia will improve  Outcome: Progressing     Problem: Risk for Aspiration  Goal: Patient's risk for aspiration will be absent or decrease  Outcome: Progressing     Problem: Urinary Elimination  Goal: Establish and maintain regular urinary output  Outcome: Progressing     Problem: Bowel Elimination  Goal: Establish and maintain regular bowel function  Outcome: Progressing     Problem: Mobility - Stroke  Goal: Patient's capacity to carry out activities will improve  Outcome: Progressing  Goal: Spasticity will be prevented or improved  Outcome: Progressing  Goal: Subluxation will be prevented or improved  Outcome: Progressing     Problem: Self Care  Goal: Patient will have the ability to perform ADLs independently or with assistance (bathe,  groom, dress, toilet and feed)  Outcome: Progressing     Problem: Knowledge Deficit - Standard  Goal: Patient and family/care givers will demonstrate understanding of plan of care, disease process/condition, diagnostic tests and medications  Outcome: Progressing     Problem: Skin Integrity  Goal: Skin integrity is maintained or improved  Outcome: Progressing     Problem: Fall Risk  Goal: Patient will remain free from falls  Outcome: Progressing     Problem: Pain - Standard  Goal: Alleviation of pain or a reduction in pain to the patient’s comfort goal  Outcome: Progressing   The patient is Watcher - Medium risk of patient condition declining or worsening    Shift Goals  Clinical Goals: mainatin stable bp  Patient Goals: rest  Family Goals: REGIS    Progress made toward(s) clinical / shift goals:    Ambulate to br    Patient is not progressing towards the following goals:

## 2022-08-19 NOTE — PROGRESS NOTES
Chief Complaint   Patient presents with    Possible Stroke     Pt arrives w/ L sided weakness, LKW 1400. Bs  mg/dL per EMS. Pt arrives AAOx4 with a GCS of 15.           Neurology Daily Progress Note  Neurohospitalist Service, [facility]    History of present illness:  Regla Meier is a 81 y.o. woman with a history of hypertension who presents with acute onset left-sided weakness and right gaze preference.  She reports that she is not sure exactly what happened or when it started, but according to EMS symptom onset was at 2:00 PM per family.    Patient states that she feels funny but otherwise does not know what is happening.  She denies any recent head traumas, surgeries, history of bleeding into her brain, blood thinner use.  She does not have any major medical issues and has never had any symptoms like this in the past.    Interval Update  8/17/2022: Patient seen and examined at bedside. Pleasant demeanor, daughter and  present. Exam continues to improve. MRI brain obtained yesterday demonstrates acute infarct in the right medial temporal lobe and medial occipital lobe, acute infarct in the right thalamus with petechial hemorrhage within. Will continue to hold ASA at this time (3 more days), DVT prophylaxis started today. Echocardiogram pending.    8/18/2022: Patient seen at bedside, not in acute distress. Voices mild left finger parasthesias, treated with gabapentin to good effect by primary team. Awaiting echocardiogram to complete stroke work-up.     8/19/2022: Patient resting comfortbly on exam. Not in acute distress. TTE obtained demonstrates   LV EF 55%, normal RV. Recommend Zio patch on discharge. Will start ASA 81mg today.     Past medical history:   Past Medical History:   Diagnosis Date    Hyperlipemia        Past surgical history:   Past Surgical History:   Procedure Laterality Date    CYSTECTOMY  1963    CATARACT EXTRACTION WITH IOL         Family history:    Family History   Problem Relation Age of Onset    Other Mother         TIA     Other Father         Abdominal aneurysm     Alcohol abuse Brother     Heart Disease Brother     No Known Problems Daughter     No Known Problems Daughter        Social history:   Social History     Socioeconomic History    Marital status:      Spouse name: Not on file    Number of children: Not on file    Years of education: Not on file    Highest education level: Not on file   Occupational History    Not on file   Tobacco Use    Smoking status: Never    Smokeless tobacco: Never   Vaping Use    Vaping Use: Never used   Substance and Sexual Activity    Alcohol use: Yes     Alcohol/week: 0.6 oz     Types: 1 Glasses of wine per week    Drug use: No    Sexual activity: Yes     Partners: Male     Comment:  for 57 years. Documented on 4/10/19.   Other Topics Concern    Not on file   Social History Narrative    Not on file     Social Determinants of Health     Financial Resource Strain: Not on file   Food Insecurity: Not on file   Transportation Needs: Not on file   Physical Activity: Not on file   Stress: Not on file   Social Connections: Not on file   Intimate Partner Violence: Not on file   Housing Stability: Not on file       Current medications:   Current Facility-Administered Medications   Medication Dose    atorvastatin (LIPITOR) tablet 80 mg  80 mg    aspirin EC (ECOTRIN) tablet 81 mg  81 mg    gabapentin (NEURONTIN) capsule 300 mg  300 mg    senna-docusate (PERICOLACE or SENOKOT S) 8.6-50 MG per tablet 2 Tablet  2 Tablet    And    polyethylene glycol/lytes (MIRALAX) PACKET 1 Packet  1 Packet    And    magnesium hydroxide (MILK OF MAGNESIA) suspension 30 mL  30 mL    And    bisacodyl (DULCOLAX) suppository 10 mg  10 mg    enoxaparin (Lovenox) inj 40 mg  40 mg    ondansetron (ZOFRAN) syringe/vial injection 4 mg  4 mg    acetaminophen (Tylenol) tablet 650 mg  650 mg    hydrALAZINE (APRESOLINE) injection 10-20 mg  10-20 mg     labetalol (NORMODYNE/TRANDATE) injection 10-20 mg  10-20 mg    dextrose 10 % BOLUS 25 g  25 g       Medication Allergy:  Allergies   Allergen Reactions    Pcn [Penicillins] Hives       Review of systems:   Constitutional: denies fever, night sweats, weight loss.   Eyes: denies acute vision change, eye pain or secretion.   Ears, Nose, Mouth, Throat: denies nasal secretion, nasal bleeding, difficulty swallowing, hearing loss, tinnitus, vertigo, ear pain, acute dental problems, oral ulcers or lesions.   Endocrine: denies recent weight changes, heat or cold intolerance, polyuria, polydypsia, polyphagia,abnormal hair growth.  Cardiovascular: denies new onset of chest pain, palpitations, syncope, or dyspnea of exertion.  Pulmonary: denies shortness of breath, new onset of cough, hemoptysis, wheezing, chest pain or flu-like symptoms.   GI: denies nausea, vomiting, diarrhea, GI bleeding, change in appetite, abdominal pain, and change in bowel habits.  : denies dysuria, urinary incontinence, hematuria.  Heme/oncology: denies history of easy bruising or bleeding. No history of cancer, DVTor PE.  Allergy/immunology: denies hives/urticaria, or itching.   Dermatologic: denies new rash, or new skin lesions.  Musculoskeletal:denies joint swelling or pain, muscle pain, neck and back pain.   Neurologic: denies headaches, acute visual changes, facial droopiness, muscle weakness (focal or generalized), paresthesias, anesthesia, ataxia, change in speech or language, memory loss, abnormal movements, seizures, loss of consciousness, or episodes of confusion.   Psychiatric: denies symptoms of depression, anxiety, hallucinations, mood swings or changes, suicidal or homicidal thoughts.     Physical examination:   Vitals:    08/18/22 1900 08/18/22 2324 08/19/22 0349 08/19/22 0655   BP: 129/78 115/72 118/75 120/74   Pulse: 96 82 80 79   Resp: 17 17 17 17   Temp: 36.8 °C (98.2 °F) 36.4 °C (97.5 °F) 36.4 °C (97.5 °F) 36.6 °C (97.9 °F)    TempSrc: Temporal Temporal Temporal Temporal   SpO2: 94% 92% 94% 94%   Weight:       Height:         General: Patient in no acute distress, pleasant and cooperative.  HEENT: Normocephalic, no signs of acute trauma.   Neck: supple, no meningeal signs or carotid bruits. There is normal range of motion. No tenderness on exam.   Chest: clear to auscultation. No cough.   CV: RRR, no murmurs.   Skin: no signs of acute rashes or trauma.   Musculoskeletal: joints exhibit full range of motion, without any pain to palpation. There are no signs of joint or muscle swelling. There is no tenderness to deep palpation of muscles.   Psychiatric: No hallucinatory behavior. Denies symptoms of depression or suicidal ideation. Mood and affect appear normal on exam.     NEUROLOGICAL EXAM:   Mental status, orientation: Awake, alert and fully oriented.   Speech and language: speech clear. The patient is able to name, repeat and comprehend.   Memory: There is intact recollection of recent and remote events.   Cranial nerve exam: Pupils are 3 mm bilaterally and equally reactive to light and accommodation. No blink to threat on left. Left field homonymous superior quadrantanopia. There is no nystagmus on primary or secondary gaze. Intact full EOM, gaze midline, no facial droop. Sensation in the face is intact to light touch. Uvula is midline. Palate elevates symmetrically. Tongue is midline and without any signs of tongue biting or fasciculations. Shoulder shrug is intact bilaterally.   Motor exam: Strength is 5/5 in all extremities. Tone is normal. No abnormal movements were seen on exam.   Sensory exam reveals normal sense of light touch, proprioception, vibration and pinprick in all extremities.   Deep tendon reflexes:  2+ throughout. Plantar responses are flexor. There is no clonus.   Coordination: No obvious dysmetria. Normal rapidly alternating movements.   Gait: Not examined due to patient condition.      NIH Stroke Scale    1a Level  of Consciousness 0  1b Orientation Questions 0  1c Response to Commands 0   2 Gaze 0  3 Visual Fields 1  4 Facial Movement 0  5 Motor Function (arm)   a Left 0  b Right 0  6 Motor Function (leg)   a Left 0  b Right 0  7 Limb Ataxia 0   8 Sensory 0   9 Language 0  10 Articulation0   11 Extinction/Inattention 0    Score: 1    ANCILLARY DATA REVIEWED:     Lab Data Review:  Recent Results (from the past 24 hour(s))   EC-ECHOCARDIOGRAM COMPLETE W/O CONT    Collection Time: 08/18/22  4:45 PM   Result Value Ref Range    Eject.Frac. MOD BP 54.08     Eject.Frac. MOD 4C 53.64     Eject.Frac. MOD 2C 57.03        Labs reviewed by me.     Imaging reviewed by me:     EC-ECHOCARDIOGRAM COMPLETE W/O CONT   Final Result      MR-BRAIN-W/O   Final Result         Acute infarct in the right medial temporal lobe and medial occipital lobe.      Acute infarct in the right thalamus with petechial hemorrhage within.      Age-related volume loss and chronic microvascular ischemic changes.         DX-CHEST-PORTABLE (1 VIEW)   Final Result      1.  Increased bilateral interstitial markings are suggestive of mild pulmonary edema.      2.  Mild cardiomegaly      CT-CTA NECK WITH & W/O-POST PROCESSING   Final Result      CT angiogram of the neck within normal limits.      CT-CTA HEAD WITH & W/O-POST PROCESS   Final Result      1.  Thrombosed right posterior cerebral artery in the P2 segment. The artery is reconstituted more distally.         Comment: The exam was discussed with Dr. Ace at 2:59 PM.      CT-CEREBRAL PERFUSION ANALYSIS   Final Result      1.  Cerebral blood flow less than 30% likely representing completed infarct = 0 mL.      2.  T Max more than 6 seconds likely representing combination of completed infarct and ischemia = 23 mL.      3.  Mismatched volume likely representing ischemic brain/penumbra = 23 mL      4.  Please note that the cerebral perfusion was performed on the limited brain tissue around the basal ganglia region.  Infarct/ischemia outside the CT perfusion sections can be missed in this study.      CT-HEAD W/O   Final Result         1.  No acute intracranial process.      2. Diffuse atrophy and periventricular white matter changes consistent with chronic small vessel disease.            ASSESSMENT AND PLAN:    Regla Meier is a 81 y.o. with HTN senting with acute onset left-sided weakness and hemineglect in the setting of a right P2 occlusion.  Last seen normal was at 2:00 PM.  I reviewed all neuroimaging.  CT head reveals no significant obvious area of infarction.  CTA head and neck reveals minimal atherosclerotic burden with a right P2 occlusion.  CT perfusion shows a small area of ischemic penumbra in the right PCA territory without any measured core infarction. Patient received TNK at 1455 on 08/15/2022.    -Q4H Neuro checks  -Blood pressure, goal 100-140  -Start ASA 81mg daily  -Blood glucose goal . HgA1C 5.1  -Continue atorvastatin 80mg, long term goal of LDL <70  -MRI brain demonstrates acute infarct in the right medial temporal lobe and medial occipital lobe. Acute infarct in the right thalamus with petechial hemorrhage within.  - TTE shows LV EF 55%. Recommend Zio patch on discharge  - [facility] follow up as an outpatient    Neurology will sign off at this time. Please contact or re-consult us at any time.     Patient examined and discussed with Dr. Antoni Arrington, Neurohospitalist. Please call with any questions.    MADDIE Grady.  [place] [facility]

## 2022-08-19 NOTE — DISCHARGE SUMMARY
Discharge Summary    CHIEF COMPLAINT ON ADMISSION  Chief Complaint   Patient presents with    Possible Stroke     Pt arrives w/ L sided weakness, LKW 1400. Bs  mg/dL per EMS. Pt arrives AAOx4 with a GCS of 15.         Reason for Admission  stroke      Admission Date  8/15/2022    CODE STATUS  Full Code    HPI & HOSPITAL COURSE  81 year old female with PMH blindness left eye (retinal occlusion) and chronic BLE edema on Lasix who presented to the ED 8/15/22 with left-sided weakness. LKW was 20-30 minutes prior to arrival. On presentation she had left-sided weakness and right gaze preference. NIH 11. She received TPA at 1455 on 8/15. CT head showed thrombosed right Posterior cerebral artery in the P2 segment. Brain MRI showing acute infarct right medial temporal lobe and medial occipital lobe as well as acute infarct right thalamus with petechial hemorrhage.  Patient on high-dose statin.  Neurology following for which patient was ultimately started on aspirin on 8/19/2022.  Echocardiogram with ejection fraction 55%.  Patient was set up with Zio patch.  Patient was eval by physiatry who recommended outpatient physical therapy and Occupational Therapy.  Stable patient with in chronic condition was discharged home on high-dose statin, aspirin and Zio patch and was instructed to follow-up with her primary care provider and neurology in the outpatient setting.  All results and plan of action were discussed with the patient for which she voiced understanding agree with the primary care team.  Patient was instructed to return to emergency department symptoms were to worsen.    Therefore, she is discharged in fair and stable condition to home with close outpatient follow-up.    The patient met 2-midnight criteria for an inpatient stay at the time of discharge.    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    Leg edema (Chronic) POA: Yes  Resolved Problems:    * No resolved hospital problems. *      FOLLOW UP  Future Appointments   Date Time Provider Department Center   9/9/2022  2:15 PM [facility] EXAM 9 ECHO [facility]   9/14/2022  1:15 PM Andrade Cherry M.D. [facility] None     [facility] NEUROLOGY  [address]  [address]  [facility] [address]  [phone]            MEDICATIONS ON DISCHARGE     Medication List        ASK your doctor about these medications        Instructions   asa/apap/caffeine 250-250-65 MG Tabs  Commonly known as: EXCEDRIN   Take 1-2 Tablets by mouth every 6 hours as needed for Headache.  Dose: 1-2 Tablet     furosemide 20 MG Tabs  Commonly known as: LASIX   Doctor's comments: No new prescription, modified from 20 mg twice a day to once daily.  Take 1 Tablet by mouth every day.  Dose: 20 mg     Lutein 40 MG Caps   Take 40 mg by mouth every day.  Dose: 40 mg     potassium chloride SA 10 MEQ Tbcr  Commonly known as: K-DUR   Doctor's comments: No new prescription, change from  twice a day to once daily.  Take 1 Tablet by mouth every day.  Dose: 10 mEq     TURMERIC PO   Take 1 Capsule by mouth every day.  Dose: 1 Capsule              Allergies  Allergies   Allergen Reactions    Pcn [Penicillins] Hives       DIET  Orders Placed This Encounter   Procedures    Diet Order Diet: Cardiac; Tray Modifications (optional): No Straws     Standing Status:   Standing     Number of Occurrences:   1     Order Specific Question:   Diet:     Answer:   Cardiac [6]     Order Specific Question:   Tray Modifications (optional)     Answer:   No Straws       ACTIVITY  As tolerated.  Weight bearing as tolerated    CONSULTATIONS  Critical care  medicine  Neurology  Physiatry    PROCEDURES  None    LABORATORY  Lab Results   Component Value Date    SODIUM 136 08/18/2022    POTASSIUM 4.4 08/18/2022    CHLORIDE 102 08/18/2022    CO2 26 08/18/2022    GLUCOSE 98 08/18/2022    BUN 18 08/18/2022    CREATININE 0.72 08/18/2022        Lab Results   Component Value Date    WBC 7.0 08/17/2022    HEMOGLOBIN 12.4 08/17/2022    HEMATOCRIT 38.0 08/17/2022    PLATELETCT 189 08/17/2022        Total time of the discharge process exceeds 34 minutes.

## 2022-08-19 NOTE — DISCHARGE INSTRUCTIONS
Special Equipment    You are being discharged with the following special equipment:  Walker  Diet    Heart Healthy Diet    A Heart-Healthy diet includes increasing healthy fats and limiting unhealthy fats.  Focus on eating a balance of foods, including fruits and vegetables, low-fat or nonfat dairy, lean protein, nuts and legumes, whole grains, and heart-healthy oils and fats.  Monitor your salt (sodium) intake, especially if you have high blood pressure.  Lose weight if you are overweight. Losing just 5-10% of your body weight can help your overall health and prevent diseases such as diabetes and heart disease.    Activity    Resume Your Normal Activity    You may resume your normal activity as tolerated.  Rest as needed.      Stroke / CVA / TIA / Hemorrhagic Ischemia Discharge Instructions    You have had a stroke. Your risk factors have been identified as follows:  Age - Over 55  High blood pressure  Heart disease  High Cholesterol and lipids    It is important that you reduce your risk factors to avoid another stroke in the future. Here are some general guidelines to follow:    Eat healthy - avoid food high in fat  Maintain a healthy weight  Avoid alcohol and illegal drug use Get regular exercise  Avoid smoking  Take your medications as directed     For more information regarding risk factors, refer to pages 17-19 in your Stroke Patient Education Guide. Stroke Education Guide was given to patient, family member, and significant other.    Warning signs of a stroke include (which can also be found on page 3 of your Stroke Patient Education Guide):  Sudden numbness of weakness of the face, arm or leg (especially on one side of the body).  Sudden confusion, trouble speaking or understanding.  Sudden trouble seeing in one or both eyes.  Sudden trouble walking, dizziness, loss of balance or coordination.  Sudden severe headache with no known cause.  It is very important to get treatment quickly when a stroke occurs.  If you experience any of the above warning signs, call 911 immediately.     Some patients who have had a stroke will be going home on a blood thinner medication called Warfarin (Coumadin).  This medication requires very close monitoring and follow up.  This follow up can be provided by either your Primary Care Physician or by [facility] Outpatient [facility]ticoagulation Service.  The Outpatient Anticoagulation Service is located at the [place] [facility] at [facility] ([facility]).  If you do not know when your follow up appointment is scheduled, call [phone] to verify your appointment time.            Stroke / CVA / TIA / Hemorrhagic Ischemia Discharge Instructions    You have had a stroke. Your risk factors have been identified as follows:  Age - Over 55  High blood pressure  Heart disease  High Cholesterol and lipids    It is important that you reduce your risk factors to avoid another stroke in the future. Here are some general guidelines to follow:    Eat healthy - avoid food high in fat  Maintain a healthy weight  Avoid alcohol and illegal drug use Get regular exercise  Avoid smoking  Take your medications as directed     For more information regarding risk factors, refer to pages 17-19 in your Stroke Patient Education Guide. Stroke Education Guide was given to patient and significant other.    Warning signs of a stroke include (which can also be found on page 3 of your Stroke Patient Education Guide):  Sudden numbness of weakness of the face, arm or leg (especially on one side of the body).  Sudden confusion, trouble speaking or understanding.  Sudden trouble seeing in one or both eyes.  Sudden trouble walking, dizziness, loss of balance or coordination.  Sudden severe headache with no known cause.  It is very important to get treatment quickly when a stroke occurs. If you experience any of the above warning signs, call 911 immediately.     Some patients who have  had a stroke will be going home on a blood thinner medication called Warfarin (Coumadin).  This medication requires very close monitoring and follow up.  This follow up can be provided by either your Primary Care Physician or by [facility]'s Outpatient Anticoagulation Service.  The Outpatient Anticoagulation Service is located at the [place] [facility] at [facility] ([facility]).  If you do not know when your follow up appointment is scheduled, call [phone] to verify your appointment time.

## 2022-08-19 NOTE — FACE TO FACE
Face to Face Note  -  Durable Medical Equipment    Kenn Chapman M.D. - NPI: [id number]  I certify that this patient is under my care and that they had a durable medical equipment(DME)face to face encounter by myself that meets the physician DME face-to-face encounter requirements with this patient on:    Date of encounter:   Patient:                    MRN:                       YOB: 2021  Regla Meier  [id number]  1940     The encounter with the patient was in whole, or in part, for the following medical condition, which is the primary reason for durable medical equipment:  CVA    I certify that, based on my findings, the following durable medical equipment is medically necessary:    Walkers.    My Clinical findings support the need for the above equipment due to:  Abnormal Gait

## 2022-08-19 NOTE — THERAPY
Occupational Therapy  Daily Treatment     Patient Name: Regla Meier  Age:  81 y.o., Sex:  female  Medical Record #: [id number]  Today's Date: 8/19/2022       Precautions: Fall Risk  Comments: baseline L eye blindness, R visual field cut    Assessment    Pt seen for OT tx. Continues to be limited by visual impairments impacting ability to complete ADLs and ADL transfers independently. Pt reports being able to see peripherally in R eye. Pt required cues for safety w/ FWW d/t visual impairments. Pt reports that her SO will be home to assist as needed w/ ADLs and IADLs.     Plan    Continue current treatment plan.    DC Equipment Recommendations: Unable to determine at this time  Discharge Recommendations: Other - Pt reports that her SO will assist at home. If pt's SO is unable to care for pt at current level recommend placement.        08/19/22 0831   Balance   Sitting Balance (Static) Fair   Sitting Balance (Dynamic) Fair   Standing Balance (Static) Fair   Standing Balance (Dynamic) Fair -   Weight Shift Sitting Fair   Weight Shift Standing Fair   Bed Mobility    Supine to Sit Supervised   Sit to Supine Supervised   Activities of Daily Living   Grooming Standby Assist;Standing   Lower Body Dressing Supervision   Toileting Supervision   Functional Mobility   Sit to Stand Contact Guard Assist   Bed, Chair, Wheelchair Transfer Standby Assist   Toilet Transfers Standby Assist   Short Term Goals   Short Term Goal # 1 Pt will complete ADL transfers with supervision   Goal Outcome # 1 Progressing as expected   Short Term Goal # 2 Pt will complete LB dressing with supervision   Goal Outcome # 2 Progressing as expected   Anticipated Discharge Equipment and Recommendations   DC Equipment Recommendations Unable to determine at this time   Discharge Recommendations Other -

## 2022-08-22 ENCOUNTER — PATIENT OUTREACH (OUTPATIENT)
Dept: MEDICAL GROUP | Facility: PHYSICIAN GROUP | Age: 82
End: 2022-08-22
Payer: MEDICARE

## 2022-08-22 ENCOUNTER — TELEPHONE (OUTPATIENT)
Dept: MEDICAL GROUP | Facility: PHYSICIAN GROUP | Age: 82
End: 2022-08-22
Payer: MEDICARE

## 2022-08-22 DIAGNOSIS — I63.9 ACUTE CVA (CEREBROVASCULAR ACCIDENT) (HCC): ICD-10-CM

## 2022-08-22 NOTE — PROGRESS NOTES
RN Transitional Care Management discharge follow up call completed. Patient has no questions regarding medications or follow up care. Patient discharge follow up appointment scheduled with PCP on 8/31/22.     JULIETTE Pepe Care Coordinator  Chronic Care Management [phone]

## 2022-08-22 NOTE — TELEPHONE ENCOUNTER
Pt left a message stating that she had a stroke about a week ago and was discharged from the hospital on Friday.   She is scheduled for follow up on 8/31/22 but would like to get a referral sent to [facility] physical therapy in [facility] so she can get scheduled for some PT.

## 2022-08-22 NOTE — PROGRESS NOTES
Subjective:     Regla Meier is a 81 y.o. female who presents for Hospital Follow-up.    POST DISCHARGE CALL:  Discharge Date:8/19/2022   Date of Outreach Call: 8/22/2022  2:32 PM  Now that you're home, how are you doing? Good  Do you have questions about your medications? No  Did you fill your medications? Yes  Do you have a follow-up appointment scheduled?No  Discharging Department: Neurosciences  Number of Attempts: 1  Current or previous attempts completed within two business days of discharge? Yes  Provided education regarding treatment plan, medication, self-management, ADLs? Yes  Has patient completed Advance Directive? If yes, advise them to bring to appointment. Yes  Care Manager phone number provided? Yes  Is there anything else I can help you with? No    HPI:   Recently hospitalized for chest pain and muscle pain    Current medicines (including reconciliation performed today)  Current Outpatient Medications   Medication Sig Dispense Refill    aspirin EC 81 MG EC tablet Take 1 Tablet by mouth every day. 30 Tablet 0    atorvastatin (LIPITOR) 80 MG tablet Take 1 Tablet by mouth every evening. 30 Tablet 0    Lutein 40 MG Cap Take 40 mg by mouth every day.      furosemide (LASIX) 20 MG Tab Take 1 Tablet by mouth every day. 30 Tablet 0    potassium chloride SA (K-DUR) 10 MEQ Tab CR Take 1 Tablet by mouth every day. 1 Each 0    asa/apap/caffeine (EXCEDRIN) 250-250-65 MG Tab Take 1-2 Tablets by mouth every 6 hours as needed for Headache.      TURMERIC PO Take 1 Capsule by mouth every day.       No current facility-administered medications for this visit.       Allergies:   Pcn [penicillins]    Social History     Tobacco Use    Smoking status: Never    Smokeless tobacco: Never   Vaping Use    Vaping Use: Never used   Substance Use Topics    Alcohol use: Yes     Alcohol/week: 0.6 oz     Types: 1 Glasses of wine per week    Drug use: No       ROS:  Please refer to note from visit today    Objective:      There were no vitals filed for this visit.  There is no height or weight on file to calculate BMI.    Physical Exam:  Please refer to note visit from today    Assessment and Plan:   There are no diagnoses linked to this encounter.    - Chart and discharge summary were reviewed.   - Hospitalization and results reviewed with patient.   - Medications reviewed including instructions regarding high risk medications, dosing and side effects.  - Recommended Services: Referral to Home Health  - Advance directive/POLST on file?  Yes    Follow-up:No follow-ups on file.    Face-to-face transitional care management services with HIGH (today's visit is within days post discharge & LACE+ score 59+) medical decision complexity were provided.     LACE+ Historical Score Over Time (0-28: Low, 29-58: Medium, 59+: High): 75

## 2022-08-24 DIAGNOSIS — I63.9 ACUTE CVA (CEREBROVASCULAR ACCIDENT) (HCC): ICD-10-CM

## 2022-08-29 ENCOUNTER — APPOINTMENT (OUTPATIENT)
Dept: RADIOLOGY | Facility: MEDICAL CENTER | Age: 82
End: 2022-08-29
Attending: EMERGENCY MEDICINE
Payer: MEDICARE

## 2022-08-29 ENCOUNTER — OFFICE VISIT (OUTPATIENT)
Dept: URGENT CARE | Facility: PHYSICIAN GROUP | Age: 82
End: 2022-08-29
Payer: MEDICARE

## 2022-08-29 ENCOUNTER — HOSPITAL ENCOUNTER (EMERGENCY)
Facility: MEDICAL CENTER | Age: 82
End: 2022-08-29
Attending: EMERGENCY MEDICINE
Payer: MEDICARE

## 2022-08-29 VITALS
TEMPERATURE: 97.9 F | RESPIRATION RATE: 16 BRPM | OXYGEN SATURATION: 96 % | SYSTOLIC BLOOD PRESSURE: 123 MMHG | DIASTOLIC BLOOD PRESSURE: 74 MMHG | WEIGHT: 118.83 LBS | BODY MASS INDEX: 21.73 KG/M2 | HEART RATE: 76 BPM

## 2022-08-29 DIAGNOSIS — R10.10 PAIN OF UPPER ABDOMEN: ICD-10-CM

## 2022-08-29 DIAGNOSIS — R07.9 CHEST PAIN, UNSPECIFIED TYPE: ICD-10-CM

## 2022-08-29 DIAGNOSIS — R53.1 UNILATERAL WEAKNESS: ICD-10-CM

## 2022-08-29 DIAGNOSIS — Z86.73 HX OF TIA (TRANSIENT ISCHEMIC ATTACK) AND STROKE: ICD-10-CM

## 2022-08-29 DIAGNOSIS — M79.10 MYALGIA: ICD-10-CM

## 2022-08-29 LAB
ALBUMIN SERPL BCP-MCNC: 3 G/DL (ref 3.2–4.9)
ALBUMIN/GLOB SERPL: 1.2 G/DL
ALP SERPL-CCNC: 77 U/L (ref 30–99)
ALT SERPL-CCNC: 21 U/L (ref 2–50)
ANION GAP SERPL CALC-SCNC: 9 MMOL/L (ref 7–16)
AST SERPL-CCNC: 32 U/L (ref 12–45)
BASOPHILS # BLD AUTO: 0.9 % (ref 0–1.8)
BASOPHILS # BLD: 0.05 K/UL (ref 0–0.12)
BILIRUB SERPL-MCNC: 0.5 MG/DL (ref 0.1–1.5)
BUN SERPL-MCNC: 12 MG/DL (ref 8–22)
CALCIUM SERPL-MCNC: 8.9 MG/DL (ref 8.5–10.5)
CHLORIDE SERPL-SCNC: 105 MMOL/L (ref 96–112)
CK SERPL-CCNC: 68 U/L (ref 0–154)
CO2 SERPL-SCNC: 25 MMOL/L (ref 20–33)
CREAT SERPL-MCNC: 0.55 MG/DL (ref 0.5–1.4)
EKG IMPRESSION: NORMAL
EOSINOPHIL # BLD AUTO: 0.05 K/UL (ref 0–0.51)
EOSINOPHIL NFR BLD: 0.9 % (ref 0–6.9)
ERYTHROCYTE [DISTWIDTH] IN BLOOD BY AUTOMATED COUNT: 42.1 FL (ref 35.9–50)
GFR SERPLBLD CREATININE-BSD FMLA CKD-EPI: 92 ML/MIN/1.73 M 2
GLOBULIN SER CALC-MCNC: 2.5 G/DL (ref 1.9–3.5)
GLUCOSE SERPL-MCNC: 100 MG/DL (ref 65–99)
HCT VFR BLD AUTO: 39.2 % (ref 37–47)
HGB BLD-MCNC: 13.3 G/DL (ref 12–16)
IMM GRANULOCYTES # BLD AUTO: 0.01 K/UL (ref 0–0.11)
IMM GRANULOCYTES NFR BLD AUTO: 0.2 % (ref 0–0.9)
LIPASE SERPL-CCNC: 28 U/L (ref 11–82)
LYMPHOCYTES # BLD AUTO: 1.44 K/UL (ref 1–4.8)
LYMPHOCYTES NFR BLD: 24.5 % (ref 22–41)
MCH RBC QN AUTO: 31.1 PG (ref 27–33)
MCHC RBC AUTO-ENTMCNC: 33.9 G/DL (ref 33.6–35)
MCV RBC AUTO: 91.6 FL (ref 81.4–97.8)
MONOCYTES # BLD AUTO: 0.51 K/UL (ref 0–0.85)
MONOCYTES NFR BLD AUTO: 8.7 % (ref 0–13.4)
NEUTROPHILS # BLD AUTO: 3.81 K/UL (ref 2–7.15)
NEUTROPHILS NFR BLD: 64.8 % (ref 44–72)
NRBC # BLD AUTO: 0 K/UL
NRBC BLD-RTO: 0 /100 WBC
PLATELET # BLD AUTO: 199 K/UL (ref 164–446)
PMV BLD AUTO: 8.9 FL (ref 9–12.9)
POTASSIUM SERPL-SCNC: 4.8 MMOL/L (ref 3.6–5.5)
PROT SERPL-MCNC: 5.5 G/DL (ref 6–8.2)
RBC # BLD AUTO: 4.28 M/UL (ref 4.2–5.4)
SODIUM SERPL-SCNC: 139 MMOL/L (ref 135–145)
TROPONIN T SERPL-MCNC: 68 NG/L (ref 6–19)
TROPONIN T SERPL-MCNC: 69 NG/L (ref 6–19)
WBC # BLD AUTO: 5.9 K/UL (ref 4.8–10.8)

## 2022-08-29 PROCEDURE — 93005 ELECTROCARDIOGRAM TRACING: CPT | Performed by: EMERGENCY MEDICINE

## 2022-08-29 PROCEDURE — 85025 COMPLETE CBC W/AUTO DIFF WBC: CPT

## 2022-08-29 PROCEDURE — 93005 ELECTROCARDIOGRAM TRACING: CPT

## 2022-08-29 PROCEDURE — 84484 ASSAY OF TROPONIN QUANT: CPT

## 2022-08-29 PROCEDURE — 80053 COMPREHEN METABOLIC PANEL: CPT

## 2022-08-29 PROCEDURE — 99215 OFFICE O/P EST HI 40 MIN: CPT | Performed by: PHYSICIAN ASSISTANT

## 2022-08-29 PROCEDURE — 700102 HCHG RX REV CODE 250 W/ 637 OVERRIDE(OP): Performed by: EMERGENCY MEDICINE

## 2022-08-29 PROCEDURE — 83690 ASSAY OF LIPASE: CPT

## 2022-08-29 PROCEDURE — 99285 EMERGENCY DEPT VISIT HI MDM: CPT

## 2022-08-29 PROCEDURE — 82550 ASSAY OF CK (CPK): CPT

## 2022-08-29 PROCEDURE — 36415 COLL VENOUS BLD VENIPUNCTURE: CPT

## 2022-08-29 PROCEDURE — A9270 NON-COVERED ITEM OR SERVICE: HCPCS | Performed by: EMERGENCY MEDICINE

## 2022-08-29 PROCEDURE — 71045 X-RAY EXAM CHEST 1 VIEW: CPT

## 2022-08-29 RX ORDER — CYCLOBENZAPRINE HCL 10 MG
5 TABLET ORAL 3 TIMES DAILY PRN
Qty: 30 TABLET | Refills: 0 | Status: SHIPPED | OUTPATIENT
Start: 2022-08-29 | End: 2022-11-02

## 2022-08-29 RX ORDER — CYCLOBENZAPRINE HCL 10 MG
5 TABLET ORAL ONCE
Status: COMPLETED | OUTPATIENT
Start: 2022-08-29 | End: 2022-08-29

## 2022-08-29 RX ADMIN — CYCLOBENZAPRINE 5 MG: 10 TABLET, FILM COATED ORAL at 10:29

## 2022-08-29 ASSESSMENT — ENCOUNTER SYMPTOMS
MYALGIAS: 1
CARDIOVASCULAR NEGATIVE: 1
ABDOMINAL PAIN: 0
ABDOMINAL PAIN: 1
DIZZINESS: 0
SHORTNESS OF BREATH: 0
DOUBLE VISION: 0
NAUSEA: 0
RESPIRATORY NEGATIVE: 1

## 2022-08-29 ASSESSMENT — PAIN DESCRIPTION - PAIN TYPE: TYPE: ACUTE PAIN

## 2022-08-29 ASSESSMENT — HEART SCORE
ECG: NORMAL
TROPONIN: 1-3 TIMES NORMAL LIMIT
HEART SCORE: 3
AGE: 65+
RISK FACTORS: NO KNOWN RISK FACTORS
HISTORY: SLIGHTLY SUSPICIOUS

## 2022-08-29 ASSESSMENT — FIBROSIS 4 INDEX: FIB4 SCORE: 3.11

## 2022-08-29 NOTE — PROGRESS NOTES
Subjective:     Regla Meier  is a 81 y.o. female who presents for No chief complaint on file.       She presents today for upper abdominal pain x1 day.  She does note that she suffered a stroke 3 weeks ago, she is currently on a heart monitor and has left-sided unilateral body numbness secondary to this stroke.  Her  presents today and states he does not notice any changes of mental status or level of consciousness with the patient as compared to her baseline.     Review of Systems   Respiratory: Negative.     Cardiovascular: Negative.    Gastrointestinal:  Positive for abdominal pain.   Neurological:         Left-sided unilateral numbness    Allergies   Allergen Reactions    Pcn [Penicillins] Hives     Past Medical History:   Diagnosis Date    Hyperlipemia         Objective:   There were no vitals taken for this visit.  Physical Exam  Vitals and nursing note reviewed.   Constitutional:       General: She is not in acute distress.     Appearance: She is not ill-appearing or toxic-appearing.   HENT:      Head: Normocephalic.      Nose: No rhinorrhea.   Eyes:      General: No scleral icterus.     Conjunctiva/sclera: Conjunctivae normal.   Pulmonary:      Effort: Pulmonary effort is normal. No respiratory distress.      Breath sounds: No stridor.   Musculoskeletal:      Cervical back: Neck supple.   Neurological:      Mental Status: She is alert and oriented to person, place, and time.      Cranial Nerves: No cranial nerve deficit.      Comments: Left-sided unilateral numbness and slight weakness of the left upper extremity; patient states that this is a direct cause from her stroke she experienced 3 weeks ago.   Psychiatric:         Mood and Affect: Mood normal.         Behavior: Behavior normal.         Thought Content: Thought content normal.         Judgment: Judgment normal.           Diagnostic testing: None    Assessment/Plan:     Encounter Diagnoses   Name Primary?    Unilateral weakness      Hx of TIA (transient ischemic attack) and stroke     Pain of upper abdomen           Plan for care for today's complaint includes transfer via private vehicle to [facility] emergency department for further evaluation of possible repeat stroke.  Did offer EMS transportation; however, the patient and her  elected private vehicle.  Transfer service was contacted.    See AVS Instructions below for written guidance provided to patient on after-visit management and care in addition to our verbal discussion during the visit.    Please note that this dictation was created using voice recognition software. I have attempted to correct all errors, but there may be sound-alike, spelling, grammar and possibly content errors that I did not discover before finalizing the note.    Montecitopari Loja PA-C

## 2022-08-29 NOTE — ED NOTES
Reviewed discharge instructions with patient and answered any questions. Patient discharged home and encouraged to follow up with PCP. Patient informed of meds to  at pharmacy. Patient ambulating with steady gait with walker.

## 2022-08-29 NOTE — ED TRIAGE NOTES
Ambulates to triage  Chief Complaint   Patient presents with    Chest Pain     Feels tight/squeezing around her ribs and R arm     Muscle Pain     Generalized muscle pains, started on a statin a week ago, had a reaction to statins in the past    Sent from Urgent Care     Pt had a stroke lat week, was started back on a statin, she thinks she might be having the muscle pains from the statin. Also c/o of L leg numbness since the stroke.

## 2022-08-29 NOTE — DISCHARGE INSTRUCTIONS
As we discussed you can start taking coenzyme Q to try to offset the effects of your statin.  Please discuss your body aches with your primary care doctor

## 2022-08-29 NOTE — ED PROVIDER NOTES
"ED Provider Note    Scribed for Cayla Vincent M.D. by Naomi Tabares. 8/29/2022, 9:09 AM.    Primary care provider: SHANNON Ontiveros  Means of arrival: Walk-In  History obtained from: Patient  History limited by: None    CHIEF COMPLAINT  Chief Complaint   Patient presents with    Chest Pain     Feels tight/squeezing around her ribs and R arm     Muscle Pain     Generalized muscle pains, started on a statin a week ago, had a reaction to statins in the past    Sent from Urgent Care       HPI  Regla Meier is a 81 y.o. female who presents to the Emergency Department for evaluation of myalgias and chest pain onset three days ago. Patient states she developed a chest tightness to her left rib cage that radiates to her left shoulder. Patient describes the sensation similar to a \"band\" that is squeezing her. Patient states that she just started on a new statin, as she had a stroke and was admitted to the hospital. She notes that she was on a statin in the past, and had muscle weakness as a side effect at that time. Patient presented to Urgent Care, who directed her here to the ED for further evaluation. Currently in the ED, patient states that her pain is still present.  Patient reports since having the stroke she also just has had persistent heaviness in her left upper and lower extremity and is looking forward to starting outpatient PT as she feels that this will help.  She has follow-up with her primary care physician in 2 days.  She has follow-up with a cardiologist to establish care in a week.  Denies any diplopia, shortness of breath, abdomina pain, nausea, or dizziness. No alleviating or exacerbating factors reported. Drug allergies to Penicillins.     Review of records show that patient was admitted to the hospital on 8/15/22 for a stroke. Patient had left-sided weakness and had an NIH of 11. Patient received tPA. 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 MRI showed " an acute infarct in the right medial temporal lobe, medial occipital lobe and right thalamus.  Patient reports her symptoms have greatly improved since her initial presentation.  During that hospitalization she also had an echocardiogram on 8/18/2022 which demonstrated left ventricular ejection fraction of 55%, no obvious wall motion abnormalities.  Patient also had a stress test in July 2022 which demonstrated no reversible ischemia, patient did have a nonreversible ischemia that was thought to be artifact that was a small area in the apical septal wall.    REVIEW OF SYSTEMS  Review of Systems   Eyes:  Negative for double vision.   Respiratory:  Negative for shortness of breath.    Cardiovascular:  Positive for chest pain and leg swelling.   Gastrointestinal:  Negative for abdominal pain and nausea.   Musculoskeletal:  Positive for myalgias.   Neurological:  Negative for dizziness.   All other systems reviewed and are negative.      PAST MEDICAL HISTORY   has a past medical history of Hyperlipemia and Stroke (HCC).    SURGICAL HISTORY   has a past surgical history that includes cystectomy (1963) and cataract extraction with iol.    SOCIAL HISTORY  Social History     Tobacco Use    Smoking status: Never    Smokeless tobacco: Never   Vaping Use    Vaping Use: Never used   Substance Use Topics    Alcohol use: Yes     Alcohol/week: 0.6 oz     Types: 1 Glasses of wine per week     Comment: occ glass of wine    Drug use: No      Social History     Substance and Sexual Activity   Drug Use No       FAMILY HISTORY  Family History   Problem Relation Age of Onset    Other Mother         TIA     Other Father         Abdominal aneurysm     Alcohol abuse Brother     Heart Disease Brother     No Known Problems Daughter     No Known Problems Daughter        CURRENT MEDICATIONS  Home Medications       Reviewed by Hermila Mistry (Pharmacy Tech) on 08/29/22 at 1120  Med List Status: Complete     Medication Last Dose Status    asa/apap/caffeine (EXCEDRIN) 250-250-65 MG Tab 8/29/2022 Active   aspirin EC 81 MG EC tablet 8/28/2022 Active   atorvastatin (LIPITOR) 80 MG tablet 8/27/2022 Active   Cyanocobalamin (VITAMIN B12 PO) 8/28/2022 Active   FOLIC ACID PO 8/28/2022 Active   furosemide (LASIX) 20 MG Tab 8/28/2022 Active   Lutein 40 MG Cap 8/28/2022 Active   Non Formulary Request FEW DAYS AGO Active   potassium chloride SA (K-DUR) 10 MEQ Tab CR 8/28/2022 Active   QUERCETIN PO 8/28/2022 Active   TURMERIC PO FEW DAYS AGO Active                    ALLERGIES  Allergies   Allergen Reactions    Pcn [Penicillins] Hives       PHYSICAL EXAM  VITAL SIGNS: /75   Pulse 87   Temp 36.3 °C (97.4 °F) (Temporal)   Resp 14   Wt 53.9 kg (118 lb 13.3 oz)   SpO2 97%   BMI 21.73 kg/m²   Vitals reviewed by myself.  Nursing note and vitals reviewed.  Constitutional: Well-developed and well-nourished. No acute distress.   HENT: Head is normocephalic and atraumatic.  Eyes: extra-ocular movements intact  Cardiovascular: Regular rate and regular rhythm. No murmur heard.  Pulmonary/Chest: Breath sounds normal. No wheezes or rales.   Abdominal: Soft and non-tender. No distention.    Musculoskeletal: Extremities exhibit no tenderness. No calf tenderness.   Neurological: Awake and alert. There is some left-sided weakness upper and lower extremity weakness and numbness which she reports has been persistent since her stroke 3 weeks ago, it is improved from initial presentation 3 weeks ago  Skin: Skin is warm and dry. No rash.     DIAGNOSTIC STUDIES /  LABS  Labs Reviewed   CBC WITH DIFFERENTIAL - Abnormal; Notable for the following components:       Result Value    MPV 8.9 (*)     All other components within normal limits   COMP METABOLIC PANEL - Abnormal; Notable for the following components:    Glucose 100 (*)     Albumin 3.0 (*)     Total Protein 5.5 (*)     All other components within normal limits   TROPONIN - Abnormal; Notable for the following  components:    Troponin T 68 (*)     All other components within normal limits   TROPONIN - Abnormal; Notable for the following components:    Troponin T 69 (*)     All other components within normal limits   CREATINE KINASE   LIPASE   ESTIMATED GFR       All labs reviewed by me.    EKG Interpretation:  Interpreted by myself    12 Lead EKG interpreted by me to show:  EKG at 8:58 AM: Normal sinus rhythm, heart rate 75, occasional PAC, right axis deviation, normal intervals, , , QTc 458, no acute ST-T segment changes, Q waves noted in anterior leads consistent with prior infarct, no evidence of acute arrhythmia or ischemia  My impression of this EKG: Does not indicate ischemia or arrhythmia at this time.    RADIOLOGY  DX-CHEST-PORTABLE (1 VIEW)   Final Result      1.  No acute cardiac or pulmonary abnormalities are identified.   2.  Persistently enlarged cardiac silhouette        The radiologist's interpretation of all radiological studies have been reviewed by me.    REASSESSMENT  9:09 AM - Patient was seen and examined at bedside. After my exam, I explained to the patient the plan of care, which includes obtaining lab work and imaging for further evaluation. Patient understands and verbalizes agreement to plan of care.     9:57 AM - Patient was reevaluated at bedside. Patient will be treated with medication for his symptoms.     10:32 AM - Patient was reevaluated at bedside. I discussed the lab and imaging results with the patient.     11:01 AM - Patient was reevaluated at bedside. Patient states improvement in her symptoms after medication. She is requesting for water. Water is given at this time.     11:35 AM - Patient was reevaluated at bedside. Discussed lab and radiology results with the patient. I then informed the patient of my plan for discharge, which includes sending her home with Flexeril as well as giving strict return precautions for any new or worsening symptoms. Patient understands and  verbalizes agreement to plan of care. Patient is comfortable going home at this time.      COURSE & MEDICAL DECISION MAKING  Nursing notes, VS, PMSFHx reviewed in chart.    Patient is a 81-year-old female who comes in for evaluation of chest pain and myalgias.  Differential diagnosis includes medication side effect, rhabdomyolysis, elevated LFTs, acute coronary syndrome, arrhythmia.  Diagnostic work-up includes labs and EKG.    Patient's initial vitals are within normal limits.  Patient is treated with Flexeril after which she feels improved.  EKG returns demonstrates no evidence of acute arrhythmia or ischemia.  Labs returned and are notable for elevated troponin of 68, patient's troponins have been persistently elevated since she was previously hospitalized in July.  At that time stress test and echocardiogram were unremarkable.  A repeat troponin here is stable at 69.  Patient has a heart score of 3 and there feel I feel her ongoing pain can be followed up with cardiology outpatient as planned.  Labs are otherwise unremarkable.  CK is normal ruling out rhabdomyolysis.  LFTs are within normal limits.  Her symptoms seem most consistent with likely statin side effect versus weakness from her stroke.  At this time patient will be started on Flexeril, she is also advised to initiate over-the-counter coenzyme Q to assist with her symptoms.  She has follow-up with her primary care physician in 2 days and she can see how she is responding to medication.  At this time I encouraged her to continue her statin as long as she can tolerate it given her recent stroke.  Patient is amenable to this plan.  She is then given strict return precautions and discharged in stable condition.    The patient will return for new or worsening symptoms and is stable at the time of discharge.    The patient is referred to a primary physician for blood pressure management, diabetic screening, and for all other preventative health  concerns.      DISPOSITION:  Patient will be discharged home in stable condition.    FOLLOW UP:  SHANNON Ontiveros  [address] [id number] [phone]            OUTPATIENT MEDICATIONS:  New Prescriptions    CYCLOBENZAPRINE (FLEXERIL) 10 MG TAB    Take 0.5 Tablets by mouth 3 times a day as needed for Mild Pain, Moderate Pain or Muscle Spasms.       FINAL IMPRESSION  1. Myalgia    2. Chest pain, unspecified type          I, Naomi Tabares (Scribe), am scribing for, and in the presence of, Cayla Vincent M.D..    Electronically signed by: Naomi Tabares (Flor), 8/29/2022    ICayla M.D. personally performed the services described in this documentation, as scribed by Naomi Tabares in my presence, and it is both accurate and complete.    The note accurately reflects work and decisions made by me.  Cayla Vincent M.D.  8/29/2022  2:14 PM

## 2022-08-31 ENCOUNTER — OFFICE VISIT (OUTPATIENT)
Dept: MEDICAL GROUP | Facility: PHYSICIAN GROUP | Age: 82
End: 2022-08-31
Payer: MEDICARE

## 2022-08-31 VITALS
TEMPERATURE: 98.8 F | HEIGHT: 62 IN | DIASTOLIC BLOOD PRESSURE: 68 MMHG | RESPIRATION RATE: 16 BRPM | WEIGHT: 118 LBS | BODY MASS INDEX: 21.71 KG/M2 | SYSTOLIC BLOOD PRESSURE: 108 MMHG | HEART RATE: 100 BPM | OXYGEN SATURATION: 95 %

## 2022-08-31 DIAGNOSIS — I63.9 ACUTE CVA (CEREBROVASCULAR ACCIDENT) (HCC): ICD-10-CM

## 2022-08-31 DIAGNOSIS — M79.10 MYALGIA: ICD-10-CM

## 2022-08-31 DIAGNOSIS — E78.2 MIXED HYPERLIPIDEMIA: ICD-10-CM

## 2022-08-31 DIAGNOSIS — Z91.89 OTHER SPECIFIED PERSONAL RISK FACTORS, NOT ELSEWHERE CLASSIFIED: ICD-10-CM

## 2022-08-31 DIAGNOSIS — Z09 HOSPITAL DISCHARGE FOLLOW-UP: ICD-10-CM

## 2022-08-31 DIAGNOSIS — Z86.73 HISTORY OF STROKE: ICD-10-CM

## 2022-08-31 DIAGNOSIS — E53.8 VITAMIN B 12 DEFICIENCY: ICD-10-CM

## 2022-08-31 PROCEDURE — 99214 OFFICE O/P EST MOD 30 MIN: CPT | Mod: 25 | Performed by: NURSE PRACTITIONER

## 2022-08-31 PROCEDURE — 96372 THER/PROPH/DIAG INJ SC/IM: CPT | Performed by: NURSE PRACTITIONER

## 2022-08-31 RX ORDER — CYANOCOBALAMIN 1000 UG/ML
1000 INJECTION, SOLUTION INTRAMUSCULAR; SUBCUTANEOUS ONCE
Status: COMPLETED | OUTPATIENT
Start: 2022-08-31 | End: 2022-08-31

## 2022-08-31 RX ORDER — CYANOCOBALAMIN 1000 UG/ML
1000 INJECTION, SOLUTION INTRAMUSCULAR; SUBCUTANEOUS
Qty: 3 ML | Refills: 3 | Status: SHIPPED | OUTPATIENT
Start: 2022-08-31 | End: 2023-01-25

## 2022-08-31 RX ADMIN — CYANOCOBALAMIN 1000 MCG: 1000 INJECTION, SOLUTION INTRAMUSCULAR; SUBCUTANEOUS at 15:02

## 2022-08-31 ASSESSMENT — FIBROSIS 4 INDEX: FIB4 SCORE: 2.81

## 2022-08-31 NOTE — PROGRESS NOTES
Chief Complaint   Patient presents with    Follow-Up     Hospital 8/29       HISTORY OF PRESENT ILLNESS: Patient is a 81 y.o. female, established patient who presents today to discuss medical problems as listed below:    Health Maintenance:  COMPLETED     Hospital discharge follow-up  Recent hospital follow-up from 8/29/2022 with chest pain and muscle pain.  History of recent posterior cerebral stroke she received tPA.  Her cardiac work-up was unremarkable including echo, EF of 55, EKG without ischemia.  Her troponin was at 68 which is persistently elevated since last ER visit.  Most likely her symptoms were pertaining to myalgia secondary to statin side effect.  She was given Flexeril.  Was on Lipitor 80 mg which she stopped taking.  Cholesterol from 8/16/2022 within normal limits with slightly elevated LDL at 106.    Patient Active Problem List    Diagnosis Date Noted    Acute CVA (cerebrovascular accident) (HCC) 08/15/2022    Hospital discharge follow-up 07/20/2022    Leg edema 07/09/2022    Other heart failure (HCC) 06/21/2022    Edema of both lower extremities 06/03/2022    Neuropathy of both feet 06/03/2022    Close exposure to COVID-19 virus 12/07/2021    Cough 12/07/2021    Pedal edema 08/24/2021    Hyperlipemia 11/26/2019    Legally blind in left eye, as defined in [place] 11/26/2019    History of retinal vein occlusion 04/10/2019        Allergies: Pcn [penicillins]    Current Outpatient Medications   Medication Sig Dispense Refill    cyanocobalamin (VITAMIN B-12) 1000 MCG/ML Solution Inject 1 mL into the shoulder, thigh, or buttocks every 30 days. 3 mL 3    Cyanocobalamin (VITAMIN B12 PO) Take 1 Tablet by mouth every day.      FOLIC ACID PO Take 1 Tablet by mouth every day.      QUERCETIN PO Take 1 Tablet by mouth every day.      Non Formulary Request Take 1 Tablet by mouth every day. Arthrozene OTC supplement      cyclobenzaprine (FLEXERIL) 10 mg Tab Take 0.5 Tablets by mouth 3 times a day as needed for Mild  Pain, Moderate Pain or Muscle Spasms. 30 Tablet 0    aspirin EC 81 MG EC tablet Take 1 Tablet by mouth every day. 30 Tablet 0    Lutein 40 MG Cap Take 40 mg by mouth every day.      furosemide (LASIX) 20 MG Tab Take 1 Tablet by mouth every day. 30 Tablet 0    potassium chloride SA (K-DUR) 10 MEQ Tab CR Take 1 Tablet by mouth every day. 1 Each 0    asa/apap/caffeine (EXCEDRIN) 250-250-65 MG Tab Take 1-2 Tablets by mouth every 6 hours as needed for Headache.      TURMERIC PO Take 1 Capsule by mouth every day.      atorvastatin (LIPITOR) 80 MG tablet Take 1 Tablet by mouth every evening. (Patient not taking: Reported on 8/31/2022) 30 Tablet 0     No current facility-administered medications for this visit.       Social History     Tobacco Use    Smoking status: Never    Smokeless tobacco: Never   Vaping Use    Vaping Use: Never used   Substance Use Topics    Alcohol use: Yes     Alcohol/week: 0.6 oz     Types: 1 Glasses of wine per week     Comment: occ glass of wine    Drug use: No     Social History     Social History Narrative    Not on file       Family History   Problem Relation Age of Onset    Other Mother         TIA     Other Father         Abdominal aneurysm     Alcohol abuse Brother     Heart Disease Brother     No Known Problems Daughter     No Known Problems Daughter        Allergies, past medical history, past surgical history, family history, social history reviewed and updated.    Review of Systems:     - Constitutional: Negative for fever, chills, unexpected weight change, and fatigue/generalized weakness.       - Respiratory: Negative for cough, sputum production, chest congestion, dyspnea, wheezing, and crackles.      - Cardiovascular: Negative for chest pain, palpitations, orthopnea, and bilateral lower extremity edema.       - Musculoskeletal: Negative for myalgias, back pain, and joint pain.       - Neurological: Negative for dizziness, tingling, tremors, focal sensory deficit, focal weakness and  "headaches.     - Psychiatric/Behavioral: Negative for depression, suicidal/homicidal ideation and memory loss.      All other systems reviewed and are negative    Exam:    /68 (BP Location: Left arm, Patient Position: Sitting, BP Cuff Size: Adult)   Pulse 100   Temp 37.1 °C (98.8 °F) (Temporal)   Resp 16   Ht 1.575 m (5' 2\")   Wt 53.5 kg (118 lb)   SpO2 95%   BMI 21.58 kg/m²  Body mass index is 21.58 kg/m².    Physical Exam:  Constitutional: Well-developed and well-nourished. Not diaphoretic. No distress.   Cardiovascular: Regular rate and rhythm, S1 and S2 without murmur, rubs, or gallops.    Chest: Effort normal. Clear to auscultation throughout. No adventitious sounds. Extremities: No cyanosis, clubbing, erythema, nor edema. Distal pulses intact and symmetric.   Musculoskeletal: All major joints AROM full in all directions without pain.  Neurological: Alert and oriented x 3.   Psychiatric:  Behavior, mood, and affect are appropriate.  MA/nursing note and vitals reviewed.    Assessment/Plan:  1. Hospital discharge follow-up  Hospital records reviewed and discussed with patient.  - Referral to Physical Therapy    2. Other specified personal risk factors, not elsewhere classified  - CT-CARDIAC SCORING; Future    3. Mixed hyperlipidemia  - CT-CARDIAC SCORING; Future  - Lipid Profile; Future  - Comp Metabolic Panel; Future    4. Acute CVA (cerebrovascular accident) (HCC)  - Referral to Home Health  - Referral to Physical Therapy    5. History of stroke  - CT-CARDIAC SCORING; Future  - Referral to Home Health  - Referral to Physical Therapy    6. Vitamin B 12 deficiency  - VITAMIN B12; Future  - cyanocobalamin (VITAMIN B-12) injection 1,000 mcg  - cyanocobalamin (VITAMIN B-12) 1000 MCG/ML Solution; Inject 1 mL into the shoulder, thigh, or buttocks every 30 days.  Dispense: 3 mL; Refill: 3    7. Myalgia  - VITAMIN B12; Future  - cyanocobalamin (VITAMIN B-12) injection 1,000 mcg  - cyanocobalamin (VITAMIN " B-12) 1000 MCG/ML Solution; Inject 1 mL into the shoulder, thigh, or buttocks every 30 days.  Dispense: 3 mL; Refill: 3       Discussed with patient possible alternative diagnoses, pt is to take all medications as prescribed. If symptoms persist FU w/PCP, if symptoms worsen go to emergency room. If experiencing any side effects from prescribed medications reports to the office immediately or go to emergency room.  Reviewed indication, dosage, usage and potential adverse effects of prescribed medications. Reviewed risks and benefits of treatment plan. Patient verbalizes understanding of all instruction and verbally agrees to plan.    No follow-ups on file. 3 mos

## 2022-08-31 NOTE — ASSESSMENT & PLAN NOTE
Recent hospital follow-up from 8/29/2022 with chest pain and muscle pain.  History of recent posterior cerebral stroke she received tPA.  Her cardiac work-up was unremarkable including echo, EF of 55, EKG without ischemia.  Her troponin was at 68 which is persistently elevated since last ER visit.  Most likely her symptoms were pertaining to myalgia secondary to statin side effect.  She was given Flexeril.  Cholesterol from 8/16/2022 within normal limits with slightly elevated LDL at 106.  She is continuing Lipitor 80 mg.  She was told based on her history of stroke he should continue statin.  She was advised to add coenzyme every 10 to her regimen.  Flexeril which she takes half a dose helps her to relax as she felt contracted at the time when she went to ED.  Overall she feels much better her symptoms have improved.  Has an appointment with cardiology on 9/14/2022 and neurology on 11/2/2022.  She is wearing continuous Holter monitor.  She is unable to start physical therapy as [facility] PT is not available until November.  She would like a referral to home health and physical therapy to outside of [facility] to possibly start PT earlier.

## 2022-09-09 ENCOUNTER — HOSPITAL ENCOUNTER (OUTPATIENT)
Dept: CARDIOLOGY | Facility: MEDICAL CENTER | Age: 82
End: 2022-09-09
Attending: NURSE PRACTITIONER
Payer: MEDICARE

## 2022-09-09 DIAGNOSIS — R60.0 EDEMA OF BOTH LOWER EXTREMITIES: ICD-10-CM

## 2022-09-09 LAB
LV EJECT FRACT  99904: 55
LV EJECT FRACT MOD 2C 99903: 70.87
LV EJECT FRACT MOD 4C 99902: 64.89
LV EJECT FRACT MOD BP 99901: 68.09

## 2022-09-09 PROCEDURE — 93306 TTE W/DOPPLER COMPLETE: CPT | Mod: 26 | Performed by: INTERNAL MEDICINE

## 2022-09-09 PROCEDURE — 93306 TTE W/DOPPLER COMPLETE: CPT

## 2022-09-12 ENCOUNTER — TELEPHONE (OUTPATIENT)
Dept: CARDIOLOGY | Facility: MEDICAL CENTER | Age: 82
End: 2022-09-12
Payer: MEDICARE

## 2022-09-13 ENCOUNTER — HOSPITAL ENCOUNTER (OUTPATIENT)
Dept: RADIOLOGY | Facility: MEDICAL CENTER | Age: 82
End: 2022-09-13
Attending: NURSE PRACTITIONER
Payer: MEDICARE

## 2022-09-13 DIAGNOSIS — Z91.89 OTHER SPECIFIED PERSONAL RISK FACTORS, NOT ELSEWHERE CLASSIFIED: ICD-10-CM

## 2022-09-13 DIAGNOSIS — Z86.73 HISTORY OF STROKE: ICD-10-CM

## 2022-09-13 DIAGNOSIS — E78.2 MIXED HYPERLIPIDEMIA: ICD-10-CM

## 2022-09-13 PROCEDURE — 4410556 CT-CARDIAC SCORING (SELF PAY ONLY)

## 2022-09-14 ENCOUNTER — OFFICE VISIT (OUTPATIENT)
Dept: CARDIOLOGY | Facility: MEDICAL CENTER | Age: 82
End: 2022-09-14
Attending: NURSE PRACTITIONER
Payer: MEDICARE

## 2022-09-14 VITALS
SYSTOLIC BLOOD PRESSURE: 112 MMHG | RESPIRATION RATE: 16 BRPM | WEIGHT: 122 LBS | OXYGEN SATURATION: 96 % | DIASTOLIC BLOOD PRESSURE: 64 MMHG | BODY MASS INDEX: 22.45 KG/M2 | HEIGHT: 62 IN | HEART RATE: 93 BPM

## 2022-09-14 DIAGNOSIS — R60.0 EDEMA OF BOTH LOWER EXTREMITIES: ICD-10-CM

## 2022-09-14 DIAGNOSIS — I63.9 ACUTE CVA (CEREBROVASCULAR ACCIDENT) (HCC): ICD-10-CM

## 2022-09-14 DIAGNOSIS — E78.2 MIXED HYPERLIPIDEMIA: ICD-10-CM

## 2022-09-14 DIAGNOSIS — Z09 HOSPITAL DISCHARGE FOLLOW-UP: ICD-10-CM

## 2022-09-14 DIAGNOSIS — R60.0 LEG EDEMA: Chronic | ICD-10-CM

## 2022-09-14 DIAGNOSIS — I50.89 OTHER HEART FAILURE (HCC): ICD-10-CM

## 2022-09-14 DIAGNOSIS — R60.0 PEDAL EDEMA: ICD-10-CM

## 2022-09-14 DIAGNOSIS — R93.1 AGATSTON CAC SCORE 200-399: ICD-10-CM

## 2022-09-14 PROCEDURE — 99205 OFFICE O/P NEW HI 60 MIN: CPT | Performed by: INTERNAL MEDICINE

## 2022-09-14 RX ORDER — EZETIMIBE 10 MG/1
10 TABLET ORAL DAILY
Qty: 30 TABLET | Refills: 11 | Status: SHIPPED | OUTPATIENT
Start: 2022-09-14 | End: 2022-12-07 | Stop reason: SDUPTHER

## 2022-09-14 RX ORDER — ROSUVASTATIN CALCIUM 5 MG/1
5 TABLET, COATED ORAL EVERY EVENING
Qty: 30 TABLET | Refills: 11 | Status: SHIPPED | OUTPATIENT
Start: 2022-09-14 | End: 2022-12-07 | Stop reason: SDUPTHER

## 2022-09-14 ASSESSMENT — ENCOUNTER SYMPTOMS
LOSS OF CONSCIOUSNESS: 0
NEUROLOGICAL NEGATIVE: 1
FEVER: 0
EYES NEGATIVE: 1
WHEEZING: 0
CHILLS: 0
PND: 0
GASTROINTESTINAL NEGATIVE: 1
RESPIRATORY NEGATIVE: 1
WEAKNESS: 0
CLAUDICATION: 0
ORTHOPNEA: 0
BRUISES/BLEEDS EASILY: 0
PALPITATIONS: 0
CONSTITUTIONAL NEGATIVE: 1
HEMOPTYSIS: 0
MUSCULOSKELETAL NEGATIVE: 1
DIZZINESS: 0
STRIDOR: 0
SPUTUM PRODUCTION: 0
COUGH: 0
SORE THROAT: 0
CARDIOVASCULAR NEGATIVE: 1
SHORTNESS OF BREATH: 0

## 2022-09-14 ASSESSMENT — FIBROSIS 4 INDEX: FIB4 SCORE: 2.81

## 2022-09-14 NOTE — PROGRESS NOTES
Chief Complaint   Patient presents with    Edema     NP [facility] DX: Edema       Subjective     Regla Meier is a 81 y.o. female who presents today as a new consultation for recent CVA.  She is a 81-year-old female with a past medical history of hypertension hyperlipidemia.  She was in the hospital for stroke.  She had echocardiogram which was unremarkable.  She had no events while on telemetry.  She wore a Zio patch for 14 days which she turned in front of the results of which are not back yet.  Her blood pressure is controlled.  She has been intolerant to atorvastatin.  She stopped this recently.    Past Medical History:   Diagnosis Date    Hyperlipemia     Stroke (HCC)      Past Surgical History:   Procedure Laterality Date    CYSTECTOMY  1963    CATARACT EXTRACTION WITH IOL       Family History   Problem Relation Age of Onset    Other Mother         TIA     Other Father         Abdominal aneurysm     Alcohol abuse Brother     Heart Disease Brother     No Known Problems Daughter     No Known Problems Daughter      Social History     Socioeconomic History    Marital status:      Spouse name: Not on file    Number of children: Not on file    Years of education: Not on file    Highest education level: Not on file   Occupational History    Not on file   Tobacco Use    Smoking status: Never    Smokeless tobacco: Never   Vaping Use    Vaping Use: Never used   Substance and Sexual Activity    Alcohol use: Yes     Alcohol/week: 0.6 oz     Types: 1 Glasses of wine per week     Comment: occ glass of wine    Drug use: No    Sexual activity: Yes     Partners: Male     Comment:  for 57 years. Documented on 4/10/19.   Other Topics Concern    Not on file   Social History Narrative    Not on file     Social Determinants of Health     Financial Resource Strain: Not on file   Food Insecurity: Not on file   Transportation Needs: Not on file   Physical Activity: Not on file   Stress: Not on file   Social  Connections: Not on file   Intimate Partner Violence: Not on file   Housing Stability: Not on file     Allergies   Allergen Reactions    Pcn [Penicillins] Hives     Outpatient Encounter Medications as of 9/14/2022   Medication Sig Dispense Refill    ezetimibe (ZETIA) 10 MG Tab Take 1 Tablet by mouth every day. 30 Tablet 11    rosuvastatin (CRESTOR) 5 MG Tab Take 1 Tablet by mouth every evening. 30 Tablet 11    cyanocobalamin (VITAMIN B-12) 1000 MCG/ML Solution Inject 1 mL into the shoulder, thigh, or buttocks every 30 days. 3 mL 3    Cyanocobalamin (VITAMIN B12 PO) Take 1 Tablet by mouth every day.      FOLIC ACID PO Take 1 Tablet by mouth every day.      QUERCETIN PO Take 1 Tablet by mouth every day.      Non Formulary Request Take 1 Tablet by mouth every day. Arthrozene OTC supplement      cyclobenzaprine (FLEXERIL) 10 mg Tab Take 0.5 Tablets by mouth 3 times a day as needed for Mild Pain, Moderate Pain or Muscle Spasms. 30 Tablet 0    aspirin EC 81 MG EC tablet Take 1 Tablet by mouth every day. 30 Tablet 0    Lutein 40 MG Cap Take 40 mg by mouth every day.      furosemide (LASIX) 20 MG Tab Take 1 Tablet by mouth every day. 30 Tablet 0    potassium chloride SA (K-DUR) 10 MEQ Tab CR Take 1 Tablet by mouth every day. 1 Each 0    asa/apap/caffeine (EXCEDRIN) 250-250-65 MG Tab Take 1-2 Tablets by mouth every 6 hours as needed for Headache.      TURMERIC PO Take 1 Capsule by mouth every day.      [DISCONTINUED] atorvastatin (LIPITOR) 80 MG tablet Take 1 Tablet by mouth every evening. (Patient not taking: No sig reported) 30 Tablet 0     No facility-administered encounter medications on file as of 9/14/2022.     Review of Systems   Constitutional: Negative.  Negative for chills, fever and malaise/fatigue.   HENT: Negative.  Negative for sore throat.    Eyes: Negative.    Respiratory: Negative.  Negative for cough, hemoptysis, sputum production, shortness of breath, wheezing and stridor.    Cardiovascular: Negative.   "Negative for chest pain, palpitations, orthopnea, claudication, leg swelling and PND.   Gastrointestinal: Negative.    Genitourinary: Negative.    Musculoskeletal: Negative.    Skin: Negative.    Neurological: Negative.  Negative for dizziness, loss of consciousness and weakness.   Endo/Heme/Allergies: Negative.  Does not bruise/bleed easily.   All other systems reviewed and are negative.           Objective     /64 (BP Location: Right arm, Patient Position: Sitting, BP Cuff Size: Adult)   Pulse 93   Resp 16   Ht 1.575 m (5' 2\")   Wt 55.3 kg (122 lb)   SpO2 96%   BMI 22.31 kg/m²     Physical Exam  Vitals and nursing note reviewed.   Constitutional:       General: She is not in acute distress.     Appearance: She is well-developed. She is not diaphoretic.   HENT:      Head: Normocephalic and atraumatic.      Right Ear: External ear normal.      Left Ear: External ear normal.      Nose: Nose normal.      Mouth/Throat:      Pharynx: No oropharyngeal exudate.   Eyes:      General: No scleral icterus.        Right eye: No discharge.         Left eye: No discharge.      Conjunctiva/sclera: Conjunctivae normal.      Pupils: Pupils are equal, round, and reactive to light.   Neck:      Vascular: No JVD.   Cardiovascular:      Rate and Rhythm: Normal rate and regular rhythm.      Heart sounds: No murmur heard.    No friction rub. No gallop.   Pulmonary:      Effort: Pulmonary effort is normal. No respiratory distress.      Breath sounds: No stridor. No wheezing or rales.   Chest:      Chest wall: No tenderness.   Abdominal:      General: There is no distension.      Palpations: Abdomen is soft.      Tenderness: There is no guarding.   Musculoskeletal:         General: No tenderness or deformity. Normal range of motion.      Cervical back: Neck supple.   Skin:     General: Skin is warm and dry.      Coloration: Skin is not pale.      Findings: No erythema or rash.   Neurological:      Mental Status: She is alert. "      Cranial Nerves: No cranial nerve deficit.      Motor: No abnormal muscle tone.      Coordination: Coordination normal.      Deep Tendon Reflexes: Reflexes are normal and symmetric. Reflexes normal.   Psychiatric:         Behavior: Behavior normal.         Thought Content: Thought content normal.         Judgment: Judgment normal.          Echocardiogram: Dated 9/9/2022 personally viewed interpreted by myself showing normal LV systolic function no valvular heart disease.    Coronary calcium score: Dated 9/13/2022 personally viewed inter myself showing a high risk calcium score.    Nuclear medicine stress test: Dated 7/11/2022 personally reviewed inter myself showing small region of possible ischemia.  Normal LV systolic function.    Assessment & Plan     1. Leg edema        2. Mixed hyperlipidemia  ezetimibe (ZETIA) 10 MG Tab      3. Pedal edema        4. Edema of both lower extremities        5. Other heart failure (HCC)  rosuvastatin (CRESTOR) 5 MG Tab      6. Hospital discharge follow-up  rosuvastatin (CRESTOR) 5 MG Tab      7. Acute CVA (cerebrovascular accident) (HCC)  ezetimibe (ZETIA) 10 MG Tab    rosuvastatin (CRESTOR) 5 MG Tab      8. Agatston CAC score 200-399  ezetimibe (ZETIA) 10 MG Tab          Medical Decision Making: Today's Assessment/Status/Plan:        81-year-old female with hyperlipidemia hypertension and a recent CVA.  I discussed with her the risk benefits and alternatives of proceeding to a loop recorder.  We will await the results of her Zio patch.  If this shows no evidence we will proceed to a loop recorder.  For her high cholesterol I will start her on a low-dose of rosuvastatin and Zetia I will see her back in 3 months with labs prior.

## 2022-09-19 ENCOUNTER — TELEPHONE (OUTPATIENT)
Dept: CARDIOLOGY | Facility: MEDICAL CENTER | Age: 82
End: 2022-09-19
Payer: MEDICARE

## 2022-09-19 PROCEDURE — 93248 EXT ECG>7D<15D REV&INTERPJ: CPT | Performed by: INTERNAL MEDICINE

## 2022-09-26 ENCOUNTER — HOSPITAL ENCOUNTER (OUTPATIENT)
Dept: LAB | Facility: MEDICAL CENTER | Age: 82
End: 2022-09-26
Attending: NURSE PRACTITIONER
Payer: MEDICARE

## 2022-09-26 DIAGNOSIS — E78.2 MIXED HYPERLIPIDEMIA: ICD-10-CM

## 2022-09-26 DIAGNOSIS — E53.8 VITAMIN B 12 DEFICIENCY: ICD-10-CM

## 2022-09-26 DIAGNOSIS — M79.10 MYALGIA: ICD-10-CM

## 2022-09-26 LAB
ALBUMIN SERPL BCP-MCNC: 3.4 G/DL (ref 3.2–4.9)
ALBUMIN/GLOB SERPL: 1.5 G/DL
ALP SERPL-CCNC: 73 U/L (ref 30–99)
ALT SERPL-CCNC: 17 U/L (ref 2–50)
ANION GAP SERPL CALC-SCNC: 8 MMOL/L (ref 7–16)
AST SERPL-CCNC: 36 U/L (ref 12–45)
BILIRUB SERPL-MCNC: 0.6 MG/DL (ref 0.1–1.5)
BUN SERPL-MCNC: 13 MG/DL (ref 8–22)
CALCIUM SERPL-MCNC: 9.1 MG/DL (ref 8.5–10.5)
CHLORIDE SERPL-SCNC: 104 MMOL/L (ref 96–112)
CHOLEST SERPL-MCNC: 162 MG/DL (ref 100–199)
CO2 SERPL-SCNC: 27 MMOL/L (ref 20–33)
CREAT SERPL-MCNC: 0.7 MG/DL (ref 0.5–1.4)
FASTING STATUS PATIENT QL REPORTED: NORMAL
GFR SERPLBLD CREATININE-BSD FMLA CKD-EPI: 87 ML/MIN/1.73 M 2
GLOBULIN SER CALC-MCNC: 2.3 G/DL (ref 1.9–3.5)
GLUCOSE SERPL-MCNC: 99 MG/DL (ref 65–99)
HDLC SERPL-MCNC: 70 MG/DL
LDLC SERPL CALC-MCNC: 76 MG/DL
POTASSIUM SERPL-SCNC: 4.1 MMOL/L (ref 3.6–5.5)
PROT SERPL-MCNC: 5.7 G/DL (ref 6–8.2)
SODIUM SERPL-SCNC: 139 MMOL/L (ref 135–145)
TRIGL SERPL-MCNC: 79 MG/DL (ref 0–149)
VIT B12 SERPL-MCNC: 2543 PG/ML (ref 211–911)

## 2022-09-26 PROCEDURE — 36415 COLL VENOUS BLD VENIPUNCTURE: CPT

## 2022-09-26 PROCEDURE — 82607 VITAMIN B-12: CPT

## 2022-09-26 PROCEDURE — 80053 COMPREHEN METABOLIC PANEL: CPT

## 2022-09-26 PROCEDURE — 80061 LIPID PANEL: CPT

## 2022-10-13 DIAGNOSIS — R60.0 EDEMA OF BOTH LOWER EXTREMITIES: ICD-10-CM

## 2022-10-13 RX ORDER — POTASSIUM CHLORIDE 750 MG/1
TABLET, FILM COATED, EXTENDED RELEASE ORAL
Qty: 30 TABLET | Refills: 0 | Status: SHIPPED | OUTPATIENT
Start: 2022-10-13 | End: 2022-12-06

## 2022-11-02 ENCOUNTER — OFFICE VISIT (OUTPATIENT)
Dept: NEUROLOGY | Facility: MEDICAL CENTER | Age: 82
End: 2022-11-02
Attending: NURSE PRACTITIONER
Payer: MEDICARE

## 2022-11-02 VITALS
HEIGHT: 62 IN | SYSTOLIC BLOOD PRESSURE: 100 MMHG | RESPIRATION RATE: 14 BRPM | OXYGEN SATURATION: 97 % | BODY MASS INDEX: 23 KG/M2 | TEMPERATURE: 98.5 F | DIASTOLIC BLOOD PRESSURE: 60 MMHG | HEART RATE: 85 BPM | WEIGHT: 125 LBS

## 2022-11-02 DIAGNOSIS — I69.30 LATE EFFECT OF STROKE: ICD-10-CM

## 2022-11-02 DIAGNOSIS — E78.2 MIXED HYPERLIPIDEMIA: ICD-10-CM

## 2022-11-02 PROCEDURE — 99215 OFFICE O/P EST HI 40 MIN: CPT | Performed by: NURSE PRACTITIONER

## 2022-11-02 PROCEDURE — 99212 OFFICE O/P EST SF 10 MIN: CPT | Performed by: NURSE PRACTITIONER

## 2022-11-02 ASSESSMENT — ENCOUNTER SYMPTOMS
SHORTNESS OF BREATH: 0
SINUS PAIN: 0
VOMITING: 0
BRUISES/BLEEDS EASILY: 0
FEVER: 0
HEADACHES: 1
BLOOD IN STOOL: 0
PALPITATIONS: 0
COUGH: 1
WEAKNESS: 0
NAUSEA: 0
NERVOUS/ANXIOUS: 1
FALLS: 0
FOCAL WEAKNESS: 1
DIZZINESS: 0
DEPRESSION: 0
BLURRED VISION: 1

## 2022-11-02 ASSESSMENT — FIBROSIS 4 INDEX: FIB4 SCORE: 3.55

## 2022-11-02 NOTE — PROGRESS NOTES
Subjective         HPI      Regla Meier is an 82 y.o.  right handed female who presents to [facility] for evaluation of infarcts of right thalamus, right temporal and right occipital lobes.   She presented to [facility] on 8/15/2022 with complaints of left-sided weakness and right gaze preference, NIHSS 11. She received tPA.        She was discharged on ASA 81mg and Atorvastatin 80mg.    She is here with her  today.  She is no longer on Atorvastatin 80mg, she had a problem with myalgias in the past with statins, she has since been reduced to Crestor 5mg.    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          Review of Systems   Constitutional:  Negative for fever.   HENT:  Positive for congestion. Negative for nosebleeds and sinus pain.    Eyes:  Positive for blurred vision.   Respiratory:  Positive for cough. Negative for shortness of breath.    Cardiovascular:  Negative for chest pain and palpitations.   Gastrointestinal:  Negative for blood in stool, nausea and vomiting.   Genitourinary:  Negative for hematuria.   Musculoskeletal:  Negative for falls.   Neurological:  Positive for focal weakness and headaches. Negative for dizziness and weakness.   Endo/Heme/Allergies:  Positive for environmental allergies. Does not bruise/bleed easily.   Psychiatric/Behavioral:  Negative for depression. The patient is nervous/anxious.           Current Outpatient Medications on File Prior to Visit   Medication Sig Dispense Refill    potassium chloride ER (KLOR-CON) 10 MEQ tablet Take 1 tablet by mouth once daily 30 Tablet 0    rosuvastatin (CRESTOR) 5 MG Tab Take 1 Tablet by mouth every evening. 30 Tablet 11    cyanocobalamin (VITAMIN B-12) 1000 MCG/ML Solution Inject 1 mL into the shoulder, thigh, or buttocks every 30 days. 3 mL 3    Cyanocobalamin (VITAMIN B12 PO) Take 1 Tablet by mouth every  day.      FOLIC ACID PO Take 1 Tablet by mouth every day.      QUERCETIN PO Take 1 Tablet by mouth every day.      Non Formulary Request Take 1 Tablet by mouth every day. Arthrozene OTC supplement      aspirin EC 81 MG EC tablet Take 1 Tablet by mouth every day. 30 Tablet 0    Lutein 40 MG Cap Take 40 mg by mouth every day.      furosemide (LASIX) 20 MG Tab Take 1 Tablet by mouth every day. 30 Tablet 0    asa/apap/caffeine (EXCEDRIN) 250-250-65 MG Tab Take 1-2 Tablets by mouth every 6 hours as needed for Headache.      TURMERIC PO Take 1 Capsule by mouth every day.      ezetimibe (ZETIA) 10 MG Tab Take 1 Tablet by mouth every day. 30 Tablet 11     No current facility-administered medications on file prior to visit.       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     CT Head 8/15/2022  No acute intracranial process.    Diffuse atrophy and periventricular white matter changes consistent with chronic small vessel disease.        CT Cerebral perfusion 8/15/2022  1.  Cerebral blood flow less than 30% likely representing completed infarct = 0 mL.     2.  T Max more than 6 seconds likely representing  "combination of completed infarct and ischemia = 23 mL.     3.  Mismatched volume likely representing ischemic brain/penumbra = 23 mL     4.  Please note that the cerebral perfusion was performed on the limited brain tissue around the basal ganglia region. Infarct/ischemia outside the CT perfusion sections can be missed in this study.    CTA head 8/15/2022  1.  Thrombosed right posterior cerebral artery in the P2 segment. The artery is reconstituted more distally. There is some evidence of atherosclerosis.     CTA neck 8/15/2022  CT angiogram of the neck within normal limits.  MRI brain 8/16/2022     Acute infarct in the right medial temporal lobe and medial occipital lobe.    Acute infarct in the right thalamus with petechial hemorrhage within.    Age-related volume loss and chronic microvascular ischemic changes.    Stroke Labs: Creat. 0.70, A1C 5.1, LDL 76    Encounter Vitals  Standard Vitals  Vitals  Blood Pressure : 100/60  Temperature: 36.9 °C (98.5 °F)  Temp src: Temporal  Pulse: 85  Respiration: 14  Pulse Oximetry: 97 %  Height: 157.5 cm (5' 2\")  Weight: 56.7 kg (125 lb)  Encounter Vitals  Temperature: 36.9 °C (98.5 °F)  Temp src: Temporal  Blood Pressure : 100/60  Pulse: 85  Respiration: 14  Pulse Oximetry: 97 %  Weight: 56.7 kg (125 lb)  Height: 157.5 cm (5' 2\")  BMI (Calculated): 22.86          PHYSICAL ASSESSMENT  Constitutional:  Alert, no apparent distress,  Psych:   mood and affect WNL  Muskuloskeletal:  Moves all extremities equally, strength 5/5  BUE/BLE flexors/extensors, no drift  NEUROLOGICAL ASSESSMENT  Oriented X 4, speech fluent, naming and memory intact  CN II:  Blind OS.  OD with medial visual field deficits.  Fundoscopic exam is normal with sharp discs and no vascular changes. Pupils are 3 mm and briskly reactive to light.   CN III: IV, VI  EOMs intact, no ptosis  CN V: Facial sensation is intact to pinprick in all 3 divisions bilaterally. Corneal responses are intact.  CN VII: Face is " symmetric with normal eye closure and smile.  CN VIII Hearing is normal to rubbing fingers  CN IX, X: Palate elevates symmetrically. Phonation is normal.  CN XI: Head turning and shoulder shrug are intact  CN XII: Tongue is midline with normal movements and no atrophy.                           Sensation to PP equal bilaterally                 Slight ataxia finger to nose LUE                 Ambulates with steady gait.                 Rhomberg negative                Biceps,brachioradialis, tricep, and patellar reflexes all 2+     Cardiovascular:    S1S2, no abnormal rhythm auscultated, no peripheral edema  Neck:                     No carotid bruits noted   Pulmonary:            Respirations easy, lungs clear to auscultation all fields.     Skin:                     No obvious rashes.    Iniital NIHSS 11      Current NIHSS    1a. LOC: 0  1b. LOC Questions: 0  1c. LOC Commands: 0  2. Best Gaze:0  3. Visual Fields: 2  4. Facial Paresis: 0  5a. Motor arm left: 0  5b. Motor arm right: 0  6a. Motor leg left: 0  6b. Motor leg right: 0  7. Sensory: 0  8. Best Language: 0  9. Limb Ataxia: 1  10. Dysarthria: 0  11. Extinction/Inattention: 0    Total Score Current 3          Current mRS 2           Assessment & Plan       1. Late effect of stroke      Presumed Mechanism by TOAST:  Embolic infarcts of unknown source. S/P tPA, her echocardiogram and 14 day ZIO patch are unremarkable, she does not have atrial fibrillation risk factors.  She does have a Hx of migraine w/aura, this does increase the lifetime risk of embolic stroke by ~ 5%.  There was also some questionable atherosclerosis in the right PCA, this may have caused the occlusion.      __  Large Artery Atherosclerosis  __  Small Vessel (lacunar)  __   Cardioembolic  __   Other (Sickle cell, vasculitis, hypercoagulable)  __XX   Unknown      Stroke risk factors:  migraine with aura, HLD  Blood pressure goal less than 130/80, current 110/60    Continue ASA 81mg PO daily  for stroke prevention, discussed signs of bleeding.      2. Mixed hyperlipidemia      LDL goal < 70, current 76, continue Crestor 5mg, goal to get to 20mg if possible for secondary stroke prevention per SPARCL trial, discussed medication side effects, will need follow up with primary care evaluate liver function at intervals and refill  Exercise at least 30 minutes daily, avoid/minimize red meat, fried foods, butter, cheese.   Eat 5-6 servings of vegetables and fruits daily, choose lean white meat without skin (chicken, turkey, white fish)--baked, broiled or grilled.      If any new signs of stroke:  sudden weakness, numbness, speech difficulty (slurring or difficulty finding words), imbalance, incoordination, worse headache of life or vision loss occur, call 911.      Take all medications as prescribed unless instructed by your provider.        I spent a total of 43 minutes caring for patient,  my time includes counseling, review of systems, HPI and assessment, review of images, labs and testing as above.  I reviewed the hospital records, PMH, social and family history.   I have counseled patient on stroke prevention strategies, stroke symptoms and mimics.  Diet and exercise modifications.  We discussed medication side effects and instructions.       I have provided patient a written personalized stroke prevention plan,  it is filed under the media tab under '[facility]”.      Follow up PRN

## 2022-11-02 NOTE — LETTER
[facility]  SHANNON Ontiveros  [address]  [facility] [address]  Fax: [phone]   Authorization for Release/Disclosure of   Protected Health Information   Name: REGLA MEIER : 1940 SSN: [id number]   Address: [address] [place] [address] Phone:    [phone] (home)    I authorize the entity listed below to release/disclose the PHI below to:   [facility]/SHANNON Ontiveros and SHANNON Acevedo   Provider or Entity Name:     Address   City, State, Zip   Phone:      Fax:     Reason for request: continuity of care   Information to be released:    [  ] LAST COLONOSCOPY,  including any PATH REPORT and follow-up  [  ] LAST FIT/COLOGUARD RESULT [  ] LAST DEXA  [  ] LAST MAMMOGRAM  [  ] LAST PAP  [  ] LAST LABS [  ] RETINA EXAM REPORT  [  ] IMMUNIZATION RECORDS  [  ] Release all info      [  ] Check here and initial the line next to each item to release ALL health information INCLUDING  _____ Care and treatment for drug and / or alcohol abuse  _____ HIV testing, infection status, or AIDS  _____ Genetic Testing    DATES OF SERVICE OR TIME PERIOD TO BE DISCLOSED: _____________  I understand and acknowledge that:  * This Authorization may be revoked at any time by you in writing, except if your health information has already been used or disclosed.  * Your health information that will be used or disclosed as a result of you signing this authorization could be re-disclosed by the recipient. If this occurs, your re-disclosed health information may no longer be protected by State or Federal laws.  * You may refuse to sign this Authorization. Your refusal will not affect your ability to obtain treatment.  * This Authorization becomes effective upon signing and will  on (date) __________.      If no date is indicated, this Authorization will  one (1) year from the signature date.    Name: Regla Meier    Signature:   Date:     2022        PLEASE FAX REQUESTED RECORDS BACK TO: [phone]

## 2022-11-03 ENCOUNTER — OFFICE VISIT (OUTPATIENT)
Dept: MEDICAL GROUP | Facility: PHYSICIAN GROUP | Age: 82
End: 2022-11-03
Payer: MEDICARE

## 2022-11-03 ENCOUNTER — APPOINTMENT (OUTPATIENT)
Dept: MEDICAL GROUP | Facility: PHYSICIAN GROUP | Age: 82
End: 2022-11-03
Payer: MEDICARE

## 2022-11-03 VITALS
SYSTOLIC BLOOD PRESSURE: 102 MMHG | HEART RATE: 84 BPM | OXYGEN SATURATION: 98 % | RESPIRATION RATE: 16 BRPM | BODY MASS INDEX: 23.19 KG/M2 | WEIGHT: 126 LBS | TEMPERATURE: 97.8 F | HEIGHT: 62 IN | DIASTOLIC BLOOD PRESSURE: 66 MMHG

## 2022-11-03 DIAGNOSIS — I63.9 ACUTE CVA (CEREBROVASCULAR ACCIDENT) (HCC): ICD-10-CM

## 2022-11-03 DIAGNOSIS — L65.9 HAIR THINNING: ICD-10-CM

## 2022-11-03 DIAGNOSIS — L60.3 NAIL BRITTLENESS: ICD-10-CM

## 2022-11-03 DIAGNOSIS — Z23 NEED FOR VACCINATION: ICD-10-CM

## 2022-11-03 PROCEDURE — G0008 ADMIN INFLUENZA VIRUS VAC: HCPCS | Performed by: NURSE PRACTITIONER

## 2022-11-03 PROCEDURE — 99214 OFFICE O/P EST MOD 30 MIN: CPT | Mod: 25 | Performed by: NURSE PRACTITIONER

## 2022-11-03 PROCEDURE — 90662 IIV NO PRSV INCREASED AG IM: CPT | Performed by: NURSE PRACTITIONER

## 2022-11-03 ASSESSMENT — FIBROSIS 4 INDEX: FIB4 SCORE: 3.55

## 2022-11-03 NOTE — ASSESSMENT & PLAN NOTE
Patient has a stroke in August.  Left-sided residual weakness.  Patient has decreased, she is following with ophthalmology.  Is also followed by cardiology, Zio patch ordered, results pending.  She is also followed by neurology.  She is on Zetia 10 mg and rosuvastatin 5 mg as she did not tolerate higher dose of statin.  She is on aspirin 81 mg, no bleeding.   Her recent vit B12 levels are supratherapeutic.   She takes CoQ-10 recommended by cardiology.   Her recent blood work is within normal limits here at her blood pressure is well controlled.  She is not dizzy.  She has a designated .  Has no issues with ambulation or swallowing.

## 2022-11-03 NOTE — ASSESSMENT & PLAN NOTE
Noted brittle nails over 6 months.  Taking zinc and vitamin C supplementation.  Have not had her thyroid checked.  Had recent stroke in August.

## 2022-11-04 NOTE — PROGRESS NOTES
Chief Complaint   Patient presents with    Lab Results       HISTORY OF PRESENT ILLNESS: Patient is a 82 y.o. female, established patient who presents today to discuss medical problems as listed below:    Health Maintenance:  COMPLETED     Acute CVA (cerebrovascular accident) (HCC)  Patient has a stroke in August.  Left-sided residual weakness.  Patient has decreased, she is following with ophthalmology.  Is also followed by cardiology, Zio patch ordered, results pending.  She is also followed by neurology.  She is on Zetia 10 mg and rosuvastatin 5 mg as she did not tolerate higher dose of statin.  She is on aspirin 81 mg, no bleeding.   Her recent vit B12 levels are supratherapeutic.   She takes CoQ-10 recommended by cardiology.   Her recent blood work is within normal limits here at her blood pressure is well controlled.  She is not dizzy.  She has a designated .  Has no issues with ambulation or swallowing.    Nail brittleness  Noted brittle nails over 6 months.  Taking zinc and vitamin C supplementation.  Have not had her thyroid checked.  Had recent stroke in August.    Patient Active Problem List    Diagnosis Date Noted    Nail brittleness 11/03/2022    Late effect of stroke 11/02/2022    Agatston CAC score 200-399 09/14/2022    Acute CVA (cerebrovascular accident) (HCC) 08/15/2022    Hospital discharge follow-up 07/20/2022    Leg edema 07/09/2022    Other heart failure ([facility]) 06/21/2022    Edema of both lower extremities 06/03/2022    Neuropathy of both feet 06/03/2022    Close exposure to COVID-19 virus 12/07/2021    Cough 12/07/2021    Pedal edema 08/24/2021    Hyperlipemia 11/26/2019    Legally blind in left eye, as defined in [place] 11/26/2019    History of retinal vein occlusion 04/10/2019        Allergies: Pcn [penicillins]    Current Outpatient Medications   Medication Sig Dispense Refill    potassium chloride ER (KLOR-CON) 10 MEQ tablet Take 1 tablet by mouth once daily 30 Tablet 0    ezetimibe  (ZETIA) 10 MG Tab Take 1 Tablet by mouth every day. 30 Tablet 11    rosuvastatin (CRESTOR) 5 MG Tab Take 1 Tablet by mouth every evening. 30 Tablet 11    cyanocobalamin (VITAMIN B-12) 1000 MCG/ML Solution Inject 1 mL into the shoulder, thigh, or buttocks every 30 days. 3 mL 3    FOLIC ACID PO Take 1 Tablet by mouth every day.      QUERCETIN PO Take 1 Tablet by mouth every day.      Non Formulary Request Take 1 Tablet by mouth every day. Arthrozene OTC supplement      aspirin EC 81 MG EC tablet Take 1 Tablet by mouth every day. 30 Tablet 0    Lutein 40 MG Cap Take 40 mg by mouth every day.      furosemide (LASIX) 20 MG Tab Take 1 Tablet by mouth every day. 30 Tablet 0    asa/apap/caffeine (EXCEDRIN) 250-250-65 MG Tab Take 1-2 Tablets by mouth every 6 hours as needed for Headache.      TURMERIC PO Take 1 Capsule by mouth every day.      Cyanocobalamin (VITAMIN B12 PO) Take 1 Tablet by mouth every day. (Patient not taking: Reported on 11/3/2022)       No current facility-administered medications for this visit.       Social History     Tobacco Use    Smoking status: Never    Smokeless tobacco: Never   Vaping Use    Vaping Use: Never used   Substance Use Topics    Alcohol use: Yes     Alcohol/week: 0.6 oz     Types: 1 Glasses of wine per week     Comment: occ glass of wine    Drug use: No     Social History     Social History Narrative    Not on file       Family History   Problem Relation Age of Onset    Other Mother         TIA     Other Father         Abdominal aneurysm     Alcohol abuse Brother     Heart Disease Brother     No Known Problems Daughter     No Known Problems Daughter        Allergies, past medical history, past surgical history, family history, social history reviewed and updated.    Review of Systems:     - Constitutional: Negative for fever, chills, unexpected weight change, and fatigue/generalized weakness.      - Respiratory: Negative for cough, sputum production, chest congestion, dyspnea,  "wheezing, and crackles.      - Cardiovascular: Negative for chest pain, palpitations, orthopnea, and bilateral lower extremity edema.      - Skin: brittle nails and thinning hair.     - Psychiatric/Behavioral: Negative for depression, suicidal/homicidal ideation and memory loss.      All other systems reviewed and are negative    Exam:    /66 (BP Location: Right arm, Patient Position: Sitting, BP Cuff Size: Adult)   Pulse 84   Temp 36.6 °C (97.8 °F) (Temporal)   Resp 16   Ht 1.575 m (5' 2\")   Wt 57.2 kg (126 lb)   SpO2 98%   BMI 23.05 kg/m²  Body mass index is 23.05 kg/m².    Physical Exam:  Constitutional: Well-developed and well-nourished. Not diaphoretic. No distress.   Cardiovascular: Regular rate and rhythm, S1 and S2 without murmur, rubs, or gallops.    Chest: Effort normal. Clear to auscultation throughout. No adventitious sounds. Neurological: Alert and oriented x 3.   Psychiatric:  Behavior, mood, and affect are appropriate.  MA/nursing note and vitals reviewed.    LABS: 2022  results reviewed and discussed with the patient, questions answered.    Assessment/Plan:  1. Need for vaccination  - INFLUENZA VACCINE, HIGH DOSE (65+ ONLY)    2. Acute CVA (cerebrovascular accident) (HCC)  Stable.  She is followed by neurology and cardiology.    3. Hair thinning  Will check thyroid panel.  Once labs are complete, start taking biotin, stable hydrated, also continue to take zinc and vitamin C and D.  We will follow-up in 4 weeks.  - TSH; Future   - T3 FREE; Future  - FREE THYROXINE; Future    4. Nail brittleness  - TSH; Future  - T3 FREE; Future  - FREE THYROXINE; Future       Discussed with patient possible alternative diagnoses, patient is to take all medications as prescribed.      If symptoms persist FU w/PCP, if symptoms worsen go to emergency room.      If experiencing any side effects from prescribed medications report to the office immediately or go to emergency room.     Reviewed indication, " dosage, usage and potential adverse effects of prescribed medications.      Reviewed risks and benefits of treatment plan. Patient verbalizes understanding of all instruction and verbally agrees to plan.     Discussed plan with the patient, and patient agrees to the above.      I personally reviewed prior external notes and test results pertinent to today's visit.      No follow-ups on file. 4 wks

## 2022-11-07 ENCOUNTER — PATIENT MESSAGE (OUTPATIENT)
Dept: HEALTH INFORMATION MANAGEMENT | Facility: OTHER | Age: 82
End: 2022-11-07

## 2022-11-21 ENCOUNTER — HOSPITAL ENCOUNTER (OUTPATIENT)
Dept: LAB | Facility: MEDICAL CENTER | Age: 82
End: 2022-11-21
Attending: NURSE PRACTITIONER
Payer: MEDICARE

## 2022-11-21 DIAGNOSIS — L65.9 HAIR THINNING: ICD-10-CM

## 2022-11-21 DIAGNOSIS — L60.3 NAIL BRITTLENESS: ICD-10-CM

## 2022-11-21 LAB
T3FREE SERPL-MCNC: 2.82 PG/ML (ref 2–4.4)
T4 FREE SERPL-MCNC: 1.47 NG/DL (ref 0.93–1.7)
TSH SERPL DL<=0.005 MIU/L-ACNC: 4.47 UIU/ML (ref 0.38–5.33)

## 2022-11-21 PROCEDURE — 84481 FREE ASSAY (FT-3): CPT

## 2022-11-21 PROCEDURE — 84439 ASSAY OF FREE THYROXINE: CPT

## 2022-11-21 PROCEDURE — 36415 COLL VENOUS BLD VENIPUNCTURE: CPT

## 2022-11-21 PROCEDURE — 84443 ASSAY THYROID STIM HORMONE: CPT

## 2022-11-28 ENCOUNTER — TELEPHONE (OUTPATIENT)
Dept: OCCUPATIONAL THERAPY | Facility: MEDICAL CENTER | Age: 82
End: 2022-11-28

## 2022-11-29 NOTE — THERAPY
Called patient between 75 and 120 days after discharge. Assessed Modified Kelford Scale to be 2    Mr La is a 41 year old male who presented to [facility] ED for evaluation of right leg pain after a fall one week ago. CTA of chest showed no PE but did show peripheral tree-in-bud opacities to the lower lobes, right middle lobe with bronchial wall thickening. Today, patient feeling well, denies chest pain or shortness of breath. O 2 sats 98 % on room air. Troponin mildly elevated and flat. Will continue treatment as outpatient. He was seen, examined and deemed suitable for discharge.

## 2022-12-06 DIAGNOSIS — R60.0 EDEMA OF BOTH LOWER EXTREMITIES: ICD-10-CM

## 2022-12-06 DIAGNOSIS — I50.810 RIGHT-SIDED CONGESTIVE HEART FAILURE, UNSPECIFIED HF CHRONICITY (HCC): ICD-10-CM

## 2022-12-06 RX ORDER — FUROSEMIDE 20 MG/1
TABLET ORAL
Qty: 30 TABLET | Refills: 0 | Status: SHIPPED | OUTPATIENT
Start: 2022-12-06 | End: 2022-12-07 | Stop reason: SDUPTHER

## 2022-12-06 RX ORDER — POTASSIUM CHLORIDE 750 MG/1
TABLET, FILM COATED, EXTENDED RELEASE ORAL
Qty: 30 TABLET | Refills: 0 | Status: SHIPPED | OUTPATIENT
Start: 2022-12-06 | End: 2022-12-07 | Stop reason: SDUPTHER

## 2022-12-07 ENCOUNTER — OFFICE VISIT (OUTPATIENT)
Dept: CARDIOLOGY | Facility: MEDICAL CENTER | Age: 82
End: 2022-12-07
Payer: MEDICARE

## 2022-12-07 ENCOUNTER — TELEPHONE (OUTPATIENT)
Dept: CARDIOLOGY | Facility: MEDICAL CENTER | Age: 82
End: 2022-12-07

## 2022-12-07 VITALS
WEIGHT: 131 LBS | DIASTOLIC BLOOD PRESSURE: 60 MMHG | HEIGHT: 62 IN | OXYGEN SATURATION: 98 % | HEART RATE: 84 BPM | SYSTOLIC BLOOD PRESSURE: 98 MMHG | RESPIRATION RATE: 16 BRPM | BODY MASS INDEX: 24.11 KG/M2

## 2022-12-07 DIAGNOSIS — I63.9 ACUTE CVA (CEREBROVASCULAR ACCIDENT) (HCC): ICD-10-CM

## 2022-12-07 DIAGNOSIS — R60.0 PEDAL EDEMA: ICD-10-CM

## 2022-12-07 DIAGNOSIS — R60.0 EDEMA OF BOTH LOWER EXTREMITIES: ICD-10-CM

## 2022-12-07 DIAGNOSIS — R93.1 AGATSTON CAC SCORE 200-399: ICD-10-CM

## 2022-12-07 DIAGNOSIS — Z20.822 CLOSE EXPOSURE TO COVID-19 VIRUS: ICD-10-CM

## 2022-12-07 DIAGNOSIS — I50.89 OTHER HEART FAILURE (HCC): ICD-10-CM

## 2022-12-07 DIAGNOSIS — R60.0 LEG EDEMA: Chronic | ICD-10-CM

## 2022-12-07 DIAGNOSIS — E78.2 MIXED HYPERLIPIDEMIA: ICD-10-CM

## 2022-12-07 DIAGNOSIS — Z09 HOSPITAL DISCHARGE FOLLOW-UP: ICD-10-CM

## 2022-12-07 DIAGNOSIS — I50.810 RIGHT-SIDED CONGESTIVE HEART FAILURE, UNSPECIFIED HF CHRONICITY (HCC): ICD-10-CM

## 2022-12-07 PROCEDURE — 99214 OFFICE O/P EST MOD 30 MIN: CPT | Performed by: INTERNAL MEDICINE

## 2022-12-07 RX ORDER — EZETIMIBE 10 MG/1
10 TABLET ORAL DAILY
Qty: 90 TABLET | Refills: 11 | Status: SHIPPED | OUTPATIENT
Start: 2022-12-07 | End: 2023-03-02

## 2022-12-07 RX ORDER — POTASSIUM CHLORIDE 750 MG/1
10 TABLET, FILM COATED, EXTENDED RELEASE ORAL DAILY
Qty: 90 TABLET | Refills: 3 | Status: SHIPPED | OUTPATIENT
Start: 2022-12-07 | End: 2023-02-02

## 2022-12-07 RX ORDER — ROSUVASTATIN CALCIUM 5 MG/1
5 TABLET, COATED ORAL EVERY EVENING
Qty: 90 TABLET | Refills: 11 | Status: SHIPPED | OUTPATIENT
Start: 2022-12-07 | End: 2023-03-20 | Stop reason: SDUPTHER

## 2022-12-07 RX ORDER — FUROSEMIDE 20 MG/1
20 TABLET ORAL DAILY
Qty: 90 TABLET | Refills: 3 | Status: SHIPPED | OUTPATIENT
Start: 2022-12-07 | End: 2023-02-02

## 2022-12-07 ASSESSMENT — ENCOUNTER SYMPTOMS
CARDIOVASCULAR NEGATIVE: 1
HEMOPTYSIS: 0
PALPITATIONS: 0
BRUISES/BLEEDS EASILY: 0
SPUTUM PRODUCTION: 0
FEVER: 0
SHORTNESS OF BREATH: 0
ORTHOPNEA: 0
LOSS OF CONSCIOUSNESS: 0
PND: 0
CONSTITUTIONAL NEGATIVE: 1
WHEEZING: 0
CLAUDICATION: 0
MUSCULOSKELETAL NEGATIVE: 1
STRIDOR: 0
COUGH: 0
RESPIRATORY NEGATIVE: 1
CHILLS: 0
SORE THROAT: 0
WEAKNESS: 0
GASTROINTESTINAL NEGATIVE: 1
DIZZINESS: 0
EYES NEGATIVE: 1
NEUROLOGICAL NEGATIVE: 1

## 2022-12-07 ASSESSMENT — FIBROSIS 4 INDEX: FIB4 SCORE: 3.55

## 2022-12-07 NOTE — TELEPHONE ENCOUNTER
LOGAN Peterson ordered a CL-IMPLANTABLE LOOP RECORDER [[id number]]   for patient attached to have scheduled.     Thank you  Edwina Arteaga Ass't

## 2022-12-07 NOTE — PROGRESS NOTES
Chief Complaint   Patient presents with    Edema     F/V Dx: Leg edema    Hyperlipidemia    Other     F/V Dx: Late effect of stroke       Subjective     Regla Meier is a 81 y.o. female who presents today as a new consultation for recent CVA.  She is a 81-year-old female with a past medical history of hypertension hyperlipidemia.      Since she was last seen she wore an event recorder that showed short nonsustained runs of of SVT but no A. fib.  Her blood pressures been controlled.  She is frustrated by her loss of vision.  She had to lose her 's license.  She is otherwise been doing well.    Past Medical History:   Diagnosis Date    Hyperlipemia     Stroke (HCC)      Past Surgical History:   Procedure Laterality Date    CYSTECTOMY  1963    CATARACT EXTRACTION WITH IOL       Family History   Problem Relation Age of Onset    Other Mother         TIA     Other Father         Abdominal aneurysm     Alcohol abuse Brother     Heart Disease Brother     No Known Problems Daughter     No Known Problems Daughter      Social History     Socioeconomic History    Marital status:      Spouse name: Not on file    Number of children: Not on file    Years of education: Not on file    Highest education level: Not on file   Occupational History    Not on file   Tobacco Use    Smoking status: Never    Smokeless tobacco: Never   Vaping Use    Vaping Use: Never used   Substance and Sexual Activity    Alcohol use: Yes     Alcohol/week: 0.6 oz     Types: 1 Glasses of wine per week     Comment: occ glass of wine    Drug use: No    Sexual activity: Yes     Partners: Male     Comment:  for 57 years. Documented on 4/10/19.   Other Topics Concern    Not on file   Social History Narrative    Not on file     Social Determinants of Health     Financial Resource Strain: Not on file   Food Insecurity: Not on file   Transportation Needs: Not on file   Physical Activity: Not on file   Stress: Not on file   Social  Connections: Not on file   Intimate Partner Violence: Not on file   Housing Stability: Not on file     Allergies   Allergen Reactions    Pcn [Penicillins] Hives     Outpatient Encounter Medications as of 12/7/2022   Medication Sig Dispense Refill    Coenzyme Q10 (CO Q 10 PO) Take  by mouth.      rosuvastatin (CRESTOR) 5 MG Tab Take 1 Tablet by mouth every evening. 90 Tablet 11    potassium chloride ER (KLOR-CON) 10 MEQ tablet Take 1 Tablet by mouth every day. 90 Tablet 3    furosemide (LASIX) 20 MG Tab Take 1 Tablet by mouth every day. 90 Tablet 3    ezetimibe (ZETIA) 10 MG Tab Take 1 Tablet by mouth every day. 90 Tablet 11    cyanocobalamin (VITAMIN B-12) 1000 MCG/ML Solution Inject 1 mL into the shoulder, thigh, or buttocks every 30 days. 3 mL 3    Cyanocobalamin (VITAMIN B12 PO) Take 1 Tablet by mouth every day.      FOLIC ACID PO Take 1 Tablet by mouth every day.      QUERCETIN PO Take 1 Tablet by mouth every day.      Non Formulary Request Take 1 Tablet by mouth every day. Arthrozene OTC supplement      aspirin EC 81 MG EC tablet Take 1 Tablet by mouth every day. 30 Tablet 0    Lutein 40 MG Cap Take 40 mg by mouth every day.      asa/apap/caffeine (EXCEDRIN) 250-250-65 MG Tab Take 1-2 Tablets by mouth every 6 hours as needed for Headache.      TURMERIC PO Take 1 Capsule by mouth every day.      [DISCONTINUED] potassium chloride ER (KLOR-CON) 10 MEQ tablet Take 1 tablet by mouth once daily 30 Tablet 0    [DISCONTINUED] furosemide (LASIX) 20 MG Tab Take 1 tablet by mouth once daily 30 Tablet 0    [DISCONTINUED] ezetimibe (ZETIA) 10 MG Tab Take 1 Tablet by mouth every day. 30 Tablet 11    [DISCONTINUED] rosuvastatin (CRESTOR) 5 MG Tab Take 1 Tablet by mouth every evening. 30 Tablet 11     No facility-administered encounter medications on file as of 12/7/2022.     Review of Systems   Constitutional: Negative.  Negative for chills, fever and malaise/fatigue.   HENT: Negative.  Negative for sore throat.    Eyes:  "Negative.    Respiratory: Negative.  Negative for cough, hemoptysis, sputum production, shortness of breath, wheezing and stridor.    Cardiovascular: Negative.  Negative for chest pain, palpitations, orthopnea, claudication, leg swelling and PND.   Gastrointestinal: Negative.    Genitourinary: Negative.    Musculoskeletal: Negative.    Skin: Negative.    Neurological: Negative.  Negative for dizziness, loss of consciousness and weakness.   Endo/Heme/Allergies: Negative.  Does not bruise/bleed easily.   All other systems reviewed and are negative.           Objective     BP (!) 98/60 (BP Location: Left arm, Patient Position: Sitting, BP Cuff Size: Adult)   Pulse 84   Resp 16   Ht 1.575 m (5' 2\")   Wt 59.4 kg (131 lb)   SpO2 98%   BMI 23.96 kg/m²     Physical Exam  Vitals and nursing note reviewed.   Constitutional:       General: She is not in acute distress.     Appearance: She is well-developed. She is not diaphoretic.   HENT:      Head: Normocephalic and atraumatic.      Right Ear: External ear normal.      Left Ear: External ear normal.      Nose: Nose normal.      Mouth/Throat:      Pharynx: No oropharyngeal exudate.   Eyes:      General: No scleral icterus.        Right eye: No discharge.         Left eye: No discharge.      Conjunctiva/sclera: Conjunctivae normal.      Pupils: Pupils are equal, round, and reactive to light.   Neck:      Vascular: No JVD.   Cardiovascular:      Rate and Rhythm: Normal rate and regular rhythm.      Heart sounds: No murmur heard.    No friction rub. No gallop.   Pulmonary:      Effort: Pulmonary effort is normal. No respiratory distress.      Breath sounds: No stridor. No wheezing or rales.   Chest:      Chest wall: No tenderness.   Abdominal:      General: There is no distension.      Palpations: Abdomen is soft.      Tenderness: There is no guarding.   Musculoskeletal:         General: No tenderness or deformity. Normal range of motion.      Cervical back: Neck supple. "   Skin:     General: Skin is warm and dry.      Coloration: Skin is not pale.      Findings: No erythema or rash.   Neurological:      Mental Status: She is alert.      Cranial Nerves: No cranial nerve deficit.      Motor: No abnormal muscle tone.      Coordination: Coordination normal.      Deep Tendon Reflexes: Reflexes are normal and symmetric. Reflexes normal.   Psychiatric:         Behavior: Behavior normal.         Thought Content: Thought content normal.         Judgment: Judgment normal.          Echocardiogram: Dated 9/9/2022 personally viewed interpreted by myself showing normal LV systolic function no valvular heart disease.    Coronary calcium score: Dated 9/13/2022 personally viewed inter myself showing a high risk calcium score.    Nuclear medicine stress test: Dated 7/11/2022 personally reviewed inter myself showing small region of possible ischemia.  Normal LV systolic function.    Assessment & Plan     1. Leg edema        2. Mixed hyperlipidemia  ezetimibe (ZETIA) 10 MG Tab      3. Pedal edema        4. Close exposure to COVID-19 virus        5. Other heart failure (HCC)  rosuvastatin (CRESTOR) 5 MG Tab      6. Hospital discharge follow-up  rosuvastatin (CRESTOR) 5 MG Tab      7. Acute CVA (cerebrovascular accident) (HCC)  rosuvastatin (CRESTOR) 5 MG Tab    ezetimibe (ZETIA) 10 MG Tab    CL-IMPLANTABLE LOOP RECORDER      8. Agatston CAC score 200-399  ezetimibe (ZETIA) 10 MG Tab      9. Edema of both lower extremities  potassium chloride ER (KLOR-CON) 10 MEQ tablet    furosemide (LASIX) 20 MG Tab      10. Right-sided congestive heart failure, unspecified HF chronicity (HCC)  furosemide (LASIX) 20 MG Tab          Medical Decision Making: Today's Assessment/Status/Plan:        81-year-old female with hyperlipidemia hypertension and a recent CVA.  I discussed with her the risk benefits and alternatives of placing implantable loop recorder.  She agrees to proceed.  I will see her back in 6  months.

## 2022-12-08 ENCOUNTER — OFFICE VISIT (OUTPATIENT)
Dept: MEDICAL GROUP | Facility: MEDICAL CENTER | Age: 82
End: 2022-12-08
Payer: MEDICARE

## 2022-12-08 VITALS
RESPIRATION RATE: 16 BRPM | HEART RATE: 76 BPM | BODY MASS INDEX: 23.53 KG/M2 | OXYGEN SATURATION: 97 % | WEIGHT: 127.87 LBS | HEIGHT: 62 IN | TEMPERATURE: 98.3 F | DIASTOLIC BLOOD PRESSURE: 74 MMHG | SYSTOLIC BLOOD PRESSURE: 110 MMHG

## 2022-12-08 DIAGNOSIS — E53.8 VITAMIN B12 DEFICIENCY: ICD-10-CM

## 2022-12-08 DIAGNOSIS — L60.3 NAIL BRITTLENESS: ICD-10-CM

## 2022-12-08 DIAGNOSIS — G57.93 NEUROPATHY OF BOTH FEET: ICD-10-CM

## 2022-12-08 PROCEDURE — 99214 OFFICE O/P EST MOD 30 MIN: CPT | Performed by: NURSE PRACTITIONER

## 2022-12-08 RX ORDER — NORTRIPTYLINE HYDROCHLORIDE 25 MG/1
25-50 CAPSULE ORAL NIGHTLY
Qty: 60 CAPSULE | Refills: 1 | Status: SHIPPED | OUTPATIENT
Start: 2022-12-08 | End: 2022-12-20 | Stop reason: SDUPTHER

## 2022-12-08 ASSESSMENT — FIBROSIS 4 INDEX: FIB4 SCORE: 3.55

## 2022-12-08 NOTE — PROGRESS NOTES
Chief Complaint   Patient presents with    Follow-Up       HISTORY OF PRESENT ILLNESS: Patient is a 82 y.o. female, established patient who presents today to discuss medical problems as listed below:    Health Maintenance:  COMPLETED     Neuropathy of both feet  Chronic and stable. Neuropathy in lower extremities, pins and needle sensations. Was taking OTC nerve renew which was helping.    Vitamin B12 deficiency   Latest Reference Range & Units 09/26/22 08:31   Vitamin B12 -True Cobalamin 211 - 911 pg/mL 2543 (H)   (H): Data is abnormally high  Taking sublingual supplement. Was on injections and reports better energy levels.     Nail brittleness  Chronic persistent problem. Brittle nails. Taking OTC collagen. Recent thyroid panel WNL.    Patient Active Problem List    Diagnosis Date Noted    Vitamin B12 deficiency 12/08/2022    Nail brittleness 11/03/2022    Late effect of stroke 11/02/2022    Agatston CAC score 200-399 09/14/2022    Acute CVA (cerebrovascular accident) ([facility]) 08/15/2022    Hospital discharge follow-up 07/20/2022    Leg edema 07/09/2022    Other heart failure (HCC) 06/21/2022    Edema of both lower extremities 06/03/2022    Neuropathy of both feet 06/03/2022    Close exposure to COVID-19 virus 12/07/2021    Cough 12/07/2021    Pedal edema 08/24/2021    Hyperlipemia 11/26/2019    Legally blind in left eye, as defined in [place] 11/26/2019    History of retinal vein occlusion 04/10/2019        Allergies: Atorvastatin and Pcn [penicillins]    Current Outpatient Medications   Medication Sig Dispense Refill    nortriptyline (PAMELOR) 25 MG Cap Take 1-2 Capsules by mouth every evening. 60 Capsule 1    Coenzyme Q10 (CO Q 10 PO) Take  by mouth.      rosuvastatin (CRESTOR) 5 MG Tab Take 1 Tablet by mouth every evening. 90 Tablet 11    potassium chloride ER (KLOR-CON) 10 MEQ tablet Take 1 Tablet by mouth every day. 90 Tablet 3    furosemide (LASIX) 20 MG Tab Take 1 Tablet by mouth every day. 90 Tablet 3     ezetimibe (ZETIA) 10 MG Tab Take 1 Tablet by mouth every day. 90 Tablet 11    Cyanocobalamin (VITAMIN B12 PO) Take 1 Tablet by mouth every day.      FOLIC ACID PO Take 1 Tablet by mouth every day.      QUERCETIN PO Take 1 Tablet by mouth every day.      Non Formulary Request Take 1 Tablet by mouth every day. Arthrozene OTC supplement      aspirin EC 81 MG EC tablet Take 1 Tablet by mouth every day. 30 Tablet 0    Lutein 40 MG Cap Take 40 mg by mouth every day.      asa/apap/caffeine (EXCEDRIN) 250-250-65 MG Tab Take 1-2 Tablets by mouth every 6 hours as needed for Headache.      TURMERIC PO Take 1 Capsule by mouth every day.      cyanocobalamin (VITAMIN B-12) 1000 MCG/ML Solution Inject 1 mL into the shoulder, thigh, or buttocks every 30 days. (Patient not taking: Reported on 12/8/2022) 3 mL 3     No current facility-administered medications for this visit.       Social History     Tobacco Use    Smoking status: Never    Smokeless tobacco: Never   Vaping Use    Vaping Use: Never used   Substance Use Topics    Alcohol use: Yes     Alcohol/week: 0.6 oz     Types: 1 Glasses of wine per week     Comment: occ glass of wine    Drug use: No     Social History     Social History Narrative    Not on file       Family History   Problem Relation Age of Onset    Other Mother         TIA     Other Father         Abdominal aneurysm     Alcohol abuse Brother     Heart Disease Brother     No Known Problems Daughter     No Known Problems Daughter        Allergies, past medical history, past surgical history, family history, social history reviewed and updated.    Review of Systems:     - Constitutional: Negative for fever, chills, unexpected weight change, and fatigue/generalized weakness.     - Respiratory: Negative for cough, sputum production, chest congestion, dyspnea, wheezing, and crackles.      - Cardiovascular: Negative for chest pain, palpitations, orthopnea, and bilateral lower extremity edema.    - Neurological: tingling  ", pins and needles sensations in lower extremities   - skin: dry thin nails in both hands      - Psychiatric/Behavioral: Negative for depression, suicidal/homicidal ideation and memory loss.      All other systems reviewed and are negative    Exam:    /74 (BP Location: Left arm, Patient Position: Sitting, BP Cuff Size: Adult)   Pulse 76   Temp 36.8 °C (98.3 °F) (Temporal)   Resp 16   Ht 1.575 m (5' 2\")   Wt 58 kg (127 lb 13.9 oz)   SpO2 97%   BMI 23.39 kg/m²  Body mass index is 23.39 kg/m².    Physical Exam:  Constitutional: Well-developed and well-nourished. Not diaphoretic. No distress.   Skin: brittle dry nails in both hands  Cardiovascular: Regular rate and rhythm, S1 and S2 without murmur, rubs, or gallops.    Chest: Effort normal. Clear to auscultation throughout. No adventitious sounds.   Neurological: Alert and oriented x 3.   Psychiatric:  Behavior, mood, and affect are appropriate.  MA/nursing note and vitals reviewed.    LABS: 11/2022  results reviewed and discussed with the patient, questions answered.    Assessment/Plan:  1. Neuropathy of both feet  Continue OTC supplement. Trial of Pamelor. Will follow up in 4-6 wks  - nortriptyline (PAMELOR) 25 MG Cap; Take 1-2 Capsules by mouth every evening.  Dispense: 60 Capsule; Refill: 1    2. Vitamin B12 deficiency  Optimal levels at this time, 2 mos supplementation vacation and can restart sublingual    3. Nail brittleness  Trial of OTC biotin and continue collagen.        Discussed with patient possible alternative diagnoses, patient is to take all medications as prescribed.      If symptoms persist FU w/PCP, if symptoms worsen go to emergency room.      If experiencing any side effects from prescribed medications report to the office immediately or go to emergency room.     Reviewed indication, dosage, usage and potential adverse effects of prescribed medications.      Reviewed risks and benefits of treatment plan. Patient verbalizes " understanding of all instruction and verbally agrees to plan.     Discussed plan with the patient, and patient agrees to the above.      I personally reviewed prior external notes and test results pertinent to today's visit.      No follow-ups on file.

## 2022-12-08 NOTE — ASSESSMENT & PLAN NOTE
Latest Reference Range & Units 09/26/22 08:31   Vitamin B12 -True Cobalamin 211 - 911 pg/mL 2543 (H)   (H): Data is abnormally high  Taking sublingual supplement. Was on injections and reports better energy levels.

## 2022-12-08 NOTE — TELEPHONE ENCOUNTER
Recvd voicemail from patient. Called and left another message on patient's voicemail requesting a call back to schedule this procedure.

## 2022-12-08 NOTE — ASSESSMENT & PLAN NOTE
Chronic and stable. Neuropathy in lower extremities, pins and needle sensations. Was taking OTC nerve renew which was helping.

## 2022-12-09 NOTE — TELEPHONE ENCOUNTER
Recvd voice message from patient - called and left another message on her voicemail requesting a call back to schedule this procedure.

## 2022-12-20 ENCOUNTER — OFFICE VISIT (OUTPATIENT)
Dept: MEDICAL GROUP | Facility: MEDICAL CENTER | Age: 82
End: 2022-12-20
Payer: MEDICARE

## 2022-12-20 VITALS
BODY MASS INDEX: 23.53 KG/M2 | SYSTOLIC BLOOD PRESSURE: 102 MMHG | RESPIRATION RATE: 17 BRPM | HEIGHT: 62 IN | DIASTOLIC BLOOD PRESSURE: 60 MMHG | TEMPERATURE: 98 F | WEIGHT: 127.87 LBS | HEART RATE: 94 BPM | OXYGEN SATURATION: 97 %

## 2022-12-20 DIAGNOSIS — I50.89 OTHER HEART FAILURE (HCC): ICD-10-CM

## 2022-12-20 DIAGNOSIS — I63.9 ACUTE CVA (CEREBROVASCULAR ACCIDENT) (HCC): ICD-10-CM

## 2022-12-20 DIAGNOSIS — Z00.00 MEDICARE ANNUAL WELLNESS VISIT, SUBSEQUENT: ICD-10-CM

## 2022-12-20 DIAGNOSIS — L60.3 NAIL BRITTLENESS: ICD-10-CM

## 2022-12-20 DIAGNOSIS — G57.93 NEUROPATHY OF BOTH FEET: ICD-10-CM

## 2022-12-20 PROCEDURE — 99214 OFFICE O/P EST MOD 30 MIN: CPT | Performed by: NURSE PRACTITIONER

## 2022-12-20 RX ORDER — NORTRIPTYLINE HYDROCHLORIDE 25 MG/1
25 CAPSULE ORAL NIGHTLY
Qty: 90 CAPSULE | Refills: 1 | Status: SHIPPED | OUTPATIENT
Start: 2022-12-20 | End: 2023-02-02

## 2022-12-20 ASSESSMENT — ENCOUNTER SYMPTOMS: GENERAL WELL-BEING: FAIR

## 2022-12-20 ASSESSMENT — FIBROSIS 4 INDEX: FIB4 SCORE: 3.55

## 2022-12-20 ASSESSMENT — ACTIVITIES OF DAILY LIVING (ADL): BATHING_REQUIRES_ASSISTANCE: 0

## 2022-12-20 NOTE — PROGRESS NOTES
Chief Complaint   Patient presents with    Annual Exam       HISTORY OF PRESENT ILLNESS: Patient is a 82 y.o. female, established patient who presents today to discuss medical problems as listed below:    Health Maintenance:  COMPLETED     Other heart failure ([facility])  Chronic and stable. Denies SOB, no ankle swelling. Followed by Dr. Cherry, pending implantable loop recorder placement.     Neuropathy of both feet  Last visit started on Pamelor 25 mg.  Took 2 tabs initially and had difficulty falling asleep, after that started on 1 tab daily about 9 days and her symptoms improved.  Would like to continue    Nail brittleness  Chronic problem.  Brittle nails.  Will start on biotin and collagen supplement. 11/ 211/22 within normal limits.    Acute CVA (cerebrovascular accident) ([facility])  Patient has a stroke in August 2022.  Left-sided residual weakness.  Patient has decreased, she is following with ophthalmology.  Is also followed by cardiology, Kobeo patch ordered, results pending.  She is also followed by neurology.  She is on Zetia 10 mg and rosuvastatin 5 mg as she did not tolerate higher dose of statin.  She is on aspirin 81 mg, no bleeding.   Her recent vit B12 levels are supratherapeutic.   She takes CoQ-10 recommended by cardiology.   Her recent blood work is within normal limits here at her blood pressure is well controlled.  She is not dizzy.  She has a designated .  Has no issues with ambulation or swallowing    Patient Active Problem List    Diagnosis Date Noted    Vitamin B12 deficiency 12/08/2022    Nail brittleness 11/03/2022    Late effect of stroke 11/02/2022    Agatston CAC score 200-399 09/14/2022    Acute CVA (cerebrovascular accident) ([facility]) 08/15/2022    Hospital discharge follow-up 07/20/2022    Leg edema 07/09/2022    Other heart failure ([facility]) 06/21/2022    Edema of both lower extremities 06/03/2022    Neuropathy of both feet 06/03/2022    Close exposure to COVID-19 virus 12/07/2021    Cough  12/07/2021    Pedal edema 08/24/2021    Hyperlipemia 11/26/2019    Legally blind in left eye, as defined in [place] 11/26/2019    History of retinal vein occlusion 04/10/2019        Allergies: Atorvastatin and Pcn [penicillins]    Current Outpatient Medications   Medication Sig Dispense Refill    nortriptyline (PAMELOR) 25 MG Cap Take 1 Capsule by mouth every evening. 90 Capsule 1    Coenzyme Q10 (CO Q 10 PO) Take  by mouth.      rosuvastatin (CRESTOR) 5 MG Tab Take 1 Tablet by mouth every evening. 90 Tablet 11    potassium chloride ER (KLOR-CON) 10 MEQ tablet Take 1 Tablet by mouth every day. 90 Tablet 3    furosemide (LASIX) 20 MG Tab Take 1 Tablet by mouth every day. 90 Tablet 3    ezetimibe (ZETIA) 10 MG Tab Take 1 Tablet by mouth every day. 90 Tablet 11    cyanocobalamin (VITAMIN B-12) 1000 MCG/ML Solution Inject 1 mL into the shoulder, thigh, or buttocks every 30 days. (Patient not taking: Reported on 12/8/2022) 3 mL 3    Cyanocobalamin (VITAMIN B12 PO) Take 1 Tablet by mouth every day.      FOLIC ACID PO Take 1 Tablet by mouth every day.      QUERCETIN PO Take 1 Tablet by mouth every day.      Non Formulary Request Take 1 Tablet by mouth every day. Arthrozene OTC supplement      aspirin EC 81 MG EC tablet Take 1 Tablet by mouth every day. 30 Tablet 0    Lutein 40 MG Cap Take 40 mg by mouth every day.      asa/apap/caffeine (EXCEDRIN) 250-250-65 MG Tab Take 1-2 Tablets by mouth every 6 hours as needed for Headache.      TURMERIC PO Take 1 Capsule by mouth every day.       No current facility-administered medications for this visit.       Social History     Tobacco Use    Smoking status: Never    Smokeless tobacco: Never   Vaping Use    Vaping Use: Never used   Substance Use Topics    Alcohol use: Yes     Alcohol/week: 0.6 oz     Types: 1 Glasses of wine per week     Comment: occ glass of wine    Drug use: No     Social History     Social History Narrative    Not on file       Family History   Problem Relation Age  "of Onset    Other Mother         TIA     Other Father         Abdominal aneurysm     Alcohol abuse Brother     Heart Disease Brother     No Known Problems Daughter     No Known Problems Daughter        Allergies, past medical history, past surgical history, family history, social history reviewed and updated.    Review of Systems:     - Constitutional: Negative for fever, chills, unexpected weight change, and fatigue/generalized weakness.     - HEENT: Negative for headaches, vision changes, hearing changes, ear pain, ear discharge, rhinorrhea, sinus congestion, sore throat, and neck pain.      - Respiratory: Negative for cough, sputum production, chest congestion, dyspnea, wheezing, and crackles.      - Cardiovascular: Negative for chest pain, palpitations, orthopnea, and bilateral lower extremity edema.     - Gastrointestinal: Negative for heartburn, nausea, vomiting, abdominal pain, hematochezia, melena, diarrhea, constipation, and greasy/foul-smelling stools.     - Genitourinary: Negative for dysuria, polyuria, hematuria, pyuria, urinary urgency, and urinary incontinence.    - Musculoskeletal: Negative for myalgias, back pain, and joint pain.     - Skin: Negative for rash, itching, cyanotic skin color change.     - Neurological: Negative for dizziness, tingling, tremors, focal sensory deficit, focal weakness and headaches.     - Endo/Heme/Allergies: Does not bruise/bleed easily.     - Psychiatric/Behavioral: Negative for depression, suicidal/homicidal ideation and memory loss.      All other systems reviewed and are negative    Exam:    /60 (BP Location: Left arm, Patient Position: Sitting, BP Cuff Size: Adult)   Pulse 94   Temp 36.7 °C (98 °F)   Resp 17   Ht 1.575 m (5' 2\")   Wt 58 kg (127 lb 13.9 oz)   SpO2 97%   BMI 23.39 kg/m²  Body mass index is 23.39 kg/m².    Physical Exam:  Constitutional: Well-developed and well-nourished. Not diaphoretic. No distress.   Cardiovascular: Regular rate and " rhythm, S1 and S2 without murmur, rubs, or gallops.    Chest: Effort normal. Clear to auscultation throughout. No adventitious sounds.  Neurological: Alert and oriented x 3.   Psychiatric:  Behavior, mood, and affect are appropriate.  MA/nursing note and vitals reviewed.    Assessment/Plan    2. Other heart failure (HCC)  Stable, followed by [facility]    3. Neuropathy of both feet  Stable on current regimen.  Continue.  Refills given   - nortriptyline (PAMELOR) 25 MG Cap; Take 1 Capsule by mouth every evening.  Dispense: 90 Capsule; Refill: 1    4. Nail brittleness  Start taking biotin and collagen, will recheck in 6 mo    5. Acute CVA (cerebrovascular accident) (HCC)  Followed with cardiology       Discussed with patient possible alternative diagnoses, patient is to take all medications as prescribed.      If symptoms persist FU w/PCP, if symptoms worsen go to emergency room.      If experiencing any side effects from prescribed medications report to the office immediately or go to emergency room.     Reviewed indication, dosage, usage and potential adverse effects of prescribed medications.      Reviewed risks and benefits of treatment plan. Patient verbalizes understanding of all instruction and verbally agrees to plan.     Discussed plan with the patient, and patient agrees to the above.      I personally reviewed prior external notes and test results pertinent to today's visit.      No follow-ups on file. 6 mo

## 2022-12-20 NOTE — PROGRESS NOTES
No chief complaint on file.      HPI:  Regla is a 82 y.o. here for Medicare Annual Wellness Visit    ***    Patient Active Problem List    Diagnosis Date Noted    Vitamin B12 deficiency 12/08/2022    Nail brittleness 11/03/2022    Late effect of stroke 11/02/2022    Agatston CAC score 200-399 09/14/2022    Acute CVA (cerebrovascular accident) (HCC) 08/15/2022    Hospital discharge follow-up 07/20/2022    Leg edema 07/09/2022    Other heart failure (HCC) 06/21/2022    Edema of both lower extremities 06/03/2022    Neuropathy of both feet 06/03/2022    Close exposure to COVID-19 virus 12/07/2021    Cough 12/07/2021    Pedal edema 08/24/2021    Hyperlipemia 11/26/2019    Legally blind in left eye, as defined in [place] 11/26/2019    History of retinal vein occlusion 04/10/2019       Current Outpatient Medications   Medication Sig Dispense Refill    nortriptyline (PAMELOR) 25 MG Cap Take 1-2 Capsules by mouth every evening. 60 Capsule 1    Coenzyme Q10 (CO Q 10 PO) Take  by mouth.      rosuvastatin (CRESTOR) 5 MG Tab Take 1 Tablet by mouth every evening. 90 Tablet 11    potassium chloride ER (KLOR-CON) 10 MEQ tablet Take 1 Tablet by mouth every day. 90 Tablet 3    furosemide (LASIX) 20 MG Tab Take 1 Tablet by mouth every day. 90 Tablet 3    ezetimibe (ZETIA) 10 MG Tab Take 1 Tablet by mouth every day. 90 Tablet 11    cyanocobalamin (VITAMIN B-12) 1000 MCG/ML Solution Inject 1 mL into the shoulder, thigh, or buttocks every 30 days. (Patient not taking: Reported on 12/8/2022) 3 mL 3    Cyanocobalamin (VITAMIN B12 PO) Take 1 Tablet by mouth every day.      FOLIC ACID PO Take 1 Tablet by mouth every day.      QUERCETIN PO Take 1 Tablet by mouth every day.      Non Formulary Request Take 1 Tablet by mouth every day. Arthrozene OTC supplement      aspirin EC 81 MG EC tablet Take 1 Tablet by mouth every day. 30 Tablet 0    Lutein 40 MG Cap Take 40 mg by mouth every day.      asa/apap/caffeine (EXCEDRIN) 250-250-65 MG Tab Take 1-2  Tablets by mouth every 6 hours as needed for Headache.      TURMERIC PO Take 1 Capsule by mouth every day.       No current facility-administered medications for this visit.        {MEDICATION ADHERENCE:20401}  Current supplements as per medication list.     Allergies: Atorvastatin and Pcn [penicillins]    Current social contact/activities: *** ***    Is patient current with immunizations? {YES/NO DUE FOR IZ:20402}    She  reports that she has never smoked. She has never used smokeless tobacco. She reports current alcohol use of about 0.6 oz per week. She reports that she does not use drugs.  Counseling given: Not Answered      ROS:    Gait: Uses no assistive device ***  Ostomy: No ***  Other tubes: No ***  Amputations: No ***  Chronic oxygen use No ***  Last eye exam Oct 2022 ***  Wears hearing aids: No ***  : Reports urinary leakage during the last 6 months that has somewhat interfered with their daily activities or sleep.  ***  Screening:  ***  Depression Screening  Little interest or pleasure in doing things?     Feeling down, depressed, or hopeless?    Trouble falling or staying asleep, or sleeping too much?     Feeling tired or having little energy?     Poor appetite or overeating?     Feeling bad about yourself - or that you are a failure or have let yourself or your family down?    Trouble concentrating on things, such as reading the newspaper or watching television?    Moving or speaking so slowly that other people could have noticed.  Or the opposite - being so fidgety or restless that you have been moving around a lot more than usual?     Thoughts that you would be better off dead, or of hurting yourself?     Patient Health Questionnaire Score:      If depressive symptoms identified deferred to follow up visit unless specifically addressed in assessment and plan.    Interpretation of PHQ-9 Total Score   Score Severity   1-4 No Depression   5-9 Mild Depression   10-14 Moderate Depression   15-19  Moderately Severe Depression   20-27 Severe Depression    Screening for Cognitive Impairment  Three Minute Recall (daughter, heaven, mountain)   /3    Draw clock face with all 12 numbers and set the hands to show 10 past 11.       If cognitive concerns identified, deferred for follow up unless specifically addressed in assessment and plan.    Fall Risk Assessment  Has the patient had two or more falls in the last year or any fall with injury in the last year?     If fall risk identified, deferred for follow up unless specifically addressed in assessment and plan.    Safety Assessment  Throw rugs on floor.     Handrails on all stairs.     Good lighting in all hallways.     Difficulty hearing.     Patient counseled about all safety risks that were identified.    Functional Assessment ADLs  Are there any barriers preventing you from cooking for yourself or meeting nutritional needs?   .    Are there any barriers preventing you from driving safely or obtaining transportation?   .    Are there any barriers preventing you from using a telephone or calling for help?   .    Are there any barriers preventing you from shopping?   .    Are there any barriers preventing you from taking care of your own finances?   .    Are there any barriers preventing you from managing your medications?   .    Are there any barriers preventing you from showering, bathing or dressing yourself?   .    Are you currently engaging in any exercise or physical activity?   .     What is your perception of your health?   .    Advance Care Planning  Do you have an Advance Directive, Living Will, Durable Power of , or POLST?                 Health Maintenance Summary            Overdue - Annual Wellness Visit (Every 366 Days) Overdue since 12/2/2020 12/02/2019  Visit Dx: Medicare annual wellness visit, subsequent    11/26/2019  Visit Dx: Medicare annual wellness visit, subsequent              Overdue - COVID-19 Vaccine (4 - Booster for  Pfizer series) Overdue since 3/3/2022      01/06/2022  Imm Admin: PFIZER PURPLE CAP SARS-COV-2 VACCINATION (12+)    02/04/2021  Imm Admin: PFIZER PURPLE CAP SARS-COV-2 VACCINATION (12+)    01/14/2021  Imm Admin: PFIZER PURPLE CAP SARS-COV-2 VACCINATION (12+)              Postponed - IMM ZOSTER VACCINES (1 of 2) Postponed until 1/31/2023      No completion history exists for this topic.              Postponed - IMM HEP B VACCINE (1 of 3 - 3-dose series) Postponed until 8/31/2023      No completion history exists for this topic.              Postponed - IMM PNEUMOCOCCAL VACCINE: 65+ Years (1 - PCV) Postponed until 12/8/2023      No completion history exists for this topic.              BONE DENSITY (Every 5 Years) Next due on 8/31/2026 08/31/2021  DS-BONE DENSITY STUDY (DEXA)              IMM DTaP/Tdap/Td Vaccine (2 - Td or Tdap) Next due on 11/11/2028 11/11/2018  Imm Admin: Tdap Vaccine              IMM INFLUENZA (Series Information) Completed      11/03/2022  Imm Admin: Influenza Vaccine Adult HD    12/02/2021  Imm Admin: Influenza Vaccine Adult HD    03/03/2020  Imm Admin: Influenza Vaccine Adult HD              IMM MENINGOCOCCAL ACWY VACCINE (Series Information) Aged Out      No completion history exists for this topic.                    Patient Care Team:  SHANNON Ontiveros as PCP - General (Family Medicine)  Venu Wheeler M.D. (Ophthalmology)    Social History     Tobacco Use    Smoking status: Never    Smokeless tobacco: Never   Vaping Use    Vaping Use: Never used   Substance Use Topics    Alcohol use: Yes     Alcohol/week: 0.6 oz     Types: 1 Glasses of wine per week     Comment: occ glass of wine    Drug use: No     Family History   Problem Relation Age of Onset    Other Mother         TIA     Other Father         Abdominal aneurysm     Alcohol abuse Brother     Heart Disease Brother     No Known Problems Daughter     No Known Problems Daughter      She  has a past medical history of  Hyperlipemia and Stroke (HCC).   Past Surgical History:   Procedure Laterality Date    CYSTECTOMY  1963    CATARACT EXTRACTION WITH IOL         Exam:   There were no vitals taken for this visit. There is no height or weight on file to calculate BMI.    Hearing {GOOD/FAIR/POOR/EXCELLENT:50341}.    Dentition {DENTITION:97723}  Alert, oriented in no acute distress  Eye contact is good, speech goal directed, affect calm  ***    Assessment and Plan. The following treatment and monitoring plan is recommended:  ***  There are no diagnoses linked to this encounter.     Services suggested: { AWV COORDINATION OF SERVICES:78091}  Health Care Screening recommendations as per orders if indicated.  Referrals offered: PT/OT/Nutrition counseling/Behavioral Health/Smoking cessation as per orders if indicated.    Discussion today about general wellness and lifestyle habits:    Prevent falls and reduce trip hazards; Cautioned about securing or removing rugs.  Have a working fire alarm and carbon monoxide detector;   Engage in regular physical activity and social activities.     Follow-up: No follow-ups on file.

## 2022-12-20 NOTE — PROGRESS NOTES
Chief Complaint   Patient presents with    Annual Exam       HISTORY OF PRESENT ILLNESS: Patient is a 82 y.o. female, established patient who presents today to discuss medical problems as listed below:    Health Maintenance:  COMPLETED     Other heart failure ([facility])  Chronic and stable. Denies SOB, no ankle swelling. Followed by Dr. Cherry, pending implantable loop recorder placement.     Neuropathy of both feet  Last visit started on Pamelor 25 mg.  Took 2 tabs initially and had difficulty falling asleep, after that started on 1 tab daily about 9 days and her symptoms improved.  Would like to continue    Nail brittleness  Chronic problem.  Brittle nails.  Will start on biotin and collagen supplement. 11/ 211/22 within normal limits.    Acute CVA (cerebrovascular accident) ([facility])  Patient has a stroke in August 2022.  Left-sided residual weakness.  Patient has decreased, she is following with ophthalmology.  Is also followed by cardiology, Kobeo patch ordered, results pending.  She is also followed by neurology.  She is on Zetia 10 mg and rosuvastatin 5 mg as she did not tolerate higher dose of statin.  She is on aspirin 81 mg, no bleeding.   Her recent vit B12 levels are supratherapeutic.   She takes CoQ-10 recommended by cardiology.   Her recent blood work is within normal limits here at her blood pressure is well controlled.  She is not dizzy.  She has a designated .  Has no issues with ambulation or swallowing    Patient Active Problem List    Diagnosis Date Noted    Vitamin B12 deficiency 12/08/2022    Nail brittleness 11/03/2022    Late effect of stroke 11/02/2022    Agatston CAC score 200-399 09/14/2022    Acute CVA (cerebrovascular accident) ([facility]) 08/15/2022    Hospital discharge follow-up 07/20/2022    Leg edema 07/09/2022    Other heart failure ([facility]) 06/21/2022    Edema of both lower extremities 06/03/2022    Neuropathy of both feet 06/03/2022    Close exposure to COVID-19 virus 12/07/2021    Cough  12/07/2021    Pedal edema 08/24/2021    Hyperlipemia 11/26/2019    Legally blind in left eye, as defined in [place] 11/26/2019    History of retinal vein occlusion 04/10/2019        Allergies: Atorvastatin and Pcn [penicillins]    Current Outpatient Medications   Medication Sig Dispense Refill    nortriptyline (PAMELOR) 25 MG Cap Take 1 Capsule by mouth every evening. 90 Capsule 1    Coenzyme Q10 (CO Q 10 PO) Take  by mouth.      rosuvastatin (CRESTOR) 5 MG Tab Take 1 Tablet by mouth every evening. 90 Tablet 11    potassium chloride ER (KLOR-CON) 10 MEQ tablet Take 1 Tablet by mouth every day. 90 Tablet 3    furosemide (LASIX) 20 MG Tab Take 1 Tablet by mouth every day. 90 Tablet 3    ezetimibe (ZETIA) 10 MG Tab Take 1 Tablet by mouth every day. 90 Tablet 11    cyanocobalamin (VITAMIN B-12) 1000 MCG/ML Solution Inject 1 mL into the shoulder, thigh, or buttocks every 30 days. (Patient not taking: Reported on 12/8/2022) 3 mL 3    Cyanocobalamin (VITAMIN B12 PO) Take 1 Tablet by mouth every day.      FOLIC ACID PO Take 1 Tablet by mouth every day.      QUERCETIN PO Take 1 Tablet by mouth every day.      Non Formulary Request Take 1 Tablet by mouth every day. Arthrozene OTC supplement      aspirin EC 81 MG EC tablet Take 1 Tablet by mouth every day. 30 Tablet 0    Lutein 40 MG Cap Take 40 mg by mouth every day.      asa/apap/caffeine (EXCEDRIN) 250-250-65 MG Tab Take 1-2 Tablets by mouth every 6 hours as needed for Headache.      TURMERIC PO Take 1 Capsule by mouth every day.       No current facility-administered medications for this visit.       Social History     Tobacco Use    Smoking status: Never    Smokeless tobacco: Never   Vaping Use    Vaping Use: Never used   Substance Use Topics    Alcohol use: Yes     Alcohol/week: 0.6 oz     Types: 1 Glasses of wine per week     Comment: occ glass of wine    Drug use: No     Social History     Social History Narrative    Not on file       Family History   Problem Relation Age  "of Onset    Other Mother         TIA     Other Father         Abdominal aneurysm     Alcohol abuse Brother     Heart Disease Brother     No Known Problems Daughter     No Known Problems Daughter        Allergies, past medical history, past surgical history, family history, social history reviewed and updated.    Review of Systems:     - Constitutional: Negative for fever, chills, unexpected weight change, and fatigue/generalized weakness.     - HEENT: Negative for headaches, vision changes, hearing changes, ear pain, ear discharge, rhinorrhea, sinus congestion, sore throat, and neck pain.      - Respiratory: Negative for cough, sputum production, chest congestion, dyspnea, wheezing, and crackles.      - Cardiovascular: Negative for chest pain, palpitations, orthopnea, and bilateral lower extremity edema.     - Gastrointestinal: Negative for heartburn, nausea, vomiting, abdominal pain, hematochezia, melena, diarrhea, constipation, and greasy/foul-smelling stools.     - Genitourinary: Negative for dysuria, polyuria, hematuria, pyuria, urinary urgency, and urinary incontinence.    - Musculoskeletal: Negative for myalgias, back pain, and joint pain.     - Skin: Negative for rash, itching, cyanotic skin color change.     - Neurological: Negative for dizziness, tingling, tremors, focal sensory deficit, focal weakness and headaches.     - Endo/Heme/Allergies: Does not bruise/bleed easily.     - Psychiatric/Behavioral: Negative for depression, suicidal/homicidal ideation and memory loss.      All other systems reviewed and are negative    Exam:    /60 (BP Location: Left arm, Patient Position: Sitting, BP Cuff Size: Adult)   Pulse 94   Temp 36.7 °C (98 °F)   Resp 17   Ht 1.575 m (5' 2\")   Wt 58 kg (127 lb 13.9 oz)   SpO2 97%   BMI 23.39 kg/m²  Body mass index is 23.39 kg/m².    Physical Exam:  Constitutional: Well-developed and well-nourished. Not diaphoretic. No distress.   Cardiovascular: Regular rate and " rhythm, S1 and S2 without murmur, rubs, or gallops.    Chest: Effort normal. Clear to auscultation throughout. No adventitious sounds.  Neurological: Alert and oriented x 3.   Psychiatric:  Behavior, mood, and affect are appropriate.  MA/nursing note and vitals reviewed.    Assessment/Plan    2. Other heart failure (HCC)  Stable, followed by [facility]    3. Neuropathy of both feet  Stable on current regimen.  Continue.  Refills given   - nortriptyline (PAMELOR) 25 MG Cap; Take 1 Capsule by mouth every evening.  Dispense: 90 Capsule; Refill: 1    4. Nail brittleness  Start taking biotin and collagen, will recheck in 6 mo    5. Acute CVA (cerebrovascular accident) (HCC)  Followed with cardiology       Discussed with patient possible alternative diagnoses, patient is to take all medications as prescribed.      If symptoms persist FU w/PCP, if symptoms worsen go to emergency room.      If experiencing any side effects from prescribed medications report to the office immediately or go to emergency room.     Reviewed indication, dosage, usage and potential adverse effects of prescribed medications.      Reviewed risks and benefits of treatment plan. Patient verbalizes understanding of all instruction and verbally agrees to plan.     Discussed plan with the patient, and patient agrees to the above.      I personally reviewed prior external notes and test results pertinent to today's visit.      No follow-ups on file. 6 mo

## 2022-12-20 NOTE — ASSESSMENT & PLAN NOTE
Chronic problem.  Brittle nails.  Will start on biotin and collagen supplement. 11/ 211/22 within normal limits.

## 2022-12-20 NOTE — ASSESSMENT & PLAN NOTE
Chronic and stable. Denies SOB, no ankle swelling. Followed by Dr. Cherry, pending implantable loop recorder placement.

## 2022-12-20 NOTE — ASSESSMENT & PLAN NOTE
Patient has a stroke in August 2022.  Left-sided residual weakness.  Patient has decreased, she is following with ophthalmology.  Is also followed by cardiology, Zio patch ordered, results pending.  She is also followed by neurology.  She is on Zetia 10 mg and rosuvastatin 5 mg as she did not tolerate higher dose of statin.  She is on aspirin 81 mg, no bleeding.   Her recent vit B12 levels are supratherapeutic.   She takes CoQ-10 recommended by cardiology.   Her recent blood work is within normal limits here at her blood pressure is well controlled.  She is not dizzy.  She has a designated .  Has no issues with ambulation or swallowing

## 2022-12-20 NOTE — ASSESSMENT & PLAN NOTE
Last visit started on Pamelor 25 mg.  Took 2 tabs initially and had difficulty falling asleep, after that started on 1 tab daily about 9 days and her symptoms improved.  Would like to continue

## 2022-12-28 ENCOUNTER — OFFICE VISIT (OUTPATIENT)
Dept: MEDICAL GROUP | Facility: MEDICAL CENTER | Age: 82
End: 2022-12-28
Payer: MEDICARE

## 2022-12-28 DIAGNOSIS — J32.9 RHINOSINUSITIS: ICD-10-CM

## 2022-12-28 DIAGNOSIS — R68.89 FLU-LIKE SYMPTOMS: ICD-10-CM

## 2022-12-28 DIAGNOSIS — R68.83 CHILL: ICD-10-CM

## 2022-12-28 DIAGNOSIS — R05.2 SUBACUTE COUGH: ICD-10-CM

## 2022-12-28 DIAGNOSIS — R09.81 SINUS CONGESTION: ICD-10-CM

## 2022-12-28 DIAGNOSIS — R09.82 PND (POST-NASAL DRIP): ICD-10-CM

## 2022-12-28 LAB
EXTERNAL QUALITY CONTROL: NORMAL
FLUAV+FLUBV AG SPEC QL IA: NEGATIVE
INT CON NEG: NORMAL
INT CON NEG: NORMAL
INT CON POS: NORMAL
INT CON POS: NORMAL
SARS-COV+SARS-COV-2 AG RESP QL IA.RAPID: NEGATIVE

## 2022-12-28 PROCEDURE — 87426 SARSCOV CORONAVIRUS AG IA: CPT | Performed by: NURSE PRACTITIONER

## 2022-12-28 PROCEDURE — 87804 INFLUENZA ASSAY W/OPTIC: CPT | Performed by: NURSE PRACTITIONER

## 2022-12-28 PROCEDURE — 99213 OFFICE O/P EST LOW 20 MIN: CPT | Mod: CS | Performed by: NURSE PRACTITIONER

## 2022-12-28 RX ORDER — DOXYCYCLINE HYCLATE 100 MG
100 TABLET ORAL 2 TIMES DAILY
Qty: 14 TABLET | Refills: 0 | Status: SHIPPED | OUTPATIENT
Start: 2022-12-28 | End: 2023-01-04

## 2022-12-28 RX ORDER — BENZONATATE 100 MG/1
100 CAPSULE ORAL 3 TIMES DAILY PRN
Qty: 60 CAPSULE | Refills: 0 | Status: ON HOLD | OUTPATIENT
Start: 2022-12-28 | End: 2023-03-05

## 2022-12-28 RX ORDER — TRIAMCINOLONE ACETONIDE 55 UG/1
2 SPRAY, METERED NASAL DAILY
Qty: 16.9 ML | Refills: 1 | Status: SHIPPED | OUTPATIENT
Start: 2022-12-28 | End: 2023-02-14

## 2022-12-28 ASSESSMENT — FIBROSIS 4 INDEX: FIB4 SCORE: 3.55

## 2022-12-28 NOTE — PROGRESS NOTES
Chief Complaint   Patient presents with    Nasal Congestion     Musinex help mucus to star come out       HISTORY OF PRESENT ILLNESS: Patient is a 82 y.o. female, established patient who presents today to discuss medical problems as listed below:    Flu-like symptoms  New problem. Pt was around grandkids with cold s/s. Pt reports productive cough, sinus congestion, rhinitis, PND, chills. No fever, no SOB, wheezing, or palpitations. No GI s/s. Tried mucinex and oregano oil. Today feels her s/s are worse with congestion and nasal discharge.     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    Late effect of stroke 11/02/2022    Agatston CAC score 200-399 09/14/2022    Acute CVA (cerebrovascular accident) (HCC) 08/15/2022    Hospital discharge follow-up 07/20/2022    Leg edema 07/09/2022    Other heart failure (HCC) 06/21/2022    Edema of both lower extremities 06/03/2022    Neuropathy of both feet 06/03/2022    Close exposure to COVID-19 virus 12/07/2021    Cough 12/07/2021    Pedal edema 08/24/2021    Hyperlipemia 11/26/2019    Legally blind in left eye, as defined in [place] 11/26/2019    History of retinal vein occlusion 04/10/2019        Allergies: Atorvastatin and Pcn [penicillins]    Current Outpatient Medications   Medication Sig Dispense Refill    nortriptyline (PAMELOR) 25 MG Cap Take 1 Capsule by mouth every evening. 90 Capsule 1    Coenzyme Q10 (CO Q 10 PO) Take  by mouth.      rosuvastatin (CRESTOR) 5 MG Tab Take 1 Tablet by mouth every evening. 90 Tablet 11    potassium chloride ER (KLOR-CON) 10 MEQ tablet Take 1 Tablet by mouth every day. 90 Tablet 3    furosemide (LASIX) 20 MG Tab Take 1 Tablet by mouth every day. 90 Tablet 3    ezetimibe (ZETIA) 10 MG Tab Take 1 Tablet by mouth every day. 90 Tablet 11    cyanocobalamin (VITAMIN B-12) 1000 MCG/ML Solution Inject 1 mL into the shoulder, thigh, or  buttocks every 30 days. (Patient not taking: Reported on 12/8/2022) 3 mL 3    Cyanocobalamin (VITAMIN B12 PO) Take 1 Tablet by mouth every day.      FOLIC ACID PO Take 1 Tablet by mouth every day.      QUERCETIN PO Take 1 Tablet by mouth every day.      Non Formulary Request Take 1 Tablet by mouth every day. Arthrozene OTC supplement      aspirin EC 81 MG EC tablet Take 1 Tablet by mouth every day. 30 Tablet 0    Lutein 40 MG Cap Take 40 mg by mouth every day.      asa/apap/caffeine (EXCEDRIN) 250-250-65 MG Tab Take 1-2 Tablets by mouth every 6 hours as needed for Headache.      TURMERIC PO Take 1 Capsule by mouth every day.       No current facility-administered medications for this visit.       Social History     Tobacco Use    Smoking status: Never    Smokeless tobacco: Never   Vaping Use    Vaping Use: Never used   Substance Use Topics    Alcohol use: Yes     Alcohol/week: 0.6 oz     Types: 1 Glasses of wine per week     Comment: occ glass of wine    Drug use: No     Social History     Social History Narrative    Not on file       Family History   Problem Relation Age of Onset    Other Mother         TIA     Other Father         Abdominal aneurysm     Alcohol abuse Brother     Heart Disease Brother     No Known Problems Daughter     No Known Problems Daughter        Allergies, past medical history, past surgical history, family history, social history reviewed and updated.    Review of Systems:     - Constitutional: chills Negative for fever, unexpected weight change, and fatigue/generalized weakness.     - HEENT: rhinorrhea, sinus congestion, Negative for headaches, vision changes, hearing changes, ear pain, ear discharge, sore throat, and neck pain.      - Respiratory: cough. Negative for sputum production, chest congestion, dyspnea, wheezing, and crackles.      - Cardiovascular: Negative for chest pain, palpitations, orthopnea, and bilateral lower extremity edema.     - Psychiatric/Behavioral: Negative for  "depression, suicidal/homicidal ideation and memory loss.      All other systems reviewed and are negative    Exam:    BP (P) 106/60 (BP Location: Left arm, Patient Position: Sitting, BP Cuff Size: Adult)   Pulse (P) 89   Temp (P) 37 °C (98.6 °F) (Temporal)   Resp (P) 16   Ht (P) 1.57 m (5' 1.81\")   Wt (P) 58 kg (127 lb 13.9 oz)   SpO2 (P) 97%   BMI (P) 23.53 kg/m²  Body mass index is 23.53 kg/m² (pended).    Physical Exam:  Constitutional: Well-developed and well-nourished. Not diaphoretic. No distress.   Ears:  External ears unremarkable. Tympanic membranes clear and intact.  Nose:  Turbinates with moderate erythema and edema. No discharge.   Mouth/Throat: . Posterior pharynx without erythema or exudates.  Neck:  No lymphadenopathy--cervical or supraclavicular.  Cardiovascular: Regular rate and rhythm, S1 and S2 without murmur, rubs, or gallops.    Chest: Effort normal. Clear to auscultation throughout. No adventitious sounds.   Neurological: Alert and oriented x 3.   Psychiatric:  Behavior, mood, and affect are appropriate.  MA/nursing note and vitals reviewed.    Assessment/Plan:  1. Flu-like symptoms  - POCT Influenza A/B  - POCT SARS-COV Antigen JOSE LAEJANDRO (Symptomatic only)    2. Subacute cough  - POCT Influenza A/B  - POCT SARS-COV Antigen JOSE ALEJANDRO (Symptomatic only)  - benzonatate (TESSALON) 100 MG Cap; Take 1 Capsule by mouth 3 times a day as needed for Cough.  Dispense: 60 Capsule; Refill: 0    3. Chill  Rx, supportive care, warm fluids, rest. Avoid dairy as it is mucus forming. 10 day f/u PRN  - doxycycline (VIBRAMYCIN) 100 MG Tab; Take 1 Tablet by mouth 2 times a day for 7 days.  Dispense: 14 Tablet; Refill: 0    4. Sinus congestion  - triamcinolone (NASACORT) 55 MCG/ACT nasal inhaler; Administer 2 Sprays into affected nostril(S) every day.  Dispense: 16.9 mL; Refill: 1    5. PND (post-nasal drip)    6. Rhinosinusitis    - doxycycline (VIBRAMYCIN) 100 MG Tab; Take 1 Tablet by mouth 2 times a day for 7 days.  " Dispense: 14 Tablet; Refill: 0   Discussed with patient possible alternative diagnoses, patient is to take all medications as prescribed.      If symptoms persist FU w/PCP, if symptoms worsen go to emergency room.      If experiencing any side effects from prescribed medications report to the office immediately or go to emergency room.     Reviewed indication, dosage, usage and potential adverse effects of prescribed medications.      Reviewed risks and benefits of treatment plan. Patient verbalizes understanding of all instruction and verbally agrees to plan.     Discussed plan with the patient, and patient agrees to the above.      I personally reviewed prior external notes and test results pertinent to today's visit.      No follow-ups on file. 10 day f/u PRN

## 2022-12-28 NOTE — ASSESSMENT & PLAN NOTE
New problem. Pt was around grandkids with cold s/s. Pt reports productive cough, sinus congestion, rhinitis, PND, chills. No fever, no SOB, wheezing, or palpitations. No GI s/s. Tried mucinex and oregano oil. Today feels her s/s are worse with congestion and nasal discharge.

## 2023-01-10 ENCOUNTER — APPOINTMENT (OUTPATIENT)
Dept: MEDICAL GROUP | Facility: MEDICAL CENTER | Age: 83
End: 2023-01-10
Payer: MEDICARE

## 2023-01-12 ENCOUNTER — OFFICE VISIT (OUTPATIENT)
Dept: MEDICAL GROUP | Facility: MEDICAL CENTER | Age: 83
End: 2023-01-12
Payer: MEDICARE

## 2023-01-12 VITALS
OXYGEN SATURATION: 97 % | RESPIRATION RATE: 16 BRPM | SYSTOLIC BLOOD PRESSURE: 110 MMHG | HEIGHT: 62 IN | BODY MASS INDEX: 22.96 KG/M2 | WEIGHT: 124.78 LBS | DIASTOLIC BLOOD PRESSURE: 66 MMHG | HEART RATE: 91 BPM | TEMPERATURE: 97 F

## 2023-01-12 DIAGNOSIS — R06.2 WHEEZING: ICD-10-CM

## 2023-01-12 DIAGNOSIS — U07.1 COVID: ICD-10-CM

## 2023-01-12 DIAGNOSIS — R68.89 FLU-LIKE SYMPTOMS: ICD-10-CM

## 2023-01-12 DIAGNOSIS — R05.9 COUGH, UNSPECIFIED TYPE: ICD-10-CM

## 2023-01-12 DIAGNOSIS — R09.81 SINUS CONGESTION: ICD-10-CM

## 2023-01-12 PROCEDURE — 99214 OFFICE O/P EST MOD 30 MIN: CPT | Performed by: NURSE PRACTITIONER

## 2023-01-12 RX ORDER — FLUTICASONE PROPIONATE 50 MCG
1 SPRAY, SUSPENSION (ML) NASAL
Qty: 16 G | Refills: 1 | Status: SHIPPED | OUTPATIENT
Start: 2023-01-12

## 2023-01-12 RX ORDER — ALBUTEROL SULFATE 90 UG/1
2 AEROSOL, METERED RESPIRATORY (INHALATION) EVERY 6 HOURS PRN
Qty: 8.5 G | Refills: 1 | Status: SHIPPED | OUTPATIENT
Start: 2023-01-12 | End: 2023-05-12

## 2023-01-12 RX ORDER — BENZONATATE 100 MG/1
100 CAPSULE ORAL 3 TIMES DAILY PRN
Qty: 60 CAPSULE | Refills: 0 | Status: SHIPPED | OUTPATIENT
Start: 2023-01-12 | End: 2023-02-02

## 2023-01-12 ASSESSMENT — FIBROSIS 4 INDEX: FIB4 SCORE: 3.55

## 2023-01-12 NOTE — PROGRESS NOTES
Chief Complaint   Patient presents with    Follow-Up     Covid cymptoms, Diarrhea, chills, coughing, and pain in ribcage.        HISTORY OF PRESENT ILLNESS: Patient is a 82 y.o. female, established patient who presents today to discuss medical problems as listed below:    Flu-like symptoms  Patient is here today wit hnew s/s started on Saturday. S/s include diarrhea, chills, coughing. Having body aches from coughing so much.  On 12/28/2022 she was tested for flu and COVID test was negative.  No fever, no SOB.   tested for Covid at the same time.    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    Late effect of stroke 11/02/2022    Agatston CAC score 200-399 09/14/2022    Acute CVA (cerebrovascular accident) (HCC) 08/15/2022    Hospital discharge follow-up 07/20/2022    Leg edema 07/09/2022    Other heart failure (HCC) 06/21/2022    Edema of both lower extremities 06/03/2022    Neuropathy of both feet 06/03/2022    Close exposure to COVID-19 virus 12/07/2021    Cough 12/07/2021    Pedal edema 08/24/2021    Hyperlipemia 11/26/2019    Legally blind in left eye, as defined in [place] 11/26/2019    History of retinal vein occlusion 04/10/2019        Allergies: Atorvastatin and Pcn [penicillins]    Current Outpatient Medications   Medication Sig Dispense Refill    benzonatate (TESSALON) 100 MG Cap Take 1 Capsule by mouth 3 times a day as needed for Cough. 60 Capsule 0    fluticasone (FLONASE) 50 MCG/ACT nasal spray Administer 1 Spray into affected nostril(S) at bedtime. 16 g 1    albuterol 108 (90 Base) MCG/ACT Aero Soln inhalation aerosol Inhale 2 Puffs every 6 hours as needed for Shortness of Breath. 8.5 g 1    triamcinolone (NASACORT) 55 MCG/ACT nasal inhaler Administer 2 Sprays into affected nostril(S) every day. 16.9 mL 1    benzonatate (TESSALON) 100 MG Cap Take 1 Capsule by mouth 3 times a day as needed for  Cough. 60 Capsule 0    nortriptyline (PAMELOR) 25 MG Cap Take 1 Capsule by mouth every evening. 90 Capsule 1    Coenzyme Q10 (CO Q 10 PO) Take  by mouth.      rosuvastatin (CRESTOR) 5 MG Tab Take 1 Tablet by mouth every evening. 90 Tablet 11    potassium chloride ER (KLOR-CON) 10 MEQ tablet Take 1 Tablet by mouth every day. 90 Tablet 3    furosemide (LASIX) 20 MG Tab Take 1 Tablet by mouth every day. 90 Tablet 3    ezetimibe (ZETIA) 10 MG Tab Take 1 Tablet by mouth every day. 90 Tablet 11    cyanocobalamin (VITAMIN B-12) 1000 MCG/ML Solution Inject 1 mL into the shoulder, thigh, or buttocks every 30 days. (Patient not taking: Reported on 12/8/2022) 3 mL 3    Cyanocobalamin (VITAMIN B12 PO) Take 1 Tablet by mouth every day.      FOLIC ACID PO Take 1 Tablet by mouth every day.      QUERCETIN PO Take 1 Tablet by mouth every day.      Non Formulary Request Take 1 Tablet by mouth every day. Arthrozene OTC supplement      aspirin EC 81 MG EC tablet Take 1 Tablet by mouth every day. 30 Tablet 0    Lutein 40 MG Cap Take 40 mg by mouth every day.      asa/apap/caffeine (EXCEDRIN) 250-250-65 MG Tab Take 1-2 Tablets by mouth every 6 hours as needed for Headache.      TURMERIC PO Take 1 Capsule by mouth every day.       No current facility-administered medications for this visit.       Social History     Tobacco Use    Smoking status: Never    Smokeless tobacco: Never   Vaping Use    Vaping Use: Never used   Substance Use Topics    Alcohol use: Yes     Alcohol/week: 0.6 oz     Types: 1 Glasses of wine per week     Comment: occ glass of wine    Drug use: No     Social History     Social History Narrative    Not on file       Family History   Problem Relation Age of Onset    Other Mother         TIA     Other Father         Abdominal aneurysm     Alcohol abuse Brother     Heart Disease Brother     No Known Problems Daughter     No Known Problems Daughter        Allergies, past medical history, past surgical history, family  "history, social history reviewed and updated.    Review of Systems:     - Constitutional: chills. Negative for fever, unexpected weight change, and fatigue/generalized weakness.     - HEENT: Negative for headaches, vision changes, hearing changes, ear pain, ear discharge, rhinorrhea, sinus congestion, sore throat, and neck pain.      - Respiratory: cough, chest congestion and wheezing.  Negative for  sputum production,  dyspnea, and crackles.      - Cardiovascular: Negative for chest pain, palpitations, orthopnea, and bilateral lower extremity edema.       - Psychiatric/Behavioral: Negative for depression, suicidal/homicidal ideation and memory loss.      All other systems reviewed and are negative    Exam:    /66   Pulse 91   Temp 36.1 °C (97 °F) (Temporal)   Resp 16   Ht 1.57 m (5' 1.81\")   Wt 56.6 kg (124 lb 12.5 oz)   SpO2 97%   BMI 22.96 kg/m²  Body mass index is 22.96 kg/m².    Physical Exam:  Constitutional: Well-developed and well-nourished. Not diaphoretic. No distress.   Ears:  External ears unremarkable. Tympanic membranes clear and intact.  Nose: Nares patent. Septum midline. Turbinates without erythema nor edema. No discharge.   Mouth/Throat: cobblestone pattern. Posterior pharynx without erythema or exudates.  Neck:  No lymphadenopathy--cervical or supraclavicular.  Cardiovascular: Regular rate and rhythm, S1 and S2 without murmur, rubs, or gallops.    Chest: Effort normal. Clear to auscultation throughout. No adventitious sounds.   Neurological: Alert and oriented x 3.   Psychiatric:  Behavior, mood, and affect are appropriate.  MA/nursing note and vitals reviewed.    Assessment/Plan:  1. Flu-like symptoms  Rx, supportive care, warm fluids and rest, avoid cold fluids and dairy during tx.  F/ u in 1 wk or earlier if symptoms persist or worsen    2. Cough, unspecified type  - benzonatate (TESSALON) 100 MG Cap; Take 1 Capsule by mouth 3 times a day as needed for Cough.  Dispense: 60 Capsule; " Refill: 0    3. Wheezing  - albuterol 108 (90 Base) MCG/ACT Aero Soln inhalation aerosol; Inhale 2 Puffs every 6 hours as needed for Shortness of Breath.  Dispense: 8.5 g; Refill: 1    4. Sinus congestion  - fluticasone (FLONASE) 50 MCG/ACT nasal spray; Administer 1 Spray into affected nostril(S) at bedtime.  Dispense: 16 g; Refill: 1    5. COVID       Discussed with patient possible alternative diagnoses, patient is to take all medications as prescribed.      If symptoms persist FU w/PCP, if symptoms worsen go to emergency room.      If experiencing any side effects from prescribed medications report to the office immediately or go to emergency room.     Reviewed indication, dosage, usage and potential adverse effects of prescribed medications.      Reviewed risks and benefits of treatment plan. Patient verbalizes understanding of all instruction and verbally agrees to plan.     Discussed plan with the patient, and patient agrees to the above.      I personally reviewed prior external notes and test results pertinent to today's visit.      No follow-ups on file. 1 wk PRN

## 2023-01-12 NOTE — ASSESSMENT & PLAN NOTE
Patient is here today wit hnew s/s started on Saturday. S/s include diarrhea, chills, coughing. Having body aches from coughing so much.  On 12/28/2022 she was tested for flu and COVID test was negative.  No fever, no SOB.   tested for Covid at the same time.

## 2023-01-20 ENCOUNTER — APPOINTMENT (OUTPATIENT)
Dept: MEDICAL GROUP | Facility: MEDICAL CENTER | Age: 83
End: 2023-01-20
Payer: MEDICARE

## 2023-01-25 ENCOUNTER — OFFICE VISIT (OUTPATIENT)
Dept: MEDICAL GROUP | Facility: MEDICAL CENTER | Age: 83
End: 2023-01-25
Payer: MEDICARE

## 2023-01-25 ENCOUNTER — HOSPITAL ENCOUNTER (OUTPATIENT)
Dept: LAB | Facility: MEDICAL CENTER | Age: 83
End: 2023-01-25
Attending: NURSE PRACTITIONER
Payer: MEDICARE

## 2023-01-25 ENCOUNTER — HOSPITAL ENCOUNTER (OUTPATIENT)
Dept: RADIOLOGY | Facility: MEDICAL CENTER | Age: 83
End: 2023-01-25
Attending: NURSE PRACTITIONER
Payer: MEDICARE

## 2023-01-25 VITALS
RESPIRATION RATE: 17 BRPM | SYSTOLIC BLOOD PRESSURE: 110 MMHG | TEMPERATURE: 98 F | OXYGEN SATURATION: 98 % | HEIGHT: 62 IN | DIASTOLIC BLOOD PRESSURE: 70 MMHG | BODY MASS INDEX: 25.76 KG/M2 | WEIGHT: 140 LBS | HEART RATE: 90 BPM

## 2023-01-25 DIAGNOSIS — R60.0 EDEMA OF BOTH LOWER EXTREMITIES: ICD-10-CM

## 2023-01-25 DIAGNOSIS — R60.0 LEG EDEMA: Chronic | ICD-10-CM

## 2023-01-25 DIAGNOSIS — R07.81 RIB PAIN: ICD-10-CM

## 2023-01-25 DIAGNOSIS — R05.2 SUBACUTE COUGH: ICD-10-CM

## 2023-01-25 DIAGNOSIS — R68.89 COLD FEELING: ICD-10-CM

## 2023-01-25 LAB
ALBUMIN SERPL BCP-MCNC: 2.6 G/DL (ref 3.2–4.9)
ALBUMIN/GLOB SERPL: 0.8 G/DL
ALP SERPL-CCNC: 113 U/L (ref 30–99)
ALT SERPL-CCNC: 19 U/L (ref 2–50)
ANION GAP SERPL CALC-SCNC: 9 MMOL/L (ref 7–16)
AST SERPL-CCNC: 32 U/L (ref 12–45)
BILIRUB SERPL-MCNC: 0.2 MG/DL (ref 0.1–1.5)
BUN SERPL-MCNC: 13 MG/DL (ref 8–22)
CALCIUM ALBUM COR SERPL-MCNC: 9.7 MG/DL (ref 8.5–10.5)
CALCIUM SERPL-MCNC: 8.6 MG/DL (ref 8.4–10.2)
CHLORIDE SERPL-SCNC: 96 MMOL/L (ref 96–112)
CO2 SERPL-SCNC: 26 MMOL/L (ref 20–33)
CREAT SERPL-MCNC: 0.69 MG/DL (ref 0.5–1.4)
GFR SERPLBLD CREATININE-BSD FMLA CKD-EPI: 87 ML/MIN/1.73 M 2
GLOBULIN SER CALC-MCNC: 3.2 G/DL (ref 1.9–3.5)
GLUCOSE SERPL-MCNC: 101 MG/DL (ref 65–99)
POTASSIUM SERPL-SCNC: 4.4 MMOL/L (ref 3.6–5.5)
PROT SERPL-MCNC: 5.8 G/DL (ref 6–8.2)
SODIUM SERPL-SCNC: 131 MMOL/L (ref 135–145)
T4 FREE SERPL-MCNC: 1.37 NG/DL (ref 0.93–1.7)
TSH SERPL DL<=0.005 MIU/L-ACNC: 4.16 UIU/ML (ref 0.38–5.33)

## 2023-01-25 PROCEDURE — 84443 ASSAY THYROID STIM HORMONE: CPT

## 2023-01-25 PROCEDURE — 84439 ASSAY OF FREE THYROXINE: CPT

## 2023-01-25 PROCEDURE — 80053 COMPREHEN METABOLIC PANEL: CPT

## 2023-01-25 PROCEDURE — 72070 X-RAY EXAM THORAC SPINE 2VWS: CPT

## 2023-01-25 PROCEDURE — 36415 COLL VENOUS BLD VENIPUNCTURE: CPT

## 2023-01-25 PROCEDURE — 84481 FREE ASSAY (FT-3): CPT

## 2023-01-25 PROCEDURE — 99214 OFFICE O/P EST MOD 30 MIN: CPT | Performed by: NURSE PRACTITIONER

## 2023-01-25 RX ORDER — BENZONATATE 100 MG/1
100 CAPSULE ORAL 3 TIMES DAILY PRN
Qty: 60 CAPSULE | Refills: 0 | Status: SHIPPED | OUTPATIENT
Start: 2023-01-25 | End: 2023-02-02

## 2023-01-25 RX ORDER — PREDNISONE 10 MG/1
10 TABLET ORAL DAILY
Qty: 14 TABLET | Refills: 0 | Status: SHIPPED | OUTPATIENT
Start: 2023-01-25 | End: 2023-02-02

## 2023-01-25 ASSESSMENT — FIBROSIS 4 INDEX: FIB4 SCORE: 3.55

## 2023-01-26 LAB — T3FREE SERPL-MCNC: 2 PG/ML (ref 2–4.4)

## 2023-01-26 NOTE — ASSESSMENT & PLAN NOTE
Chronic intermittent.  On Lasix and K supplementation.  Has not been utilizing elevation of the legs or SCDs.

## 2023-01-26 NOTE — ASSESSMENT & PLAN NOTE
Chronic intermittent, taking Lasix 20 mg with K.   Has not been using her SCDs. Wearing compression socks.

## 2023-01-26 NOTE — ASSESSMENT & PLAN NOTE
Moderate to severe cough, Doxycycline completed beginning of January. Cough worsens at night. Also reports mucus in the throat, no other s/s no difficulty swallowing, no wheezing.    at home with same s/s, was dx'd with COvid, pt tested Negative per POCT last visit. It's been over 3 wks.

## 2023-01-26 NOTE — ASSESSMENT & PLAN NOTE
New problem. Pain in mid back after coughing  X 2 wks. Seeing chiropractor feels a little better.

## 2023-01-26 NOTE — PROGRESS NOTES
Chief Complaint   Patient presents with    Leg Swelling    Cough     Productive     Nasal Congestion    Runny Nose       HISTORY OF PRESENT ILLNESS: Patient is a 82 y.o. female, established patient who presents today to discuss medical problems as listed below:    Health Maintenance:  COMPLETED     Cough  Moderate to severe cough, Doxycycline completed beginning of January. Cough worsens at night. Also reports mucus in the throat, no other s/s no difficulty swallowing, no wheezing.     Rib pain  New problem. Pain in mid back after coughing  X 2 wks. Seeing chiropractor feels a little better.      Leg edema  Chronic intermittent, taking Lasix 20 mg with K.   Has not been using her SCDs. Wearing compression socks.     Edema of both lower extremities  Chronic intermittent.  On Lasix and K supplementation.  Has not been utilizing elevation of the legs or SCDs.    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    Late effect of stroke 11/02/2022    Agatston CAC score 200-399 09/14/2022    Acute CVA (cerebrovascular accident) (HCC) 08/15/2022    Hospital discharge follow-up 07/20/2022    Leg edema 07/09/2022    Other heart failure (HCC) 06/21/2022    Edema of both lower extremities 06/03/2022    Neuropathy of both feet 06/03/2022    Close exposure to COVID-19 virus 12/07/2021    Cough 12/07/2021    Pedal edema 08/24/2021    Hyperlipemia 11/26/2019    Legally blind in left eye, as defined in [place] 11/26/2019    History of retinal vein occlusion 04/10/2019        Allergies: Atorvastatin and Pcn [penicillins]    Current Outpatient Medications   Medication Sig Dispense Refill    predniSONE (DELTASONE) 10 MG Tab Take 1 Tablet by mouth every day for 14 days. 14 Tablet 0    benzonatate (TESSALON) 100 MG Cap Take 1 Capsule by mouth 3 times a day as needed for Cough. 60 Capsule 0    fluticasone (FLONASE) 50  MCG/ACT nasal spray Administer 1 Spray into affected nostril(S) at bedtime. 16 g 1    albuterol 108 (90 Base) MCG/ACT Aero Soln inhalation aerosol Inhale 2 Puffs every 6 hours as needed for Shortness of Breath. 8.5 g 1    triamcinolone (NASACORT) 55 MCG/ACT nasal inhaler Administer 2 Sprays into affected nostril(S) every day. 16.9 mL 1    benzonatate (TESSALON) 100 MG Cap Take 1 Capsule by mouth 3 times a day as needed for Cough. 60 Capsule 0    nortriptyline (PAMELOR) 25 MG Cap Take 1 Capsule by mouth every evening. 90 Capsule 1    Coenzyme Q10 (CO Q 10 PO) Take  by mouth.      rosuvastatin (CRESTOR) 5 MG Tab Take 1 Tablet by mouth every evening. 90 Tablet 11    potassium chloride ER (KLOR-CON) 10 MEQ tablet Take 1 Tablet by mouth every day. 90 Tablet 3    furosemide (LASIX) 20 MG Tab Take 1 Tablet by mouth every day. 90 Tablet 3    ezetimibe (ZETIA) 10 MG Tab Take 1 Tablet by mouth every day. 90 Tablet 11    Cyanocobalamin (VITAMIN B12 PO) Take 1 Tablet by mouth every day.      FOLIC ACID PO Take 1 Tablet by mouth every day.      QUERCETIN PO Take 1 Tablet by mouth every day.      aspirin EC 81 MG EC tablet Take 1 Tablet by mouth every day. 30 Tablet 0    Lutein 40 MG Cap Take 40 mg by mouth every day.      asa/apap/caffeine (EXCEDRIN) 250-250-65 MG Tab Take 1-2 Tablets by mouth every 6 hours as needed for Headache.      TURMERIC PO Take 1 Capsule by mouth every day.      benzonatate (TESSALON) 100 MG Cap Take 1 Capsule by mouth 3 times a day as needed for Cough. 60 Capsule 0    Non Formulary Request Take 1 Tablet by mouth every day. Arthrozene OTC supplement       No current facility-administered medications for this visit.       Social History     Tobacco Use    Smoking status: Never    Smokeless tobacco: Never   Vaping Use    Vaping Use: Never used   Substance Use Topics    Alcohol use: Yes     Alcohol/week: 0.6 oz     Types: 1 Glasses of wine per week     Comment: occ glass of wine    Drug use: No     Social  "History     Social History Narrative    Not on file       Family History   Problem Relation Age of Onset    Other Mother         TIA     Other Father         Abdominal aneurysm     Alcohol abuse Brother     Heart Disease Brother     No Known Problems Daughter     No Known Problems Daughter        Allergies, past medical history, past surgical history, family history, social history reviewed and updated.    Review of Systems:     - Constitutional: Negative for fever, chills, unexpected weight change, and fatigue/generalized weakness.     - HEENT: Negative for headaches, vision changes, hearing changes, ear pain, ear discharge, rhinorrhea, sinus congestion, sore throat, and neck pain.      - Respiratory: cough. Negative for  sputum production, chest congestion, dyspnea, wheezing, and crackles.      - Cardiovascular: bilateral lower extremity edema. Negative for chest pain, palpitations, orthopnea      - Psychiatric/Behavioral: Negative for depression, suicidal/homicidal ideation and memory loss.      All other systems reviewed and are negative    Exam:    /70 (BP Location: Left arm, Patient Position: Sitting, BP Cuff Size: Adult)   Pulse 90   Temp 36.7 °C (98 °F) (Temporal)   Resp 17   Ht 1.575 m (5' 2\")   Wt 63.5 kg (140 lb)   SpO2 98%   BMI 25.61 kg/m²  Body mass index is 25.61 kg/m².    Physical Exam:  Constitutional: Well-developed and well-nourished. Not diaphoretic. No distress.   Cardiovascular: Regular rate and rhythm, S1 and S2 without murmur, rubs, or gallops.    Chest: Effort normal. Clear to auscultation throughout. No adventitious sounds.   Extremities: b/l lower extremity edema. No cyanosis, clubbing, erythema.  Distal pulses intact and symmetric.   Neurological: Alert and oriented x 3.   Psychiatric:  Behavior, mood, and affect are appropriate.  MA/nursing note and vitals reviewed.    Assessment/Plan:  1. Subacute cough  - predniSONE (DELTASONE) 10 MG Tab; Take 1 Tablet by mouth every day " for 14 days.  Dispense: 14 Tablet; Refill: 0  - benzonatate (TESSALON) 100 MG Cap; Take 1 Capsule by mouth 3 times a day as needed for Cough.  Dispense: 60 Capsule; Refill: 0    2. Rib pain  - DX-THORACIC SPINE-2 VIEWS; Future  - DX-THORACIC SPINE-2 VIEWS; Future    3. Leg edema  - Comp Metabolic Panel; Future  - Comp Metabolic Panel; Future    4. Edema of both lower extremities  Utilize SCD, elevation of extremities. Can take double dose of Lasix for a few days. Will recheck labs  - Comp Metabolic Panel; Future  - Comp Metabolic Panel; Future    5. Cold feeling  - TSH; Future  - T3 FREE; Future  - FREE THYROXINE; Future       Discussed with patient possible alternative diagnoses, patient is to take all medications as prescribed.      If symptoms persist FU w/PCP, if symptoms worsen go to emergency room.      If experiencing any side effects from prescribed medications report to the office immediately or go to emergency room.     Reviewed indication, dosage, usage and potential adverse effects of prescribed medications.      Reviewed risks and benefits of treatment plan. Patient verbalizes understanding of all instruction and verbally agrees to plan.     Discussed plan with the patient, and patient agrees to the above.      I personally reviewed prior external notes and test results pertinent to today's visit.      No follow-ups on file. 2 wks

## 2023-02-02 ENCOUNTER — OFFICE VISIT (OUTPATIENT)
Dept: CARDIOLOGY | Facility: MEDICAL CENTER | Age: 83
End: 2023-02-02
Payer: MEDICARE

## 2023-02-02 VITALS
WEIGHT: 143 LBS | BODY MASS INDEX: 26.31 KG/M2 | DIASTOLIC BLOOD PRESSURE: 70 MMHG | SYSTOLIC BLOOD PRESSURE: 110 MMHG | OXYGEN SATURATION: 94 % | HEART RATE: 97 BPM | RESPIRATION RATE: 16 BRPM | HEIGHT: 62 IN

## 2023-02-02 DIAGNOSIS — R60.0 LEG EDEMA: Chronic | ICD-10-CM

## 2023-02-02 DIAGNOSIS — I50.810 RIGHT-SIDED CONGESTIVE HEART FAILURE, UNSPECIFIED HF CHRONICITY (HCC): ICD-10-CM

## 2023-02-02 DIAGNOSIS — R60.0 EDEMA OF BOTH LOWER EXTREMITIES: ICD-10-CM

## 2023-02-02 DIAGNOSIS — I50.812 CHRONIC RIGHT-SIDED HEART FAILURE (HCC): ICD-10-CM

## 2023-02-02 PROCEDURE — 99214 OFFICE O/P EST MOD 30 MIN: CPT | Performed by: NURSE PRACTITIONER

## 2023-02-02 RX ORDER — POTASSIUM CHLORIDE 20 MEQ/1
20 TABLET, EXTENDED RELEASE ORAL DAILY
Qty: 30 TABLET | Refills: 3 | Status: ON HOLD | OUTPATIENT
Start: 2023-02-02 | End: 2023-02-23

## 2023-02-02 RX ORDER — FUROSEMIDE 40 MG/1
40 TABLET ORAL DAILY
Qty: 30 TABLET | Refills: 3 | Status: ON HOLD | OUTPATIENT
Start: 2023-02-02 | End: 2023-02-23 | Stop reason: SDUPTHER

## 2023-02-02 ASSESSMENT — FIBROSIS 4 INDEX: FIB4 SCORE: 2.99

## 2023-02-02 NOTE — PROGRESS NOTES
Cardiology Clinic Follow-up Note    Date of note:    2/3/2023  Primary Care Provider: SHANNON Ontiveros    Name:             Regla Meier  YOB: 1940  MRN:               [id number]    CC: LE edema, confusion    Primary Cardiologist: Dr Cherry    Patient HPI:   Regla Meier is a 82 y.o. female with current medical problems including HLD, right-sided CHF chronic, lower extremity edema with Hx of CVA in 8/2022    Interim History:  Ms. Meier was last seen in this cardiology office by Dr Cherry on 12/7/22.  At that time no Afib was noted on 30 day Biotlel, ILR was recommended, unable to schedule as of yet.    concerned she is having mini strokes currently. Has had  moments of confusion.  She has very low energy.   Per weight almost 20lbs up, extremely swollen bilateral LE. She is trying to use her Leg compression device, unable to place JACKIE hose on. Only taking lasix 20mg daily.  She denies palpitations, chest pain, shortness of breath, dyspnea on exertion, dizziness or syncopal episodes, orthopnea, PND.  Patient endorses medication compliance.    Review of systems:  All others systems reviewed and negative except for what is outlined in the above HPI    Past Medical History:   Diagnosis Date    Hyperlipemia     Stroke (HCC)      Past Surgical History:   Procedure Laterality Date    CYSTECTOMY  1963    CATARACT EXTRACTION WITH IOL       Family History   Problem Relation Age of Onset    Other Mother         TIA     Other Father         Abdominal aneurysm     Alcohol abuse Brother     Heart Disease Brother     No Known Problems Daughter     No Known Problems Daughter      Social History     Socioeconomic History    Marital status:      Spouse name: Not on file    Number of children: Not on file    Years of education: Not on file    Highest education level: Not on file   Occupational History    Not on file   Tobacco Use    Smoking status: Never    Smokeless tobacco:  Never   Vaping Use    Vaping Use: Never used   Substance and Sexual Activity    Alcohol use: Yes     Alcohol/week: 0.6 oz     Types: 1 Glasses of wine per week     Comment: occ glass of wine    Drug use: No    Sexual activity: Yes     Partners: Male     Comment:  for 57 years. Documented on 4/10/19.   Other Topics Concern    Not on file   Social History Narrative    Not on file     Social Determinants of Health     Financial Resource Strain: Not on file   Food Insecurity: Not on file   Transportation Needs: Not on file   Physical Activity: Not on file   Stress: Not on file   Social Connections: Not on file   Intimate Partner Violence: Not on file   Housing Stability: Not on file     Allergies   Allergen Reactions    Atorvastatin Myalgia     Severe muscle weakness    Pcn [Penicillins] Hives     Current Outpatient Medications   Medication Sig Dispense Refill    furosemide (LASIX) 40 MG Tab Take 1 Tablet by mouth every day. 30 Tablet 3    potassium chloride SA (KDUR) 20 MEQ Tab CR Take 1 Tablet by mouth every day. 30 Tablet 3    fluticasone (FLONASE) 50 MCG/ACT nasal spray Administer 1 Spray into affected nostril(S) at bedtime. 16 g 1    albuterol 108 (90 Base) MCG/ACT Aero Soln inhalation aerosol Inhale 2 Puffs every 6 hours as needed for Shortness of Breath. 8.5 g 1    triamcinolone (NASACORT) 55 MCG/ACT nasal inhaler Administer 2 Sprays into affected nostril(S) every day. 16.9 mL 1    benzonatate (TESSALON) 100 MG Cap Take 1 Capsule by mouth 3 times a day as needed for Cough. 60 Capsule 0    Coenzyme Q10 (CO Q 10 PO) Take  by mouth.      rosuvastatin (CRESTOR) 5 MG Tab Take 1 Tablet by mouth every evening. 90 Tablet 11    ezetimibe (ZETIA) 10 MG Tab Take 1 Tablet by mouth every day. 90 Tablet 11    Cyanocobalamin (VITAMIN B12 PO) Take 1 Tablet by mouth every day.      QUERCETIN PO Take 1 Tablet by mouth every day.      aspirin EC 81 MG EC tablet Take 1 Tablet by mouth every day. 30 Tablet 0    Lutein 40 MG  "Cap Take 40 mg by mouth every day.      asa/apap/caffeine (EXCEDRIN) 250-250-65 MG Tab Take 1-2 Tablets by mouth every 6 hours as needed for Headache.      TURMERIC PO Take 1 Capsule by mouth every day.       No current facility-administered medications for this visit.       Physical Exam:  Ambulatory Vitals  /70 (BP Location: Left arm, Patient Position: Sitting, BP Cuff Size: Adult)   Pulse 97   Resp 16   Ht 1.575 m (5' 2\")   Wt 64.9 kg (143 lb)   SpO2 94%    BP Readings from Last 4 Encounters:   02/02/23 110/70   01/25/23 110/70   01/12/23 110/66   12/28/22 (P) 106/60       Weight/BMI: Body mass index is 26.16 kg/m².  Wt Readings from Last 4 Encounters:   02/02/23 64.9 kg (143 lb)   01/25/23 63.5 kg (140 lb)   01/12/23 56.6 kg (124 lb 12.5 oz)   12/28/22 (P) 58 kg (127 lb 13.9 oz)       General: No apparent distress. Well nourished.   Neck: No JVD. No caroid bruits, trachea midline  Lungs: CTAB. Normal effort, without crackles/rhonchi, no wheezing  Heart: RRR. Normal S1/S2, no murmur, no rub. 3+ pitting lower extremity edema. 2+ radial pulses, 2+ DT pulses  Ext: No clubbing or cyanosis.  Abdomen: soft, non tender, non distended, no viridiana hepatomegaly.  Neurological: No focal deficits, no facial asymmetry.  Normal speech.  Psychiatric: Appropriate affect, alert and oriented x 4.   Skin: Warm and dry, no rash.    Lab Data Review:  Lab Results   Component Value Date/Time    CHOLSTRLTOT 162 09/26/2022 08:31 AM    LDL 76 09/26/2022 08:31 AM    HDL 70 09/26/2022 08:31 AM    TRIGLYCERIDE 79 09/26/2022 08:31 AM       Lab Results   Component Value Date/Time    SODIUM 131 (L) 01/25/2023 05:17 PM    POTASSIUM 4.4 01/25/2023 05:17 PM    CHLORIDE 96 01/25/2023 05:17 PM    CO2 26 01/25/2023 05:17 PM    GLUCOSE 101 (H) 01/25/2023 05:17 PM    BUN 13 01/25/2023 05:17 PM    CREATININE 0.69 01/25/2023 05:17 PM     Lab Results   Component Value Date/Time    ALKPHOSPHAT 113 (H) 01/25/2023 05:17 PM    ASTSGOT 32 01/25/2023 " 05:17 PM    ALTSGPT 19 2023 05:17 PM    TBILIRUBIN 0.2 2023 05:17 PM      Lab Results   Component Value Date/Time    WBC 5.9 2022 09:10 AM       Cardiac Imaging and Procedures Review:      EKG 22: My Personal interpretation reveals SR 75    Echo 22:  The left ventricular ejection fraction is visually estimated to be 55%.  Normal regional wall motion.  Grade I diastolic dysfunction.  Mild to moderate aortic insufficiency.  Mild mitral regurgitation.    Stress Test 22:  NUCLEAR IMAGING INTERPRETATION   * Small sized, equivocal, non-reversible, mild severity defects in the apical      septal wall and mid inferolateral wall. Both most consistent with artifact    given the distribution, less likely small scars.    * SSS 2. SDS 0. No reversible ischemia.    * Normal left ventricular size, ejection fraction, and wall motion.   ECG INTERPRETATION   Negative stress ECG for ischemia.    Cardiac Event Monitor 22  No VT, VF or Pauses.   No atrial fibrillation or flutter.   No pauses   97 runs of SVT with the longest lasting 17 beats in duration    Assessment and Clinical Decision Makin. Leg edema  Basic Metabolic Panel    potassium chloride SA (KDUR) 20 MEQ Tab CR      2. Edema of both lower extremities  furosemide (LASIX) 40 MG Tab    Basic Metabolic Panel    potassium chloride SA (KDUR) 20 MEQ Tab CR      3. Right-sided congestive heart failure, unspecified HF chronicity (HCC)  furosemide (LASIX) 40 MG Tab    Basic Metabolic Panel      4. Chronic right-sided heart failure (HCC)          The following treatment plan was discussed    Chronic right sided HF  Bilateral Leg edema  -20 lb weight increase in past 2 months with 3+ pitting edema to BLE  -Increase furosemide to 40mg in the morning 20mg in the early afternoon x 2 weeks. Call office with daily weights in 2 weeks, call sooner if not improving. Increase potassium to 20 meq's daily as well  -Obtain BMP in 2 weeks  -Elevate legs, use  compression device   -She has F/U with her PCP next week as well    Hx of CVA  -Given continued concern for possible TIA's with confusion, recommend she call to schedule ILR placement.  -Continue asa 81mg daily and statin, increase as tolerated to high intensity    Plan reviewed in detail with the patient, verbalizes understanding and is in agreement.  Pt is to follow up with myself in 1 month  Encouraged Pt to follow up with us over the phone or electronically using my Outcome Referralshart as cardiac issues/concerns arise.      PLEASE NOTE: This dictation was created using voice recognition software. I have made every reasonable attempt to correct obvious errors, but I expect that there are errors of grammar and possibly content that I did not discover before finalizing the note.       JASON DominguezRSHERLEY.   [facility]  [phone]    Collaborating Physician: Dr. Mejia.

## 2023-02-02 NOTE — PATIENT INSTRUCTIONS
Increase furosemide to 40mg daily and 20mg in the afternoon for 2 weeks (can increase to 40 twice daily in needed)   Potassium should also increase to 20mg daily (increase potassium with lasix)  Obtain BMP in 2 weeks   Weigh yourself every morning at the same time, keep track, bring to next appt.   Call to our office to schedule Loop recorder placement

## 2023-02-03 PROBLEM — I50.812 CHRONIC RIGHT-SIDED HEART FAILURE (HCC): Status: ACTIVE | Noted: 2023-02-03

## 2023-02-08 ENCOUNTER — APPOINTMENT (OUTPATIENT)
Dept: MEDICAL GROUP | Facility: MEDICAL CENTER | Age: 83
End: 2023-02-08
Payer: MEDICARE

## 2023-02-10 ENCOUNTER — OFFICE VISIT (OUTPATIENT)
Dept: MEDICAL GROUP | Facility: MEDICAL CENTER | Age: 83
End: 2023-02-10
Payer: MEDICARE

## 2023-02-10 VITALS
TEMPERATURE: 98.2 F | HEART RATE: 90 BPM | DIASTOLIC BLOOD PRESSURE: 54 MMHG | BODY MASS INDEX: 27.97 KG/M2 | OXYGEN SATURATION: 96 % | HEIGHT: 62 IN | SYSTOLIC BLOOD PRESSURE: 110 MMHG | WEIGHT: 152 LBS | RESPIRATION RATE: 16 BRPM

## 2023-02-10 DIAGNOSIS — R60.0 LEG EDEMA: Chronic | ICD-10-CM

## 2023-02-10 DIAGNOSIS — I69.354 HEMIPLEGIA AND HEMIPARESIS FOLLOWING CEREBRAL INFARCTION AFFECTING LEFT NON-DOMINANT SIDE (HCC): ICD-10-CM

## 2023-02-10 DIAGNOSIS — R41.0 CONFUSION AND DISORIENTATION: ICD-10-CM

## 2023-02-10 DIAGNOSIS — I63.9 ACUTE CVA (CEREBROVASCULAR ACCIDENT) (HCC): ICD-10-CM

## 2023-02-10 DIAGNOSIS — R25.2 BILATERAL LEG CRAMPS: ICD-10-CM

## 2023-02-10 DIAGNOSIS — I50.812 CHRONIC RIGHT-SIDED HEART FAILURE (HCC): ICD-10-CM

## 2023-02-10 DIAGNOSIS — I47.10 SUPRAVENTRICULAR TACHYCARDIA (HCC): ICD-10-CM

## 2023-02-10 DIAGNOSIS — Z86.79 HISTORY OF RETINAL VEIN OCCLUSION: ICD-10-CM

## 2023-02-10 DIAGNOSIS — G89.29 CHRONIC BILATERAL THORACIC BACK PAIN: ICD-10-CM

## 2023-02-10 DIAGNOSIS — M54.6 CHRONIC BILATERAL THORACIC BACK PAIN: ICD-10-CM

## 2023-02-10 DIAGNOSIS — R60.0 EDEMA OF BOTH LOWER EXTREMITIES: ICD-10-CM

## 2023-02-10 DIAGNOSIS — H54.8 LEGALLY BLIND IN LEFT EYE, AS DEFINED IN USA: Chronic | ICD-10-CM

## 2023-02-10 PROCEDURE — 99215 OFFICE O/P EST HI 40 MIN: CPT | Performed by: NURSE PRACTITIONER

## 2023-02-10 ASSESSMENT — FIBROSIS 4 INDEX: FIB4 SCORE: 2.99

## 2023-02-10 ASSESSMENT — PATIENT HEALTH QUESTIONNAIRE - PHQ9: CLINICAL INTERPRETATION OF PHQ2 SCORE: 0

## 2023-02-10 NOTE — ASSESSMENT & PLAN NOTE
This is a chronic problem since August 2022 when patient had a stroke.  She reports intermittent confusion and weakness.  She is alert oriented x4 today.  She denies urinary symptoms or symptoms in her chest such as shortness of breath or chest tightness.  Is been taking 40 mg of Lasix for extremity swelling.  She admits to not always drinking adequate amount of water.  She wonders if fatigue and weakness can be contributed to that.

## 2023-02-10 NOTE — ASSESSMENT & PLAN NOTE
Chronic pains from mid back.  Recent imaging from 1/25/2023 with the following impression.  IMPRESSION:        Worsening moderate chronic appearing compression deformity of T12.     Unchanged moderate chronic compression deformity of L1

## 2023-02-10 NOTE — ASSESSMENT & PLAN NOTE
Chronic problem. Chronic swelling and pain of bilateral lower extremities. Was seen by cardiology on 2/2/23. Her lasix was increased to 40 mg daily. She reports low sodium diet. She is wearing compression socks. She admits to not practice elevation.    Latest Reference Range & Units 01/25/23 17:17   Potassium 3.6 - 5.5 mmol/L 4.4

## 2023-02-10 NOTE — PROGRESS NOTES
Chief Complaint   Patient presents with    Edema     Bilateral lower extremity edema       HISTORY OF PRESENT ILLNESS: Patient is a 82 y.o. female, established patient who presents today to discuss medical problems as listed below:    Health Maintenance:  COMPLETED       Allergies: Atorvastatin and Pcn [penicillins]    Current Outpatient Medications   Medication Sig Dispense Refill    furosemide (LASIX) 40 MG Tab Take 1 Tablet by mouth every day. 30 Tablet 3    potassium chloride SA (KDUR) 20 MEQ Tab CR Take 1 Tablet by mouth every day. 30 Tablet 3    fluticasone (FLONASE) 50 MCG/ACT nasal spray Administer 1 Spray into affected nostril(S) at bedtime. 16 g 1    albuterol 108 (90 Base) MCG/ACT Aero Soln inhalation aerosol Inhale 2 Puffs every 6 hours as needed for Shortness of Breath. 8.5 g 1    triamcinolone (NASACORT) 55 MCG/ACT nasal inhaler Administer 2 Sprays into affected nostril(S) every day. 16.9 mL 1    benzonatate (TESSALON) 100 MG Cap Take 1 Capsule by mouth 3 times a day as needed for Cough. 60 Capsule 0    Coenzyme Q10 (CO Q 10 PO) Take  by mouth.      rosuvastatin (CRESTOR) 5 MG Tab Take 1 Tablet by mouth every evening. 90 Tablet 11    ezetimibe (ZETIA) 10 MG Tab Take 1 Tablet by mouth every day. 90 Tablet 11    Cyanocobalamin (VITAMIN B12 PO) Take 1 Tablet by mouth every day.      QUERCETIN PO Take 1 Tablet by mouth every day.      aspirin EC 81 MG EC tablet Take 1 Tablet by mouth every day. 30 Tablet 0    Lutein 40 MG Cap Take 40 mg by mouth every day.      asa/apap/caffeine (EXCEDRIN) 250-250-65 MG Tab Take 1-2 Tablets by mouth every 6 hours as needed for Headache.      TURMERIC PO Take 1 Capsule by mouth every day.       No current facility-administered medications for this visit.       Allergies, past medical history, past surgical history, family history, social history reviewed and updated.    Review of Systems:     - Constitutional: fatigue/generalized weakness. Negative for fever, chills,  "unexpected weight change, and      - HEENT: vision problems (chronic). Negative for headaches, hearing changes, ear pain, ear discharge, rhinorrhea, sinus congestion, sore throat, and neck pain.      - Respiratory: Negative for cough, sputum production, chest congestion, dyspnea, wheezing, and crackles.      - Cardiovascular: bilateral lower extremity edema. Negative for chest pain, palpitations, orthopnea    - Neurological: confusion (chronic ). Negative for dizziness, tingling, tremors, focal sensory deficit, focal weakness and headaches.     - Endo/Heme/Allergies: Does not bruise/bleed easily.     - Psychiatric/Behavioral: Negative for depression, suicidal/homicidal ideation and memory loss.      All other systems reviewed and are negative    Exam:    /54   Pulse 90   Temp 36.8 °C (98.2 °F) (Temporal)   Resp 16   Ht 1.575 m (5' 2\")   Wt 68.9 kg (152 lb)   SpO2 96%   BMI 27.80 kg/m²  Body mass index is 27.8 kg/m².    Physical Exam:  Constitutional: Well-developed and well-nourished. Not diaphoretic. No distress.   Skin: Skin is warm and dry. No rash noted.  Cardiovascular: Regular rate and rhythm, S1 and S2 without murmur, rubs, or gallops.    Chest: Effort normal. Clear to auscultation throughout. No adventitious sounds.   : Negative for dysuria, polyuria, hematuria, pyuria, urinary urgency, and urinary incontinence.  Extremities: Swelling of bilateral lower extremities, no weeping.  Compression stockings on  Musculoskeletal: All major joints AROM full in all directions without pain.  Neurological: Alert and oriented x 3. Sensation intact. Negative Rhomberg.  Psychiatric:  Behavior, mood, and affect are appropriate.  MA/nursing note and vitals reviewed.    LABS: 1/2023  results reviewed and discussed with the patient, questions answered.    My total time spent caring for the patient on the day of the encounter was 50 minutes.  This time includes direct time with patient, current and chronic " problems, education, reviewing of cardiology and neurology notes from 2-2-23 and 11-2-22.  Imaging from 1/25/2023, labs from 1/25/2023.  This does not include time spent on separately billable procedures/tests.     Assessment/Plan:  Chronic right-sided heart failure (HCC)  Chronic problem. Following by [facility] cardiology. Having chronic bilateral lower leg extremity swelling.   On Lasix which was recently increased to 40 mg, also taking her potassium.  Recent kidney function within    Confusion and disorientation  This is a chronic problem since August 2022 when patient had a stroke.  She reports intermittent confusion and weakness.  She is alert oriented x4 today.  She denies urinary symptoms or symptoms in her chest such as shortness of breath or chest tightness.  Is been taking 40 mg of Lasix for extremity swelling.  She admits to not always drinking adequate amount of water.  She wonders if fatigue and weakness can be contributed to that.    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  History of retinal vein occlusion in the left eye.  She is following with ophthalmology every 6 months.  She denies any changes in vision, however her chronic vision status poor.  She is legally blind in the left eye.    Acute CVA (cerebrovascular accident) (HCC)  Chronic problem.  History of stroke from August 2022 with residual left-sided weakness.  She has no issues swallowing.  She is walking without support.  She is followed by cardiology and neurology.  She is on Crestor and Zetia and reports compliance.    Thoracic back pain  Chronic pains from mid back.  Recent imaging from 1/25/2023 with the following impression.  IMPRESSION:        Worsening moderate chronic appearing compression deformity of T12.     Unchanged moderate chronic compression deformity of L1    Edema of both lower extremities  Chronic problem. Chronic swelling and pain of bilateral lower extremities. Was seen by cardiology on 2/2/23. Her  lasix was increased to 40 mg daily. She reports low sodium diet. She is wearing compression socks. She admits to not practice elevation.    Latest Reference Range & Units 01/25/23 17:17   Potassium 3.6 - 5.5 mmol/L 4.4      Latest Reference Range & Units 01/25/23 17:17   Potassium 3.6 - 5.5 mmol/L 4.4   Chloride 96 - 112 mmol/L 96   Co2 20 - 33 mmol/L 26   Anion Gap 7.0 - 16.0  9.0   Glucose 65 - 99 mg/dL 101 (H)   Bun 8 - 22 mg/dL 13   Creatinine 0.50 - 1.40 mg/dL 0.69   GFR (CKD-EPI) >60 mL/min/1.73 m 2 87   (H): Data is abnormally high      Bilateral leg cramps  Problem.  Bilateral cramping in both legs, worse at night.  Patient admits to not drinking enough water.  She is also taking Lasix 40 mg as well as potassium.  She takes OTC magnesium to 50 mg daily.    1. Leg edema  Uncontrolled.  Continue Lasix and potassium supplementation.  Strong encouragement to keep feet elevated at least every few hours during the day for about 10 to 15 minutes.  Patient to follow-up with cardiology and myself as needed.  - Comp Metabolic Panel; Future  - Referral to Home Health    2. Edema of both lower extremities  As discussed in 1  - Comp Metabolic Panel; Future  - Referral to Home Health  - REFERRAL TO CARE MANAGEMENT    3. Chronic right-sided heart failure (HCC)  Followed by [facility]ology.  - Comp Metabolic Panel; Future  - Referral to Nutrition Services  - Referral to Home Health  - REFERRAL TO CARE MANAGEMENT    4. Confusion and disorientation  This is an intermittent chronic issue since August.  This might be secondary to dehydration and advanced age.  Patient encouraged to drink more water, avoid canned food and additional salt.  will appreciate an evaluation by care management and home health.  Patient has no neurological deficits today.  She is followed by neurology.  Patient to schedule an appointment with her neurologist.  - Referral to Home Health  - POCT Urinalysis  - REFERRAL TO CARE MANAGEMENT    5. Acute CVA  (cerebrovascular accident) (HCC)  Stable.  - REFERRAL TO CARE MANAGEMENT    6. History of retinal vein occlusion  Stable.  Patient has a follow-up with her ophthalmologist.  - REFERRAL TO CARE MANAGEMENT    7. Legally blind in left eye, as defined in [place]  Stable.  Patient does not drive.  Patient has a follow-up appointment with ophthalmologist.  - REFERRAL TO CARE MANAGEMENT    8. Chronic bilateral thoracic back pain  Chronic issue.  Will appreciate orthopedic evaluation.  OTC Voltaren gel, Salonpas patches as needed.  - Referral to Orthopedics    9. Hemiplegia and hemiparesis following cerebral infarction affecting left non-dominant side (HCC)  Stable.    10. Supraventricular tachycardia (HCC)  Stable.  Followed by cardiology.    11.  Bilateral leg cramps  Likely electrolyte depletion..  Encouraged to drink plain water with electrolytes sodium magnesium and potassium as well as chloride.  Avoid salt.  Not improved in the next 2 weeks trial of Requip.    Discussed with patient possible alternative diagnoses, patient is to take all medications as prescribed.      If symptoms persist FU w/PCP, if symptoms worsen go to emergency room.      If experiencing any side effects from prescribed medications report to the office immediately or go to emergency room.     Reviewed indication, dosage, usage and potential adverse effects of prescribed medications.      Reviewed risks and benefits of treatment plan. Patient verbalizes understanding of all instruction and verbally agrees to plan.     Discussed plan with the patient, and patient agrees to the above.      I personally reviewed prior external notes and test results pertinent to today's visit.      No follow-ups on file. 2 wks

## 2023-02-10 NOTE — ASSESSMENT & PLAN NOTE
Problem.  Bilateral cramping in both legs, worse at night.  Patient admits to not drinking enough water.  She is also taking Lasix 40 mg as well as potassium.  She takes OTC magnesium to 50 mg daily.

## 2023-02-10 NOTE — ASSESSMENT & PLAN NOTE
Chronic problem.  History of stroke from August 2022 with residual left-sided weakness.  She has no issues swallowing.  She is walking without support.  She is followed by cardiology and neurology.  She is on Crestor and Zetia and reports compliance.

## 2023-02-10 NOTE — ASSESSMENT & PLAN NOTE
Chronic problem. Chronic swelling and pain of bilateral lower extremities. Was seen by cardiology on 2/2/23. Her lasix was increased to 40 mg daily. She reports low sodium diet. She is wearing compression socks. She admits to not practice elevation.    Latest Reference Range & Units 01/25/23 17:17   Potassium 3.6 - 5.5 mmol/L 4.4      Latest Reference Range & Units 01/25/23 17:17   Potassium 3.6 - 5.5 mmol/L 4.4   Chloride 96 - 112 mmol/L 96   Co2 20 - 33 mmol/L 26   Anion Gap 7.0 - 16.0  9.0   Glucose 65 - 99 mg/dL 101 (H)   Bun 8 - 22 mg/dL 13   Creatinine 0.50 - 1.40 mg/dL 0.69   GFR (CKD-EPI) >60 mL/min/1.73 m 2 87   (H): Data is abnormally high

## 2023-02-10 NOTE — ASSESSMENT & PLAN NOTE
Chronic problem.  History of retinal vein occlusion in the left eye.  She is following with ophthalmology every 6 months.  She denies any changes in vision, however her chronic vision status poor.  She is legally blind in the left eye.

## 2023-02-10 NOTE — ASSESSMENT & PLAN NOTE
Chronic problem. Following by [facility] cardiology. Having chronic bilateral lower leg extremity swelling.   On Lasix which was recently increased to 40 mg, also taking her potassium.  Recent kidney function within

## 2023-02-14 ENCOUNTER — HOSPITAL ENCOUNTER (INPATIENT)
Facility: MEDICAL CENTER | Age: 83
LOS: 3 days | DRG: 293 | End: 2023-02-17
Attending: EMERGENCY MEDICINE | Admitting: HOSPITALIST
Payer: MEDICARE

## 2023-02-14 ENCOUNTER — OFFICE VISIT (OUTPATIENT)
Dept: MEDICAL GROUP | Facility: MEDICAL CENTER | Age: 83
End: 2023-02-14
Payer: MEDICARE

## 2023-02-14 ENCOUNTER — APPOINTMENT (OUTPATIENT)
Dept: MEDICAL GROUP | Facility: MEDICAL CENTER | Age: 83
End: 2023-02-14
Payer: MEDICARE

## 2023-02-14 ENCOUNTER — APPOINTMENT (OUTPATIENT)
Dept: RADIOLOGY | Facility: MEDICAL CENTER | Age: 83
DRG: 293 | End: 2023-02-14
Payer: MEDICARE

## 2023-02-14 VITALS
WEIGHT: 182.98 LBS | OXYGEN SATURATION: 92 % | SYSTOLIC BLOOD PRESSURE: 108 MMHG | DIASTOLIC BLOOD PRESSURE: 62 MMHG | HEIGHT: 62 IN | TEMPERATURE: 97.9 F | HEART RATE: 94 BPM | RESPIRATION RATE: 20 BRPM | BODY MASS INDEX: 33.67 KG/M2

## 2023-02-14 DIAGNOSIS — R25.2 LEG CRAMPING: ICD-10-CM

## 2023-02-14 DIAGNOSIS — R06.02 SOB (SHORTNESS OF BREATH): ICD-10-CM

## 2023-02-14 DIAGNOSIS — R53.83 OTHER FATIGUE: ICD-10-CM

## 2023-02-14 DIAGNOSIS — R41.0 CONFUSION AND DISORIENTATION: ICD-10-CM

## 2023-02-14 DIAGNOSIS — I50.9 CONGESTIVE HEART FAILURE, UNSPECIFIED HF CHRONICITY, UNSPECIFIED HEART FAILURE TYPE (HCC): ICD-10-CM

## 2023-02-14 DIAGNOSIS — I50.9 ACUTE ON CHRONIC CONGESTIVE HEART FAILURE, UNSPECIFIED HEART FAILURE TYPE (HCC): ICD-10-CM

## 2023-02-14 DIAGNOSIS — R60.9 3+ PITTING EDEMA: ICD-10-CM

## 2023-02-14 DIAGNOSIS — R06.01 SLEEPS IN SITTING POSITION DUE TO ORTHOPNEA: ICD-10-CM

## 2023-02-14 PROBLEM — J44.9 COPD (CHRONIC OBSTRUCTIVE PULMONARY DISEASE) (HCC): Status: ACTIVE | Noted: 2023-02-14

## 2023-02-14 PROBLEM — R79.89 TROPONIN LEVEL ELEVATED: Status: ACTIVE | Noted: 2023-02-14

## 2023-02-14 PROBLEM — I48.91 AF (ATRIAL FIBRILLATION) (HCC): Status: ACTIVE | Noted: 2023-02-14

## 2023-02-14 LAB
ALBUMIN SERPL BCP-MCNC: 2.8 G/DL (ref 3.2–4.9)
ALBUMIN/GLOB SERPL: 0.9 G/DL
ALP SERPL-CCNC: 120 U/L (ref 30–99)
ALT SERPL-CCNC: 28 U/L (ref 2–50)
ANION GAP SERPL CALC-SCNC: 11 MMOL/L (ref 7–16)
AST SERPL-CCNC: 44 U/L (ref 12–45)
BASOPHILS # BLD AUTO: 0.9 % (ref 0–1.8)
BASOPHILS # BLD: 0.07 K/UL (ref 0–0.12)
BILIRUB SERPL-MCNC: 0.3 MG/DL (ref 0.1–1.5)
BUN SERPL-MCNC: 28 MG/DL (ref 8–22)
CALCIUM ALBUM COR SERPL-MCNC: 9.7 MG/DL (ref 8.5–10.5)
CALCIUM SERPL-MCNC: 8.7 MG/DL (ref 8.4–10.2)
CHLORIDE SERPL-SCNC: 96 MMOL/L (ref 96–112)
CO2 SERPL-SCNC: 27 MMOL/L (ref 20–33)
CREAT SERPL-MCNC: 0.87 MG/DL (ref 0.5–1.4)
EKG IMPRESSION: NORMAL
EOSINOPHIL # BLD AUTO: 0.06 K/UL (ref 0–0.51)
EOSINOPHIL NFR BLD: 0.8 % (ref 0–6.9)
ERYTHROCYTE [DISTWIDTH] IN BLOOD BY AUTOMATED COUNT: 45.3 FL (ref 35.9–50)
FLUAV RNA SPEC QL NAA+PROBE: NEGATIVE
FLUBV RNA SPEC QL NAA+PROBE: NEGATIVE
GFR SERPLBLD CREATININE-BSD FMLA CKD-EPI: 67 ML/MIN/1.73 M 2
GLOBULIN SER CALC-MCNC: 3 G/DL (ref 1.9–3.5)
GLUCOSE SERPL-MCNC: 110 MG/DL (ref 65–99)
HCT VFR BLD AUTO: 39.9 % (ref 37–47)
HGB BLD-MCNC: 13.7 G/DL (ref 12–16)
IMM GRANULOCYTES # BLD AUTO: 0.03 K/UL (ref 0–0.11)
IMM GRANULOCYTES NFR BLD AUTO: 0.4 % (ref 0–0.9)
LYMPHOCYTES # BLD AUTO: 1.26 K/UL (ref 1–4.8)
LYMPHOCYTES NFR BLD: 16.3 % (ref 22–41)
MCH RBC QN AUTO: 31.9 PG (ref 27–33)
MCHC RBC AUTO-ENTMCNC: 34.3 G/DL (ref 33.6–35)
MCV RBC AUTO: 93 FL (ref 81.4–97.8)
MONOCYTES # BLD AUTO: 0.78 K/UL (ref 0–0.85)
MONOCYTES NFR BLD AUTO: 10.1 % (ref 0–13.4)
NEUTROPHILS # BLD AUTO: 5.51 K/UL (ref 2–7.15)
NEUTROPHILS NFR BLD: 71.5 % (ref 44–72)
NRBC # BLD AUTO: 0 K/UL
NRBC BLD-RTO: 0 /100 WBC
NT-PROBNP SERPL IA-MCNC: 8499 PG/ML (ref 0–125)
PLATELET # BLD AUTO: 335 K/UL (ref 164–446)
PMV BLD AUTO: 8.2 FL (ref 9–12.9)
POTASSIUM SERPL-SCNC: 4 MMOL/L (ref 3.6–5.5)
PROT SERPL-MCNC: 5.8 G/DL (ref 6–8.2)
RBC # BLD AUTO: 4.29 M/UL (ref 4.2–5.4)
RSV RNA SPEC QL NAA+PROBE: NEGATIVE
SARS-COV-2 RNA RESP QL NAA+PROBE: NOTDETECTED
SODIUM SERPL-SCNC: 134 MMOL/L (ref 135–145)
SPECIMEN SOURCE: NORMAL
TROPONIN T SERPL-MCNC: 113 NG/L (ref 6–19)
TROPONIN T SERPL-MCNC: 87 NG/L (ref 6–19)
WBC # BLD AUTO: 7.7 K/UL (ref 4.8–10.8)

## 2023-02-14 PROCEDURE — 99285 EMERGENCY DEPT VISIT HI MDM: CPT

## 2023-02-14 PROCEDURE — A9270 NON-COVERED ITEM OR SERVICE: HCPCS | Performed by: HOSPITALIST

## 2023-02-14 PROCEDURE — 94760 N-INVAS EAR/PLS OXIMETRY 1: CPT

## 2023-02-14 PROCEDURE — 80053 COMPREHEN METABOLIC PANEL: CPT

## 2023-02-14 PROCEDURE — 770020 HCHG ROOM/CARE - TELE (206)

## 2023-02-14 PROCEDURE — C9803 HOPD COVID-19 SPEC COLLECT: HCPCS | Performed by: EMERGENCY MEDICINE

## 2023-02-14 PROCEDURE — 93005 ELECTROCARDIOGRAM TRACING: CPT | Performed by: EMERGENCY MEDICINE

## 2023-02-14 PROCEDURE — 99223 1ST HOSP IP/OBS HIGH 75: CPT | Mod: AI | Performed by: HOSPITALIST

## 2023-02-14 PROCEDURE — 99215 OFFICE O/P EST HI 40 MIN: CPT | Performed by: NURSE PRACTITIONER

## 2023-02-14 PROCEDURE — 700102 HCHG RX REV CODE 250 W/ 637 OVERRIDE(OP): Performed by: EMERGENCY MEDICINE

## 2023-02-14 PROCEDURE — 36415 COLL VENOUS BLD VENIPUNCTURE: CPT

## 2023-02-14 PROCEDURE — 700111 HCHG RX REV CODE 636 W/ 250 OVERRIDE (IP): Performed by: EMERGENCY MEDICINE

## 2023-02-14 PROCEDURE — 700102 HCHG RX REV CODE 250 W/ 637 OVERRIDE(OP): Performed by: HOSPITALIST

## 2023-02-14 PROCEDURE — 83880 ASSAY OF NATRIURETIC PEPTIDE: CPT

## 2023-02-14 PROCEDURE — 84484 ASSAY OF TROPONIN QUANT: CPT

## 2023-02-14 PROCEDURE — 96374 THER/PROPH/DIAG INJ IV PUSH: CPT

## 2023-02-14 PROCEDURE — 85025 COMPLETE CBC W/AUTO DIFF WBC: CPT

## 2023-02-14 PROCEDURE — 0241U HCHG SARS-COV-2 COVID-19 NFCT DS RESP RNA 4 TRGT MIC: CPT

## 2023-02-14 PROCEDURE — 71045 X-RAY EXAM CHEST 1 VIEW: CPT

## 2023-02-14 PROCEDURE — 93005 ELECTROCARDIOGRAM TRACING: CPT

## 2023-02-14 PROCEDURE — A9270 NON-COVERED ITEM OR SERVICE: HCPCS | Performed by: EMERGENCY MEDICINE

## 2023-02-14 RX ORDER — POLYETHYLENE GLYCOL 3350 17 G/17G
1 POWDER, FOR SOLUTION ORAL
Status: DISCONTINUED | OUTPATIENT
Start: 2023-02-14 | End: 2023-02-17 | Stop reason: HOSPADM

## 2023-02-14 RX ORDER — ENOXAPARIN SODIUM 100 MG/ML
40 INJECTION SUBCUTANEOUS DAILY
Status: DISCONTINUED | OUTPATIENT
Start: 2023-02-14 | End: 2023-02-14

## 2023-02-14 RX ORDER — ALBUTEROL SULFATE 90 UG/1
2 AEROSOL, METERED RESPIRATORY (INHALATION) EVERY 6 HOURS PRN
Status: DISCONTINUED | OUTPATIENT
Start: 2023-02-14 | End: 2023-02-17 | Stop reason: HOSPADM

## 2023-02-14 RX ORDER — POTASSIUM CHLORIDE 20 MEQ/1
20 TABLET, EXTENDED RELEASE ORAL DAILY
Status: DISCONTINUED | OUTPATIENT
Start: 2023-02-15 | End: 2023-02-17

## 2023-02-14 RX ORDER — AMOXICILLIN 250 MG
2 CAPSULE ORAL 2 TIMES DAILY
Status: DISCONTINUED | OUTPATIENT
Start: 2023-02-15 | End: 2023-02-17 | Stop reason: HOSPADM

## 2023-02-14 RX ORDER — TRIAMCINOLONE ACETONIDE 55 UG/1
2 SPRAY, METERED NASAL DAILY
Status: DISCONTINUED | OUTPATIENT
Start: 2023-02-15 | End: 2023-02-14

## 2023-02-14 RX ORDER — METOPROLOL TARTRATE 1 MG/ML
5 INJECTION, SOLUTION INTRAVENOUS
Status: DISCONTINUED | OUTPATIENT
Start: 2023-02-14 | End: 2023-02-16

## 2023-02-14 RX ORDER — ROSUVASTATIN CALCIUM 10 MG/1
5 TABLET, COATED ORAL EVERY EVENING
Status: DISCONTINUED | OUTPATIENT
Start: 2023-02-14 | End: 2023-02-17 | Stop reason: HOSPADM

## 2023-02-14 RX ORDER — FUROSEMIDE 10 MG/ML
40 INJECTION INTRAMUSCULAR; INTRAVENOUS
Status: DISCONTINUED | OUTPATIENT
Start: 2023-02-15 | End: 2023-02-15

## 2023-02-14 RX ORDER — ONDANSETRON 2 MG/ML
4 INJECTION INTRAMUSCULAR; INTRAVENOUS EVERY 4 HOURS PRN
Status: DISCONTINUED | OUTPATIENT
Start: 2023-02-14 | End: 2023-02-15

## 2023-02-14 RX ORDER — ASPIRIN 81 MG/1
324 TABLET, CHEWABLE ORAL ONCE
Status: COMPLETED | OUTPATIENT
Start: 2023-02-14 | End: 2023-02-14

## 2023-02-14 RX ORDER — ACETAMINOPHEN 325 MG/1
650 TABLET ORAL EVERY 6 HOURS PRN
Status: DISCONTINUED | OUTPATIENT
Start: 2023-02-14 | End: 2023-02-17 | Stop reason: HOSPADM

## 2023-02-14 RX ORDER — PERPHENAZINE/AMITRIPTYLINE HCL 4 MG-25 MG
40 TABLET ORAL DAILY
Status: DISCONTINUED | OUTPATIENT
Start: 2023-02-15 | End: 2023-02-14

## 2023-02-14 RX ORDER — FUROSEMIDE 10 MG/ML
40 INJECTION INTRAMUSCULAR; INTRAVENOUS ONCE
Status: COMPLETED | OUTPATIENT
Start: 2023-02-14 | End: 2023-02-14

## 2023-02-14 RX ORDER — ONDANSETRON 4 MG/1
4 TABLET, ORALLY DISINTEGRATING ORAL EVERY 4 HOURS PRN
Status: DISCONTINUED | OUTPATIENT
Start: 2023-02-14 | End: 2023-02-15

## 2023-02-14 RX ORDER — EZETIMIBE 10 MG/1
10 TABLET ORAL DAILY
Status: DISCONTINUED | OUTPATIENT
Start: 2023-02-15 | End: 2023-02-17 | Stop reason: HOSPADM

## 2023-02-14 RX ORDER — FUROSEMIDE 10 MG/ML
20 INJECTION INTRAMUSCULAR; INTRAVENOUS
Status: DISCONTINUED | OUTPATIENT
Start: 2023-02-15 | End: 2023-02-14

## 2023-02-14 RX ORDER — FLUTICASONE PROPIONATE 50 MCG
1 SPRAY, SUSPENSION (ML) NASAL
Status: DISCONTINUED | OUTPATIENT
Start: 2023-02-14 | End: 2023-02-17 | Stop reason: HOSPADM

## 2023-02-14 RX ORDER — BISACODYL 10 MG
10 SUPPOSITORY, RECTAL RECTAL
Status: DISCONTINUED | OUTPATIENT
Start: 2023-02-14 | End: 2023-02-17 | Stop reason: HOSPADM

## 2023-02-14 RX ADMIN — APIXABAN 5 MG: 5 TABLET, FILM COATED ORAL at 20:59

## 2023-02-14 RX ADMIN — FLUTICASONE PROPIONATE 50 MCG: 50 SPRAY, METERED NASAL at 21:00

## 2023-02-14 RX ADMIN — ROSUVASTATIN 5 MG: 10 TABLET, FILM COATED ORAL at 20:59

## 2023-02-14 RX ADMIN — FUROSEMIDE 40 MG: 10 INJECTION, SOLUTION INTRAMUSCULAR; INTRAVENOUS at 18:58

## 2023-02-14 RX ADMIN — ASPIRIN 81 MG CHEWABLE TABLET 324 MG: 81 TABLET CHEWABLE at 18:58

## 2023-02-14 ASSESSMENT — LIFESTYLE VARIABLES
AVERAGE NUMBER OF DAYS PER WEEK YOU HAVE A DRINK CONTAINING ALCOHOL: 0
TOTAL SCORE: 0
CONSUMPTION TOTAL: NEGATIVE
TOTAL SCORE: 0
HAVE YOU EVER FELT YOU SHOULD CUT DOWN ON YOUR DRINKING: NO
EVER HAD A DRINK FIRST THING IN THE MORNING TO STEADY YOUR NERVES TO GET RID OF A HANGOVER: NO
EVER FELT BAD OR GUILTY ABOUT YOUR DRINKING: NO
TOTAL SCORE: 0
HOW MANY TIMES IN THE PAST YEAR HAVE YOU HAD 5 OR MORE DRINKS IN A DAY: 0
HAVE PEOPLE ANNOYED YOU BY CRITICIZING YOUR DRINKING: NO
ALCOHOL_USE: YES
ON A TYPICAL DAY WHEN YOU DRINK ALCOHOL HOW MANY DRINKS DO YOU HAVE: 1

## 2023-02-14 ASSESSMENT — COGNITIVE AND FUNCTIONAL STATUS - GENERAL
DAILY ACTIVITIY SCORE: 24
MOBILITY SCORE: 24
SUGGESTED CMS G CODE MODIFIER DAILY ACTIVITY: CH
SUGGESTED CMS G CODE MODIFIER MOBILITY: CH

## 2023-02-14 ASSESSMENT — ENCOUNTER SYMPTOMS
DIARRHEA: 0
NERVOUS/ANXIOUS: 0
PALPITATIONS: 0
EYE PAIN: 0
FLANK PAIN: 0
BRUISES/BLEEDS EASILY: 0
FOCAL WEAKNESS: 0
VOMITING: 0
MYALGIAS: 0
BLOOD IN STOOL: 0
FEVER: 0
CHILLS: 0
SPEECH CHANGE: 0
LOSS OF CONSCIOUSNESS: 0
ORTHOPNEA: 1
HALLUCINATIONS: 0
HEMOPTYSIS: 0
COUGH: 0
SPUTUM PRODUCTION: 0
SEIZURES: 0
SORE THROAT: 0
SHORTNESS OF BREATH: 1
EYE DISCHARGE: 0
EYE REDNESS: 0
PND: 1
STRIDOR: 0
ABDOMINAL PAIN: 0

## 2023-02-14 ASSESSMENT — CHA2DS2 SCORE
CHA2DS2 VASC SCORE: 7
CHF OR LEFT VENTRICULAR DYSFUNCTION: YES
AGE 75 OR GREATER: YES
HYPERTENSION: NO
AGE 65 TO 74: NO
DIABETES: NO
SEX: FEMALE
PRIOR STROKE OR TIA OR THROMBOEMBOLISM: YES
VASCULAR DISEASE: YES

## 2023-02-14 ASSESSMENT — FIBROSIS 4 INDEX
FIB4 SCORE: 2.99
FIB4 SCORE: 2.99
FIB4 SCORE: 2.01

## 2023-02-14 ASSESSMENT — PATIENT HEALTH QUESTIONNAIRE - PHQ9
2. FEELING DOWN, DEPRESSED, IRRITABLE, OR HOPELESS: NOT AT ALL
1. LITTLE INTEREST OR PLEASURE IN DOING THINGS: NOT AT ALL
SUM OF ALL RESPONSES TO PHQ9 QUESTIONS 1 AND 2: 0

## 2023-02-14 NOTE — ASSESSMENT & PLAN NOTE
"New problem. Started experiencing SOB x 1.5 wks O2 today at 96%. No crackles or wheezing. occational intermittent cough, worse now. Feels she has \"tight band around ribcage\". Also having worsening of bilateral leg swelling, pitting edema, intermittent oozing and b/l leg cramping. Stated intermittent confusion and disorientation, much better in the last few days (Na decreased at 131 per last labs 1/25/23). Has a friend with her today who is an RN, helping with history. Taking Lasix 40 mg and K supplementation. Symptoms are getting worse. Hx of CHF.  Most recent CMP with stable k and GFR, NA is lower at 131, can contribute to noted intermittent confusion which pt states has gotten a little better in the last few days.   Latest Reference Range & Units 01/25/23 17:17   Sodium 135 - 145 mmol/L 131 (L)   Potassium 3.6 - 5.5 mmol/L 4.4   Chloride 96 - 112 mmol/L 96   Co2 20 - 33 mmol/L 26   Anion Gap 7.0 - 16.0  9.0   Glucose 65 - 99 mg/dL 101 (H)   Bun 8 - 22 mg/dL 13   Creatinine 0.50 - 1.40 mg/dL 0.69   GFR (CKD-EPI) >60 mL/min/1.73 m 2 87   (L): Data is abnormally low  (H): Data is abnormally high  "

## 2023-02-15 ENCOUNTER — APPOINTMENT (OUTPATIENT)
Dept: CARDIOLOGY | Facility: MEDICAL CENTER | Age: 83
DRG: 293 | End: 2023-02-15
Attending: HOSPITALIST
Payer: MEDICARE

## 2023-02-15 LAB
ALBUMIN SERPL BCP-MCNC: 2.4 G/DL (ref 3.2–4.9)
ALBUMIN/GLOB SERPL: 0.9 G/DL
ALP SERPL-CCNC: 95 U/L (ref 30–99)
ALT SERPL-CCNC: 23 U/L (ref 2–50)
ANION GAP SERPL CALC-SCNC: 9 MMOL/L (ref 7–16)
AST SERPL-CCNC: 37 U/L (ref 12–45)
BILIRUB SERPL-MCNC: 0.2 MG/DL (ref 0.1–1.5)
BUN SERPL-MCNC: 27 MG/DL (ref 8–22)
CALCIUM ALBUM COR SERPL-MCNC: 9.6 MG/DL (ref 8.5–10.5)
CALCIUM SERPL-MCNC: 8.3 MG/DL (ref 8.4–10.2)
CHLORIDE SERPL-SCNC: 99 MMOL/L (ref 96–112)
CHOLEST SERPL-MCNC: 198 MG/DL (ref 100–199)
CO2 SERPL-SCNC: 28 MMOL/L (ref 20–33)
CREAT SERPL-MCNC: 0.84 MG/DL (ref 0.5–1.4)
EKG IMPRESSION: NORMAL
ERYTHROCYTE [DISTWIDTH] IN BLOOD BY AUTOMATED COUNT: 44.1 FL (ref 35.9–50)
GFR SERPLBLD CREATININE-BSD FMLA CKD-EPI: 70 ML/MIN/1.73 M 2
GLOBULIN SER CALC-MCNC: 2.6 G/DL (ref 1.9–3.5)
GLUCOSE SERPL-MCNC: 109 MG/DL (ref 65–99)
HCT VFR BLD AUTO: 36.1 % (ref 37–47)
HDLC SERPL-MCNC: 69 MG/DL
HGB BLD-MCNC: 11.7 G/DL (ref 12–16)
LDLC SERPL CALC-MCNC: 106 MG/DL
LV EJECT FRACT  99904: 60
LV EJECT FRACT MOD 2C 99903: 57.1
LV EJECT FRACT MOD 4C 99902: 63.36
LV EJECT FRACT MOD BP 99901: 57.64
MAGNESIUM SERPL-MCNC: 2 MG/DL (ref 1.5–2.5)
MCH RBC QN AUTO: 29.3 PG (ref 27–33)
MCHC RBC AUTO-ENTMCNC: 32.4 G/DL (ref 33.6–35)
MCV RBC AUTO: 90.3 FL (ref 81.4–97.8)
PHOSPHATE SERPL-MCNC: 3.6 MG/DL (ref 2.5–4.5)
PLATELET # BLD AUTO: 309 K/UL (ref 164–446)
PMV BLD AUTO: 8.2 FL (ref 9–12.9)
POTASSIUM SERPL-SCNC: 4 MMOL/L (ref 3.6–5.5)
PROT SERPL-MCNC: 5 G/DL (ref 6–8.2)
RBC # BLD AUTO: 4 M/UL (ref 4.2–5.4)
SODIUM SERPL-SCNC: 136 MMOL/L (ref 135–145)
T4 FREE SERPL-MCNC: 1.42 NG/DL (ref 0.93–1.7)
TRIGL SERPL-MCNC: 113 MG/DL (ref 0–149)
TROPONIN T SERPL-MCNC: 149 NG/L (ref 6–19)
TROPONIN T SERPL-MCNC: 154 NG/L (ref 6–19)
TROPONIN T SERPL-MCNC: 166 NG/L (ref 6–19)
TSH SERPL DL<=0.005 MIU/L-ACNC: 6.41 UIU/ML (ref 0.38–5.33)
WBC # BLD AUTO: 8 K/UL (ref 4.8–10.8)

## 2023-02-15 PROCEDURE — A9270 NON-COVERED ITEM OR SERVICE: HCPCS | Performed by: HOSPITALIST

## 2023-02-15 PROCEDURE — 80053 COMPREHEN METABOLIC PANEL: CPT

## 2023-02-15 PROCEDURE — 700111 HCHG RX REV CODE 636 W/ 250 OVERRIDE (IP): Performed by: INTERNAL MEDICINE

## 2023-02-15 PROCEDURE — A9270 NON-COVERED ITEM OR SERVICE: HCPCS | Performed by: INTERNAL MEDICINE

## 2023-02-15 PROCEDURE — 84100 ASSAY OF PHOSPHORUS: CPT

## 2023-02-15 PROCEDURE — 36415 COLL VENOUS BLD VENIPUNCTURE: CPT

## 2023-02-15 PROCEDURE — 84439 ASSAY OF FREE THYROXINE: CPT

## 2023-02-15 PROCEDURE — 86334 IMMUNOFIX E-PHORESIS SERUM: CPT

## 2023-02-15 PROCEDURE — 83735 ASSAY OF MAGNESIUM: CPT

## 2023-02-15 PROCEDURE — 80061 LIPID PANEL: CPT

## 2023-02-15 PROCEDURE — 93005 ELECTROCARDIOGRAM TRACING: CPT | Performed by: HOSPITALIST

## 2023-02-15 PROCEDURE — 99233 SBSQ HOSP IP/OBS HIGH 50: CPT | Performed by: INTERNAL MEDICINE

## 2023-02-15 PROCEDURE — 93010 ELECTROCARDIOGRAM REPORT: CPT | Performed by: INTERNAL MEDICINE

## 2023-02-15 PROCEDURE — 83521 IG LIGHT CHAINS FREE EACH: CPT | Mod: 91

## 2023-02-15 PROCEDURE — 770020 HCHG ROOM/CARE - TELE (206)

## 2023-02-15 PROCEDURE — 700102 HCHG RX REV CODE 250 W/ 637 OVERRIDE(OP): Performed by: HOSPITALIST

## 2023-02-15 PROCEDURE — 93306 TTE W/DOPPLER COMPLETE: CPT

## 2023-02-15 PROCEDURE — 85027 COMPLETE CBC AUTOMATED: CPT

## 2023-02-15 PROCEDURE — 84484 ASSAY OF TROPONIN QUANT: CPT | Mod: 91

## 2023-02-15 PROCEDURE — 99222 1ST HOSP IP/OBS MODERATE 55: CPT | Performed by: INTERNAL MEDICINE

## 2023-02-15 PROCEDURE — 84443 ASSAY THYROID STIM HORMONE: CPT

## 2023-02-15 PROCEDURE — 700102 HCHG RX REV CODE 250 W/ 637 OVERRIDE(OP): Performed by: INTERNAL MEDICINE

## 2023-02-15 PROCEDURE — 84165 PROTEIN E-PHORESIS SERUM: CPT

## 2023-02-15 PROCEDURE — 94760 N-INVAS EAR/PLS OXIMETRY 1: CPT

## 2023-02-15 PROCEDURE — 93306 TTE W/DOPPLER COMPLETE: CPT | Mod: 26 | Performed by: INTERNAL MEDICINE

## 2023-02-15 PROCEDURE — 84155 ASSAY OF PROTEIN SERUM: CPT

## 2023-02-15 RX ORDER — FUROSEMIDE 10 MG/ML
20 INJECTION INTRAMUSCULAR; INTRAVENOUS EVERY 8 HOURS
Status: DISCONTINUED | OUTPATIENT
Start: 2023-02-15 | End: 2023-02-17 | Stop reason: HOSPADM

## 2023-02-15 RX ORDER — FUROSEMIDE 10 MG/ML
40 INJECTION INTRAMUSCULAR; INTRAVENOUS ONCE
Status: COMPLETED | OUTPATIENT
Start: 2023-02-15 | End: 2023-02-15

## 2023-02-15 RX ORDER — FUROSEMIDE 20 MG/1
20 TABLET ORAL ONCE
Status: COMPLETED | OUTPATIENT
Start: 2023-02-15 | End: 2023-02-15

## 2023-02-15 RX ADMIN — APIXABAN 5 MG: 5 TABLET, FILM COATED ORAL at 17:38

## 2023-02-15 RX ADMIN — ROSUVASTATIN 5 MG: 10 TABLET, FILM COATED ORAL at 17:38

## 2023-02-15 RX ADMIN — FLUTICASONE PROPIONATE 50 MCG: 50 SPRAY, METERED NASAL at 21:39

## 2023-02-15 RX ADMIN — POTASSIUM CHLORIDE 20 MEQ: 1500 TABLET, EXTENDED RELEASE ORAL at 05:16

## 2023-02-15 RX ADMIN — EZETIMIBE 10 MG: 10 TABLET ORAL at 05:17

## 2023-02-15 RX ADMIN — ASPIRIN 81 MG: 81 TABLET, COATED ORAL at 05:18

## 2023-02-15 RX ADMIN — ACETAMINOPHEN 650 MG: 325 TABLET, FILM COATED ORAL at 17:43

## 2023-02-15 RX ADMIN — FUROSEMIDE 20 MG: 20 TABLET ORAL at 09:40

## 2023-02-15 RX ADMIN — APIXABAN 5 MG: 5 TABLET, FILM COATED ORAL at 05:18

## 2023-02-15 RX ADMIN — FUROSEMIDE 40 MG: 10 INJECTION, SOLUTION INTRAMUSCULAR; INTRAVENOUS at 11:13

## 2023-02-15 RX ADMIN — FUROSEMIDE 20 MG: 10 INJECTION, SOLUTION INTRAMUSCULAR; INTRAVENOUS at 21:38

## 2023-02-15 ASSESSMENT — ENCOUNTER SYMPTOMS
DIZZINESS: 0
HEADACHES: 0
DEPRESSION: 0
PALPITATIONS: 0
NAUSEA: 0
SHORTNESS OF BREATH: 1
HEMOPTYSIS: 0
CHILLS: 0
WHEEZING: 0
DIAPHORESIS: 0
ABDOMINAL PAIN: 0
MYALGIAS: 0
VOMITING: 0
FEVER: 0
HEMATOCHEZIA: 0
COUGH: 0

## 2023-02-15 ASSESSMENT — FIBROSIS 4 INDEX: FIB4 SCORE: 2.02

## 2023-02-15 ASSESSMENT — PAIN DESCRIPTION - PAIN TYPE: TYPE: ACUTE PAIN

## 2023-02-15 NOTE — ED TRIAGE NOTES
Pt to triage w family via w/c; c/o sob and leg swelling progressively worsening over past few weeks. Pt states sob and dyspnea with exertion worsening x1wk. Pt has hx CHF.

## 2023-02-15 NOTE — RESPIRATORY CARE
"   COPD EDUCATION by COPD CLINICAL EDUCATOR  2/15/2023 at 10:05 AM by Ivanna Taylor, RRT     Patient reviewed by COPD education team. Patient does not have a history or diagnosis of COPD and is a non-smoker.  Therefore, patient does not qualify for the COPD program. Spoke to Dr. Dong who will remove COPD dx.    COPD Screen  COPD Risk Screening  Do you have a history of COPD?: No    COPD Assessment  COPD Clinical Specialists ONLY  COPD Education Initiated: No--Quick Screen (Pt has no hx dx COPD, NO PFT, never smoked, doesnt use any inhalers for respiratory, pt has hx HF BNP 8500, COPD was put on pt medical problem list 2/14/23, pt states her legs are the size of tree trunks w/ much weight gain)  Is this a COPD exacerbation patient?: No    PFT Results      Meds to Beds  Would the patient like to opt in for Bedside Medication Delivery at Discharge?: No     MY COPD ACTION PLAN     It is recommended that patients and physicians /healthcare providers complete this action plan together. This plan should be discussed at each physician visit and updated as needed.    The green, yellow and red zones show groups of symptoms of COPD. This list of symptoms is not comprehensive, and you may experience other symptoms. In the \"Actions\" column, your healthcare provider has recommended actions for you to take based on your symptoms.    Patient Name: Regla Meier   YOB: 1940   Last Updated on:     Green Zone:  I am doing well today Actions     Usual activitiy and exercise level   Take daily medications     Usual amounts of cough and phlegm/mucus   Use oxygen as prescribed     Sleep well at night   Continue regular exercise/diet plan     Appetite is good   At all times avoid cigarette smoke, inhaled irritants     Daily Medications (these medications are taken every day):                Yellow Zone:  I am having a bad day or a COPD flare Actions     More breathless than usual   Continue daily medications    " " I have less energy for my daily activities   Use quick relief inhaler as ordered     Increased or thicker phlegm/mucus   Use oxygen as prescribed     Using quick relief inhaler/nebulizer more often   Get plenty of rest     Swelling of ankles more than usual   Use pursed lip breathing     More coughing than usual   At all times avoid cigarette smoke, inhaled irritants     I feel like I have a \"chest cold\"     Poor sleep and my symptoms woke me up     My appetite is not good     My medicine is not helping      Call provider immediately if symptoms don’t improve     Continue daily medications, add rescue medications:               Medications to be used during a flare up, (as Discussed with Provider):              Red Zone:  I need urgent medical care Actions     Severe shortness of breath even at rest   Call 911 or seek medical care immediately     Not able to do any activity because of breathing      Fever or shaking chills      Feeling confused or very drowsy       Chest pains      Coughing up blood                  "

## 2023-02-15 NOTE — ASSESSMENT & PLAN NOTE
Weight gain of 60lb in past month, 30 lb in last week despite being on diuretic   Increased BNP ~ 8500  Cardiology consulted  Echo noted   IV lasix 20 mg TID   Daily strict input and output  Daily standing weights.  Daily BMP to watch renal function, electrolytes.  Replace electrolytes as needed.

## 2023-02-15 NOTE — PROGRESS NOTES
Hospital Medicine Daily Progress Note    Date of Service  2/15/2023    Chief Complaint  Regla Meier is a 82 y.o. female admitted 2/14/2023 with weakness and dyspnea.     Hospital Course  82 y.o. female with a past medical history of chronic obstructive pulmonary disease, heart failure with preserved ejection fraction, and hyperlipidemia who presented 2/14/2023 with generalized weakness, shortness of breath and lower extremity swelling.  Patient reports that she has not been feeling well over the past 4-6 weeks.  She has been having progressively worsening shortness of breath, generalized weakness and weight gain.  She reports gaining about 60 pounds in the last month of which 30 during the last 1 week.  She has orthopnea and paroxysmal nocturnal dyspnea.  She reports compliance with her diuretic Lasix 20 mg daily.     Interval Problem Update  Vital stable on 2 L nasal cannula.  Troponin increased to 149. Patient denies chest pain. Cardiology consulted, appreciate recommendations.  Blood pressure was too low this morning for IV Lasix however this afternoon it is over 100 and I have ordered a one-time dose.  Patient still feels short of breath and has severe pitting edema up to her thighs.  She reports that her legs were weeping yesterday, however this has improved today.  She feels very fatigued.  Discussed that unfortunately we will have to do diuresis very slow given her low pressures.     I have discussed this patient's plan of care and discharge plan at IDT rounds today with Case Management, Nursing, Nursing leadership, and other members of the IDT team.    Consultants/Specialty  cardiology    Code Status  Full Code    Disposition  Patient is not medically cleared for discharge.   Anticipate discharge to to home with close outpatient follow-up.  I have placed the appropriate orders for post-discharge needs.    Review of Systems  Review of Systems   Constitutional:  Positive for malaise/fatigue. Negative  for chills and fever.   Respiratory:  Positive for shortness of breath. Negative for cough.    Cardiovascular:  Positive for leg swelling. Negative for chest pain and palpitations.   Gastrointestinal:  Negative for abdominal pain, nausea and vomiting.   Genitourinary:  Negative for dysuria.   Musculoskeletal:  Negative for myalgias.   Skin:  Negative for rash.   Neurological:  Negative for dizziness and headaches.   Psychiatric/Behavioral:  Negative for depression.    All other systems reviewed and are negative.     Physical Exam  Temp:  [36 °C (96.8 °F)-36.7 °C (98 °F)] 36.7 °C (98 °F)  Pulse:  [] 76  Resp:  [18-26] 18  BP: ()/(50-72) 104/65  SpO2:  [91 %-97 %] 93 %    Physical Exam  Vitals and nursing note reviewed.   Constitutional:       General: She is not in acute distress.     Appearance: She is obese. She is ill-appearing.   HENT:      Head: Normocephalic and atraumatic.   Eyes:      Conjunctiva/sclera: Conjunctivae normal.   Cardiovascular:      Rate and Rhythm: Rhythm irregular.      Heart sounds: Normal heart sounds.   Pulmonary:      Effort: Pulmonary effort is normal. No respiratory distress.      Breath sounds: Rales present. No wheezing.      Comments: On 2 L NC   Abdominal:      General: Abdomen is flat. There is no distension.      Palpations: Abdomen is soft.      Tenderness: There is no abdominal tenderness.   Musculoskeletal:         General: Swelling present.      Cervical back: Normal range of motion and neck supple.      Right lower leg: Edema present.      Left lower leg: Edema present.      Comments: +2 pitting edema up to thighs   Skin:     General: Skin is warm and dry.   Neurological:      General: No focal deficit present.      Mental Status: She is alert and oriented to person, place, and time.      Cranial Nerves: No cranial nerve deficit.   Psychiatric:         Mood and Affect: Mood normal.         Behavior: Behavior normal.       Fluids  No intake or output data in the 24  hours ending 02/15/23 1238    Laboratory  Recent Labs     02/14/23 1720 02/15/23  0228   WBC 7.7 8.0   RBC 4.29 4.00*   HEMOGLOBIN 13.7 11.7*   HEMATOCRIT 39.9 36.1*   MCV 93.0 90.3   MCH 31.9 29.3   MCHC 34.3 32.4*   RDW 45.3 44.1   PLATELETCT 335 309   MPV 8.2* 8.2*     Recent Labs     02/14/23  1720 02/15/23  0228   SODIUM 134* 136   POTASSIUM 4.0 4.0   CHLORIDE 96 99   CO2 27 28   GLUCOSE 110* 109*   BUN 28* 27*   CREATININE 0.87 0.84   CALCIUM 8.7 8.3*             Recent Labs     02/15/23  0228   TRIGLYCERIDE 113   HDL 69   *       Imaging  DX-CHEST-PORTABLE (1 VIEW)   Final Result      1.  BILATERAL pleural effusions with overlying atelectasis which could obscure an additional process   2.  Persistently enlarged cardiac silhouette      EC-ECHOCARDIOGRAM COMPLETE W/O CONT    (Results Pending)        Assessment/Plan  * Acute on chronic congestive heart failure (HCC)- (present on admission)  Assessment & Plan  Weight gain of 60lb in past month, 30 lb in last week despite being on diuretic   Increased BNP ~ 8500  Echo pending   IV lasix 40 mg BID, as BP tolerates   Daily strict input and output  Daily standing weights.  Daily BMP to watch renal function, electrolytes.  Replace electrolytes as needed.    Troponin level elevated- (present on admission)  Assessment & Plan  Possibly related to new onset atrial fibrillation  Increasing  Cardiology consulted, appreciate recommendations   Denies chest pain.  EKG show  atrial fibrillation with a rate of 95, there is no ST elevation, there is 1 mm ST depression in lead V5, there is T wave inversion in lead V6.  QTc is 464.  Continuous cardiac monitoring    Stat EKG, troponin for chest pain or worsening shortness of breath   Checking echocardiography    AF (atrial fibrillation) (HCC)- (present on admission)  Assessment & Plan  New onset   Echocardiography pending   continue metoprolol  I will start PRN metoprolol for sustained HR > 120   Continuous cardiac  monitoring.   [id number] Score >2  I discussed starting therapeutic anticoagulation. Patient agreeable, understands and accepts risks of potential bleeding.    -started eliquis   Cardiology consulted    COPD (chronic obstructive pulmonary disease) (HCC)- (present on admission)  Assessment & Plan  Not currently in exacerbation   Oxygen as needed, Respiratory protocol, Bronchodilators, Incentive spirometry    Hyperlipemia- (present on admission)  Assessment & Plan  Resume home ezetimibe          VTE prophylaxis: therapeutic anticoagulation with Eliquis     I have performed a physical exam and reviewed and updated ROS and Plan today (2/15/2023). In review of yesterday's note (2/14/2023), there are no changes except as documented above.

## 2023-02-15 NOTE — ASSESSMENT & PLAN NOTE
Not currently in exacerbation   Oxygen as needed, Respiratory protocol, Bronchodilators, Incentive spirometry

## 2023-02-15 NOTE — PROGRESS NOTES
Paged Dr. Barba regarding patient's complain of itchiness on the back of her head and chest tightness.

## 2023-02-15 NOTE — PROGRESS NOTES
George from lab called with critical troponin 113 at 2336. Results read back by Julieta. Dr. Barba notified of critical troponin 113 at 2337. Results read back by Dr. Barba.

## 2023-02-15 NOTE — PROGRESS NOTES
Chief Complaint   Patient presents with    Leg Swelling    Shortness of Breath       HISTORY OF PRESENT ILLNESS: Patient is a 82 y.o. female, established patient who presents today to discuss medical problems as listed below:    Health Maintenance:  COMPLETED       Allergies: Atorvastatin and Pcn [penicillins]    Current Outpatient Medications   Medication Sig Dispense Refill    furosemide (LASIX) 40 MG Tab Take 1 Tablet by mouth every day. 30 Tablet 3    potassium chloride SA (KDUR) 20 MEQ Tab CR Take 1 Tablet by mouth every day. 30 Tablet 3    fluticasone (FLONASE) 50 MCG/ACT nasal spray Administer 1 Spray into affected nostril(S) at bedtime. 16 g 1    albuterol 108 (90 Base) MCG/ACT Aero Soln inhalation aerosol Inhale 2 Puffs every 6 hours as needed for Shortness of Breath. 8.5 g 1    triamcinolone (NASACORT) 55 MCG/ACT nasal inhaler Administer 2 Sprays into affected nostril(S) every day. 16.9 mL 1    benzonatate (TESSALON) 100 MG Cap Take 1 Capsule by mouth 3 times a day as needed for Cough. 60 Capsule 0    Coenzyme Q10 (CO Q 10 PO) Take  by mouth.      rosuvastatin (CRESTOR) 5 MG Tab Take 1 Tablet by mouth every evening. 90 Tablet 11    ezetimibe (ZETIA) 10 MG Tab Take 1 Tablet by mouth every day. 90 Tablet 11    Cyanocobalamin (VITAMIN B12 PO) Take 1 Tablet by mouth every day.      QUERCETIN PO Take 1 Tablet by mouth every day.      aspirin EC 81 MG EC tablet Take 1 Tablet by mouth every day. 30 Tablet 0    Lutein 40 MG Cap Take 40 mg by mouth every day.      asa/apap/caffeine (EXCEDRIN) 250-250-65 MG Tab Take 1-2 Tablets by mouth every 6 hours as needed for Headache.      TURMERIC PO Take 1 Capsule by mouth every day.       No current facility-administered medications for this visit.       Allergies, past medical history, past surgical history, family history, social history reviewed and updated.    Review of Systems:     - Constitutional: weak and fatigued. Negative for fever, chills, unexpected weight  "change,      - Respiratory: dyspnea and cough. Negative for sputum production, chest congestion, wheezing, and crackles.      - Cardiovascular: orthopnea and bilateral lower extremity edema. Negative for chest pain, palpitations     - Psychiatric/Behavioral: Negative for depression, suicidal/homicidal ideation and memory loss.      All other systems reviewed and are negative    Exam:    /62 (BP Location: Left arm, Patient Position: Sitting, BP Cuff Size: Adult)   Pulse 94   Temp 36.6 °C (97.9 °F) (Temporal)   Resp 20   Ht 1.575 m (5' 2\")   Wt 83 kg (182 lb 15.7 oz)   SpO2 92%   BMI 33.47 kg/m²  Body mass index is 33.47 kg/m².    Physical Exam:  Constitutional: very fatigued and weak. Leaning against the exam chair. Well-developed and well-nourished. Not diaphoretic.    Cardiovascular: murmur (chronic). Regular rate and rhythm, S1 and S2 , rubs, or gallops.    Chest: mildly diminished at the bases. Effort normal. Clear to auscultation throughout. No adventitious sounds. Extremities: mild erythema and severe edema in b/l extremities, no oozing at this time, 3+ pitting, feels very tight and painful. No cyanosis, clubbing  Neurological: Alert and oriented x 3.   Psychiatric:  Behavior, mood, and affect are appropriate.  MA/nursing note and vitals reviewed.    LABS: 1/2023  results reviewed and discussed with the patient, questions answered.    Assessment/Plan:  SOB (shortness of breath)  New problem. Started experiencing SOB x 1.5 wks O2 today at 96%. No crackles or wheezing. occational intermittent cough, worse now. Feels she has \"tight band around ribcage\". Also having worsening of bilateral leg swelling, pitting edema, intermittent oozing and b/l leg cramping. Stated intermittent confusion and disorientation, much better in the last few days (Na decreased at 131 per last labs 1/25/23). Has a friend with her today who is an RN, helping with history. Taking Lasix 40 mg and K supplementation. Symptoms are " getting worse. Hx of CHF.  Most recent CMP with stable k and GFR, NA is lower at 131, can contribute to noted intermittent confusion which pt states has gotten a little better in the last few days.   Latest Reference Range & Units 01/25/23 17:17   Sodium 135 - 145 mmol/L 131 (L)   Potassium 3.6 - 5.5 mmol/L 4.4   Chloride 96 - 112 mmol/L 96   Co2 20 - 33 mmol/L 26   Anion Gap 7.0 - 16.0  9.0   Glucose 65 - 99 mg/dL 101 (H)   Bun 8 - 22 mg/dL 13   Creatinine 0.50 - 1.40 mg/dL 0.69   GFR (CKD-EPI) >60 mL/min/1.73 m 2 87   (L): Data is abnormally low  (H): Data is abnormally high    1. SOB (shortness of breath)    2. 3+ pitting edema    3. Other fatigue    4. Sleeps in sitting position due to orthopnea    5. Confusion and disorientation    6. Leg cramping    7.CHF exacerbation  Concern for acute on chronic CHF exacerbation.  Maxed out on Lasix.  May need parenteral intervention and IV Lasix in a controlled monitored environment.  Needs labs to check for electrolytes and kidney function.  Need CXR.  Giving symptomology and physical exam with extreme fatigue and weakness patient needs to be evaluated and treated in ED.  Patient and friend understand and agree with plan.  Patient will be brought down by wheelchair by her friend who is an RN.  ER report given to call center service for [facility].    Discussed with patient possible alternative diagnoses, patient is to take all medications as prescribed.      If symptoms persist FU w/PCP, if symptoms worsen go to emergency room.      If experiencing any side effects from prescribed medications report to the office immediately or go to emergency room.     Reviewed indication, dosage, usage and potential adverse effects of prescribed medications.      Reviewed risks and benefits of treatment plan. Patient verbalizes understanding of all instruction and verbally agrees to plan.     Discussed plan with the patient, and patient agrees to the above.      I personally reviewed  prior external notes and test results pertinent to today's visit.      No follow-ups on file. Pt

## 2023-02-15 NOTE — ASSESSMENT & PLAN NOTE
New onset   Echocardiography noted   Dc metoprolol as patient having pauses   Continuous cardiac monitoring.   [id number] Score >2  I discussed starting therapeutic anticoagulation. Patient agreeable, understands and accepts risks of potential bleeding.    -started eliquis   Cardiology consulted

## 2023-02-15 NOTE — CARE PLAN
The patient is Stable - Low risk of patient condition declining or worsening         Progress made toward(s) clinical / shift goals:    Problem: Hemodynamics  Goal: Patient's hemodynamics, fluid balance and neurologic status will be stable or improve  Outcome: Progressing     Problem: Respiratory  Goal: Patient will achieve/maintain optimum respiratory ventilation and gas exchange  Outcome: Progressing     Problem: Fluid Volume  Goal: Fluid volume balance will be maintained  Outcome: Progressing     Problem: Mobility  Goal: Patient's capacity to carry out activities will improve  Outcome: Progressing     Problem: Self Care  Goal: Patient will have the ability to perform ADLs independently or with assistance (bathe, groom, dress, toilet and feed)  Outcome: Progressing       Patient is not progressing towards the following goals:

## 2023-02-15 NOTE — PROGRESS NOTES
Telemetry Shift Summary     Rhythm A flutter  HR Range 46- 101  Ectopy o-fPVC  Measurements  --/0.10/--           Normal Values  Rhythm SR  HR Range    Measurements 0.12-0.20 / 0.06-0.10  / 0.30-0.52

## 2023-02-15 NOTE — H&P
Hospital Medicine History & Physical Note    Date of Service  2/14/2023    Primary Care Physician  SHANNON Ontiveros    Consultants  None     Code Status  Full Code    Chief Complaint  Chief Complaint   Patient presents with    Leg Swelling    Shortness of Breath     History of Presenting Illness  Regla Meier is a 82 y.o. female with a past medical history of chronic obstructive pulmonary disease, heart failure with preserved ejection fraction, and hyperlipidemia who presented 2/14/2023 with generalized weakness, shortness of breath and lower extremity swelling.  Patient reports that she has not been feeling well over the past 4-6 weeks.  She has been having progressively worsening shortness of breath, generalized weakness and weight gain.  She reports gaining about 60 pounds in the last month of which 30 during the last 1 week.  She has orthopnea and paroxysmal nocturnal dyspnea.  She reports compliance with her diuretic Lasix 20 mg daily.    I discussed the plan of care with emergency department physician, the patient, and patient family present at bedside in the emergency room.    Review of Systems  Review of Systems   Constitutional:  Positive for malaise/fatigue. Negative for chills and fever.   HENT:  Negative for congestion and sore throat.    Eyes:  Negative for pain, discharge and redness.   Respiratory:  Positive for shortness of breath. Negative for cough, hemoptysis, sputum production and stridor.    Cardiovascular:  Positive for orthopnea, leg swelling and PND. Negative for chest pain and palpitations.   Gastrointestinal:  Negative for abdominal pain, blood in stool, diarrhea and vomiting.   Genitourinary:  Negative for flank pain, hematuria and urgency.   Musculoskeletal:  Negative for myalgias.   Skin:  Negative for rash.   Neurological:  Negative for speech change, focal weakness, seizures and loss of consciousness.   Endo/Heme/Allergies:  Does not bruise/bleed easily.    Psychiatric/Behavioral:  Negative for hallucinations and suicidal ideas. The patient is not nervous/anxious.      Past Medical History   has a past medical history of Hyperlipemia and Stroke (HCC).    Surgical History   has a past surgical history that includes cystectomy (1963) and cataract extraction with iol.     Family History  family history includes Alcohol abuse in her brother; Heart Disease in her brother; No Known Problems in her daughter and daughter; Other in her father and mother.     Social History   reports that she has never smoked. She has never used smokeless tobacco. She reports current alcohol use of about 0.6 oz per week. She reports that she does not use drugs.    Allergies  Allergies   Allergen Reactions    Atorvastatin Myalgia     Severe muscle weakness    Pcn [Penicillins] Hives     Medications  Prior to Admission Medications   Prescriptions Last Dose Informant Patient Reported? Taking?   Coenzyme Q10 (CO Q 10 PO)   Yes No   Sig: Take  by mouth.   Cyanocobalamin (VITAMIN B12 PO)  Patient Yes No   Sig: Take 1 Tablet by mouth every day.   Lutein 40 MG Cap  Patient Yes No   Sig: Take 40 mg by mouth every day.   QUERCETIN PO  Patient Yes No   Sig: Take 1 Tablet by mouth every day.   TURMERIC PO  Patient Yes No   Sig: Take 1 Capsule by mouth every day.   albuterol 108 (90 Base) MCG/ACT Aero Soln inhalation aerosol   No No   Sig: Inhale 2 Puffs every 6 hours as needed for Shortness of Breath.   asa/apap/caffeine (EXCEDRIN) 250-250-65 MG Tab  Patient Yes No   Sig: Take 1-2 Tablets by mouth every 6 hours as needed for Headache.   aspirin EC 81 MG EC tablet  Patient No No   Sig: Take 1 Tablet by mouth every day.   benzonatate (TESSALON) 100 MG Cap   No No   Sig: Take 1 Capsule by mouth 3 times a day as needed for Cough.   ezetimibe (ZETIA) 10 MG Tab   No No   Sig: Take 1 Tablet by mouth every day.   fluticasone (FLONASE) 50 MCG/ACT nasal spray   No No   Sig: Administer 1 Spray into affected  nostril(S) at bedtime.   furosemide (LASIX) 40 MG Tab   No No   Sig: Take 1 Tablet by mouth every day.   potassium chloride SA (KDUR) 20 MEQ Tab CR   No No   Sig: Take 1 Tablet by mouth every day.   rosuvastatin (CRESTOR) 5 MG Tab   No No   Sig: Take 1 Tablet by mouth every evening.   triamcinolone (NASACORT) 55 MCG/ACT nasal inhaler   No No   Sig: Administer 2 Sprays into affected nostril(S) every day.      Facility-Administered Medications: None     Physical Exam  Temp:  [36 °C (96.8 °F)-36.6 °C (97.9 °F)] 36 °C (96.8 °F)  Pulse:  [94-97] 97  Resp:  [18-20] 18  BP: (108-116)/(62-68) 116/68  SpO2:  [92 %-94 %] 94 %  Blood Pressure : 116/68   Temperature: 36 °C (96.8 °F)   Pulse: 97   Respiration: 18   Pulse Oximetry: 94 %     Physical Exam  Vitals and nursing note reviewed.   Constitutional:       General: She is not in acute distress.     Appearance: Normal appearance.   HENT:      Head: Normocephalic and atraumatic.      Right Ear: External ear normal.      Left Ear: External ear normal.      Nose: Nose normal.      Mouth/Throat:      Mouth: Mucous membranes are moist.   Eyes:      General:         Right eye: No discharge.         Left eye: No discharge.      Extraocular Movements: Extraocular movements intact.      Pupils: Pupils are equal, round, and reactive to light.   Cardiovascular:      Rate and Rhythm: Regular rhythm. Tachycardia present.      Heart sounds:     No friction rub. No gallop.   Pulmonary:      Effort: Pulmonary effort is normal.      Breath sounds: No stridor. Decreased breath sounds present. No wheezing.      Comments: Bilateral basal crepitations   Abdominal:      General: Abdomen is flat. There is no distension.      Tenderness: There is no abdominal tenderness.   Musculoskeletal:         General: Swelling present. No deformity. Normal range of motion.      Cervical back: Normal range of motion and neck supple.      Right lower leg: Edema present.      Left lower leg: Edema present.    Skin:     General: Skin is warm and dry.      Capillary Refill: Capillary refill takes 2 to 3 seconds.   Neurological:      General: No focal deficit present.      Mental Status: She is alert and oriented to person, place, and time.   Psychiatric:         Mood and Affect: Mood normal.         Behavior: Behavior normal.     Laboratory:  Recent Labs     02/14/23  1720   WBC 7.7   RBC 4.29   HEMOGLOBIN 13.7   HEMATOCRIT 39.9   MCV 93.0   MCH 31.9   MCHC 34.3   RDW 45.3   PLATELETCT 335   MPV 8.2*     Recent Labs     02/14/23  1720   SODIUM 134*   POTASSIUM 4.0   CHLORIDE 96   CO2 27   GLUCOSE 110*   BUN 28*   CREATININE 0.87   CALCIUM 8.7     Recent Labs     02/14/23  1720   ALTSGPT 28   ASTSGOT 44   ALKPHOSPHAT 120*   TBILIRUBIN 0.3   GLUCOSE 110*         Recent Labs     02/14/23  1720   NTPROBNP 8499*         Recent Labs     02/14/23  1720   TROPONINT 87*     Imaging:  DX-CHEST-PORTABLE (1 VIEW)   Final Result      1.  BILATERAL pleural effusions with overlying atelectasis which could obscure an additional process   2.  Persistently enlarged cardiac silhouette      EC-ECHOCARDIOGRAM COMPLETE W/O CONT    (Results Pending)     I personally reviewed patient's EKG shows atrial fibrillation with a rate of 95, there is no ST elevation, there is 1 mm ST depression in lead V5, there is T wave inversion in lead V6.  QTc is 464.    Assessment/Plan:  Justification for Admission Status  I anticipate this patient will require at least two midnights for appropriate medical management, necessitating inpatient admission because patient has heart failure will require intravenous diuretics, monitoring renal function and electrolytes on a daily basis.    * Acute on chronic congestive heart failure (HCC)- (present on admission)  Assessment & Plan  Weight gain of 60lb in past month, 30 lb in last week despite being on diuretic   Increased BNP ~ 8500  I will start intravenous diuretics.  Daily strict input and output  Daily standing  weights.  Daily BMP to watch renal function, electrolytes.  Replace electrolytes as needed.    Troponin level elevated- (present on admission)  Assessment & Plan  Possibly related to new onset atrial fibrillation  In the indeterminate range  Denies chest pain.  EKG show  atrial fibrillation with a rate of 95, there is no ST elevation, there is 1 mm ST depression in lead V5, there is T wave inversion in lead V6.  QTc is 464.  Continuous cardiac monitoring    Stat EKG, troponin for chest pain or worsening shortness of breath   Checking echocardiography    AF (atrial fibrillation) ([facility])- (present on admission)  Assessment & Plan  New onset   I will check echocardiography.  I will check thyroid function.  I will start metoprolol  I will start PRN metoprolol for sustained HR > 120   Continuous cardiac monitoring.   [id number] Score >2  I discussed starting therapeutic anticoagulation. Patient agreeable, understands and accepts risks of potential bleeding.      COPD (chronic obstructive pulmonary disease) ([facility])- (present on admission)  Assessment & Plan  Not currently in exacerbation   Oxygen as needed, Respiratory protocol, Bronchodilators, Incentive spirometry    Hyperlipemia- (present on admission)  Assessment & Plan  Resume home ezetimibe     VTE prophylaxis: SCDs/TEDs and enoxaparin ppx

## 2023-02-15 NOTE — ASSESSMENT & PLAN NOTE
Possibly related to new onset atrial fibrillation  Increasing  Cardiology consulted  Denies chest pain.  EKG show  atrial fibrillation with a rate of 95, there is no ST elevation, there is 1 mm ST depression in lead V5, there is T wave inversion in lead V6.  QTc is 464.  Continuous cardiac monitoring    Stat EKG, troponin for chest pain or worsening shortness of breath   Echo noted

## 2023-02-15 NOTE — ED PROVIDER NOTES
ER Provider Note    Scribed for Luisito Purdy M.d. by Bladimir Wild. 2/14/2023  5:18 PM    Primary Care Provider: SHANNON Ontiveros    CHIEF COMPLAINT  Chief Complaint   Patient presents with    Leg Swelling    Shortness of Breath     EXTERNAL RECORDS REVIEWED  Other None     HPI/ROS  LIMITATION TO HISTORY   Select: : None  OUTSIDE HISTORIAN(S):  Friend and     Regla Meier is a 82 y.o. female who presents to the ED complaining of weight gain and lower extremity swelling which onset over the last 6 weeks. She is brought in by her  and concerned friend who noticed the significant edema in her legs and weight gain. She also complains of difficulty breathing with movement and walking. She describes having chest tightness which began a couple of weeks ago which is worsened by walking or exertion. She states her normal weight is 120 she now weighs 180. She states she's taking 20 mg of Lasix twice daily, this is a new dose which was formulated four days ago. She denies any chest pain, nausea, sweating, belly pain, back pain, fevers, or burning urination. She denies any current chest pain, and states the tightness she's been experiencing is intermittent. She reports no history of blood clots, recent travel or surgeries.    PAST MEDICAL HISTORY  Past Medical History:   Diagnosis Date    Hyperlipemia     Stroke (HCC)      SURGICAL HISTORY  Past Surgical History:   Procedure Laterality Date    CYSTECTOMY  1963    CATARACT EXTRACTION WITH IOL       FAMILY HISTORY  Family History   Problem Relation Age of Onset    Other Mother         TIA     Other Father         Abdominal aneurysm     Alcohol abuse Brother     Heart Disease Brother     No Known Problems Daughter     No Known Problems Daughter      SOCIAL HISTORY   reports that she has never smoked. She has never used smokeless tobacco. She reports current alcohol use of about 0.6 oz per week. She reports that she does not use drugs.    CURRENT  "MEDICATIONS  Previous Medications    ALBUTEROL 108 (90 BASE) MCG/ACT AERO SOLN INHALATION AEROSOL    Inhale 2 Puffs every 6 hours as needed for Shortness of Breath.    ASA/APAP/CAFFEINE (EXCEDRIN) 250-250-65 MG TAB    Take 1-2 Tablets by mouth every 6 hours as needed for Headache.    ASPIRIN EC 81 MG EC TABLET    Take 1 Tablet by mouth every day.    BENZONATATE (TESSALON) 100 MG CAP    Take 1 Capsule by mouth 3 times a day as needed for Cough.    COENZYME Q10 (CO Q 10 PO)    Take  by mouth.    CYANOCOBALAMIN (VITAMIN B12 PO)    Take 1 Tablet by mouth every day.    EZETIMIBE (ZETIA) 10 MG TAB    Take 1 Tablet by mouth every day.    FLUTICASONE (FLONASE) 50 MCG/ACT NASAL SPRAY    Administer 1 Spray into affected nostril(S) at bedtime.    FUROSEMIDE (LASIX) 40 MG TAB    Take 1 Tablet by mouth every day.    LUTEIN 40 MG CAP    Take 40 mg by mouth every day.    POTASSIUM CHLORIDE SA (KDUR) 20 MEQ TAB CR    Take 1 Tablet by mouth every day.    QUERCETIN PO    Take 1 Tablet by mouth every day.    ROSUVASTATIN (CRESTOR) 5 MG TAB    Take 1 Tablet by mouth every evening.    TRIAMCINOLONE (NASACORT) 55 MCG/ACT NASAL INHALER    Administer 2 Sprays into affected nostril(S) every day.    TURMERIC PO    Take 1 Capsule by mouth every day.     ALLERGIES  Atorvastatin and Pcn [penicillins]    PHYSICAL EXAM  /68   Pulse 97   Temp 36 °C (96.8 °F) (Temporal)   Resp 18   Ht 1.575 m (5' 2\")   Wt 75 kg (165 lb 5.5 oz)   SpO2 94%   BMI 30.24 kg/m²   Constitutional: Well developed, Well nourished, No acute distress, Non-toxic appearance.   HENT: Normocephalic, Atraumatic, mucous membranes moist, no erythema, exudates, swelling, or masses, nares patent  Eyes: nonicteric  Neck: Supple, no meningismus  Lymphatic: No lymphadenopathy noted.   Cardiovascular: Regular rate and rhythm, no gallops rubs or murmurs  Lungs: Crackles at bilateral bases   Abdomen: Soft and nontender throughout  Skin: Warm, Dry, no rash  Genitalia: " Deferred  Rectal: Deferred  Extremities: 2+ edema bilateral lower extrem  Neurologic: Alert, appropriate, follows commands, moving all extremities, normal speech   Psychiatric: Affect normal    DIAGNOSTIC STUDIES    Labs:   Results for orders placed or performed during the hospital encounter of 02/14/23   CBC with Differential   Result Value Ref Range    WBC 7.7 4.8 - 10.8 K/uL    RBC 4.29 4.20 - 5.40 M/uL    Hemoglobin 13.7 12.0 - 16.0 g/dL    Hematocrit 39.9 37.0 - 47.0 %    MCV 93.0 81.4 - 97.8 fL    MCH 31.9 27.0 - 33.0 pg    MCHC 34.3 33.6 - 35.0 g/dL    RDW 45.3 35.9 - 50.0 fL    Platelet Count 335 164 - 446 K/uL    MPV 8.2 (L) 9.0 - 12.9 fL    Neutrophils-Polys 71.50 44.00 - 72.00 %    Lymphocytes 16.30 (L) 22.00 - 41.00 %    Monocytes 10.10 0.00 - 13.40 %    Eosinophils 0.80 0.00 - 6.90 %    Basophils 0.90 0.00 - 1.80 %    Immature Granulocytes 0.40 0.00 - 0.90 %    Nucleated RBC 0.00 /100 WBC    Neutrophils (Absolute) 5.51 2.00 - 7.15 K/uL    Lymphs (Absolute) 1.26 1.00 - 4.80 K/uL    Monos (Absolute) 0.78 0.00 - 0.85 K/uL    Eos (Absolute) 0.06 0.00 - 0.51 K/uL    Baso (Absolute) 0.07 0.00 - 0.12 K/uL    Immature Granulocytes (abs) 0.03 0.00 - 0.11 K/uL    NRBC (Absolute) 0.00 K/uL   Complete Metabolic Panel (CMP)   Result Value Ref Range    Sodium 134 (L) 135 - 145 mmol/L    Potassium 4.0 3.6 - 5.5 mmol/L    Chloride 96 96 - 112 mmol/L    Co2 27 20 - 33 mmol/L    Anion Gap 11.0 7.0 - 16.0    Glucose 110 (H) 65 - 99 mg/dL    Bun 28 (H) 8 - 22 mg/dL    Creatinine 0.87 0.50 - 1.40 mg/dL    Calcium 8.7 8.4 - 10.2 mg/dL    AST(SGOT) 44 12 - 45 U/L    ALT(SGPT) 28 2 - 50 U/L    Alkaline Phosphatase 120 (H) 30 - 99 U/L    Total Bilirubin 0.3 0.1 - 1.5 mg/dL    Albumin 2.8 (L) 3.2 - 4.9 g/dL    Total Protein 5.8 (L) 6.0 - 8.2 g/dL    Globulin 3.0 1.9 - 3.5 g/dL    A-G Ratio 0.9 g/dL   Troponins NOW   Result Value Ref Range    Troponin T 87 (H) 6 - 19 ng/L   proBrain Natriuretic Peptide, NT   Result Value Ref  Range    NT-proBNP 8499 (H) 0 - 125 pg/mL   COV-2, FLU A/B, AND RSV BY PCR (2-4 HOURS CEPHEID): Collect NP swab in VTM    Specimen: Respirate   Result Value Ref Range    Influenza virus A RNA Negative Negative    Influenza virus B, PCR Negative Negative    RSV, PCR Negative Negative    SARS-CoV-2 by PCR NotDetected     SARS-CoV-2 Source NP Swab    CORRECTED CALCIUM   Result Value Ref Range    Correct Calcium 9.7 8.5 - 10.5 mg/dL   ESTIMATED GFR   Result Value Ref Range    GFR (CKD-EPI) 67 >60 mL/min/1.73 m 2   EKG (NOW)   Result Value Ref Range    Report       [facility] Emergency Dept.    Test Date:  2023  Pt Name:    DANIELITO CRAWFORD              Department: [facility]  MRN:        [id number]                      Room:  Gender:     Female                       Technician: [id number]  :        [id number]                   Requested By:ER TRIAGE PROTOCOL  Order #:    [id number]                    Reading MD:    Measurements  Intervals                                Axis  Rate:       95                           P:  CT:                                      QRS:        269  QRSD:       104                          T:  QT:         369  QTc:        464    Interpretive Statements  Atrial fibrillation  Inferior infarct, old  Anterior infarct, old  Lateral leads are also involved  Compared to ECG 2022 08:58:04  Sinus rhythm no longer present  Atrial premature complex(es) no longer present  Myocardial infarct finding still present       EKG:   I have independently interpreted this EKG  Time: 1654 rate of 95, atrial fibrillation, Q-waves inferiorly, anterior Q is also old, no STT change other than laterally, some T-waves inversions in T-5 and T-6     Radiology:   The attending emergency physician has independently interpreted the diagnostic imaging associated with this visit and am waiting the final reading from the radiologist.   Preliminary interpretation is a follows: Bilateral pleural effusions,  cardiomegaly  Radiologist interpretation:   DX-CHEST-PORTABLE (1 VIEW)   Final Result      1.  BILATERAL pleural effusions with overlying atelectasis which could obscure an additional process   2.  Persistently enlarged cardiac silhouette        COURSE & MEDICAL DECISION MAKING     ED Observation Status? Yes; I am placing the patient in to an observation status due to a diagnostic uncertainty as well as therapeutic intensity. Patient placed in observation status at 5:20 PM, 2/14/2023.     Observation plan is as follows: Awaiting lab results, serial re-evaluation of patient's condition.    Upon Reevaluation, the patient's condition has: not improved; and will be escalated to hospitalization.    Patient discharged from ED Observation status at 6:28 PM 02/14/23.     INITIAL ASSESSMENT, COURSE AND PLAN  Care Narrative: This is a 82-year-old female with CHF who presents in A-fib which appears to be new onset as well as worsening fluid overload.  She is not hypoxic but does have trouble getting around and is short of breath.  Troponin is somewhat elevated-patient has no chest pain currently.  Outpatient Lasix has been increased up to 40 mg daily which has not helped.  The patient is a 60 pound weight gain over the last few months.  The patient will be admitted for IV diuresis and further work-up.  Her [id number] is such that she would benefit from anticoagulation-this will be initiated by the hospitalist.    5:20 PM - Patient seen and examined at bedside. Discussed plan of care, including EKG, labs, and imaging. Patient agrees to the plan of care. Ordered for EKG, DX-Chest, Troponins now and every 2 hours, CMP, ProBNP, and CBC w/ Diff to evaluate her symptoms.     6:26 PM I discussed the patient's case and the above findings with Dr. Villafana (Hospitalist) who agreed to evaluate the patient for admission.    6:28 PM - Patient was reevaluated at bedside. Discussed lab and radiology results with the patient and family and  informed them of my plan to admit her to the hospital. Patient verbalizes understanding and agreement to this plan of care.     ADDITIONAL PROBLEM LIST      DISPOSITION AND DISCUSSIONS  I have discussed management of the patient with the following physicians and ELIO's:  Dr. Villafana.    Discussion of management with other [facility] or appropriate source(s): None     Barriers to care at this time, including but not limited to:  None .     Decision tools and prescription drugs considered including, but not limited to:  [id number] .    Patient will be hospitalized by Dr. Villafana.    FINAL DIAGNOSIS  1. Congestive heart failure, unspecified HF chronicity, unspecified heart failure type (HCC)       Bladimir ZIMMERMAN (Flor), am scribing for, and in the presence of, Luisito Purdy M.D..    Electronically signed by: Bladimir Wild (Flor), 2/14/2023    Luisito ZIMMERMAN M.D. personally performed the services described in this documentation, as scribed by Bladimir Wild in my presence, and it is both accurate and complete.      The note accurately reflects work and decisions made by me.  Luisito Purdy M.D.  2/14/2023  6:35 PM

## 2023-02-15 NOTE — CARE PLAN
The patient is Stable - Low risk of patient condition declining or worsening    Shift Goals  Clinical Goals: monitor I+Os; diurese; echo  Patient Goals: comfort    Progress made toward(s) clinical / shift goals:    Problem: Fluid Volume  Goal: Fluid volume balance will be maintained  Outcome: Progressing     Problem: Bowel Elimination  Goal: Establish and maintain regular bowel function  Outcome: Progressing     Problem: Fall Risk  Goal: Patient will remain free from falls  Outcome: Progressing       Patient is not progressing towards the following goals:

## 2023-02-15 NOTE — CONSULTS
Cardiology Initial Consult Note    Date of note:    2/15/2023      Consulting Physician: Radha Dong D.O.    Name:   Regla Meier   YOB: 1940  Age:   82 y.o.  female   MRN:   [id number]    Reason for Consultation: Heart failure with preserved ejection fraction and newly noted atrial fibrillation    HPI:  Regla Meier is a 82 y.o. female with a history of stroke, heart failure with preserved LVEF, presented to the emergency room with increasing shortness of breath and increasing weight and lower extremity edema.    Patient is normally followed by Dr. Cherry.  Last seen by APRN on 2/2/23 for lower extremity edema and confusion.  The patient has chronic lower extremity edema and history of stroke 8/2022.  Event monitor had not shown anything and it was recommended she receive an implantable loop recorder but patient has not yet received it.  She has been having pretty low energy and confusion and her weight is up 20 pounds.  A6 was increased to 40 mg in the morning and 20 mg in the evening for 2 weeks.    Patient presents to the emergency room 2/14/2 to 3 with complaints of worsening weight gain and lower extremity edema over the past 6 weeks.  Her blood pressure has been running on the low side.  There has been no recorded intake and output.  Lasix had been held this morning because of low blood pressure.  Patient is on metoprolol tartrate 12.5 mg p.o. twice daily but again held because of low blood pressure    The patient reports the shortness of breath is actually new and started about a few months ago.  She cannot go very far before she has to stop due to shortness of breath.  Several months ago, this was not the case.  Denies any chest pain, no orthopnea, no PND.  She also had significant weight gain and lower extremity edema.  Denies any palpitations, no lightheadedness, no dizziness, no syncope.    Problem list:  Principal Problem:    Acute on chronic congestive heart failure  ([facility]) POA: Yes  Active Problems:    Hyperlipemia POA: Yes    COPD (chronic obstructive pulmonary disease) ([facility]) POA: Yes    AF (atrial fibrillation) ([facility]) POA: Yes    Troponin level elevated POA: Yes  Resolved Problems:    * No resolved hospital problems. *      Past Medical History:   Diagnosis Date    Hyperlipemia     Stroke (HCC)      Past Surgical History:   Procedure Laterality Date    CYSTECTOMY  1963    CATARACT EXTRACTION WITH IOL       Medications Prior to Admission   Medication Sig Dispense Refill Last Dose    furosemide (LASIX) 40 MG Tab Take 1 Tablet by mouth every day. (Patient taking differently: Take 40 mg by mouth 2 times a day.) 30 Tablet 3 2/14/2023 at AM    potassium chloride SA (KDUR) 20 MEQ Tab CR Take 1 Tablet by mouth every day. 30 Tablet 3 2/14/2023 at AM    fluticasone (FLONASE) 50 MCG/ACT nasal spray Administer 1 Spray into affected nostril(S) at bedtime. 16 g 1  at PRN    albuterol 108 (90 Base) MCG/ACT Aero Soln inhalation aerosol Inhale 2 Puffs every 6 hours as needed for Shortness of Breath. 8.5 g 1  at PRN    benzonatate (TESSALON) 100 MG Cap Take 1 Capsule by mouth 3 times a day as needed for Cough. 60 Capsule 0 2/13/2023 at PM    Coenzyme Q10 (CO Q 10 PO) Take 1 Tablet by mouth every day.   2/14/2023 at AM    rosuvastatin (CRESTOR) 5 MG Tab Take 1 Tablet by mouth every evening. 90 Tablet 11 2/13/2023 at PM    ezetimibe (ZETIA) 10 MG Tab Take 1 Tablet by mouth every day. 90 Tablet 11 2/14/2023 at AM    Cyanocobalamin (VITAMIN B12 PO) Take 1 Tablet by mouth every day.   2/14/2023 at AM    QUERCETIN PO Take 1 Tablet by mouth every day.   2/14/2023 at AM    aspirin EC 81 MG EC tablet Take 1 Tablet by mouth every day. 30 Tablet 0 2/14/2023 at AM    Lutein 40 MG Cap Take 40 mg by mouth every day.   2/14/2023 at AM    asa/apap/caffeine (EXCEDRIN) 250-250-65 MG Tab Take 1-2 Tablets by mouth every 6 hours as needed for Headache.    at PRN    TURMERIC PO Take 1 Capsule by mouth every day.    2/14/2023 at AM     Current Facility-Administered Medications   Medication Dose Route Frequency Provider Last Rate Last Admin    furosemide (LASIX) injection 40 mg  40 mg Intravenous Once Radha Dong D.O.        albuterol inhaler 2 Puff  2 Puff Inhalation Q6HRS PRN Isa Villafana M.D.        aspirin EC (ECOTRIN) tablet 81 mg  81 mg Oral DAILY Isa Villafana M.D.   81 mg at 02/15/23 0518    ezetimibe (ZETIA) tablet 10 mg  10 mg Oral DAILY Isa Villafana M.D.   10 mg at 02/15/23 0517    fluticasone (FLONASE) nasal spray 50 mcg  1 Spray Nasal QHS Isa Villafana M.D.   50 mcg at 02/14/23 2100    potassium chloride SA (Kdur) tablet 20 mEq  20 mEq Oral DAILY Isa Villafana M.D.   20 mEq at 02/15/23 0516    rosuvastatin (CRESTOR) tablet 5 mg  5 mg Oral Q EVENING Aseshea Villafana M.D.   5 mg at 02/14/23 2059    furosemide (LASIX) injection 40 mg  40 mg Intravenous BID DIURETIC Radha Dong D.O.        acetaminophen (Tylenol) tablet 650 mg  650 mg Oral Q6HRS PRN Isa Villafana M.D.        senna-docusate (PERICOLACE or SENOKOT S) 8.6-50 MG per tablet 2 Tablet  2 Tablet Oral BID Isa Villafana M.D.        And    polyethylene glycol/lytes (MIRALAX) PACKET 1 Packet  1 Packet Oral QDAY PRN Isa Villafana M.D.        And    magnesium hydroxide (MILK OF MAGNESIA) suspension 30 mL  30 mL Oral QDAY PRN Isa Villafana M.D.        And    bisacodyl (DULCOLAX) suppository 10 mg  10 mg Rectal QDAY PRN Isa Villafana M.D.        Respiratory Therapy Consult   Nebulization Continuous RT Isa Villafana M.D.        metoprolol tartrate (LOPRESSOR) tablet 12.5 mg  12.5 mg Oral TWICE DAILY Isa Villafana M.D.        apixaban (ELIQUIS) tablet 5 mg  5 mg Oral BID Asem MORALES Mutasher, M.D.   5 mg at 02/15/23 0518    Metoprolol Tartrate (LOPRESSOR) injection 5 mg  5 mg Intravenous Q5 MIN PRN Isa Villafana M.D.             Allergies   Allergen Reactions    Atorvastatin Myalgia     Severe muscle weakness    Pcn  "[Penicillins] Hives         Family History   Problem Relation Age of Onset    Other Mother         TIA     Other Father         Abdominal aneurysm     Alcohol abuse Brother     Heart Disease Brother     No Known Problems Daughter     No Known Problems Daughter          Social History     Socioeconomic History    Marital status:      Spouse name: Not on file    Number of children: Not on file    Years of education: Not on file    Highest education level: Not on file   Occupational History    Not on file   Tobacco Use    Smoking status: Never    Smokeless tobacco: Never   Vaping Use    Vaping Use: Never used   Substance and Sexual Activity    Alcohol use: Yes     Alcohol/week: 0.6 oz     Types: 1 Glasses of wine per week     Comment: occ glass of wine    Drug use: No    Sexual activity: Yes     Partners: Male     Comment:  for 57 years. Documented on 4/10/19.   Other Topics Concern    Not on file   Social History Narrative    Not on file     Social Determinants of Health     Financial Resource Strain: Not on file   Food Insecurity: Not on file   Transportation Needs: Not on file   Physical Activity: Not on file   Stress: Not on file   Social Connections: Not on file   Intimate Partner Violence: Not on file   Housing Stability: Not on file     No intake or output data in the 24 hours ending 02/15/23 1105        Review of Systems   Constitutional: Negative for diaphoresis and fever.   Respiratory:  Negative for cough, hemoptysis and wheezing.    Gastrointestinal:  Negative for hematochezia and melena.   Genitourinary:  Negative for hematuria.      Physical Exam  Body mass index is 30.44 kg/m².  /65   Pulse 97   Temp 36.7 °C (98 °F) (Oral)   Resp 18   Ht 1.575 m (5' 2\")   Wt 75.5 kg (166 lb 7.2 oz)   SpO2 92%   Vitals:    02/15/23 0435 02/15/23 0720 02/15/23 0800 02/15/23 1000   BP: 105/72 (!) 80/52 96/61 104/65   Pulse: 92 82 97    Resp: 20 18     Temp: 36.6 °C (97.8 °F) 36.7 °C (98 °F)  36.7 " °C (98 °F)   TempSrc: Axillary Oral  Oral   SpO2: 97% 92%     Weight: 75.5 kg (166 lb 7.2 oz)      Height:         Oxygen Therapy:  Pulse Oximetry: 92 %, O2 (LPM): 0, O2 Delivery Device: None - Room Air    Physical Exam  Constitutional:       Appearance: Normal appearance.   Eyes:      Extraocular Movements: Extraocular movements intact.      Conjunctiva/sclera: Conjunctivae normal.   Neck:      Vascular: No carotid bruit or JVD.   Cardiovascular:      Rate and Rhythm: Normal rate. Rhythm irregular.      Pulses:           Radial pulses are 2+ on the right side and 2+ on the left side.      Heart sounds: Heart sounds are distant. Murmur heard.   Early systolic murmur is present with a grade of 1/6.     No friction rub. No gallop.   Pulmonary:      Effort: Pulmonary effort is normal. No respiratory distress.      Breath sounds: Normal breath sounds. No wheezing.   Abdominal:      General: Bowel sounds are normal.      Palpations: Abdomen is soft.   Musculoskeletal:      Cervical back: Neck supple.      Right lower le+ Pitting Edema present.      Left lower le+ Pitting Edema present.      Comments: Edema to upper thighs   Skin:     General: Skin is warm and dry.   Neurological:      Mental Status: She is alert and oriented to person, place, and time. Mental status is at baseline.   Psychiatric:         Mood and Affect: Mood normal.        Labs (personally reviewed and notable for):   Lab Results   Component Value Date/Time    SODIUM 136 02/15/2023 02:28 AM    POTASSIUM 4.0 02/15/2023 02:28 AM    CHLORIDE 99 02/15/2023 02:28 AM    CO2 28 02/15/2023 02:28 AM    GLUCOSE 109 (H) 02/15/2023 02:28 AM    BUN 27 (H) 02/15/2023 02:28 AM    CREATININE 0.84 02/15/2023 02:28 AM      Lab Results   Component Value Date/Time    WBC 8.0 02/15/2023 02:28 AM    RBC 4.00 (L) 02/15/2023 02:28 AM    HEMOGLOBIN 11.7 (L) 02/15/2023 02:28 AM    HEMATOCRIT 36.1 (L) 02/15/2023 02:28 AM    MCV 90.3 02/15/2023 02:28 AM    MCH 29.3  02/15/2023 02:28 AM    MCHC 32.4 (L) 02/15/2023 02:28 AM    MPV 8.2 (L) 02/15/2023 02:28 AM    NEUTSPOLYS 71.50 02/14/2023 05:20 PM    LYMPHOCYTES 16.30 (L) 02/14/2023 05:20 PM    MONOCYTES 10.10 02/14/2023 05:20 PM    EOSINOPHILS 0.80 02/14/2023 05:20 PM    BASOPHILS 0.90 02/14/2023 05:20 PM    INR 0.99 08/15/2022 02:25 PM      Lab Results   Component Value Date/Time    CHOLSTRLTOT 198 02/15/2023 02:28 AM     (H) 02/15/2023 02:28 AM    HDL 69 02/15/2023 02:28 AM    TRIGLYCERIDE 113 02/15/2023 02:28 AM       Lab Results   Component Value Date/Time    TROPONINT 149 (H) 02/15/2023 0927     Lab Results   Component Value Date/Time    NTPROBNP 8499 (H) 02/14/2023 1720     Lab Results   Component Value Date/Time    HBA1C 5.1 08/16/2022 0505     Cardiac Imaging and Procedures Review:    EKG dated 2/14/23: My personal interpretation is atrial fibrillation, 95 bpm, poor R wave progression cannot rule out anterior infarct age undetermined, consider inferior infarct age undetermined, low voltages in the limb and precordial leads.  Twelve-lead ECG on 2/15/23 is similar.  Compared to ECG of 8/29/22, atrial fibrillation is new    Echo dated 9/9/22: My personal interpretation is normal left ventricular systolic function, mild concentric hypertrophy, normal systolic function, estimated 55%.  Normal right ventricular systolic function.  Moderate left atrial enlargement mild right atrial enlargement.  Sclerotic trileaflet aortic valve with mild aortic regurgitation.  Mitral calcification with mild mitral regurgitation.  Not able to estimate RV systolic pressure.    Nuclear Perfusion Imaging (7/11/22): Report of small equivocal nonreversible defect in the apical septal and mid inferolateral wall.  Normal LVEF    Event monitor dated 9/19/22:  No atrial fibrillation or flutter or pauses.  No VT or VF.  97 runs of SVT, longest lasting 17 beats.    Telemetry (last 24 hours): She will fibrillation ventricular rates mostly 90s to  100s    Radiology test Review:  DX-CHEST-PORTABLE (1 VIEW)   Final Result      1.  BILATERAL pleural effusions with overlying atelectasis which could obscure an additional process   2.  Persistently enlarged cardiac silhouette      EC-ECHOCARDIOGRAM COMPLETE W/O CONT    (Results Pending)       Impression and Medical Decision Makin.  Fluid overloaded.  Patient does need to be diuresed.  Blood pressure is borderline.  Would schedule Lasix 20 mg IV every 8 hours and not hold it for low blood pressure as it should not cause blood pressure to drop too significantly.  Etiology of fluid overload is not clear.  Echo has been ordered and is pending.  Based on her last echo her LVEF is normal, however, she does have LVH yet her ECG shows low voltages suggesting possible infiltrative cause of her heart failure, i.e. amyloid.  Her troponin is elevated, but she has not had chest pain and she did have a nuclear stress test performed  2 did not suggest any significant ischemia.  Would hold off any further ischemia evaluation for now.  - Schedule Lasix 20 mg IV every 8 hours and do not hold unless systolic blood pressures less then 90 mmHg  - Check lambda kappa light chains and SPEP UPEP to assess for amyloid  - Follow-up on echo    2.  Atrial fibrillation duration is unclear.  For the most part her rate is controlled enough.  I would not be too aggressive in controlling her rate at this time.    [id number] Score of 5.  Recommend Eliquis 5 mg p.o. twice daily for stroke prevention from atrial fibrillation.    Thank you for allowing me to participate in the care of this patient, cardiology will continue to follow.  Please contact me with any questions.      Marisol Fermin M.D.  Cardiologist, [facility] [place]     This note was generated using voice recognition software which has a small chance of producing errors of grammar and possibly content. I have made every reasonable attempt to find and correct any  obvious errors, but expect that some may not be found prior to finalization of this note.

## 2023-02-15 NOTE — PROGRESS NOTES
4 Eyes Skin Assessment Completed by Julieta RN and JULIETTE Briggs.    Head WDL  Ears WDL  Nose WDL  Mouth WDL  Neck WDL  Breast/Chest WDL  Shoulder Blades WDL  Spine WDL  (R) Arm/Elbow/Hand WDL  (L) Arm/Elbow/Hand WDL  Abdomen WDL  Groin WDL  Scrotum/Coccyx/Buttocks WDL  (R) Leg Edema  (L) Leg Edema  (R) Heel/Foot/Toe WDL  (L) Heel/Foot/Toe WDL          Devices In Places Tele Box      Interventions In Place NC W/Ear Foams and Pillows    Possible Skin Injury No    Pictures Uploaded Into Epic N/A  Wound Consult Placed N/A  RN Wound Prevention Protocol Ordered No

## 2023-02-15 NOTE — PROGRESS NOTES
Patient arrived to room 303 -2, patient ambulated from gurney to bed with handheld assist. Accompanied by family.

## 2023-02-16 DIAGNOSIS — H54.8 LEGALLY BLIND IN LEFT EYE, AS DEFINED IN USA: Chronic | ICD-10-CM

## 2023-02-16 DIAGNOSIS — I50.9 ACUTE ON CHRONIC CONGESTIVE HEART FAILURE, UNSPECIFIED HEART FAILURE TYPE (HCC): ICD-10-CM

## 2023-02-16 DIAGNOSIS — I48.91 ATRIAL FIBRILLATION, UNSPECIFIED TYPE (HCC): ICD-10-CM

## 2023-02-16 DIAGNOSIS — I69.354 HEMIPLEGIA AND HEMIPARESIS FOLLOWING CEREBRAL INFARCTION AFFECTING LEFT NON-DOMINANT SIDE (HCC): ICD-10-CM

## 2023-02-16 DIAGNOSIS — J44.9 CHRONIC OBSTRUCTIVE PULMONARY DISEASE, UNSPECIFIED COPD TYPE (HCC): ICD-10-CM

## 2023-02-16 DIAGNOSIS — I50.9 HEART FAILURE, UNSPECIFIED HF CHRONICITY, UNSPECIFIED HEART FAILURE TYPE (HCC): ICD-10-CM

## 2023-02-16 LAB
ALBUMIN SERPL BCP-MCNC: 2.4 G/DL (ref 3.2–4.9)
BUN SERPL-MCNC: 29 MG/DL (ref 8–22)
CALCIUM ALBUM COR SERPL-MCNC: 9.5 MG/DL (ref 8.5–10.5)
CALCIUM SERPL-MCNC: 8.2 MG/DL (ref 8.4–10.2)
CHLORIDE SERPL-SCNC: 98 MMOL/L (ref 96–112)
CO2 SERPL-SCNC: 24 MMOL/L (ref 20–33)
CREAT SERPL-MCNC: 0.81 MG/DL (ref 0.5–1.4)
EKG IMPRESSION: NORMAL
ERYTHROCYTE [DISTWIDTH] IN BLOOD BY AUTOMATED COUNT: 46.7 FL (ref 35.9–50)
GFR SERPLBLD CREATININE-BSD FMLA CKD-EPI: 73 ML/MIN/1.73 M 2
GLUCOSE SERPL-MCNC: 96 MG/DL (ref 65–99)
HCT VFR BLD AUTO: 35.8 % (ref 37–47)
HGB BLD-MCNC: 11.6 G/DL (ref 12–16)
MAGNESIUM SERPL-MCNC: 2.1 MG/DL (ref 1.5–2.5)
MCH RBC QN AUTO: 29.7 PG (ref 27–33)
MCHC RBC AUTO-ENTMCNC: 32.4 G/DL (ref 33.6–35)
MCV RBC AUTO: 91.8 FL (ref 81.4–97.8)
PHOSPHATE SERPL-MCNC: 4 MG/DL (ref 2.5–4.5)
PLATELET # BLD AUTO: 260 K/UL (ref 164–446)
PMV BLD AUTO: 9.6 FL (ref 9–12.9)
POTASSIUM SERPL-SCNC: 4.5 MMOL/L (ref 3.6–5.5)
RBC # BLD AUTO: 3.9 M/UL (ref 4.2–5.4)
SODIUM SERPL-SCNC: 132 MMOL/L (ref 135–145)
WBC # BLD AUTO: 6.8 K/UL (ref 4.8–10.8)

## 2023-02-16 PROCEDURE — A9270 NON-COVERED ITEM OR SERVICE: HCPCS | Performed by: HOSPITALIST

## 2023-02-16 PROCEDURE — 700111 HCHG RX REV CODE 636 W/ 250 OVERRIDE (IP): Performed by: INTERNAL MEDICINE

## 2023-02-16 PROCEDURE — 93005 ELECTROCARDIOGRAM TRACING: CPT | Performed by: INTERNAL MEDICINE

## 2023-02-16 PROCEDURE — 93010 ELECTROCARDIOGRAM REPORT: CPT | Performed by: INTERNAL MEDICINE

## 2023-02-16 PROCEDURE — 770020 HCHG ROOM/CARE - TELE (206)

## 2023-02-16 PROCEDURE — 85027 COMPLETE CBC AUTOMATED: CPT

## 2023-02-16 PROCEDURE — 99233 SBSQ HOSP IP/OBS HIGH 50: CPT | Performed by: INTERNAL MEDICINE

## 2023-02-16 PROCEDURE — 36415 COLL VENOUS BLD VENIPUNCTURE: CPT

## 2023-02-16 PROCEDURE — 83735 ASSAY OF MAGNESIUM: CPT

## 2023-02-16 PROCEDURE — 700102 HCHG RX REV CODE 250 W/ 637 OVERRIDE(OP): Performed by: HOSPITALIST

## 2023-02-16 PROCEDURE — 80069 RENAL FUNCTION PANEL: CPT

## 2023-02-16 PROCEDURE — 99231 SBSQ HOSP IP/OBS SF/LOW 25: CPT | Performed by: INTERNAL MEDICINE

## 2023-02-16 RX ADMIN — POTASSIUM CHLORIDE 20 MEQ: 1500 TABLET, EXTENDED RELEASE ORAL at 06:35

## 2023-02-16 RX ADMIN — FUROSEMIDE 20 MG: 10 INJECTION, SOLUTION INTRAMUSCULAR; INTRAVENOUS at 21:00

## 2023-02-16 RX ADMIN — APIXABAN 5 MG: 5 TABLET, FILM COATED ORAL at 06:35

## 2023-02-16 RX ADMIN — ROSUVASTATIN 5 MG: 10 TABLET, FILM COATED ORAL at 17:16

## 2023-02-16 RX ADMIN — EZETIMIBE 10 MG: 10 TABLET ORAL at 06:35

## 2023-02-16 RX ADMIN — FUROSEMIDE 20 MG: 10 INJECTION, SOLUTION INTRAMUSCULAR; INTRAVENOUS at 14:46

## 2023-02-16 RX ADMIN — METOPROLOL TARTRATE 12.5 MG: 25 TABLET, FILM COATED ORAL at 06:35

## 2023-02-16 RX ADMIN — ASPIRIN 81 MG: 81 TABLET, COATED ORAL at 06:35

## 2023-02-16 RX ADMIN — FUROSEMIDE 20 MG: 10 INJECTION, SOLUTION INTRAMUSCULAR; INTRAVENOUS at 06:34

## 2023-02-16 RX ADMIN — APIXABAN 5 MG: 5 TABLET, FILM COATED ORAL at 17:17

## 2023-02-16 RX ADMIN — FLUTICASONE PROPIONATE 50 MCG: 50 SPRAY, METERED NASAL at 20:58

## 2023-02-16 RX ADMIN — SENNOSIDES AND DOCUSATE SODIUM 2 TABLET: 50; 8.6 TABLET ORAL at 06:35

## 2023-02-16 ASSESSMENT — FIBROSIS 4 INDEX: FIB4 SCORE: 2.4

## 2023-02-16 ASSESSMENT — ENCOUNTER SYMPTOMS
MYALGIAS: 0
DEPRESSION: 0
CHILLS: 0
PALPITATIONS: 0
VOMITING: 0
DIZZINESS: 0
HEADACHES: 0
SHORTNESS OF BREATH: 1
FEVER: 0
ABDOMINAL PAIN: 0
NAUSEA: 0
COUGH: 0

## 2023-02-16 ASSESSMENT — PAIN DESCRIPTION - PAIN TYPE: TYPE: ACUTE PAIN

## 2023-02-16 NOTE — PROGRESS NOTES
Telemetry Shift Summary     Rhythm: aflutter to NSR  Rate:   Measurements: 0.20/0.10/0.40  Ectopy (reported by Monitor Tech): rPVC, rPAC, rBig, rTrig     Normal Values  Rhythm: Sinus  HR:   Measurements: 0.12-0.20/0.06-0.10/0.30-0.52

## 2023-02-16 NOTE — CARE PLAN
Patient A&Ox4, VS stable, 2.5LNC, telemetry, purwick, IV patent, bed alarm, bed low and locked, call light within reach    The patient is Stable - Low risk of patient condition declining or worsening    Shift Goals  Clinical Goals: monitor BP, diuresis  Patient Goals: home    Progress made toward(s) clinical / shift goals:    Problem: Knowledge Deficit - Standard  Goal: Patient and family/care givers will demonstrate understanding of plan of care, disease process/condition, diagnostic tests and medications  Outcome: Progressing     Problem: Psychosocial  Goal: Patient's level of anxiety will decrease  Outcome: Progressing  Goal: Patient's ability to verbalize feelings about condition will improve  Outcome: Progressing  Goal: Patient's ability to re-evaluate and adapt role responsibilities will improve  Outcome: Progressing  Goal: Patient and family will demonstrate ability to cope with life altering diagnosis and/or procedure  Outcome: Progressing  Goal: Spiritual and cultural needs incorporated into hospitalization  Outcome: Progressing     Problem: Hemodynamics  Goal: Patient's hemodynamics, fluid balance and neurologic status will be stable or improve  Outcome: Progressing     Problem: Respiratory  Goal: Patient will achieve/maintain optimum respiratory ventilation and gas exchange  Outcome: Progressing     Problem: Fluid Volume  Goal: Fluid volume balance will be maintained  Outcome: Progressing     Problem: Bowel Elimination  Goal: Establish and maintain regular bowel function  Outcome: Progressing     Problem: Gastrointestinal Irritability  Goal: Nausea and vomiting will be absent or improve  Outcome: Progressing  Goal: Diarrhea will be absent or improved  Outcome: Progressing     Problem: Mobility  Goal: Patient's capacity to carry out activities will improve  Outcome: Progressing     Problem: Self Care  Goal: Patient will have the ability to perform ADLs independently or with assistance (bathe, groom, dress,  toilet and feed)  Outcome: Progressing     Problem: Fall Risk  Goal: Patient will remain free from falls  Outcome: Progressing     Problem: Pain - Standard  Goal: Alleviation of pain or a reduction in pain to the patient’s comfort goal  Outcome: Progressing       Patient is not progressing towards the following goals:

## 2023-02-16 NOTE — PROGRESS NOTES
"Cardiology Follow-up Consult Note    Date of Service:    2/16/2023      Consulting Physician: Radha Dong D.O.    Name:   Regla Meier   YOB: 1940  Age:   82 y.o.  female   MRN:   [id number]    Problem list:  Principal Problem:    Acute on chronic congestive heart failure (HCC) POA: Yes  Active Problems:    Hyperlipemia POA: Yes    COPD (chronic obstructive pulmonary disease) (HCC) POA: Yes    AF (atrial fibrillation) (HCC) POA: Yes    Troponin level elevated POA: Yes  Resolved Problems:    * No resolved hospital problems. *    Subjective:  Patient thinks may be her breathing slightly better, no new complaints    All other review of systems reviewed and negative.    Past medical, surgical, social, and family history reviewed and unchanged from admission except as noted in assessment and plan.    Medications: Reviewed in MAR    Allergies   Allergen Reactions    Atorvastatin Myalgia     Severe muscle weakness    Pcn [Penicillins] Hives       Intake/Output Summary (Last 24 hours) at 2/16/2023 0804  Last data filed at 2/15/2023 1800  Gross per 24 hour   Intake 480 ml   Output 1300 ml   Net -820 ml      Physical Exam  Body mass index is 30.44 kg/m².  /61   Pulse 60   Temp 36.2 °C (97.2 °F) (Axillary)   Resp 16   Ht 1.575 m (5' 2\")   Wt 75.5 kg (166 lb 7.2 oz)   SpO2 92%   Vitals:    02/15/23 1504 02/15/23 1900 02/15/23 2355 02/16/23 0400   BP: 92/60 99/64 105/71 108/61   Pulse: 92 89 93 60   Resp: 18 18 16 16   Temp: 36.5 °C (97.7 °F) 36.8 °C (98.3 °F) 36.8 °C (98.3 °F) 36.2 °C (97.2 °F)   TempSrc: Oral Oral Axillary Axillary   SpO2: 96% 94% 93% 92%   Weight:       Height:         Oxygen Therapy:  Pulse Oximetry: 92 %, O2 (LPM): 2.5, O2 Delivery Device: Nasal Cannula    Physical Exam  Constitutional:       Appearance: Normal appearance.   Neck:      Vascular: No JVD.   Cardiovascular:      Rate and Rhythm: Normal rate. Rhythm irregular.      Pulses: Normal pulses and intact distal " pulses.      Heart sounds: S1 normal and S2 normal. No murmur heard.    No friction rub. No gallop.   Pulmonary:      Effort: Pulmonary effort is normal. No respiratory distress.      Breath sounds: Normal breath sounds. No wheezing or rales.   Abdominal:      General: Bowel sounds are normal.      Palpations: Abdomen is soft.   Musculoskeletal:      Cervical back: Neck supple.   Neurological:      Mental Status: She is alert and oriented to person, place, and time.        Labs (personally reviewed and notable for):   Lab Results   Component Value Date/Time    SODIUM 132 (L) 02/16/2023 02:04 AM    POTASSIUM 4.5 02/16/2023 02:04 AM    CHLORIDE 98 02/16/2023 02:04 AM    CO2 24 02/16/2023 02:04 AM    GLUCOSE 96 02/16/2023 02:04 AM    BUN 29 (H) 02/16/2023 02:04 AM    CREATININE 0.81 02/16/2023 02:04 AM      Lab Results   Component Value Date/Time    WBC 6.8 02/16/2023 02:04 AM    RBC 3.90 (L) 02/16/2023 02:04 AM    HEMOGLOBIN 11.6 (L) 02/16/2023 02:04 AM    HEMATOCRIT 35.8 (L) 02/16/2023 02:04 AM    MCV 91.8 02/16/2023 02:04 AM    MCH 29.7 02/16/2023 02:04 AM    MCHC 32.4 (L) 02/16/2023 02:04 AM    MPV 9.6 02/16/2023 02:04 AM    NEUTSPOLYS 71.50 02/14/2023 05:20 PM    LYMPHOCYTES 16.30 (L) 02/14/2023 05:20 PM    MONOCYTES 10.10 02/14/2023 05:20 PM    EOSINOPHILS 0.80 02/14/2023 05:20 PM    BASOPHILS 0.90 02/14/2023 05:20 PM      Lab Results   Component Value Date/Time    CHOLSTRLTOT 198 02/15/2023 02:28 AM     (H) 02/15/2023 02:28 AM    HDL 69 02/15/2023 02:28 AM    TRIGLYCERIDE 113 02/15/2023 02:28 AM       Lab Results   Component Value Date/Time    TROPONINT 166 (H) 02/15/2023 1744     Lab Results   Component Value Date/Time    NTPROBNP 8499 (H) 02/14/2023 1720     Cardiac Imaging and Procedures Review:    Echocardiogram dated 2/15/23: My personal interpretation is normal left ventricular systolic function, borderline left ventricular hypertrophy with normal systolic function.  Normal right ventricular  systolic function.  Normal aortic root size.  2 diastolic dysfunction.  Thickened mitral valve leaflets with mild mitral regurgitation.  Sclerotic trileaflet aortic valve with mild aortic regurgitation.  Mild to moderate tricuspid regurgitation.  RV systolic pressure estimated at 30 mmHg plus right atrial pressure.    Radiology test Review:  EC-ECHOCARDIOGRAM COMPLETE W/O CONT   Final Result      DX-CHEST-PORTABLE (1 VIEW)   Final Result      1.  BILATERAL pleural effusions with overlying atelectasis which could obscure an additional process   2.  Persistently enlarged cardiac silhouette        Telemetry: Atrial fibrillation    Impression and Medical Decision Makin.  Fluid overload likely secondary to diastolic dysfunction.  Questionable infiltrative cardiomyopathy with preserved LVEF.  Lambda kappa light chains SPEP UPEP have been ordered and results are pending.  She occasionally reports she has had chest pressure sensation but more associated with shortness of breath.  She did have reported it is small equivocal nonreversible defect.  She is diuresing, improvement is pretty slow.  - Continue scheduled IV Lasix 20 mg IV every 8 hours and aim for 1 L each day if possible  - Follow-up on lambda kappa light chains SPEP and UPEP for amyloid, can consider fat pad biopsy if these are negative as clinically she appears to be more acting like amyloid  - Her troponins although elevated have been flat.  Once patient is more euvolemic, can consider possible cardiac catheterization to define coronary anatomy and check for obstructive coronary disease.  I have not yet discussed this with her, but as she improves we can consider the procedure if she would be agreeable to having it done.    2.  Atrial fibrillation.  I would be careful with metoprolol and allow her higher heart rates for now.    3.  Miguel Vascor is 5, recommended Eliquis 5 mg p.o. twice daily    Thank you for allowing me to participate in the care of this  patient, cardiology will continue to follow.      Marisol Fermin M.D.  Cardiologist, [facility] [place]       Please note that this dictation was created using voice recognition software. I have made every reasonable attempt to correct obvious errors, but it is possible there are errors of grammar and possibly content that I did not discover before finalizing the note.

## 2023-02-16 NOTE — PROGRESS NOTES
Hospital Medicine Daily Progress Note    Date of Service  2/16/2023    Chief Complaint  Regla Meier is a 82 y.o. female admitted 2/14/2023 with weakness and dyspnea.     Hospital Course  82 y.o. female with a past medical history of chronic obstructive pulmonary disease, heart failure with preserved ejection fraction, and hyperlipidemia who presented 2/14/2023 with generalized weakness, shortness of breath and lower extremity swelling.  Patient reports that she has not been feeling well over the past 4-6 weeks.  She has been having progressively worsening shortness of breath, generalized weakness and weight gain.  She reports gaining about 60 pounds in the last month of which 30 during the last 1 week.  She has orthopnea and paroxysmal nocturnal dyspnea.  She reports compliance with her diuretic Lasix 20 mg daily.     Interval Problem Update  2/15 Vital stable on 2 L nasal cannula.  Troponin increased to 149. Patient denies chest pain. Cardiology consulted, appreciate recommendations.  Blood pressure was too low this morning for IV Lasix however this afternoon it is over 100 and I have ordered a one-time dose.  Patient still feels short of breath and has severe pitting edema up to her thighs.  She reports that her legs were weeping yesterday, however this has improved today.  She feels very fatigued.  Discussed that unfortunately we will have to do diuresis very slow given her low pressures.     2/16 No acute events overnight. Cardiology recommended IV lasix Q8H. Currently on 1 L NC. Echo showed preserved EF, grade II diastolic dysfunction, and RVSP 40-45 mmHg. Complaining of left ear pain reports she gets chronic ear wax impaction, unfortunately no debrox at the inpatient pharmacy she should use this OTC at home. Tele showing pauses, and EKG interpreted by me shows occassional pause, stop metoprolol. Discussion about code status, patient would like to change her status to DNR/DNI. Discussed with nursing  patient had bag of medications at the bedside, some were unknown pills her family has taken them home. Patient reports her leg swelling is improving, still has some in her thighs b/l.     I have discussed this patient's plan of care and discharge plan at IDT rounds today with Case Management, Nursing, Nursing leadership, and other members of the IDT team.    Consultants/Specialty  cardiology    Code Status  Full Code    Disposition  Patient is not medically cleared for discharge.   Anticipate discharge to to home with close outpatient follow-up.  I have placed the appropriate orders for post-discharge needs.    Review of Systems  Review of Systems   Constitutional:  Positive for malaise/fatigue. Negative for chills and fever.   HENT:  Positive for ear pain.    Respiratory:  Positive for shortness of breath. Negative for cough.    Cardiovascular:  Positive for leg swelling. Negative for chest pain and palpitations.   Gastrointestinal:  Negative for abdominal pain, nausea and vomiting.   Genitourinary:  Negative for dysuria.   Musculoskeletal:  Negative for myalgias.   Skin:  Negative for rash.   Neurological:  Negative for dizziness and headaches.   Psychiatric/Behavioral:  Negative for depression.    All other systems reviewed and are negative.     Physical Exam  Temp:  [36.2 °C (97.2 °F)-36.8 °C (98.3 °F)] 36.4 °C (97.6 °F)  Pulse:  [60-93] 83  Resp:  [16-18] 18  BP: ()/(60-71) 107/70  SpO2:  [92 %-96 %] 93 %    Physical Exam  Vitals and nursing note reviewed.   Constitutional:       General: She is not in acute distress.     Appearance: She is obese. She is ill-appearing.   HENT:      Head: Normocephalic and atraumatic.   Eyes:      Conjunctiva/sclera: Conjunctivae normal.   Cardiovascular:      Rate and Rhythm: Rhythm irregular.      Heart sounds: Normal heart sounds.   Pulmonary:      Effort: Pulmonary effort is normal. No respiratory distress.      Breath sounds: Rales (in bases) present. No wheezing.       Comments: On NC   Speaking in full sentences  Able to lie down flat   Abdominal:      General: Abdomen is flat. There is no distension.      Palpations: Abdomen is soft.      Tenderness: There is no abdominal tenderness.   Musculoskeletal:         General: Swelling present.      Cervical back: Normal range of motion and neck supple.      Right lower leg: Edema present.      Left lower leg: Edema present.      Comments: Edema in LE below knees mild, still with pitting in the thighs   Skin:     General: Skin is warm and dry.   Neurological:      General: No focal deficit present.      Mental Status: She is alert and oriented to person, place, and time.      Cranial Nerves: No cranial nerve deficit.   Psychiatric:         Mood and Affect: Mood normal.         Behavior: Behavior normal.       Fluids    Intake/Output Summary (Last 24 hours) at 2/16/2023 1245  Last data filed at 2/16/2023 0939  Gross per 24 hour   Intake 240 ml   Output 1300 ml   Net -1060 ml       Laboratory  Recent Labs     02/14/23  1720 02/15/23  0228 02/16/23  0204   WBC 7.7 8.0 6.8   RBC 4.29 4.00* 3.90*   HEMOGLOBIN 13.7 11.7* 11.6*   HEMATOCRIT 39.9 36.1* 35.8*   MCV 93.0 90.3 91.8   MCH 31.9 29.3 29.7   MCHC 34.3 32.4* 32.4*   RDW 45.3 44.1 46.7   PLATELETCT 335 309 260   MPV 8.2* 8.2* 9.6     Recent Labs     02/14/23  1720 02/15/23  0228 02/16/23  0204   SODIUM 134* 136 132*   POTASSIUM 4.0 4.0 4.5   CHLORIDE 96 99 98   CO2 27 28 24   GLUCOSE 110* 109* 96   BUN 28* 27* 29*   CREATININE 0.87 0.84 0.81   CALCIUM 8.7 8.3* 8.2*             Recent Labs     02/15/23  0228   TRIGLYCERIDE 113   HDL 69   *       Imaging  EC-ECHOCARDIOGRAM COMPLETE W/O CONT   Final Result      DX-CHEST-PORTABLE (1 VIEW)   Final Result      1.  BILATERAL pleural effusions with overlying atelectasis which could obscure an additional process   2.  Persistently enlarged cardiac silhouette           Assessment/Plan  * Acute on chronic congestive heart failure (HCC)-  (present on admission)  Assessment & Plan  Weight gain of 60lb in past month, 30 lb in last week despite being on diuretic   Increased BNP ~ 8500  Cardiology consulted  Echo noted   IV lasix 20 mg TID   Daily strict input and output  Daily standing weights.  Daily BMP to watch renal function, electrolytes.  Replace electrolytes as needed.    Troponin level elevated- (present on admission)  Assessment & Plan  Possibly related to new onset atrial fibrillation  Increasing  Cardiology consulted  Denies chest pain.  EKG show  atrial fibrillation with a rate of 95, there is no ST elevation, there is 1 mm ST depression in lead V5, there is T wave inversion in lead V6.  QTc is 464.  Continuous cardiac monitoring    Stat EKG, troponin for chest pain or worsening shortness of breath   Echo noted    AF (atrial fibrillation) ([facility])- (present on admission)  Assessment & Plan  New onset   Echocardiography noted   Dc metoprolol as patient having pauses   Continuous cardiac monitoring.   [id number] Score >2  I discussed starting therapeutic anticoagulation. Patient agreeable, understands and accepts risks of potential bleeding.    -started eliquis   Cardiology consulted    COPD (chronic obstructive pulmonary disease) ([facility])- (present on admission)  Assessment & Plan  Not currently in exacerbation   Oxygen as needed, Respiratory protocol, Bronchodilators, Incentive spirometry    Hyperlipemia- (present on admission)  Assessment & Plan  Resume home ezetimibe          VTE prophylaxis: therapeutic anticoagulation with Eliquis     I have performed a physical exam and reviewed and updated ROS and Plan today (2/16/2023). In review of yesterday's note (2/15/2023), there are no changes except as documented above.

## 2023-02-16 NOTE — PROGRESS NOTES
I did go back and evaluate the patient.  The patient is sitting up having lunch.  She reports she actually feels better now compared to this morning when I saw her sitting up.  She denies any chest discomfort or pressure.  No lightheadedness, no dizziness.    Her most recent ECG is reviewed and now shows sinus rhythm.  There is no acute ST-T changes.  She is not endorsing any chest discomfort.  Although she has had some elevated troponins in the 160s, they have not gone up or down.  Her clinical presentation is still not consistent with acute coronary syndrome, much more suspicious for possible amyloid.  However I discussed with the patient tube and actual diagnosis.  Discussed with her doing cardiac catheterization once she is better diuresed and able to lay flat to rule out obstructive coronary disease as a cause of troponin elevation.  If this is recommended, she would be agreeable to proceed.  I also discussed with her that if the lab tests for amyloid comes back negative, I would recommend we consider a fat pad biopsy to make a diagnosis of amyloid if at all possible.  Patient also would be agreeable to this.  If she needed to be transferred she would be agreeable.    For now would continue diuresis to get her more euvolemic.  Metoprolol has been discontinued.

## 2023-02-16 NOTE — PROGRESS NOTES
Called by RN.  ECG now does appear she is back in sinus with occasional pause.  She stil lhas low voltages. Nonspecific T wave changes, but not changed from prior.  No evidence of any ischemia.    Stop metoprolol.

## 2023-02-16 NOTE — PROGRESS NOTES
Telemetry Shift Summary    Rhythm Aflutter BBB  HR Range   Ectopy fPVC  Measurements -/0.10/-      Normal Values  Rhythm SR  HR Range:   Measurements: 0.12-0.20/0.06-0.10/0.30-0.52

## 2023-02-17 ENCOUNTER — HOSPITAL ENCOUNTER (INPATIENT)
Facility: MEDICAL CENTER | Age: 83
LOS: 6 days | DRG: 286 | End: 2023-02-23
Attending: INTERNAL MEDICINE | Admitting: INTERNAL MEDICINE
Payer: MEDICARE

## 2023-02-17 VITALS
HEIGHT: 62 IN | SYSTOLIC BLOOD PRESSURE: 105 MMHG | HEART RATE: 78 BPM | RESPIRATION RATE: 16 BRPM | TEMPERATURE: 98.3 F | DIASTOLIC BLOOD PRESSURE: 71 MMHG | WEIGHT: 166.89 LBS | OXYGEN SATURATION: 96 % | BODY MASS INDEX: 30.71 KG/M2

## 2023-02-17 DIAGNOSIS — I50.33 ACUTE ON CHRONIC DIASTOLIC CONGESTIVE HEART FAILURE (HCC): ICD-10-CM

## 2023-02-17 DIAGNOSIS — I50.810 RIGHT-SIDED CONGESTIVE HEART FAILURE, UNSPECIFIED HF CHRONICITY (HCC): ICD-10-CM

## 2023-02-17 DIAGNOSIS — I48.0 PAROXYSMAL ATRIAL FIBRILLATION (HCC): ICD-10-CM

## 2023-02-17 DIAGNOSIS — R60.0 EDEMA OF BOTH LOWER EXTREMITIES: ICD-10-CM

## 2023-02-17 PROBLEM — Z86.73 HISTORY OF CVA (CEREBROVASCULAR ACCIDENT): Status: ACTIVE | Noted: 2023-02-17

## 2023-02-17 PROBLEM — J96.21 ACUTE ON CHRONIC RESPIRATORY FAILURE WITH HYPOXIA (HCC): Status: ACTIVE | Noted: 2023-02-17

## 2023-02-17 LAB
ALBUMIN SERPL BCP-MCNC: 2.4 G/DL (ref 3.2–4.9)
ANION GAP SERPL CALC-SCNC: 10 MMOL/L (ref 7–16)
ANION GAP SERPL CALC-SCNC: 8 MMOL/L (ref 7–16)
BUN SERPL-MCNC: 27 MG/DL (ref 8–22)
BUN SERPL-MCNC: 27 MG/DL (ref 8–22)
BUN SERPL-MCNC: 28 MG/DL (ref 8–22)
CALCIUM ALBUM COR SERPL-MCNC: 9.4 MG/DL (ref 8.5–10.5)
CALCIUM SERPL-MCNC: 8.1 MG/DL (ref 8.4–10.2)
CALCIUM SERPL-MCNC: 8.3 MG/DL (ref 8.4–10.2)
CALCIUM SERPL-MCNC: 8.4 MG/DL (ref 8.5–10.5)
CHLORIDE SERPL-SCNC: 100 MMOL/L (ref 96–112)
CHLORIDE SERPL-SCNC: 101 MMOL/L (ref 96–112)
CHLORIDE SERPL-SCNC: 102 MMOL/L (ref 96–112)
CO2 SERPL-SCNC: 24 MMOL/L (ref 20–33)
CO2 SERPL-SCNC: 26 MMOL/L (ref 20–33)
CO2 SERPL-SCNC: 29 MMOL/L (ref 20–33)
CREAT SERPL-MCNC: 0.7 MG/DL (ref 0.5–1.4)
CREAT SERPL-MCNC: 0.7 MG/DL (ref 0.5–1.4)
CREAT SERPL-MCNC: 0.73 MG/DL (ref 0.5–1.4)
ERYTHROCYTE [DISTWIDTH] IN BLOOD BY AUTOMATED COUNT: 45.2 FL (ref 35.9–50)
GFR SERPLBLD CREATININE-BSD FMLA CKD-EPI: 82 ML/MIN/1.73 M 2
GFR SERPLBLD CREATININE-BSD FMLA CKD-EPI: 87 ML/MIN/1.73 M 2
GFR SERPLBLD CREATININE-BSD FMLA CKD-EPI: 87 ML/MIN/1.73 M 2
GLUCOSE SERPL-MCNC: 108 MG/DL (ref 65–99)
GLUCOSE SERPL-MCNC: 93 MG/DL (ref 65–99)
GLUCOSE SERPL-MCNC: 99 MG/DL (ref 65–99)
HCT VFR BLD AUTO: 35.2 % (ref 37–47)
HGB BLD-MCNC: 11.3 G/DL (ref 12–16)
KAPPA LC FREE SER-MCNC: 52.08 MG/L (ref 3.3–19.4)
KAPPA LC FREE/LAMBDA FREE SER NEPH: 0.3 {RATIO} (ref 0.26–1.65)
LAMBDA LC FREE SERPL-MCNC: 175.94 MG/L (ref 5.71–26.3)
MAGNESIUM SERPL-MCNC: 2.1 MG/DL (ref 1.5–2.5)
MCH RBC QN AUTO: 29.3 PG (ref 27–33)
MCHC RBC AUTO-ENTMCNC: 32.1 G/DL (ref 33.6–35)
MCV RBC AUTO: 91.2 FL (ref 81.4–97.8)
NT-PROBNP SERPL IA-MCNC: 8463 PG/ML (ref 0–125)
PHOSPHATE SERPL-MCNC: 3.9 MG/DL (ref 2.5–4.5)
PLATELET # BLD AUTO: 252 K/UL (ref 164–446)
PMV BLD AUTO: 8.7 FL (ref 9–12.9)
POTASSIUM SERPL-SCNC: 4.1 MMOL/L (ref 3.6–5.5)
POTASSIUM SERPL-SCNC: 5.8 MMOL/L (ref 3.6–5.5)
POTASSIUM SERPL-SCNC: 6.1 MMOL/L (ref 3.6–5.5)
RBC # BLD AUTO: 3.86 M/UL (ref 4.2–5.4)
SODIUM SERPL-SCNC: 134 MMOL/L (ref 135–145)
SODIUM SERPL-SCNC: 136 MMOL/L (ref 135–145)
SODIUM SERPL-SCNC: 138 MMOL/L (ref 135–145)
WBC # BLD AUTO: 6 K/UL (ref 4.8–10.8)

## 2023-02-17 PROCEDURE — 80069 RENAL FUNCTION PANEL: CPT

## 2023-02-17 PROCEDURE — 700111 HCHG RX REV CODE 636 W/ 250 OVERRIDE (IP): Performed by: INTERNAL MEDICINE

## 2023-02-17 PROCEDURE — 80048 BASIC METABOLIC PNL TOTAL CA: CPT

## 2023-02-17 PROCEDURE — 85027 COMPLETE CBC AUTOMATED: CPT

## 2023-02-17 PROCEDURE — 83880 ASSAY OF NATRIURETIC PEPTIDE: CPT

## 2023-02-17 PROCEDURE — 36415 COLL VENOUS BLD VENIPUNCTURE: CPT

## 2023-02-17 PROCEDURE — 700102 HCHG RX REV CODE 250 W/ 637 OVERRIDE(OP): Performed by: INTERNAL MEDICINE

## 2023-02-17 PROCEDURE — 700102 HCHG RX REV CODE 250 W/ 637 OVERRIDE(OP): Performed by: HOSPITALIST

## 2023-02-17 PROCEDURE — 770020 HCHG ROOM/CARE - TELE (206)

## 2023-02-17 PROCEDURE — A9270 NON-COVERED ITEM OR SERVICE: HCPCS | Performed by: INTERNAL MEDICINE

## 2023-02-17 PROCEDURE — 80048 BASIC METABOLIC PNL TOTAL CA: CPT | Mod: 91

## 2023-02-17 PROCEDURE — 99223 1ST HOSP IP/OBS HIGH 75: CPT | Mod: AI | Performed by: INTERNAL MEDICINE

## 2023-02-17 PROCEDURE — A9270 NON-COVERED ITEM OR SERVICE: HCPCS | Performed by: HOSPITALIST

## 2023-02-17 PROCEDURE — 83735 ASSAY OF MAGNESIUM: CPT

## 2023-02-17 RX ORDER — FLUTICASONE PROPIONATE 50 MCG
1 SPRAY, SUSPENSION (ML) NASAL
Status: CANCELLED | OUTPATIENT
Start: 2023-02-17

## 2023-02-17 RX ORDER — POLYETHYLENE GLYCOL 3350 17 G/17G
1 POWDER, FOR SOLUTION ORAL
Status: CANCELLED | OUTPATIENT
Start: 2023-02-17

## 2023-02-17 RX ORDER — AMOXICILLIN 250 MG
2 CAPSULE ORAL 2 TIMES DAILY
Status: DISCONTINUED | OUTPATIENT
Start: 2023-02-17 | End: 2023-02-23 | Stop reason: HOSPADM

## 2023-02-17 RX ORDER — FUROSEMIDE 10 MG/ML
20 INJECTION INTRAMUSCULAR; INTRAVENOUS EVERY 8 HOURS
Status: CANCELLED | OUTPATIENT
Start: 2023-02-17

## 2023-02-17 RX ORDER — ALBUTEROL SULFATE 90 UG/1
2 AEROSOL, METERED RESPIRATORY (INHALATION) EVERY 6 HOURS PRN
Status: CANCELLED | OUTPATIENT
Start: 2023-02-17

## 2023-02-17 RX ORDER — ACETAMINOPHEN 325 MG/1
650 TABLET ORAL EVERY 6 HOURS PRN
Status: CANCELLED | OUTPATIENT
Start: 2023-02-17

## 2023-02-17 RX ORDER — BISACODYL 10 MG
10 SUPPOSITORY, RECTAL RECTAL
Status: DISCONTINUED | OUTPATIENT
Start: 2023-02-17 | End: 2023-02-23 | Stop reason: HOSPADM

## 2023-02-17 RX ORDER — FUROSEMIDE 10 MG/ML
20 INJECTION INTRAMUSCULAR; INTRAVENOUS EVERY 8 HOURS
Status: DISCONTINUED | OUTPATIENT
Start: 2023-02-17 | End: 2023-02-18

## 2023-02-17 RX ORDER — ALBUTEROL SULFATE 90 UG/1
2 AEROSOL, METERED RESPIRATORY (INHALATION) EVERY 6 HOURS PRN
Status: DISCONTINUED | OUTPATIENT
Start: 2023-02-17 | End: 2023-02-23 | Stop reason: HOSPADM

## 2023-02-17 RX ORDER — BISACODYL 10 MG
10 SUPPOSITORY, RECTAL RECTAL
Status: CANCELLED | OUTPATIENT
Start: 2023-02-17

## 2023-02-17 RX ORDER — ACETAMINOPHEN 325 MG/1
650 TABLET ORAL EVERY 6 HOURS PRN
Status: DISCONTINUED | OUTPATIENT
Start: 2023-02-17 | End: 2023-02-23 | Stop reason: HOSPADM

## 2023-02-17 RX ORDER — EZETIMIBE 10 MG/1
10 TABLET ORAL DAILY
Status: CANCELLED | OUTPATIENT
Start: 2023-02-18

## 2023-02-17 RX ORDER — ROSUVASTATIN CALCIUM 10 MG/1
5 TABLET, COATED ORAL EVERY EVENING
Status: CANCELLED | OUTPATIENT
Start: 2023-02-17

## 2023-02-17 RX ORDER — FLUTICASONE PROPIONATE 50 MCG
1 SPRAY, SUSPENSION (ML) NASAL
Status: DISCONTINUED | OUTPATIENT
Start: 2023-02-17 | End: 2023-02-23 | Stop reason: HOSPADM

## 2023-02-17 RX ORDER — EZETIMIBE 10 MG/1
10 TABLET ORAL DAILY
Status: DISCONTINUED | OUTPATIENT
Start: 2023-02-18 | End: 2023-02-23 | Stop reason: HOSPADM

## 2023-02-17 RX ORDER — AMOXICILLIN 250 MG
2 CAPSULE ORAL 2 TIMES DAILY
Status: CANCELLED | OUTPATIENT
Start: 2023-02-17

## 2023-02-17 RX ORDER — ROSUVASTATIN CALCIUM 10 MG/1
5 TABLET, COATED ORAL EVERY EVENING
Status: DISCONTINUED | OUTPATIENT
Start: 2023-02-17 | End: 2023-02-23 | Stop reason: HOSPADM

## 2023-02-17 RX ORDER — POLYETHYLENE GLYCOL 3350 17 G/17G
1 POWDER, FOR SOLUTION ORAL
Status: DISCONTINUED | OUTPATIENT
Start: 2023-02-17 | End: 2023-02-23 | Stop reason: HOSPADM

## 2023-02-17 RX ADMIN — ASPIRIN 81 MG: 81 TABLET, COATED ORAL at 05:10

## 2023-02-17 RX ADMIN — EZETIMIBE 10 MG: 10 TABLET ORAL at 05:10

## 2023-02-17 RX ADMIN — FUROSEMIDE 20 MG: 10 INJECTION, SOLUTION INTRAMUSCULAR; INTRAVENOUS at 05:10

## 2023-02-17 RX ADMIN — ALBUTEROL SULFATE 2 PUFF: 90 AEROSOL, METERED RESPIRATORY (INHALATION) at 09:36

## 2023-02-17 RX ADMIN — ACETAMINOPHEN 650 MG: 325 TABLET, FILM COATED ORAL at 21:47

## 2023-02-17 RX ADMIN — POTASSIUM CHLORIDE 20 MEQ: 1500 TABLET, EXTENDED RELEASE ORAL at 05:10

## 2023-02-17 RX ADMIN — APIXABAN 5 MG: 5 TABLET, FILM COATED ORAL at 05:10

## 2023-02-17 RX ADMIN — FLUTICASONE PROPIONATE 50 MCG: 50 SPRAY, METERED NASAL at 20:13

## 2023-02-17 RX ADMIN — ROSUVASTATIN 5 MG: 10 TABLET, FILM COATED ORAL at 17:34

## 2023-02-17 RX ADMIN — ACETAMINOPHEN 650 MG: 325 TABLET, FILM COATED ORAL at 01:10

## 2023-02-17 RX ADMIN — FUROSEMIDE 20 MG: 10 INJECTION, SOLUTION INTRAMUSCULAR; INTRAVENOUS at 15:21

## 2023-02-17 RX ADMIN — APIXABAN 5 MG: 5 TABLET, FILM COATED ORAL at 17:34

## 2023-02-17 RX ADMIN — FUROSEMIDE 20 MG: 10 INJECTION, SOLUTION INTRAMUSCULAR; INTRAVENOUS at 21:29

## 2023-02-17 ASSESSMENT — COGNITIVE AND FUNCTIONAL STATUS - GENERAL
SUGGESTED CMS G CODE MODIFIER DAILY ACTIVITY: CH
DAILY ACTIVITIY SCORE: 24
MOBILITY SCORE: 24
SUGGESTED CMS G CODE MODIFIER MOBILITY: CH

## 2023-02-17 ASSESSMENT — ENCOUNTER SYMPTOMS
VOMITING: 0
DIZZINESS: 0
MYALGIAS: 0
WHEEZING: 0
NAUSEA: 0
FLANK PAIN: 0
PALPITATIONS: 0
DIAPHORESIS: 0
SHORTNESS OF BREATH: 1
CHOKING: 0
CHILLS: 0
FEVER: 0
APNEA: 0
BLOOD IN STOOL: 0
BRUISES/BLEEDS EASILY: 0
BLURRED VISION: 0
BACK PAIN: 0
NECK PAIN: 0
SHORTNESS OF BREATH: 0
CHEST TIGHTNESS: 0
SORE THROAT: 0
STRIDOR: 0
ABDOMINAL PAIN: 0
HEADACHES: 0
DIARRHEA: 0
FOCAL WEAKNESS: 0
SPUTUM PRODUCTION: 0
SEIZURES: 0
COUGH: 0

## 2023-02-17 ASSESSMENT — COPD QUESTIONNAIRES
DO YOU EVER COUGH UP ANY MUCUS OR PHLEGM?: YES, EVERY DAY
DURING THE PAST 4 WEEKS HOW MUCH DID YOU FEEL SHORT OF BREATH: MOST  OR ALL OF THE TIME
COPD SCREENING SCORE: 6
HAVE YOU SMOKED AT LEAST 100 CIGARETTES IN YOUR ENTIRE LIFE: NO/DON'T KNOW

## 2023-02-17 ASSESSMENT — LIFESTYLE VARIABLES
EVER HAD A DRINK FIRST THING IN THE MORNING TO STEADY YOUR NERVES TO GET RID OF A HANGOVER: NO
HAVE YOU EVER FELT YOU SHOULD CUT DOWN ON YOUR DRINKING: NO
CONSUMPTION TOTAL: NEGATIVE
TOTAL SCORE: 0
TOTAL SCORE: 0
ON A TYPICAL DAY WHEN YOU DRINK ALCOHOL HOW MANY DRINKS DO YOU HAVE: 1
TOTAL SCORE: 0
AVERAGE NUMBER OF DAYS PER WEEK YOU HAVE A DRINK CONTAINING ALCOHOL: 1
HAVE PEOPLE ANNOYED YOU BY CRITICIZING YOUR DRINKING: NO
HOW MANY TIMES IN THE PAST YEAR HAVE YOU HAD 5 OR MORE DRINKS IN A DAY: 0
EVER FELT BAD OR GUILTY ABOUT YOUR DRINKING: NO
ALCOHOL_USE: YES

## 2023-02-17 ASSESSMENT — CHA2DS2 SCORE
VASCULAR DISEASE: NO
PRIOR STROKE OR TIA OR THROMBOEMBOLISM: YES
AGE 65 TO 74: NO
SEX: FEMALE
DIABETES: NO
CHA2DS2 VASC SCORE: 6
CHF OR LEFT VENTRICULAR DYSFUNCTION: YES
HYPERTENSION: NO
AGE 75 OR GREATER: YES

## 2023-02-17 ASSESSMENT — PATIENT HEALTH QUESTIONNAIRE - PHQ9
1. LITTLE INTEREST OR PLEASURE IN DOING THINGS: NOT AT ALL
SUM OF ALL RESPONSES TO PHQ9 QUESTIONS 1 AND 2: 0
2. FEELING DOWN, DEPRESSED, IRRITABLE, OR HOPELESS: NOT AT ALL

## 2023-02-17 ASSESSMENT — FIBROSIS 4 INDEX
FIB4 SCORE: 2.48
FIB4 SCORE: 2.48

## 2023-02-17 ASSESSMENT — PAIN DESCRIPTION - PAIN TYPE
TYPE: ACUTE PAIN

## 2023-02-17 NOTE — PROGRESS NOTES
1108-Report given to Prema JULIEN RN on [place] [facility] Telemetry    1210- Patient discharged to [facility] with [facility].  and staff escort. Discharge instructions given regarding transportation, and plan of care.  Education provided, patient asked questions and verbalized understanding. Patient is tolerating diet, stable when ambulating, and pain is well controlled. All belongings and medications collected. No further questions or concerns at this time.

## 2023-02-17 NOTE — PROGRESS NOTES
[facility] TRIAGE OFFICER DIRECT ADMISSION REPORT  Transferring facility: [facility]  Transferring physician: Dr Dong  Transferring facility/physician contact number:   Chief complaint: Shortness of breath  Pertinent history & patient course: 2-year-old female with past medical history of COPD and CHF with preserved EF who presented with shortness of breath secondary to acute on chronic CHF exacerbation with uptrending troponin.  Cardiology consulted and recommended transfer to [facility] for right and left heart cath  Pertinent imaging & lab results:   Code Status: DNR per transferring provider, I personally verified with the transferring provider patient's code status and the transferring provider has confirmed this with the patient.  Further work up or recommendations per triage officer prior to transfer:   Consultants called prior to transfer and pertinent input from consultants: Cardiology Dr. Baeza  Patient accepted for transfer: Yes  Consultants to be called upon arrival: Cardiology Dr Suárez  Admission status: Inpatient.   Floor requested: Tele  If ICU transfer, name of intensivist case discussed with and pertinent input from critical care: none    Please inform the triage officer upon arrival of the patient to [facility] for assignment of a hospitalist to perform admission.     For any question or concerns regarding the care of this patient, please reach out to the assigned hospitalist.

## 2023-02-17 NOTE — PROGRESS NOTES
At around 1030 pt started having pauses some up to 1.8sec. EKG ordered pt found to be in 1 degree hrt block with a BBB.  Dr Dong called metoprolol dc'd by md.   requested that I call cardiology dr. Fermin provided with EKG and MEASUREMENTS  HI 0.241  QRS 0.102  QT 0.758  RATE 79  ST 0.09  NO NEW ORDERS FROM CARDIOLOGY.  PT DID NOT C/O OF CHEST PAIN THROUGH THE DAY    PT HAS CONTINUED TO HAVE PAUSES THROUGHOUT THE DAY BUT HAVE APPEARED TO DECREASE IN FREQUENCY PER TELE REPORT

## 2023-02-17 NOTE — DISCHARGE SUMMARY
Discharge Summary    CHIEF COMPLAINT ON ADMISSION  Chief Complaint   Patient presents with    Leg Swelling    Shortness of Breath       Reason for Admission  Drowsiness, SOB    Admission Date  2/14/2023     CODE STATUS  DNAR/DNI    HPI & HOSPITAL COURSE  This is a 82 y.o. female here with weakness, dyspnea, and LE edema.      82 y.o. female with a past medical history of chronic obstructive pulmonary disease, heart failure with preserved ejection fraction, and hyperlipidemia who presented 2/14/2023 with generalized weakness, shortness of breath and lower extremity swelling.  Patient reports that she has not been feeling well over the past 4-6 weeks.  She has been having progressively worsening shortness of breath, generalized weakness and weight gain.  She reports gaining about 60 pounds in the last month of which 30 during the last 1 week. She reported compliance with her diuretic Lasix 20 mg daily. On admission patient with elevated BNP, severe pitting edema of her LE, and CXR with bilateral basilar opacities ith pleural effusions. Troponin trended up to 166 and patient noted to be in new onset afib started on eliquis. Cardiology consulted, due to low blood pressures patient started on IV lasix 20 mg Q8H with good diuresis. Metoprolol was started however patient started having pauses on telemetry monitoring and this was discontinued. Echo showed preserved EF, grade II diastolic dysfunction with RVSP 40-45 mmHg. Concern for infiltrate heart disease free Kappa elevated 52, free lambda elevated 175, ratio wnl and monoclonal protein study still pending. Cardiology is recommending transfer to [facility] for right and left heart cath.     Therefore, she is discharged in fair and stable condition to a short-term general hosptial for inpatient care.    The patient met 2-midnight criteria for an inpatient stay at the time of discharge.      FOLLOW UP ITEMS POST DISCHARGE  TBD     DISCHARGE DIAGNOSES  Principal Problem:     Acute on chronic congestive heart failure (HCC) POA: Yes  Active Problems:    Hyperlipemia POA: Yes    COPD (chronic obstructive pulmonary disease) (HCC) POA: Yes    AF (atrial fibrillation) (HCC) POA: Yes    Troponin level elevated POA: Yes  Resolved Problems:    * No resolved hospital problems. *      FOLLOW UP  Future Appointments   Date Time Provider Department Center   3/1/2023 11:20 AM SHANNON Ontiveros   3/3/2023 12:45 PM SHANNON Dominguez [facility] None   3/3/2023  3:00 PM [facility]   3/3/2023  3:30 PM Adela Umanzor P.A.-C. [facility]   6/20/2023 11:00 AM SHANNON Ontiveros     No follow-up provider specified.    MEDICATIONS ON DISCHARGE     Medication List        ASK your doctor about these medications        Instructions   albuterol 108 (90 Base) MCG/ACT Aers inhalation aerosol   Inhale 2 Puffs every 6 hours as needed for Shortness of Breath.  Dose: 2 Puff     asa/apap/caffeine 250-250-65 MG Tabs  Commonly known as: EXCEDRIN   Take 1-2 Tablets by mouth every 6 hours as needed for Headache.  Dose: 1-2 Tablet     aspirin 81 MG EC tablet   Take 1 Tablet by mouth every day.  Dose: 81 mg     benzonatate 100 MG Caps  Commonly known as: TESSALON   Take 1 Capsule by mouth 3 times a day as needed for Cough.  Dose: 100 mg     CO Q 10 PO   Take 1 Tablet by mouth every day.  Dose: 1 Tablet     ezetimibe 10 MG Tabs  Commonly known as: ZETIA   Take 1 Tablet by mouth every day.  Dose: 10 mg     fluticasone 50 MCG/ACT nasal spray  Commonly known as: FLONASE   Administer 1 Spray into affected nostril(S) at bedtime.  Dose: 1 Spray     furosemide 40 MG Tabs  Commonly known as: LASIX   Take 1 Tablet by mouth every day.  Dose: 40 mg     Lutein 40 MG Caps   Take 40 mg by mouth every day.  Dose: 40 mg     potassium chloride SA 20 MEQ Tbcr  Commonly known as: Kdur   Take 1 Tablet by mouth every day.  Dose: 20 mEq     QUERCETIN PO   Take 1 Tablet by  mouth every day.  Dose: 1 Tablet     rosuvastatin 5 MG Tabs  Commonly known as: CRESTOR   Take 1 Tablet by mouth every evening.  Dose: 5 mg     TURMERIC PO   Take 1 Capsule by mouth every day.  Dose: 1 Capsule     VITAMIN B12 PO   Take 1 Tablet by mouth every day.  Dose: 1 Tablet              Allergies  Allergies   Allergen Reactions    Atorvastatin Myalgia     Severe muscle weakness    Pcn [Penicillins] Hives       DIET  Orders Placed This Encounter   Procedures    Diet Order Diet: Cardiac     Standing Status:   Standing     Number of Occurrences:   1     Order Specific Question:   Diet:     Answer:   Cardiac [6]       ACTIVITY  As tolerated.  Weight bearing as tolerated    LINES, DRAINS, AND WOUNDS  This is an automated list. Peripheral IVs will be removed prior to discharge.  Peripheral IV 02/14/23 20 G Right Antecubital (Active)   Site Assessment Clean;Dry;Intact 02/16/23 2000   Dressing Type Transparent 02/16/23 2000   Line Status Saline locked;Flushed;Scrubbed the hub prior to access 02/16/23 2000   Dressing Status Clean;Dry;Intact 02/16/23 2000   Dressing Intervention N/A 02/15/23 2000   Dressing Change Due 02/18/23 02/14/23 2020   [facility] Grading [facility] 0 02/16/23 2000   Phlebitis Scale [facility]) 0 02/16/23 2000          Peripheral IV 02/14/23 20 G Right Antecubital (Active)   Site Assessment Clean;Dry;Intact 02/16/23 2000   Dressing Type Transparent 02/16/23 2000   Line Status Saline locked;Flushed;Scrubbed the hub prior to access 02/16/23 2000   Dressing Status Clean;Dry;Intact 02/16/23 2000   Dressing Intervention N/A 02/15/23 2000   Dressing Change Due 02/18/23 02/14/23 2020   Infiltration Grading ([facility]) 0 02/16/23 2000   Phlebitis Scale ([facility]) 0 02/16/23 2000               MENTAL STATUS ON TRANSFER             CONSULTATIONS  Cardiology     PROCEDURES  N/A    LABORATORY  Lab Results   Component Value Date    SODIUM 134 (L) 02/17/2023    SODIUM 136 02/17/2023    POTASSIUM  5.8 (H) 02/17/2023    POTASSIUM 6.1 (H) 02/17/2023    CHLORIDE 100 02/17/2023    CHLORIDE 102 02/17/2023    CO2 26 02/17/2023    CO2 24 02/17/2023    GLUCOSE 108 (H) 02/17/2023    GLUCOSE 99 02/17/2023    BUN 27 (H) 02/17/2023    BUN 28 (H) 02/17/2023    CREATININE 0.70 02/17/2023    CREATININE 0.70 02/17/2023        Lab Results   Component Value Date    WBC 6.0 02/17/2023    HEMOGLOBIN 11.3 (L) 02/17/2023    HEMATOCRIT 35.2 (L) 02/17/2023    PLATELETCT 252 02/17/2023        Total time of the discharge process exceeds 45 minutes.

## 2023-02-17 NOTE — CARE PLAN
The patient is Watcher - Medium risk of patient condition declining or worsening    Shift Goals  Clinical Goals: wean O2, monitor BP, cardiology consult, safety, comfort  Patient Goals: rest, plan of care    Progress made toward(s) clinical / shift goals: O2 maintained at 0.5L nasal cannula, patient will be transferring to [facility], report given, Patient compliant and agreeable with plan, all questions answered.       Problem: Knowledge Deficit - Standard  Goal: Patient and family/care givers will demonstrate understanding of plan of care, disease process/condition, diagnostic tests and medications  Outcome: Progressing     Problem: Psychosocial  Goal: Patient's level of anxiety will decrease  Outcome: Progressing  Goal: Patient's ability to verbalize feelings about condition will improve  Outcome: Progressing  Goal: Patient's ability to re-evaluate and adapt role responsibilities will improve  Outcome: Progressing     Problem: Hemodynamics  Goal: Patient's hemodynamics, fluid balance and neurologic status will be stable or improve  Outcome: Progressing     Problem: Respiratory  Goal: Patient will achieve/maintain optimum respiratory ventilation and gas exchange  Outcome: Progressing     Problem: Fluid Volume  Goal: Fluid volume balance will be maintained  Outcome: Progressing     Problem: Bowel Elimination  Goal: Establish and maintain regular bowel function  Outcome: Progressing     Problem: Mobility  Goal: Patient's capacity to carry out activities will improve  Outcome: Progressing     Problem: Self Care  Goal: Patient will have the ability to perform ADLs independently or with assistance (bathe, groom, dress, toilet and feed)  Outcome: Progressing       Patient is not progressing towards the following goals: n/a

## 2023-02-17 NOTE — PROGRESS NOTES
Telemetry Shift Summary    Rhythm SR, BBB, 1st degree AVB  HR Range 75-90  Ectopy rare PVC, frequent PAC  Measurements 0.22/0.12/0.40      Normal Values  Rhythm SR  HR Range:   Measurements: 0.12-0.20/0.06-0.10/0.30-0.52

## 2023-02-17 NOTE — PROGRESS NOTES
Telemetry Shift Summary   Rhythm SR  HR Range 73-85  Ectopy RPVC  Measurements  .24/12/.40    Normal Values  Rhythm SR  HR Range:   Measurements: 0.12-0.20/0.06-0.10/0.30-0.52

## 2023-02-17 NOTE — PROGRESS NOTES
Cardiology Follow Up Progress Note    Date of Service  2/17/2023    Attending Physician  Tai Carmichael M.D.    Chief Complaint     Cardiology consultation for evaluation of new A-fib, acute on chronic HFpEF    HPI  Regla Meier is a 82 y.o. female with HFpEF, CVA 2022 admitted 2/17/2023 with fluid overload      Interim Events    No overnight cardiac events  Ocrobmvft-E-ogd rate well controlled  Denies having chest pain  Shortness of breath improved significantly  Continue IV diuretics  We will consider left heart cath when near euvolemic  Dry weight 125  Current standing weight 150#    Review of Systems  Review of Systems   Respiratory:  Negative for apnea, cough, choking, chest tightness, shortness of breath, wheezing and stridor.    Cardiovascular:  Positive for leg swelling. Negative for chest pain.     Vital signs in last 24 hours       Physical Exam  Physical Exam  Cardiovascular:      Rate and Rhythm: Normal rate. Rhythm irregular.      Pulses: Normal pulses.      Heart sounds: Murmur heard.   Skin:     General: Skin is warm.   Neurological:      Mental Status: She is alert. Mental status is at baseline.   Psychiatric:         Mood and Affect: Mood normal.       Lab Review  Lab Results   Component Value Date/Time    WBC 6.0 02/17/2023 03:29 AM    RBC 3.86 (L) 02/17/2023 03:29 AM    HEMOGLOBIN 11.3 (L) 02/17/2023 03:29 AM    HEMATOCRIT 35.2 (L) 02/17/2023 03:29 AM    MCV 91.2 02/17/2023 03:29 AM    MCH 29.3 02/17/2023 03:29 AM    MCHC 32.1 (L) 02/17/2023 03:29 AM    MPV 8.7 (L) 02/17/2023 03:29 AM      Lab Results   Component Value Date/Time    SODIUM 138 02/17/2023 01:11 PM    POTASSIUM 4.1 02/17/2023 01:11 PM    CHLORIDE 101 02/17/2023 01:11 PM    CO2 29 02/17/2023 01:11 PM    GLUCOSE 93 02/17/2023 01:11 PM    BUN 27 (H) 02/17/2023 01:11 PM    CREATININE 0.73 02/17/2023 01:11 PM      Lab Results   Component Value Date/Time    ASTSGOT 37 02/15/2023 02:28 AM    ALTSGPT 23 02/15/2023 02:28 AM     Lab  Results   Component Value Date/Time    CHOLSTRLTOT 198 02/15/2023 02:28 AM     (H) 02/15/2023 02:28 AM    HDL 69 02/15/2023 02:28 AM    TRIGLYCERIDE 113 02/15/2023 02:28 AM    TROPONINT 166 (H) 02/15/2023 05:44 PM       Recent Labs     02/14/23  1720 02/17/23  0329   NTPROBNP 8499* 8463*       Cardiac Imaging and Procedures Review  EKG:  My personal interpretation of the EKG dated 2/14/2023, atrial fibrillation, heart rate 95         Echocardiogram: 2/15/2023  Normal left ventricular systolic function.  Grade II diastolic dysfunction.  Mild mitral regurgitation.  Mild to moderate aortic insufficiency.  Mild tricuspid regurgitation.  Right ventricular systolic pressure is estimated to be  40-45  mmHg.        Cardiac Catheterization: Pending      Imaging  Chest X-Ray: 2/14/2023   BILATERAL pleural effusions with overlying atelectasis which could obscure an additional process  2.  Persistently enlarged cardiac silhouette       Stress Test:    7/11/2022  Small sized, equivocal, non-reversible, mild severity defects in the apical      septal wall and mid inferolateral wall. Both most consistent with artifact    given the distribution, less likely small scars.    * SSS 2. SDS 0. No reversible ischemia.    * Normal left ventricular size, ejection fraction, and wall motion.   ECG INTERPRETATION   Negative stress ECG for ischemia.          Assessment/Plan    # Clinical presentation  per Dr Fermin is not consistent with ACS much more suspicious for possible amyloid  #New findings of atrial fibrillation duration is unclear  #[id number] score of 5  #HFpEF, acute on chronic  # LVEF 60%    Recommendations  -Continue furosemide 20 mg IV every 8 hours  -Strict intake and output  -Keep K above 4, Mg above 2  -On Eliquis 5 mg p.o. twice daily  -Follow-up on lambda kappa light chains SPEP and UPEP for amyloid per Dr Fermin, can consider fat pad biopsy if these are negative as clinically she appears to be more acting like  amyloid  -Plan for left heart cath when more euvolemic-TBD    Cardiology will follow along    I personally spent a total of 18 minutes which includes face-to-face time and non-face-to-face time spent on preparing to see the patient, reviewing hospital notes and tests, obtaining history from the patient, performing a medically appropriate exam, counseling and educating the patient, ordering medications/tests/procedures/referrals as clinically indicated, and documenting information in the electronic medical record.         Thank you for allowing me to participate in the care of this patient.  I will continue to follow this patient    Please contact me with any questions.    SPARKLE Lugo.   Cardiologist, [facility]  [phone]

## 2023-02-17 NOTE — CARE PLAN
The patient is Watcher - Medium risk of patient condition declining or worsening    Shift Goals  Clinical Goals: MONITOR OXYGEN AND SWELLING  Patient Goals: home    Progress made toward(s) clinical / shift goals:  PT DOWN TO 1 L N/C     Patient is not progressing towards the following goals:

## 2023-02-17 NOTE — CARE PLAN
Problem: Psychosocial  Goal: Patient's level of anxiety will decrease  Outcome: Progressing  Goal: Patient's ability to verbalize feelings about condition will improve  Outcome: Progressing   The patient is Stable - Low risk of patient condition declining or worsening    Shift Goals  Clinical Goals: Monitor O2, Safety  Patient Goals: Pain control, rest    Progress made toward(s) clinical / shift goals:  1LNC continued, RN tried to wean but patient did not tolerate.    Patient is not progressing towards the following goals:

## 2023-02-18 PROBLEM — Z66 DNR (DO NOT RESUSCITATE): Status: ACTIVE | Noted: 2023-02-18

## 2023-02-18 LAB
ALBUMIN SERPL ELPH-MCNC: 2.28 G/DL (ref 3.75–5.01)
ALPHA1 GLOB SERPL ELPH-MCNC: 0.33 G/DL (ref 0.19–0.46)
ALPHA2 GLOB SERPL ELPH-MCNC: 0.81 G/DL (ref 0.48–1.05)
ANION GAP SERPL CALC-SCNC: 8 MMOL/L (ref 7–16)
B-GLOBULIN SERPL ELPH-MCNC: 0.62 G/DL (ref 0.48–1.1)
BUN SERPL-MCNC: 26 MG/DL (ref 8–22)
CALCIUM SERPL-MCNC: 8.5 MG/DL (ref 8.5–10.5)
CHLORIDE SERPL-SCNC: 99 MMOL/L (ref 96–112)
CO2 SERPL-SCNC: 31 MMOL/L (ref 20–33)
CREAT SERPL-MCNC: 0.77 MG/DL (ref 0.5–1.4)
GAMMA GLOB SERPL ELPH-MCNC: 0.75 G/DL (ref 0.62–1.51)
GFR SERPLBLD CREATININE-BSD FMLA CKD-EPI: 77 ML/MIN/1.73 M 2
GLUCOSE SERPL-MCNC: 92 MG/DL (ref 65–99)
INTERPRETATION SERPL IFE-IMP: ABNORMAL
INTERPRETATION SERPL IFE-IMP: ABNORMAL
MONOCLONAL PROTEIN NL11656: ABNORMAL G/DL
PATHOLOGY STUDY: ABNORMAL
POTASSIUM SERPL-SCNC: 3.9 MMOL/L (ref 3.6–5.5)
PROT SERPL-MCNC: 4.8 G/DL (ref 6.3–8.2)
SODIUM SERPL-SCNC: 138 MMOL/L (ref 135–145)

## 2023-02-18 PROCEDURE — 99233 SBSQ HOSP IP/OBS HIGH 50: CPT | Performed by: INTERNAL MEDICINE

## 2023-02-18 PROCEDURE — 700111 HCHG RX REV CODE 636 W/ 250 OVERRIDE (IP): Performed by: INTERNAL MEDICINE

## 2023-02-18 PROCEDURE — 770020 HCHG ROOM/CARE - TELE (206)

## 2023-02-18 PROCEDURE — 99233 SBSQ HOSP IP/OBS HIGH 50: CPT | Performed by: STUDENT IN AN ORGANIZED HEALTH CARE EDUCATION/TRAINING PROGRAM

## 2023-02-18 PROCEDURE — 700111 HCHG RX REV CODE 636 W/ 250 OVERRIDE (IP): Performed by: NURSE PRACTITIONER

## 2023-02-18 PROCEDURE — 80048 BASIC METABOLIC PNL TOTAL CA: CPT

## 2023-02-18 PROCEDURE — 36415 COLL VENOUS BLD VENIPUNCTURE: CPT

## 2023-02-18 PROCEDURE — A9270 NON-COVERED ITEM OR SERVICE: HCPCS | Performed by: INTERNAL MEDICINE

## 2023-02-18 PROCEDURE — 93005 ELECTROCARDIOGRAM TRACING: CPT | Performed by: STUDENT IN AN ORGANIZED HEALTH CARE EDUCATION/TRAINING PROGRAM

## 2023-02-18 PROCEDURE — 700102 HCHG RX REV CODE 250 W/ 637 OVERRIDE(OP): Performed by: INTERNAL MEDICINE

## 2023-02-18 RX ORDER — FUROSEMIDE 10 MG/ML
40 INJECTION INTRAMUSCULAR; INTRAVENOUS EVERY 8 HOURS
Status: DISCONTINUED | OUTPATIENT
Start: 2023-02-18 | End: 2023-02-21

## 2023-02-18 RX ADMIN — APIXABAN 5 MG: 5 TABLET, FILM COATED ORAL at 17:18

## 2023-02-18 RX ADMIN — FLUTICASONE PROPIONATE 50 MCG: 50 SPRAY, METERED NASAL at 21:16

## 2023-02-18 RX ADMIN — FUROSEMIDE 40 MG: 10 INJECTION, SOLUTION INTRAMUSCULAR; INTRAVENOUS at 12:41

## 2023-02-18 RX ADMIN — ACETAMINOPHEN 650 MG: 325 TABLET, FILM COATED ORAL at 05:32

## 2023-02-18 RX ADMIN — ASPIRIN 81 MG: 81 TABLET, COATED ORAL at 05:32

## 2023-02-18 RX ADMIN — ROSUVASTATIN 5 MG: 10 TABLET, FILM COATED ORAL at 17:18

## 2023-02-18 RX ADMIN — EZETIMIBE 10 MG: 10 TABLET ORAL at 05:32

## 2023-02-18 RX ADMIN — APIXABAN 5 MG: 5 TABLET, FILM COATED ORAL at 05:31

## 2023-02-18 RX ADMIN — ACETAMINOPHEN 650 MG: 325 TABLET, FILM COATED ORAL at 18:01

## 2023-02-18 RX ADMIN — FUROSEMIDE 40 MG: 10 INJECTION, SOLUTION INTRAMUSCULAR; INTRAVENOUS at 21:22

## 2023-02-18 RX ADMIN — FUROSEMIDE 20 MG: 10 INJECTION, SOLUTION INTRAMUSCULAR; INTRAVENOUS at 05:32

## 2023-02-18 ASSESSMENT — ENCOUNTER SYMPTOMS
CHOKING: 0
APNEA: 0
STRIDOR: 0
CHEST TIGHTNESS: 0
COUGH: 0
WEAKNESS: 1
WHEEZING: 0
SHORTNESS OF BREATH: 0

## 2023-02-18 ASSESSMENT — FIBROSIS 4 INDEX: FIB4 SCORE: 2.48

## 2023-02-18 ASSESSMENT — PAIN DESCRIPTION - PAIN TYPE
TYPE: ACUTE PAIN
TYPE: ACUTE PAIN

## 2023-02-18 NOTE — PROGRESS NOTES
Monitor Summary:   Rhythm: NSR  Rate: 95 - 98  Measurement: .19/.10/.38  Ectopy: PVC    12 hour chart check

## 2023-02-18 NOTE — H&P
Hospital Medicine History & Physical Note    Date of Service  2/17/2023    Primary Care Physician  MADDIE Ontiveros.    Consultants  cardiology        Code Status  DNAR/DNI    Chief Complaint  Shortness of breath    History of Presenting Illness  Regla Meier is a 82 y.o. female with a past medical history of COPD, CVA, CHF with preserved ejection fraction, hyperlipidemia who presented 2/17/2023 with shortness of breath and lower extremity edema over the past 4 weeks despite taking Lasix 20 mg daily.  She was admitted to [facility] where she was noted to have an elevated BNP, severe pitting edema of bilateral lower extremities and chest x-ray with bilateral pulmonary edema.  Her troponin trended up to 166 and she was noted to be in new onset A-fib with RVR for which she was started on Eliquis and metoprolol.  Her metoprolol was later discontinued because patient was having pauses on telemetry.  She underwent a 2D echo that revealed preserved EF with grade 2 diastolic dysfunction with RVSP of 40-45.  There was concern of infiltrative heart disease given her free kappa was elevated at 52, free lambda elevated 175, monoclonal protein was still pending.  Cardiology recommended transfer to [facility] for right and left heart catheterization.  At this time patient continues to require a liter of supplemental oxygen.  She continues to have significant bilateral lower extremity pitting edema.  She has been diuresing well with IV Lasix 20 mg IV every 8 hours.  Continue aggressive diuresis until patient is euvolemic and then plan for cardiac catheterization    I discussed the plan of care with patient and cardiology .    Review of Systems  Review of Systems   Constitutional:  Negative for chills, diaphoresis and fever.   HENT:  Negative for hearing loss and sore throat.    Eyes:  Negative for blurred vision.   Respiratory:  Positive for shortness of breath. Negative for cough, sputum production  and wheezing.    Cardiovascular:  Positive for leg swelling. Negative for chest pain and palpitations.   Gastrointestinal:  Negative for abdominal pain, blood in stool, diarrhea, nausea and vomiting.   Genitourinary:  Negative for dysuria, flank pain and urgency.   Musculoskeletal:  Negative for back pain, joint pain, myalgias and neck pain.   Skin:  Negative for rash.   Neurological:  Negative for dizziness, focal weakness, seizures and headaches.   Endo/Heme/Allergies:  Does not bruise/bleed easily.   Psychiatric/Behavioral:  Negative for suicidal ideas.    All other systems reviewed and are negative.    Past Medical History   has a past medical history of Hyperlipemia and Stroke (HCC).    Surgical History   has a past surgical history that includes cystectomy (1963) and cataract extraction with iol.     Family History  family history includes Alcohol abuse in her brother; Heart Disease in her brother; No Known Problems in her daughter and daughter; Other in her father and mother.   Family history reviewed with patient. There is no family history that is pertinent to the chief complaint.     Social History   reports that she has never smoked. She has never used smokeless tobacco. She reports current alcohol use of about 0.6 oz per week. She reports that she does not use drugs.    Allergies  Allergies   Allergen Reactions    Atorvastatin Myalgia     Severe muscle weakness    Pcn [Penicillins] Hives       Medications  Prior to Admission Medications   Prescriptions Last Dose Informant Patient Reported? Taking?   Coenzyme Q10 (CO Q 10 PO) 2/14/2023 at AM Patient Yes No   Sig: Take 1 Tablet by mouth every day.   Cyanocobalamin (VITAMIN B12 PO) 2/14/2023 at AM Patient Yes No   Sig: Take 1 Tablet by mouth every day.   Lutein 40 MG Cap 2/14/2023 at AM Patient Yes No   Sig: Take 40 mg by mouth every day.   QUERCETIN PO 2/14/2023 at AM Patient Yes No   Sig: Take 1 Tablet by mouth every day.   TURMERIC PO 2/14/2023 at AM  Patient Yes No   Sig: Take 1 Capsule by mouth every day.   albuterol 108 (90 Base) MCG/ACT Aero Soln inhalation aerosol PRN at PRN Patient No No   Sig: Inhale 2 Puffs every 6 hours as needed for Shortness of Breath.   asa/apap/caffeine (EXCEDRIN) 250-250-65 MG Tab PRN at PRN Patient Yes No   Sig: Take 1-2 Tablets by mouth every 6 hours as needed for Headache.   aspirin EC 81 MG EC tablet 2/14/2023 at AM Patient No No   Sig: Take 1 Tablet by mouth every day.   benzonatate (TESSALON) 100 MG Cap 2/13/2023 at PM Patient No No   Sig: Take 1 Capsule by mouth 3 times a day as needed for Cough.   ezetimibe (ZETIA) 10 MG Tab 2/14/2023 at AM Patient No No   Sig: Take 1 Tablet by mouth every day.   fluticasone (FLONASE) 50 MCG/ACT nasal spray PRN at PRN Patient No No   Sig: Administer 1 Spray into affected nostril(S) at bedtime.   furosemide (LASIX) 40 MG Tab 2/14/2023 at AM Patient No No   Sig: Take 1 Tablet by mouth every day.   Patient taking differently: Take 40 mg by mouth 2 times a day.   potassium chloride SA (KDUR) 20 MEQ Tab CR 2/14/2023 at AM Patient No No   Sig: Take 1 Tablet by mouth every day.   rosuvastatin (CRESTOR) 5 MG Tab 2/13/2023 at PM Patient No No   Sig: Take 1 Tablet by mouth every evening.      Facility-Administered Medications: None       Physical Exam  Temp:  [36.3 °C (97.4 °F)-36.8 °C (98.3 °F)] 36.8 °C (98.3 °F)  Pulse:  [78-96] 78  Resp:  [16-18] 16  BP: ()/(57-71) 105/71  SpO2:  [93 %-96 %] 96 %                          Physical Exam  Vitals and nursing note reviewed.   Constitutional:       General: She is not in acute distress.     Appearance: Normal appearance.   HENT:      Head: Normocephalic and atraumatic.      Nose: Nose normal.      Mouth/Throat:      Mouth: Mucous membranes are moist.   Eyes:      Extraocular Movements: Extraocular movements intact.      Conjunctiva/sclera: Conjunctivae normal.      Pupils: Pupils are equal, round, and reactive to light.   Cardiovascular:      Rate  and Rhythm: Normal rate. Rhythm irregular.      Pulses: Normal pulses.      Heart sounds: Normal heart sounds.   Pulmonary:      Effort: No respiratory distress.      Breath sounds: Rales present. No wheezing or rhonchi.   Abdominal:      General: Bowel sounds are normal. There is no distension.      Palpations: Abdomen is soft.      Tenderness: There is no abdominal tenderness.   Musculoskeletal:         General: No swelling or tenderness. Normal range of motion.      Cervical back: Normal range of motion and neck supple.      Right lower leg: Edema present.      Left lower leg: Edema present.   Lymphadenopathy:      Cervical: No cervical adenopathy.   Skin:     General: Skin is warm.      Coloration: Skin is not jaundiced.      Findings: No rash.   Neurological:      General: No focal deficit present.      Mental Status: She is alert and oriented to person, place, and time.      Cranial Nerves: No cranial nerve deficit.      Motor: No weakness.   Psychiatric:         Mood and Affect: Mood normal.         Behavior: Behavior normal.       Laboratory:  Recent Labs     02/15/23  0228 02/16/23  0204 02/17/23  0329   WBC 8.0 6.8 6.0   RBC 4.00* 3.90* 3.86*   HEMOGLOBIN 11.7* 11.6* 11.3*   HEMATOCRIT 36.1* 35.8* 35.2*   MCV 90.3 91.8 91.2   MCH 29.3 29.7 29.3   MCHC 32.4* 32.4* 32.1*   RDW 44.1 46.7 45.2   PLATELETCT 309 260 252   MPV 8.2* 9.6 8.7*     Recent Labs     02/16/23 0204 02/17/23  0329 02/17/23  1311   SODIUM 132* 136  134* 138   POTASSIUM 4.5 6.1*  5.8* 4.1   CHLORIDE 98 102  100 101   CO2 24 24  26 29   GLUCOSE 96 99  108* 93   BUN 29* 28*  27* 27*   CREATININE 0.81 0.70  0.70 0.73   CALCIUM 8.2* 8.3*  8.1* 8.4*     Recent Labs     02/14/23  1720 02/15/23  0228 02/16/23  0204 02/17/23  0329 02/17/23  1311   ALTSGPT 28 23  --   --   --    ASTSGOT 44 37  --   --   --    ALKPHOSPHAT 120* 95  --   --   --    TBILIRUBIN 0.3 0.2  --   --   --    GLUCOSE 110* 109* 96 99  108* 93         Recent Labs      02/14/23  1720 02/17/23  0329   NTPROBNP 8499* 8463*     Recent Labs     02/15/23  0228   TRIGLYCERIDE 113   HDL 69   *     Recent Labs     02/15/23  0927 02/15/23  1244 02/15/23  1744   TROPONINT 149* 154* 166*       Imaging:  No orders to display       EKG interpreted by me reveals atrial fibrillation with controlled ventricular response and a rate of 79.  No ST elevation noted    Assessment/Plan:  Justification for Admission Status  I anticipate this patient will require at least two midnights for appropriate medical management, necessitating inpatient admission because      Patient will need a Telemetry bed on CARDIOLOGY service .  The need is secondary to .    Acute on chronic respiratory failure with hypoxia (HCC)- (present on admission)  Assessment & Plan  Currently on 1 L of oxygen via nasal cannula  Wean O2 as tolerated, RT protocol    History of CVA (cerebrovascular accident)  Assessment & Plan  Continue Eliquis and Crestor    Troponin level elevated- (present on admission)  Assessment & Plan  In the setting of acute on chronic congestive heart failure  Denies active chest pain  Continuous cardiac monitoring with serial EKG and troponin  Plan for cardiac catheterization once patient is euvolemic    AF (atrial fibrillation) ([facility])- (present on admission)  Assessment & Plan  Currently rate controlled  Continue Eliquis  If patient develops RVR we will start her on metoprolol    Acute on chronic congestive heart failure (HCC)- (present on admission)  Assessment & Plan  Continue IV Lasix 20 mg every 8 hours  Fluid and salt restriction  Strict I's/O  2D echo reveals preserved EF  Cardiology on board  Plan for cardiac catheterization once patient is euvolemic    COPD (chronic obstructive pulmonary disease) ([facility])- (present on admission)  Assessment & Plan  Does not appear to be in acute exacerbation  I have added Symbicort to her regimen  Albuterol as needed    Hyperlipemia- (present on  admission)  Assessment & Plan  Continue Crestor and Zetia        VTE prophylaxis: therapeutic anticoagulation with Eliquis

## 2023-02-18 NOTE — PROGRESS NOTES
1245 - Patient arrived to unit from [facility]. Patient's  immediately asking when her angiogram will be. RN explained that physicians need to see patient before ordering procedure. Admission profile complete.     1400 - Patient able to ambulate to bathroom with SBA. Tolerated well. Limited vision capacity, does not have glasses with her at this time.     1600 - Patient resting quietly. Denies needs.     1910 - Reported off to Joy SEGOVIA

## 2023-02-18 NOTE — PROGRESS NOTES
Assumed care of patient, received bedside report from Prema SEGOVIA. Patient is A&O X 4. Pain 0/10, on 1 L of oxygen NC. On tele monitor, SR 84. POC discussed with patient. Patient verbalized understanding. All questions answered. Call light within reach and fall precautions in place. Bed alarm on, bed locked and in lowest position.

## 2023-02-18 NOTE — PROGRESS NOTES
Monitor Summary:    Rhythm: SR/Afib  Rate: 78-97  Ectopy: (R) PAC  Measurement : .20/.09/.38

## 2023-02-18 NOTE — ASSESSMENT & PLAN NOTE
IV lasix-->PO lasix  Fluid and salt restriction  Strict I's/O  2D echo reveals preserved EF  Cardiology on board  [facility] 2/20: Nonobstructive CAD  Nuclear technetium amyloid scan  Suspecting infiltrative heart disease, free kappa was elevated at 52, free lambda elevated 175. Hem/onc consulted, planning outpatient bone marrow biopsy

## 2023-02-18 NOTE — ASSESSMENT & PLAN NOTE
Does not appear to be in acute exacerbation  I have added Symbicort to her regimen  Albuterol as needed

## 2023-02-18 NOTE — CARE PLAN
The patient is Stable - Low risk of patient condition declining or worsening    Shift Goals  Clinical Goals: NPO at midnight  Patient Goals: rest  Family Goals: na    Progress made toward(s) clinical / shift goals:  Diuresing, prepare for cath      Problem: Knowledge Deficit - Standard  Goal: Patient and family/care givers will demonstrate understanding of plan of care, disease process/condition, diagnostic tests and medications  Outcome: Progressing     Problem: Fall Risk  Goal: Patient will remain free from falls  Outcome: Progressing     Problem: Pain - Standard  Goal: Alleviation of pain or a reduction in pain to the patient’s comfort goal  Outcome: Progressing  Flowsheets (Taken 2/18/2023 1025)  Pain Rating Scale (NPRS): 0     Problem: Hemodynamics  Goal: Patient's hemodynamics, fluid balance and neurologic status will be stable or improve  Outcome: Progressing       Patient is not progressing towards the following goals:

## 2023-02-18 NOTE — PROGRESS NOTES
Hospital Medicine Daily Progress Note    Date of Service  2/18/2023    Chief Complaint  Regla Meier is a 82 y.o. female admitted 2/17/2023 with sob    Hospital Course  82 y.o. female with a past medical history of COPD, CVA, CHF with preserved ejection fraction, hyperlipidemia who presented 2/17/2023 with shortness of breath and lower extremity edema over the past 4 weeks despite taking Lasix 20 mg daily.  She was admitted to [facility] where she was noted to have an elevated BNP, severe pitting edema of bilateral lower extremities and chest x-ray with bilateral pulmonary edema.  Her troponin trended up to 166 and she was noted to be in new onset A-fib with RVR for which she was started on Eliquis and metoprolol.  Her metoprolol was later discontinued because patient was having pauses on telemetry.  She underwent a 2D echo that revealed preserved EF with grade 2 diastolic dysfunction with RVSP of 40-45. There was concern of infiltrative heart disease given her free kappa was elevated at 52, free lambda elevated 175, monoclonal protein was still pending.  Cardiology recommended transfer to [facility] for right and left heart catheterization.   Cardiology following     Interval Problem Update  Seen patient at bedside   Cont lasix per cardiology  Cont Eliquis, ASA and statin    I have discussed this patient's plan of care and discharge plan at IDT rounds today with Case Management, Nursing, Nursing leadership, and other members of the IDT team.    Consultants/Specialty  cardiology    Code Status  DNAR/DNI    Disposition  Patient is not medically cleared for discharge.   Anticipate discharge to to home with close outpatient follow-up.  I have placed the appropriate orders for post-discharge needs.    Review of Systems  Review of Systems   Constitutional:  Positive for malaise/fatigue.   Cardiovascular:  Positive for leg swelling.   Neurological:  Positive for weakness.   All other systems reviewed  and are negative.     Physical Exam  Temp:  [36.4 °C (97.5 °F)-37.1 °C (98.8 °F)] 36.9 °C (98.4 °F)  Pulse:  [85-93] 89  Resp:  [16-20] 18  BP: (107-125)/(64-80) 112/72  SpO2:  [87 %-97 %] 97 %    Physical Exam  Vitals and nursing note reviewed.   Constitutional:       Appearance: Normal appearance. She is ill-appearing.   HENT:      Head: Normocephalic and atraumatic.      Nose: Nose normal.      Mouth/Throat:      Pharynx: Oropharynx is clear.   Eyes:      Extraocular Movements: Extraocular movements intact.      Conjunctiva/sclera: Conjunctivae normal.      Pupils: Pupils are equal, round, and reactive to light.   Cardiovascular:      Rate and Rhythm: Normal rate. Rhythm irregular.      Pulses: Normal pulses.      Heart sounds: Normal heart sounds.   Pulmonary:      Effort: Pulmonary effort is normal.      Breath sounds: Rales present.   Abdominal:      General: Abdomen is flat. Bowel sounds are normal.      Palpations: Abdomen is soft.   Musculoskeletal:         General: Swelling present. Normal range of motion.      Cervical back: Normal range of motion and neck supple.      Right lower leg: Edema present.      Left lower leg: Edema present.   Skin:     General: Skin is warm and dry.   Neurological:      General: No focal deficit present.      Mental Status: She is alert and oriented to person, place, and time. Mental status is at baseline.   Psychiatric:         Mood and Affect: Mood normal.         Behavior: Behavior normal.       Fluids    Intake/Output Summary (Last 24 hours) at 2/18/2023 1553  Last data filed at 2/18/2023 1414  Gross per 24 hour   Intake 500 ml   Output 1750 ml   Net -1250 ml       Laboratory  Recent Labs     02/16/23  0204 02/17/23  0329   WBC 6.8 6.0   RBC 3.90* 3.86*   HEMOGLOBIN 11.6* 11.3*   HEMATOCRIT 35.8* 35.2*   MCV 91.8 91.2   MCH 29.7 29.3   MCHC 32.4* 32.1*   RDW 46.7 45.2   PLATELETCT 260 252   MPV 9.6 8.7*     Recent Labs     02/17/23  0329 02/17/23  1311 02/18/23  1312    SODIUM 136  134* 138 138   POTASSIUM 6.1*  5.8* 4.1 3.9   CHLORIDE 102  100 101 99   CO2 24  26 29 31   GLUCOSE 99  108* 93 92   BUN 28*  27* 27* 26*   CREATININE 0.70  0.70 0.73 0.77   CALCIUM 8.3*  8.1* 8.4* 8.5                   Imaging  No orders to display        Assessment/Plan  * Acute on chronic congestive heart failure (HCC)- (present on admission)  Assessment & Plan  Continue IV Lasix   Fluid and salt restriction  Strict I's/O  2D echo reveals preserved EF  Cardiology on board  Plan for cardiac catheterization once patient is euvolemic  Suspecting infiltrative heart disease, free kappa was elevated at 52, free lambda elevated 175. Follow up monoclonal protein disease     DNR (do not resuscitate)  Assessment & Plan  Patient is DNR/DNI status    Acute on chronic respiratory failure with hypoxia ([facility])- (present on admission)  Assessment & Plan  Currently on 1-2 L of oxygen via nasal cannula  Wean O2 as tolerated, RT protocol    History of CVA (cerebrovascular accident)  Assessment & Plan  Continue Eliquis and Crestor    Troponin level elevated- (present on admission)  Assessment & Plan  In the setting of acute on chronic congestive heart failure  Denies active chest pain  Continuous cardiac monitoring with serial EKG and troponin  Plan for cardiac catheterization once patient is euvolemic    AF (atrial fibrillation) ([facility])- (present on admission)  Assessment & Plan  Currently rate controlled  Continue Eliquis      COPD (chronic obstructive pulmonary disease) ([facility])- (present on admission)  Assessment & Plan  Does not appear to be in acute exacerbation  I have added Symbicort to her regimen  Albuterol as needed    Hyperlipemia- (present on admission)  Assessment & Plan  Continue Crestor and Zetia         VTE prophylaxis: therapeutic anticoagulation with Eliquis    I have performed a physical exam and reviewed and updated ROS and Plan today (2/18/2023). In review of yesterday's note (2/17/2023), there  are no changes except as documented above.

## 2023-02-18 NOTE — ASSESSMENT & PLAN NOTE
In the setting of acute on chronic congestive heart failure  Denies active chest pain  Continuous cardiac monitoring with serial EKG and troponin  Plan for cardiac catheterization 2/20

## 2023-02-18 NOTE — CARE PLAN
The patient is Stable - Low risk of patient condition declining or worsening         Progress made toward(s) clinical / shift goals:  Transfer from [facility]      Problem: Knowledge Deficit - Standard  Goal: Patient and family/care givers will demonstrate understanding of plan of care, disease process/condition, diagnostic tests and medications  Outcome: Progressing     Problem: Fall Risk  Goal: Patient will remain free from falls  Outcome: Progressing       Patient is not progressing towards the following goals:

## 2023-02-18 NOTE — CARE PLAN
The patient is Stable - Low risk of patient condition declining or worsening    Shift Goals  Clinical Goals: NPO at midnight  Patient Goals: rest  Family Goals: na    Progress made toward(s) clinical / shift goals:    Problem: Hemodynamics  Goal: Patient's hemodynamics, fluid balance and neurologic status will be stable or improve  Outcome: Progressing  Note: Patient is being diuresed, I/Os are being monitored, and patient is adhering to diet.        Patient is not progressing towards the following goals:

## 2023-02-18 NOTE — PROGRESS NOTES
4 Eyes Skin Assessment Completed by Prema SEGOVIA and Joy SEGOVIA.    Head WDL  Ears WDL  Nose WDL  Mouth WDL  Neck WDL  Breast/Chest WDL  Shoulder Blades WDL  Spine WDL  (R) Arm/Elbow/Hand Bruising  (L) Arm/Elbow/Hand Bruising  Abdomen WDL  Groin WDL  Scrotum/Coccyx/Buttocks WDL  (R) Leg WDL  (L) Leg WDL  (R) Heel/Foot/Toe Redness and Blanching  (L) Heel/Foot/Toe Redness and Blanching          Devices In Places Tele Box, Pulse Ox, and Nasal Cannula      Interventions In Place Gray Ear Foams and Pillows    Possible Skin Injury No    Pictures Uploaded Into Epic N/A  Wound Consult Placed N/A  RN Wound Prevention Protocol Ordered No

## 2023-02-18 NOTE — PROGRESS NOTES
Cardiology Follow Up Progress Note    Date of Service  2/18/2023    Attending Physician  Tai Carmichael M.D.    Chief Complaint     Cardiology consultation for evaluation of new A-fib, acute on chronic HFpEF    HPI  Regla Meier is a 82 y.o. female with HFpEF, CVA 2022 admitted 2/17/2023 with fluid overload      Interim Events    No overnight cardiac events  Vvwdwnymi-P-cpm rate well controlled  Denies having chest pain  Shortness of breath improved significantly  Continue IV diuretics  left heart cath when near euvolemic  Dry weight 125  Current standing weight 145#    Review of Systems  Review of Systems   Respiratory:  Negative for apnea, cough, choking, chest tightness, shortness of breath, wheezing and stridor.    Cardiovascular:  Positive for leg swelling. Negative for chest pain.     Vital signs in last 24 hours  Temp:  [36.4 °C (97.5 °F)-37.1 °C (98.8 °F)] 36.8 °C (98.2 °F)  Pulse:  [85-96] 85  Resp:  [16-18] 18  BP: (107-123)/(64-80) 123/80  SpO2:  [87 %-96 %] 87 %    Physical Exam  Physical Exam  Cardiovascular:      Rate and Rhythm: Normal rate. Rhythm irregular.      Pulses: Normal pulses.      Heart sounds: Murmur heard.   Skin:     General: Skin is warm.   Neurological:      Mental Status: She is alert. Mental status is at baseline.   Psychiatric:         Mood and Affect: Mood normal.       Lab Review  Lab Results   Component Value Date/Time    WBC 6.0 02/17/2023 03:29 AM    RBC 3.86 (L) 02/17/2023 03:29 AM    HEMOGLOBIN 11.3 (L) 02/17/2023 03:29 AM    HEMATOCRIT 35.2 (L) 02/17/2023 03:29 AM    MCV 91.2 02/17/2023 03:29 AM    MCH 29.3 02/17/2023 03:29 AM    MCHC 32.1 (L) 02/17/2023 03:29 AM    MPV 8.7 (L) 02/17/2023 03:29 AM      Lab Results   Component Value Date/Time    SODIUM 138 02/17/2023 01:11 PM    POTASSIUM 4.1 02/17/2023 01:11 PM    CHLORIDE 101 02/17/2023 01:11 PM    CO2 29 02/17/2023 01:11 PM    GLUCOSE 93 02/17/2023 01:11 PM    BUN 27 (H) 02/17/2023 01:11 PM    CREATININE 0.73  02/17/2023 01:11 PM      Lab Results   Component Value Date/Time    ASTSGOT 37 02/15/2023 02:28 AM    ALTSGPT 23 02/15/2023 02:28 AM     Lab Results   Component Value Date/Time    CHOLSTRLTOT 198 02/15/2023 02:28 AM     (H) 02/15/2023 02:28 AM    HDL 69 02/15/2023 02:28 AM    TRIGLYCERIDE 113 02/15/2023 02:28 AM    TROPONINT 166 (H) 02/15/2023 05:44 PM       Recent Labs     02/17/23  0329   NTPROBNP 8463*         Cardiac Imaging and Procedures Review  EKG:  My personal interpretation of the EKG dated 2/14/2023, atrial fibrillation, heart rate 95         Echocardiogram: 2/15/2023  Normal left ventricular systolic function.  Grade II diastolic dysfunction.  Mild mitral regurgitation.  Mild to moderate aortic insufficiency.  Mild tricuspid regurgitation.  Right ventricular systolic pressure is estimated to be  40-45  mmHg.        Cardiac Catheterization: Pending      Imaging  Chest X-Ray: 2/14/2023   BILATERAL pleural effusions with overlying atelectasis which could obscure an additional process  2.  Persistently enlarged cardiac silhouette       Stress Test:    7/11/2022  Small sized, equivocal, non-reversible, mild severity defects in the apical      septal wall and mid inferolateral wall. Both most consistent with artifact    given the distribution, less likely small scars.    * SSS 2. SDS 0. No reversible ischemia.    * Normal left ventricular size, ejection fraction, and wall motion.   ECG INTERPRETATION   Negative stress ECG for ischemia.          Assessment/Plan    # Clinical presentation  per Dr Fermin is not consistent with ACS much more suspicious for possible amyloid  #New findings of atrial fibrillation duration is unclear  #[id number] score of 5  #HFpEF, acute on chronic  # LVEF 60%    Recommendations  -Continue furosemide 20 mg IV every 8 hours  -Strict intake and output  -Keep K above 4, Mg above 2  -On Eliquis 5 mg p.o. twice daily  -Follow-up on lambda kappa light chains SPEP and UPEP for  amyloid per Dr Fermin, can consider fat pad biopsy if these are negative as clinically she appears to be more acting like amyloid  -Plan for left heart cath when more euvolemic-TBD    Cardiology will follow along    I personally spent a total of 18 minutes which includes face-to-face time and non-face-to-face time spent on preparing to see the patient, reviewing hospital notes and tests, obtaining history from the patient, performing a medically appropriate exam, counseling and educating the patient, ordering medications/tests/procedures/referrals as clinically indicated, and documenting information in the electronic medical record.         Thank you for allowing me to participate in the care of this patient.  I will continue to follow this patient    Please contact me with any questions.    SPARKLE Lugo.   Cardiologist, [facility]  [phone]

## 2023-02-18 NOTE — PROGRESS NOTES
0700 - Report received from Joy SEGOVIA at patient's bedside. Patient resting in bed quietly with no complaints at this time. Telemetry monitor intact et functioning. Call light and belongings within reach, safety measures intact, white board updated.     0740 - Patient up to bathroom with assist one. Tolerated well.     0900 - Patient resting in bed, denies needs.     1130 - Patient c/o increased SOB. Sats 95% on 2 LPM, heart rhythm atrial fibrillation with rate of 88, no recent activity. Reported to provider and cardiology NP. Who will adjust lasix.     1400 - Patient with good response to lasix. Voided well. Up in chair for a bit.    1630 - Patient resting. States  is supposed to bring her glasses.     1845 - Reported off to Joy SEGOVIA

## 2023-02-19 LAB
ANION GAP SERPL CALC-SCNC: 6 MMOL/L (ref 7–16)
BUN SERPL-MCNC: 22 MG/DL (ref 8–22)
CALCIUM SERPL-MCNC: 8.5 MG/DL (ref 8.5–10.5)
CHLORIDE SERPL-SCNC: 97 MMOL/L (ref 96–112)
CO2 SERPL-SCNC: 33 MMOL/L (ref 20–33)
CREAT SERPL-MCNC: 0.83 MG/DL (ref 0.5–1.4)
EKG IMPRESSION: NORMAL
EKG IMPRESSION: NORMAL
GFR SERPLBLD CREATININE-BSD FMLA CKD-EPI: 71 ML/MIN/1.73 M 2
GLUCOSE SERPL-MCNC: 131 MG/DL (ref 65–99)
POTASSIUM SERPL-SCNC: 3.9 MMOL/L (ref 3.6–5.5)
SODIUM SERPL-SCNC: 136 MMOL/L (ref 135–145)

## 2023-02-19 PROCEDURE — 93010 ELECTROCARDIOGRAM REPORT: CPT | Mod: 76 | Performed by: INTERNAL MEDICINE

## 2023-02-19 PROCEDURE — 36415 COLL VENOUS BLD VENIPUNCTURE: CPT

## 2023-02-19 PROCEDURE — 700111 HCHG RX REV CODE 636 W/ 250 OVERRIDE (IP): Performed by: NURSE PRACTITIONER

## 2023-02-19 PROCEDURE — 99232 SBSQ HOSP IP/OBS MODERATE 35: CPT | Performed by: INTERNAL MEDICINE

## 2023-02-19 PROCEDURE — 80048 BASIC METABOLIC PNL TOTAL CA: CPT

## 2023-02-19 PROCEDURE — 700102 HCHG RX REV CODE 250 W/ 637 OVERRIDE(OP): Performed by: INTERNAL MEDICINE

## 2023-02-19 PROCEDURE — 99232 SBSQ HOSP IP/OBS MODERATE 35: CPT | Performed by: STUDENT IN AN ORGANIZED HEALTH CARE EDUCATION/TRAINING PROGRAM

## 2023-02-19 PROCEDURE — 770020 HCHG ROOM/CARE - TELE (206)

## 2023-02-19 PROCEDURE — 93005 ELECTROCARDIOGRAM TRACING: CPT | Performed by: INTERNAL MEDICINE

## 2023-02-19 PROCEDURE — 93010 ELECTROCARDIOGRAM REPORT: CPT | Performed by: INTERNAL MEDICINE

## 2023-02-19 PROCEDURE — A9270 NON-COVERED ITEM OR SERVICE: HCPCS | Performed by: INTERNAL MEDICINE

## 2023-02-19 RX ADMIN — FUROSEMIDE 40 MG: 10 INJECTION, SOLUTION INTRAMUSCULAR; INTRAVENOUS at 21:56

## 2023-02-19 RX ADMIN — ASPIRIN 81 MG: 81 TABLET, COATED ORAL at 06:23

## 2023-02-19 RX ADMIN — ACETAMINOPHEN 650 MG: 325 TABLET, FILM COATED ORAL at 17:32

## 2023-02-19 RX ADMIN — FUROSEMIDE 40 MG: 10 INJECTION, SOLUTION INTRAMUSCULAR; INTRAVENOUS at 15:25

## 2023-02-19 RX ADMIN — ROSUVASTATIN 5 MG: 10 TABLET, FILM COATED ORAL at 17:30

## 2023-02-19 RX ADMIN — FUROSEMIDE 40 MG: 10 INJECTION, SOLUTION INTRAMUSCULAR; INTRAVENOUS at 06:23

## 2023-02-19 RX ADMIN — ACETAMINOPHEN 650 MG: 325 TABLET, FILM COATED ORAL at 23:35

## 2023-02-19 RX ADMIN — FLUTICASONE PROPIONATE 50 MCG: 50 SPRAY, METERED NASAL at 21:56

## 2023-02-19 RX ADMIN — SENNOSIDES AND DOCUSATE SODIUM 2 TABLET: 50; 8.6 TABLET ORAL at 06:22

## 2023-02-19 RX ADMIN — ACETAMINOPHEN 650 MG: 325 TABLET, FILM COATED ORAL at 01:19

## 2023-02-19 RX ADMIN — APIXABAN 5 MG: 5 TABLET, FILM COATED ORAL at 06:22

## 2023-02-19 RX ADMIN — EZETIMIBE 10 MG: 10 TABLET ORAL at 06:22

## 2023-02-19 RX ADMIN — SENNOSIDES AND DOCUSATE SODIUM 2 TABLET: 50; 8.6 TABLET ORAL at 17:30

## 2023-02-19 ASSESSMENT — ENCOUNTER SYMPTOMS
STRIDOR: 0
CHOKING: 0
APNEA: 0
CHEST TIGHTNESS: 0
COUGH: 0
WHEEZING: 0
SHORTNESS OF BREATH: 0
WEAKNESS: 1

## 2023-02-19 ASSESSMENT — FIBROSIS 4 INDEX: FIB4 SCORE: 2.48

## 2023-02-19 ASSESSMENT — PAIN DESCRIPTION - PAIN TYPE: TYPE: ACUTE PAIN

## 2023-02-19 NOTE — PROGRESS NOTES
Monitor Summary:    Rhythm: SR/Afib  Rate: 77-91  Ectopy: (R) PVC, (O) coup, (O) trigem  Measurement : .19/.09/.37

## 2023-02-19 NOTE — PROGRESS NOTES
Patient complained of chest pain 4/10. All vitals stable. Stat EKG ordered, showed possible accelerated junctional rhythm. Patient repositioned and chest pain was relieved. On call hospitalist, Kaylee PIMENTEL notified.

## 2023-02-19 NOTE — PROGRESS NOTES
Hospital Medicine Daily Progress Note    Date of Service  2/19/2023    Chief Complaint  Regla Meier is a 82 y.o. female admitted 2/17/2023 with sob    Hospital Course  82 y.o. female with a past medical history of COPD, CVA, CHF with preserved ejection fraction, hyperlipidemia who presented 2/17/2023 with shortness of breath and lower extremity edema over the past 4 weeks despite taking Lasix 20 mg daily.  She was admitted to [facility] where she was noted to have an elevated BNP, severe pitting edema of bilateral lower extremities and chest x-ray with bilateral pulmonary edema.  Her troponin trended up to 166 and she was noted to be in new onset A-fib with RVR for which she was started on Eliquis and metoprolol.  Her metoprolol was later discontinued because patient was having pauses on telemetry.  She underwent a 2D echo that revealed preserved EF with grade 2 diastolic dysfunction with RVSP of 40-45. There was concern of infiltrative heart disease given her free kappa was elevated at 52, free lambda elevated 175, monoclonal protein was still pending.  Cardiology recommended transfer to [facility] for right and left heart catheterization.   Cardiology following. Plan [facility] 2/20    Interval Problem Update  Seen patient at bedside   Cont lasix per cardiology  Eliquis on hold  [facility] tomorrow per cards     I have discussed this patient's plan of care and discharge plan at IDT rounds today with Case Management, Nursing, Nursing leadership, and other members of the IDT team.    Consultants/Specialty  cardiology    Code Status  Full Code    Disposition  Patient is not medically cleared for discharge.   Anticipate discharge to to home with close outpatient follow-up.  I have placed the appropriate orders for post-discharge needs.    Review of Systems  Review of Systems   Constitutional:  Positive for malaise/fatigue.   Cardiovascular:  Positive for leg swelling.   Neurological:  Positive for weakness.    All other systems reviewed and are negative.     Physical Exam  Temp:  [36.8 °C (98.2 °F)-37.1 °C (98.8 °F)] 37 °C (98.6 °F)  Pulse:  [80-89] 81  Resp:  [17-18] 17  BP: (108-127)/(72-80) 108/74  SpO2:  [95 %-97 %] 97 %    Physical Exam  Vitals and nursing note reviewed.   Constitutional:       Appearance: Normal appearance. She is ill-appearing.   HENT:      Head: Normocephalic and atraumatic.      Nose: Nose normal.      Mouth/Throat:      Pharynx: Oropharynx is clear.   Eyes:      Extraocular Movements: Extraocular movements intact.      Conjunctiva/sclera: Conjunctivae normal.      Pupils: Pupils are equal, round, and reactive to light.   Cardiovascular:      Rate and Rhythm: Normal rate. Rhythm irregular.      Pulses: Normal pulses.      Heart sounds: Normal heart sounds.   Pulmonary:      Effort: Pulmonary effort is normal.      Breath sounds: Rales present.   Abdominal:      General: Abdomen is flat. Bowel sounds are normal.      Palpations: Abdomen is soft.   Musculoskeletal:         General: Swelling present. Normal range of motion.      Cervical back: Normal range of motion and neck supple.      Right lower leg: Edema present.      Left lower leg: Edema present.   Skin:     General: Skin is warm and dry.   Neurological:      General: No focal deficit present.      Mental Status: She is alert and oriented to person, place, and time. Mental status is at baseline.   Psychiatric:         Mood and Affect: Mood normal.         Behavior: Behavior normal.       Fluids    Intake/Output Summary (Last 24 hours) at 2/19/2023 1449  Last data filed at 2/19/2023 1300  Gross per 24 hour   Intake --   Output 2250 ml   Net -2250 ml         Laboratory  Recent Labs     02/17/23  0329   WBC 6.0   RBC 3.86*   HEMOGLOBIN 11.3*   HEMATOCRIT 35.2*   MCV 91.2   MCH 29.3   MCHC 32.1*   RDW 45.2   PLATELETCT 252   MPV 8.7*       Recent Labs     02/17/23  0329 02/17/23  1311 02/18/23  1312   SODIUM 136  134* 138 138   POTASSIUM 6.1*   5.8* 4.1 3.9   CHLORIDE 102  100 101 99   CO2 24  26 29 31   GLUCOSE 99  108* 93 92   BUN 28*  27* 27* 26*   CREATININE 0.70  0.70 0.73 0.77   CALCIUM 8.3*  8.1* 8.4* 8.5                     Imaging  No orders to display          Assessment/Plan  * Acute on chronic congestive heart failure (HCC)- (present on admission)  Assessment & Plan  Continue IV Lasix   Fluid and salt restriction  Strict I's/O  2D echo reveals preserved EF  Cardiology on board  Plan for cardiac catheterization 2/20  Suspecting infiltrative heart disease, free kappa was elevated at 52, free lambda elevated 175. Follow up monoclonal protein disease     DNR (do not resuscitate)  Assessment & Plan  Patient is DNR/DNI status    Acute on chronic respiratory failure with hypoxia (HCC)- (present on admission)  Assessment & Plan  Currently on 1-2 L of oxygen via nasal cannula  Wean O2 as tolerated, RT protocol    History of CVA (cerebrovascular accident)  Assessment & Plan  Continue Eliquis and Crestor    Troponin level elevated- (present on admission)  Assessment & Plan  In the setting of acute on chronic congestive heart failure  Denies active chest pain  Continuous cardiac monitoring with serial EKG and troponin  Plan for cardiac catheterization 2/20    AF (atrial fibrillation) ([facility])- (present on admission)  Assessment & Plan  Currently rate controlled  Continue Eliquis. On hold 2/19      COPD (chronic obstructive pulmonary disease) ([facility])- (present on admission)  Assessment & Plan  Does not appear to be in acute exacerbation  I have added Symbicort to her regimen  Albuterol as needed    Hyperlipemia- (present on admission)  Assessment & Plan  Continue Crestor and Zetia         VTE prophylaxis: therapeutic anticoagulation with Eliquis    I have performed a physical exam and reviewed and updated ROS and Plan today (2/19/2023). In review of yesterday's note (2/18/2023), there are no changes except as documented above.

## 2023-02-19 NOTE — CARE PLAN
The patient is Stable - Low risk of patient condition declining or worsening    Shift Goals  Clinical Goals: diuresis  Patient Goals: rest  Family Goals: na    Progress made toward(s) clinical / shift goals:    Problem: Fluid Volume  Goal: Fluid volume balance will be maintained  Outcome: Progressing  Note: Patient is being diuresed with I/Os being monitored.        Patient is not progressing towards the following goals:      Problem: Respiratory  Goal: Patient will achieve/maintain optimum respiratory ventilation and gas exchange  Outcome: Not Progressing  Note: Patient continues to require supplemental oxygen.

## 2023-02-19 NOTE — PROGRESS NOTES
Cardiology Follow Up Progress Note    Date of Service  2/19/2023    Attending Physician  Tai Carmichael M.D.    Chief Complaint     Cardiology consultation for evaluation of new A-fib, acute on chronic HFpEF    HPI  Regla Meier is a 82 y.o. female with HFpEF, CVA 2022 admitted 2/17/2023 with fluid overload      Interim Events    No overnight cardiac events  Yoroassyj-I-sda rate well controlled  Denies having chest pain  Shortness of breath improved   Continue IV diuretics  Diuresed 2100 overnight  Weight down ~ 6-7#  Dry weight 125  Current standing weight 138#    Review of Systems  Review of Systems   Respiratory:  Negative for apnea, cough, choking, chest tightness, shortness of breath, wheezing and stridor.    Cardiovascular:  Positive for leg swelling. Negative for chest pain.     Vital signs in last 24 hours  Temp:  [36.8 °C (98.2 °F)-37.1 °C (98.8 °F)] 37 °C (98.6 °F)  Pulse:  [80-89] 81  Resp:  [17-18] 17  BP: (108-127)/(72-80) 108/74  SpO2:  [95 %-97 %] 97 %    Physical Exam  Physical Exam  Cardiovascular:      Rate and Rhythm: Normal rate. Rhythm irregular.      Pulses: Normal pulses.      Heart sounds: Murmur heard.   Skin:     General: Skin is warm.   Neurological:      Mental Status: She is alert. Mental status is at baseline.   Psychiatric:         Mood and Affect: Mood normal.       Lab Review  Lab Results   Component Value Date/Time    WBC 6.0 02/17/2023 03:29 AM    RBC 3.86 (L) 02/17/2023 03:29 AM    HEMOGLOBIN 11.3 (L) 02/17/2023 03:29 AM    HEMATOCRIT 35.2 (L) 02/17/2023 03:29 AM    MCV 91.2 02/17/2023 03:29 AM    MCH 29.3 02/17/2023 03:29 AM    MCHC 32.1 (L) 02/17/2023 03:29 AM    MPV 8.7 (L) 02/17/2023 03:29 AM      Lab Results   Component Value Date/Time    SODIUM 138 02/18/2023 01:12 PM    POTASSIUM 3.9 02/18/2023 01:12 PM    CHLORIDE 99 02/18/2023 01:12 PM    CO2 31 02/18/2023 01:12 PM    GLUCOSE 92 02/18/2023 01:12 PM    BUN 26 (H) 02/18/2023 01:12 PM    CREATININE 0.77 02/18/2023  01:12 PM      Lab Results   Component Value Date/Time    ASTSGOT 37 02/15/2023 02:28 AM    ALTSGPT 23 02/15/2023 02:28 AM     Lab Results   Component Value Date/Time    CHOLSTRLTOT 198 02/15/2023 02:28 AM     (H) 02/15/2023 02:28 AM    HDL 69 02/15/2023 02:28 AM    TRIGLYCERIDE 113 02/15/2023 02:28 AM    TROPONINT 166 (H) 02/15/2023 05:44 PM       Recent Labs     02/17/23  0329   NTPROBNP 8463*         Cardiac Imaging and Procedures Review  EKG:  My personal interpretation of the EKG dated 2/14/2023, atrial fibrillation, heart rate 95         Echocardiogram: 2/15/2023  Normal left ventricular systolic function.  Grade II diastolic dysfunction.  Mild mitral regurgitation.  Mild to moderate aortic insufficiency.  Mild tricuspid regurgitation.  Right ventricular systolic pressure is estimated to be  40-45  mmHg.        Cardiac Catheterization: Pending      Imaging  Chest X-Ray: 2/14/2023   BILATERAL pleural effusions with overlying atelectasis which could obscure an additional process  2.  Persistently enlarged cardiac silhouette       Stress Test:    7/11/2022  Small sized, equivocal, non-reversible, mild severity defects in the apical      septal wall and mid inferolateral wall. Both most consistent with artifact    given the distribution, less likely small scars.    * SSS 2. SDS 0. No reversible ischemia.    * Normal left ventricular size, ejection fraction, and wall motion.   ECG INTERPRETATION   Negative stress ECG for ischemia.          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 twice daily, hold pending [facility] 2/20/2023  -Follow-up on lambda kappa light chains SPEP and UPEP   -consider fat pad biopsy  -Plan for left heart cath in the morning  -N.p.o. at  midnight    Cardiology will follow along    I personally spent a total of 18 minutes which includes face-to-face time and non-face-to-face time spent on preparing to see the patient, reviewing hospital notes and tests, obtaining history from the patient, performing a medically appropriate exam, counseling and educating the patient, ordering medications/tests/procedures/referrals as clinically indicated, and documenting information in the electronic medical record.         Thank you for allowing me to participate in the care of this patient.  I will continue to follow this patient    Please contact me with any questions.    SPARKLE Lugo.   Cardiologist, [facility]  [phone]

## 2023-02-19 NOTE — CARE PLAN
The patient is Stable - Low risk of patient condition declining or worsening    Shift Goals  Clinical Goals: procedure  Patient Goals: procedure  Family Goals: REGIS    Progress made toward(s) clinical / shift goals:    Problem: Knowledge Deficit - Standard  Goal: Patient and family/care givers will demonstrate understanding of plan of care, disease process/condition, diagnostic tests and medications  Outcome: Progressing     Problem: Fall Risk  Goal: Patient will remain free from falls  Outcome: Progressing     Problem: Pain - Standard  Goal: Alleviation of pain or a reduction in pain to the patient’s comfort goal  Outcome: Progressing     Problem: Fluid Volume  Goal: Fluid volume balance will be maintained  Outcome: Progressing

## 2023-02-19 NOTE — PROGRESS NOTES
Monitor Summary:   Rhythm: NSR  Rate: 71 - 92  Measurement: .19/.08/.37  Ectopy: PVC, PAC, couplet    12 hour chart check

## 2023-02-19 NOTE — PROGRESS NOTES
Assumed care of patient, received bedside report from Prema SEGOVIA. Patient is A&O X 4. Pain 4/10, on 2 L of oxygen NC. On tele monitor, SR 84. POC discussed with patient. Patient verbalized understanding. All questions answered. Call light within reach and fall precautions in place. Bed alarm on, bed locked and in lowest position.

## 2023-02-20 ENCOUNTER — APPOINTMENT (OUTPATIENT)
Dept: CARDIOLOGY | Facility: MEDICAL CENTER | Age: 83
DRG: 286 | End: 2023-02-20
Attending: NURSE PRACTITIONER
Payer: MEDICARE

## 2023-02-20 LAB
ANION GAP SERPL CALC-SCNC: 9 MMOL/L (ref 7–16)
BUN SERPL-MCNC: 22 MG/DL (ref 8–22)
CALCIUM SERPL-MCNC: 8.6 MG/DL (ref 8.5–10.5)
CHLORIDE SERPL-SCNC: 97 MMOL/L (ref 96–112)
CO2 SERPL-SCNC: 30 MMOL/L (ref 20–33)
CREAT SERPL-MCNC: 0.7 MG/DL (ref 0.5–1.4)
ERYTHROCYTE [DISTWIDTH] IN BLOOD BY AUTOMATED COUNT: 44.2 FL (ref 35.9–50)
GFR SERPLBLD CREATININE-BSD FMLA CKD-EPI: 87 ML/MIN/1.73 M 2
GLUCOSE SERPL-MCNC: 97 MG/DL (ref 65–99)
HCT VFR BLD AUTO: 42.8 % (ref 37–47)
HGB BLD-MCNC: 13.7 G/DL (ref 12–16)
MAGNESIUM SERPL-MCNC: 2 MG/DL (ref 1.5–2.5)
MCH RBC QN AUTO: 28.8 PG (ref 27–33)
MCHC RBC AUTO-ENTMCNC: 32 G/DL (ref 33.6–35)
MCV RBC AUTO: 89.9 FL (ref 81.4–97.8)
PHOSPHATE SERPL-MCNC: 3.5 MG/DL (ref 2.5–4.5)
PLATELET # BLD AUTO: 235 K/UL (ref 164–446)
PMV BLD AUTO: 8.3 FL (ref 9–12.9)
POTASSIUM SERPL-SCNC: 3.8 MMOL/L (ref 3.6–5.5)
RBC # BLD AUTO: 4.76 M/UL (ref 4.2–5.4)
SODIUM SERPL-SCNC: 136 MMOL/L (ref 135–145)
WBC # BLD AUTO: 7.3 K/UL (ref 4.8–10.8)

## 2023-02-20 PROCEDURE — 700102 HCHG RX REV CODE 250 W/ 637 OVERRIDE(OP): Performed by: INTERNAL MEDICINE

## 2023-02-20 PROCEDURE — 4A023N7 MEASUREMENT OF CARDIAC SAMPLING AND PRESSURE, LEFT HEART, PERCUTANEOUS APPROACH: ICD-10-PCS | Performed by: INTERNAL MEDICINE

## 2023-02-20 PROCEDURE — 84100 ASSAY OF PHOSPHORUS: CPT

## 2023-02-20 PROCEDURE — 93458 L HRT ARTERY/VENTRICLE ANGIO: CPT | Mod: 26 | Performed by: INTERNAL MEDICINE

## 2023-02-20 PROCEDURE — 770020 HCHG ROOM/CARE - TELE (206)

## 2023-02-20 PROCEDURE — 700111 HCHG RX REV CODE 636 W/ 250 OVERRIDE (IP): Performed by: NURSE PRACTITIONER

## 2023-02-20 PROCEDURE — 99233 SBSQ HOSP IP/OBS HIGH 50: CPT | Performed by: STUDENT IN AN ORGANIZED HEALTH CARE EDUCATION/TRAINING PROGRAM

## 2023-02-20 PROCEDURE — A9270 NON-COVERED ITEM OR SERVICE: HCPCS | Performed by: INTERNAL MEDICINE

## 2023-02-20 PROCEDURE — B3101ZZ FLUOROSCOPY OF THORACIC AORTA USING LOW OSMOLAR CONTRAST: ICD-10-PCS | Performed by: INTERNAL MEDICINE

## 2023-02-20 PROCEDURE — B2111ZZ FLUOROSCOPY OF MULTIPLE CORONARY ARTERIES USING LOW OSMOLAR CONTRAST: ICD-10-PCS | Performed by: INTERNAL MEDICINE

## 2023-02-20 PROCEDURE — 700111 HCHG RX REV CODE 636 W/ 250 OVERRIDE (IP)

## 2023-02-20 PROCEDURE — 700101 HCHG RX REV CODE 250

## 2023-02-20 PROCEDURE — 83735 ASSAY OF MAGNESIUM: CPT

## 2023-02-20 PROCEDURE — 85027 COMPLETE CBC AUTOMATED: CPT

## 2023-02-20 PROCEDURE — 99152 MOD SED SAME PHYS/QHP 5/>YRS: CPT | Performed by: INTERNAL MEDICINE

## 2023-02-20 PROCEDURE — 36415 COLL VENOUS BLD VENIPUNCTURE: CPT

## 2023-02-20 PROCEDURE — 93458 L HRT ARTERY/VENTRICLE ANGIO: CPT

## 2023-02-20 PROCEDURE — 700117 HCHG RX CONTRAST REV CODE 255: Performed by: INTERNAL MEDICINE

## 2023-02-20 PROCEDURE — 80048 BASIC METABOLIC PNL TOTAL CA: CPT

## 2023-02-20 PROCEDURE — 99232 SBSQ HOSP IP/OBS MODERATE 35: CPT | Performed by: INTERNAL MEDICINE

## 2023-02-20 PROCEDURE — B2151ZZ FLUOROSCOPY OF LEFT HEART USING LOW OSMOLAR CONTRAST: ICD-10-PCS | Performed by: INTERNAL MEDICINE

## 2023-02-20 RX ORDER — HEPARIN SODIUM 200 [USP'U]/100ML
INJECTION, SOLUTION INTRAVENOUS
Status: COMPLETED
Start: 2023-02-20 | End: 2023-02-20

## 2023-02-20 RX ORDER — HEPARIN SODIUM 1000 [USP'U]/ML
INJECTION, SOLUTION INTRAVENOUS; SUBCUTANEOUS
Status: COMPLETED
Start: 2023-02-20 | End: 2023-02-20

## 2023-02-20 RX ORDER — MIDAZOLAM HYDROCHLORIDE 1 MG/ML
INJECTION INTRAMUSCULAR; INTRAVENOUS
Status: COMPLETED
Start: 2023-02-20 | End: 2023-02-20

## 2023-02-20 RX ORDER — VERAPAMIL HYDROCHLORIDE 2.5 MG/ML
INJECTION, SOLUTION INTRAVENOUS
Status: COMPLETED
Start: 2023-02-20 | End: 2023-02-20

## 2023-02-20 RX ORDER — LIDOCAINE HYDROCHLORIDE 20 MG/ML
INJECTION, SOLUTION INFILTRATION; PERINEURAL
Status: COMPLETED
Start: 2023-02-20 | End: 2023-02-20

## 2023-02-20 RX ADMIN — NITROGLYCERIN 10 ML: 20 INJECTION INTRAVENOUS at 14:49

## 2023-02-20 RX ADMIN — IOHEXOL 36 ML: 300 INJECTION, SOLUTION INTRAVENOUS at 15:15

## 2023-02-20 RX ADMIN — ACETAMINOPHEN 650 MG: 325 TABLET, FILM COATED ORAL at 21:22

## 2023-02-20 RX ADMIN — FENTANYL CITRATE 25 MCG: 50 INJECTION, SOLUTION INTRAMUSCULAR; INTRAVENOUS at 15:06

## 2023-02-20 RX ADMIN — ASPIRIN 81 MG: 81 TABLET, COATED ORAL at 05:03

## 2023-02-20 RX ADMIN — LIDOCAINE HYDROCHLORIDE: 20 INJECTION, SOLUTION INFILTRATION; PERINEURAL at 14:48

## 2023-02-20 RX ADMIN — FUROSEMIDE 40 MG: 10 INJECTION, SOLUTION INTRAMUSCULAR; INTRAVENOUS at 05:04

## 2023-02-20 RX ADMIN — FUROSEMIDE 40 MG: 10 INJECTION, SOLUTION INTRAMUSCULAR; INTRAVENOUS at 13:19

## 2023-02-20 RX ADMIN — HEPARIN SODIUM: 1000 INJECTION, SOLUTION INTRAVENOUS; SUBCUTANEOUS at 14:48

## 2023-02-20 RX ADMIN — VERAPAMIL HYDROCHLORIDE 2.5 MG: 2.5 INJECTION, SOLUTION INTRAVENOUS at 14:48

## 2023-02-20 RX ADMIN — FLUTICASONE PROPIONATE 50 MCG: 50 SPRAY, METERED NASAL at 21:23

## 2023-02-20 RX ADMIN — MIDAZOLAM HYDROCHLORIDE 0.5 MG: 1 INJECTION, SOLUTION INTRAMUSCULAR; INTRAVENOUS at 15:06

## 2023-02-20 RX ADMIN — HEPARIN SODIUM 2000 UNITS: 200 INJECTION, SOLUTION INTRAVENOUS at 14:30

## 2023-02-20 RX ADMIN — FUROSEMIDE 40 MG: 10 INJECTION, SOLUTION INTRAMUSCULAR; INTRAVENOUS at 21:25

## 2023-02-20 RX ADMIN — EZETIMIBE 10 MG: 10 TABLET ORAL at 05:03

## 2023-02-20 ASSESSMENT — ENCOUNTER SYMPTOMS
NAUSEA: 0
PALPITATIONS: 0
ABDOMINAL PAIN: 0
WEAKNESS: 1
SHORTNESS OF BREATH: 0
HEADACHES: 0
DIZZINESS: 0
CHEST TIGHTNESS: 0

## 2023-02-20 ASSESSMENT — PAIN DESCRIPTION - PAIN TYPE: TYPE: ACUTE PAIN

## 2023-02-20 NOTE — DISCHARGE PLANNING
Attempted to see patient for initial assessment.  She was not in the room.  Will attempt again at a later time.

## 2023-02-20 NOTE — CARE PLAN
The patient is Watcher - Medium risk of patient condition declining or worsening    Shift Goals  Clinical Goals: procedure  Patient Goals: procedure  Family Goals: REGIS    Progress made toward(s) clinical / shift goals:    Problem: Knowledge Deficit - Standard  Goal: Patient and family/care givers will demonstrate understanding of plan of care, disease process/condition, diagnostic tests and medications  Outcome: Progressing     Problem: Fall Risk  Goal: Patient will remain free from falls  Outcome: Progressing     Problem: Pain - Standard  Goal: Alleviation of pain or a reduction in pain to the patient’s comfort goal  Outcome: Progressing       Patient is not progressing towards the following goals:

## 2023-02-20 NOTE — CARE PLAN
The patient is Stable - Low risk of patient condition declining or worsening    Shift Goals  Clinical Goals: procedure  Patient Goals: procedure  Family Goals: REGIS    Progress made toward(s) clinical / shift goals:    Problem: Pain - Standard  Goal: Alleviation of pain or a reduction in pain to the patient’s comfort goal  Outcome: Progressing

## 2023-02-20 NOTE — PROGRESS NOTES
Cardiology Follow Up Progress Note    Date of Service  2/20/2023    Attending Physician  Kat Alcantar M.D.    HPI  Regla Meier is a 82 y.o. female admitted 2/17/2023 with HFpEF, prior CVA in 2022 who was admitted to [facility] on 2/17/2023 with acute on chronic heart failure exacerbation.  She was found to have elevated troponins that peaked at 166. Given heart failure exacerbation and elevated troponin, she was transferred to [facility] by cardiology for cath.     Interim Events  2/20: Symptomatically improved, edema resolved.  No chest pain.    Review of Systems  Review of Systems   Respiratory:  Negative for chest tightness and shortness of breath.    Cardiovascular:  Negative for palpitations.   Gastrointestinal:  Negative for abdominal pain and nausea.   Neurological:  Negative for dizziness and headaches.     Vital signs in last 24 hours  Temp:  [36.2 °C (97.2 °F)-37.2 °C (99 °F)] 36.7 °C (98.1 °F)  Pulse:  [78-90] 78  Resp:  [17-18] 17  BP: (108-119)/(67-81) 109/72  SpO2:  [95 %-100 %] 98 %    Physical Exam  Physical Exam  Constitutional:       General: She is not in acute distress.     Comments: Frail.  Petite.  Elderly.   Cardiovascular:      Rate and Rhythm: Normal rate and regular rhythm.      Heart sounds: Normal heart sounds, S1 normal and S2 normal. No murmur heard.    No friction rub. No gallop.   Pulmonary:      Effort: Pulmonary effort is normal.      Breath sounds: Normal breath sounds. No wheezing, rhonchi or rales.   Musculoskeletal:      Right lower leg: No edema.      Left lower leg: No edema.   Skin:     General: Skin is warm and dry.   Neurological:      Mental Status: She is alert and oriented to person, place, and time.   Psychiatric:         Behavior: Behavior normal.       Lab Review  Lab Results   Component Value Date/Time    WBC 7.3 02/20/2023 03:14 AM    RBC 4.76 02/20/2023 03:14 AM    HEMOGLOBIN 13.7 02/20/2023 03:14 AM    HEMATOCRIT 42.8  02/20/2023 03:14 AM    MCV 89.9 02/20/2023 03:14 AM    MCH 28.8 02/20/2023 03:14 AM    MCHC 32.0 (L) 02/20/2023 03:14 AM    MPV 8.3 (L) 02/20/2023 03:14 AM      Lab Results   Component Value Date/Time    SODIUM 136 02/20/2023 03:14 AM    POTASSIUM 3.8 02/20/2023 03:14 AM    CHLORIDE 97 02/20/2023 03:14 AM    CO2 30 02/20/2023 03:14 AM    GLUCOSE 97 02/20/2023 03:14 AM    BUN 22 02/20/2023 03:14 AM    CREATININE 0.70 02/20/2023 03:14 AM      Lab Results   Component Value Date/Time    ASTSGOT 37 02/15/2023 02:28 AM    ALTSGPT 23 02/15/2023 02:28 AM     Lab Results   Component Value Date/Time    CHOLSTRLTOT 198 02/15/2023 02:28 AM     (H) 02/15/2023 02:28 AM    HDL 69 02/15/2023 02:28 AM    TRIGLYCERIDE 113 02/15/2023 02:28 AM    TROPONINT 166 (H) 02/15/2023 05:44 PM       No results for input(s): NTPROBNP in the last 72 hours.    Cardiac Imaging and Procedures Review  EKG:  My personal interpretation of the EKG dated/19/2023 is sinus rhythm, PACs, anterior MI, inferior MI, low voltage    Rhythm: My personal interpretation of the rhythm dated 2/20/2023 is sinus rhythm    Echocardiogram: 2/15/2023  Normal left ventricular systolic function.  Grade II diastolic dysfunction.  Mild mitral regurgitation.  Mild to moderate aortic insufficiency.  Mild tricuspid regurgitation.  Right ventricular systolic pressure is estimated to be  40-45  mmHg.    Imaging  Chest X-Ray: 2/14/2023   BILATERAL pleural effusions with overlying atelectasis which could obscure an additional process  2.  Persistently enlarged cardiac silhouette     MPI:  7/11/2022  Small sized, equivocal, non-reversible, mild severity defects in the apical septal wall and mid inferolateral wall. Both most consistent with artifact given the distribution, less likely small scars.    * SSS 2. SDS 0. No reversible ischemia.    * Normal left ventricular size, ejection fraction, and wall motion.   ECG INTERPRETATION    Assessment:  HFpEF acute on chronic  Afib new  onset spontaneously converting to sinus rhythm  Aortic regurgitation mild to moderate  Mitral regurgitation, mild to moderate  Pulmonary hypertension, mild  Concern for amyloid  Minimally elevated free light chains  Prior abnormal MPI  Coronary calcification  CVA 8/1/2022  COPD    Recommendations Discussion  Heart failure improved, currently euvolemic  Good diuresis 2200 cc  Will transition from IV to p.o. furosemide  Start spironolactone 25 mg daily  Scheduled for LHC/RHC today to exclude obstructive CAD, reviewed with patient for which she expresses understanding  Heart failure exacerbation probably multifactorial with current episode related to acute onset of atrial fibrillation  There is concern for amyloid.  Reviewed echocardiogram images which shows no LVH or evidence of advanced infiltrative disease, may have early clinical manifestation  Minimally elevated free light chains lambda/kappa, suggest oncology/hematology evaluation  [employer] cardiac amyloid scan  Will need AAD vs ablation for rhythm management in view of heart failure with new onset atrial fibrillation, will discuss with EP    I personally reviewed the patient's treatment plan with Kat Alcantar MD    Thank you for allowing me to participate in the care of this patient.    Please contact me with any questions.    Luis Alberto Ferguson M.D.   Cardiologist, [facility]  [phone]

## 2023-02-20 NOTE — PROCEDURES
Cardiac Catheterization report    2/20/2023  3:13 PM    Referring MD:     Primary Care Provider: SHANNON Ontiveros    Indication for procedure: Non-ST elevation myocardial infarction    Procedures performed:  Coronary arteriograms  Left heart catheterization and Aortic root arteriogram     Final impression:  Non-obstructive coronary artery disease  1-2+ aortic regurgitation.    Recommendations:  Guideline directed medical therapy   Cardiovascular Risk factor modification    Findings:  1.  Left main coronary artery:  Normal.  2.  Left anterior descending artery: 40% disease in proximal portion, calcified.    3.  Left circumflex coronary artery: Luminal irregularities.  Gives two marginal branches, which have no significant disease   4.  Right coronary artery: 50% disease in midportion..  This is a right dominant system.  5.  Left ventricular end diastolic pressure:  20 mmHg.  No signficant gradient across the aortic valve.  6.  Ascending aortic root angiogram showed normal sized ascending aorta, 1-2+ aortic regurgitation.      EBL: <10 CC    Specimens: None    Procedure details:  The risks and benefits of cardiac catheterization and possible intervention were explained to the patient including death, heart attack, stroke, and emergency surgery.  The patient verbalized understanding and wished to proceed.  The patient was brought to the cardiac catheterization laboratory in the fasting state and prepped and draped in the usual sterile fashion.  Timeout was performed.  The right wrist was locally anesthetized with lidocaine and the right radial artery was cannulated with 5/6-Tajik equipment and standard radial cocktail was given.  Coronary angiography was performed using JR 4 and JL 3.5  diagnostic catheters in the usual fashion, results mentioned below.  JR4 catheter was used across aortic valve, left heart catheterization was performed.  Pigtail catheter was used to perform ascending aortic root  angiogram.    Once all the views were obtained, all wires and catheters were removed from the patient without difficulty.  A Vasc-Band was placed over the right radial artery and the radial artery sheath was removed without difficulty.      Complications:  None    Contrast: 36 cc    Sedation time:  I supervised moderate sedation over a trained independent nursing staff,  Sedation Start time:  14:52   Sedation Stop time: 15:07      LIEN Lemus  [facility]

## 2023-02-21 LAB
ANION GAP SERPL CALC-SCNC: 7 MMOL/L (ref 7–16)
BUN SERPL-MCNC: 22 MG/DL (ref 8–22)
CALCIUM SERPL-MCNC: 8.2 MG/DL (ref 8.5–10.5)
CHLORIDE SERPL-SCNC: 97 MMOL/L (ref 96–112)
CO2 SERPL-SCNC: 32 MMOL/L (ref 20–33)
CREAT SERPL-MCNC: 0.83 MG/DL (ref 0.5–1.4)
ERYTHROCYTE [DISTWIDTH] IN BLOOD BY AUTOMATED COUNT: 43.8 FL (ref 35.9–50)
GFR SERPLBLD CREATININE-BSD FMLA CKD-EPI: 71 ML/MIN/1.73 M 2
GLUCOSE SERPL-MCNC: 85 MG/DL (ref 65–99)
HCT VFR BLD AUTO: 36.6 % (ref 37–47)
HGB BLD-MCNC: 12 G/DL (ref 12–16)
MAGNESIUM SERPL-MCNC: 1.8 MG/DL (ref 1.5–2.5)
MCH RBC QN AUTO: 29.9 PG (ref 27–33)
MCHC RBC AUTO-ENTMCNC: 32.8 G/DL (ref 33.6–35)
MCV RBC AUTO: 91 FL (ref 81.4–97.8)
PHOSPHATE SERPL-MCNC: 3.6 MG/DL (ref 2.5–4.5)
PLATELET # BLD AUTO: 271 K/UL (ref 164–446)
PMV BLD AUTO: 8.2 FL (ref 9–12.9)
POTASSIUM SERPL-SCNC: 3.4 MMOL/L (ref 3.6–5.5)
RBC # BLD AUTO: 4.02 M/UL (ref 4.2–5.4)
SODIUM SERPL-SCNC: 136 MMOL/L (ref 135–145)
WBC # BLD AUTO: 6.8 K/UL (ref 4.8–10.8)

## 2023-02-21 PROCEDURE — 84100 ASSAY OF PHOSPHORUS: CPT

## 2023-02-21 PROCEDURE — 99232 SBSQ HOSP IP/OBS MODERATE 35: CPT | Performed by: INTERNAL MEDICINE

## 2023-02-21 PROCEDURE — 83735 ASSAY OF MAGNESIUM: CPT

## 2023-02-21 PROCEDURE — 36415 COLL VENOUS BLD VENIPUNCTURE: CPT

## 2023-02-21 PROCEDURE — A9270 NON-COVERED ITEM OR SERVICE: HCPCS | Performed by: INTERNAL MEDICINE

## 2023-02-21 PROCEDURE — 700102 HCHG RX REV CODE 250 W/ 637 OVERRIDE(OP): Performed by: INTERNAL MEDICINE

## 2023-02-21 PROCEDURE — 700105 HCHG RX REV CODE 258

## 2023-02-21 PROCEDURE — 80048 BASIC METABOLIC PNL TOTAL CA: CPT

## 2023-02-21 PROCEDURE — 700111 HCHG RX REV CODE 636 W/ 250 OVERRIDE (IP): Performed by: NURSE PRACTITIONER

## 2023-02-21 PROCEDURE — 770020 HCHG ROOM/CARE - TELE (206)

## 2023-02-21 PROCEDURE — 85027 COMPLETE CBC AUTOMATED: CPT

## 2023-02-21 RX ORDER — SODIUM CHLORIDE, SODIUM LACTATE, POTASSIUM CHLORIDE, AND CALCIUM CHLORIDE .6; .31; .03; .02 G/100ML; G/100ML; G/100ML; G/100ML
250 INJECTION, SOLUTION INTRAVENOUS ONCE
Status: COMPLETED | OUTPATIENT
Start: 2023-02-21 | End: 2023-02-21

## 2023-02-21 RX ORDER — FUROSEMIDE 40 MG/1
40 TABLET ORAL
Status: DISCONTINUED | OUTPATIENT
Start: 2023-02-21 | End: 2023-02-23 | Stop reason: HOSPADM

## 2023-02-21 RX ORDER — AMIODARONE HYDROCHLORIDE 200 MG/1
200 TABLET ORAL TWICE DAILY
Status: DISCONTINUED | OUTPATIENT
Start: 2023-02-21 | End: 2023-02-23 | Stop reason: HOSPADM

## 2023-02-21 RX ORDER — POTASSIUM CHLORIDE 20 MEQ/1
40 TABLET, EXTENDED RELEASE ORAL ONCE
Status: COMPLETED | OUTPATIENT
Start: 2023-02-21 | End: 2023-02-21

## 2023-02-21 RX ORDER — FUROSEMIDE 40 MG/1
40 TABLET ORAL
Status: DISCONTINUED | OUTPATIENT
Start: 2023-02-22 | End: 2023-02-21

## 2023-02-21 RX ORDER — SPIRONOLACTONE 25 MG/1
25 TABLET ORAL
Status: DISCONTINUED | OUTPATIENT
Start: 2023-02-22 | End: 2023-02-23 | Stop reason: HOSPADM

## 2023-02-21 RX ADMIN — POTASSIUM CHLORIDE 40 MEQ: 1500 TABLET, EXTENDED RELEASE ORAL at 09:31

## 2023-02-21 RX ADMIN — FLUTICASONE PROPIONATE 50 MCG: 50 SPRAY, METERED NASAL at 21:00

## 2023-02-21 RX ADMIN — FUROSEMIDE 40 MG: 10 INJECTION, SOLUTION INTRAMUSCULAR; INTRAVENOUS at 04:28

## 2023-02-21 RX ADMIN — ASPIRIN 81 MG: 81 TABLET, COATED ORAL at 04:28

## 2023-02-21 RX ADMIN — APIXABAN 5 MG: 5 TABLET, FILM COATED ORAL at 18:36

## 2023-02-21 RX ADMIN — SODIUM CHLORIDE, POTASSIUM CHLORIDE, SODIUM LACTATE AND CALCIUM CHLORIDE 250 ML: 600; 310; 30; 20 INJECTION, SOLUTION INTRAVENOUS at 00:48

## 2023-02-21 RX ADMIN — EZETIMIBE 10 MG: 10 TABLET ORAL at 04:28

## 2023-02-21 RX ADMIN — ROSUVASTATIN 5 MG: 10 TABLET, FILM COATED ORAL at 18:37

## 2023-02-21 RX ADMIN — AMIODARONE HYDROCHLORIDE 200 MG: 200 TABLET ORAL at 12:22

## 2023-02-21 ASSESSMENT — ENCOUNTER SYMPTOMS
NAUSEA: 0
WEAKNESS: 1
SHORTNESS OF BREATH: 0
DIZZINESS: 0
HEADACHES: 0
CHEST TIGHTNESS: 0
ABDOMINAL PAIN: 0
PALPITATIONS: 0

## 2023-02-21 ASSESSMENT — PAIN DESCRIPTION - PAIN TYPE: TYPE: ACUTE PAIN

## 2023-02-21 ASSESSMENT — FIBROSIS 4 INDEX: FIB4 SCORE: 2.31

## 2023-02-21 NOTE — PROGRESS NOTES
Hospital Medicine Daily Progress Note    Date of Service  2/20/2023    Chief Complaint  Regla Meier is a 82 y.o. female admitted 2/17/2023 with sob    Hospital Course  82 y.o. female with a past medical history of COPD, CVA, CHF with preserved ejection fraction, hyperlipidemia who presented 2/17/2023 with shortness of breath and lower extremity edema over the past 4 weeks despite taking Lasix 20 mg daily.  She was admitted to [facility] where she was noted to have an elevated BNP, severe pitting edema of bilateral lower extremities and chest x-ray with bilateral pulmonary edema.  Her troponin trended up to 166 and she was noted to be in new onset A-fib with RVR for which she was started on Eliquis and metoprolol.  Her metoprolol was later discontinued because patient was having pauses on telemetry.  She underwent a 2D echo that revealed preserved EF with grade 2 diastolic dysfunction with RVSP of 40-45. There was concern of infiltrative heart disease given her free kappa was elevated at 52, free lambda elevated 175, monoclonal protein was still pending.  Cardiology recommended transfer to [facility] for right and left heart catheterization.   Cardiology following. Plan [facility] 2/20  Oncology consulted for elevated Keppa and lambda     Interval Problem Update  Seen patient at bedside   Cont lasix per cardiology  Eliquis on hold  [facility] ttoday  I have consulted hem/onc given elevated Keppa and Lambda      I have discussed this patient's plan of care and discharge plan at IDT rounds today with Case Management, Nursing, Nursing leadership, and other members of the IDT team.    Consultants/Specialty  cardiology  oncology    Code Status  Full Code    Disposition  Patient is not medically cleared for discharge.   Anticipate discharge to to home with close outpatient follow-up.  I have placed the appropriate orders for post-discharge needs.    Review of Systems  Review of Systems   Constitutional:  Positive  for malaise/fatigue.   Cardiovascular:  Positive for leg swelling.   Neurological:  Positive for weakness.   All other systems reviewed and are negative.     Physical Exam  Temp:  [36.2 °C (97.2 °F)-37.2 °C (99 °F)] 36.8 °C (98.2 °F)  Pulse:  [76-84] 76  Resp:  [17-18] 17  BP: (108-120)/(65-81) 108/65  SpO2:  [90 %-100 %] 90 %    Physical Exam  Vitals and nursing note reviewed.   Constitutional:       Appearance: Normal appearance. She is ill-appearing.   HENT:      Head: Normocephalic and atraumatic.      Nose: Nose normal.      Mouth/Throat:      Pharynx: Oropharynx is clear.   Eyes:      Extraocular Movements: Extraocular movements intact.      Conjunctiva/sclera: Conjunctivae normal.      Pupils: Pupils are equal, round, and reactive to light.   Cardiovascular:      Rate and Rhythm: Normal rate. Rhythm irregular.      Pulses: Normal pulses.      Heart sounds: Normal heart sounds.   Pulmonary:      Effort: Pulmonary effort is normal.      Breath sounds: Rales present.   Abdominal:      General: Abdomen is flat. Bowel sounds are normal.      Palpations: Abdomen is soft.   Musculoskeletal:         General: Swelling present. Normal range of motion.      Cervical back: Normal range of motion and neck supple.      Right lower leg: Edema present.      Left lower leg: Edema present.   Skin:     General: Skin is warm and dry.   Neurological:      General: No focal deficit present.      Mental Status: She is alert and oriented to person, place, and time. Mental status is at baseline.   Psychiatric:         Mood and Affect: Mood normal.         Behavior: Behavior normal.       Fluids    Intake/Output Summary (Last 24 hours) at 2/20/2023 1612  Last data filed at 2/20/2023 0800  Gross per 24 hour   Intake 960 ml   Output 1700 ml   Net -740 ml         Laboratory  Recent Labs     02/20/23  0314   WBC 7.3   RBC 4.76   HEMOGLOBIN 13.7   HEMATOCRIT 42.8   MCV 89.9   MCH 28.8   MCHC 32.0*   RDW 44.2   PLATELETCT 235   MPV 8.3*        Recent Labs     02/18/23  1312 02/19/23  1541 02/20/23  0314   SODIUM 138 136 136   POTASSIUM 3.9 3.9 3.8   CHLORIDE 99 97 97   CO2 31 33 30   GLUCOSE 92 131* 97   BUN 26* 22 22   CREATININE 0.77 0.83 0.70   CALCIUM 8.5 8.5 8.6                     Imaging  CL-LEFT HEART CATHETERIZATION WITH POSSIBLE INTERVENTION    (Results Pending)          Assessment/Plan  * Acute on chronic congestive heart failure (HCC)- (present on admission)  Assessment & Plan  Continue IV Lasix   Fluid and salt restriction  Strict I's/O  2D echo reveals preserved EF  Cardiology on board  Plan for cardiac catheterization 2/20  Suspecting infiltrative heart disease, free kappa was elevated at 52, free lambda elevated 175. Hem/onc consulted      DNR (do not resuscitate)  Assessment & Plan  Patient is DNR/DNI status    Acute on chronic respiratory failure with hypoxia ([facility])- (present on admission)  Assessment & Plan  Currently on 1-2 L of oxygen via nasal cannula  Wean O2 as tolerated, RT protocol    History of CVA (cerebrovascular accident)  Assessment & Plan  Continue Eliquis and Crestor    Troponin level elevated- (present on admission)  Assessment & Plan  In the setting of acute on chronic congestive heart failure  Denies active chest pain  Continuous cardiac monitoring with serial EKG and troponin  Plan for cardiac catheterization 2/20    AF (atrial fibrillation) ([facility])- (present on admission)  Assessment & Plan  Currently rate controlled  Continue Eliquis. On hold 2/19      COPD (chronic obstructive pulmonary disease) ([facility])- (present on admission)  Assessment & Plan  Does not appear to be in acute exacerbation  I have added Symbicort to her regimen  Albuterol as needed    Hyperlipemia- (present on admission)  Assessment & Plan  Continue Crestor and Zetia         VTE prophylaxis: SCDs/TEDs    I have performed a physical exam and reviewed and updated ROS and Plan today (2/20/2023). In review of yesterday's note (2/19/2023), there are no  changes except as documented above.

## 2023-02-21 NOTE — PROGRESS NOTES
Assumed care of pt. Pt was updated on plan of care. Call light, phone and personal belongings in reach. Bed alarm on and working properly, bed in lowest position, and locked.     History of Present Illness


Admission Date/PCP: 


  11/05/2018





  IGOR TREJO MD





Patient complains of: Status post fall


History of Present Illness: 


OBDULIA PIERCE is a 66 year old female who comes to the emergency department 

status post fall.  Patient tells me that she came home and saw a pumpkin out on 

the floor in the porch, when she went out the light was off and she is stepped 

into the pumpkin with her left lower lower extremity twisting it and finally 

falling onto her right knee.  She denies before the fall any dizziness, 

lightheadedness, nausea, vomiting, headaches.  She hit her head but she did not 

have any loss of consciousness.  EMS was called and in route given 100 mcg of 

fentanyl and 4 mg of Zofran.


In the emergency department left ankle x-ray shows transverse nondisplaced 

fracture distal fibula, right knee x-ray shows a possible nondisplaced 

longitudinal fracture in the lateral body of patella.  Subsequently a right CT 

of the knee was done showing nondisplaced acute patella fracture with mild 

joint effusion, questionable lipohemarthrosis.





Patient placed in a left ankle stirrup up brace and right knee immobilizer and 

attempted to ambulate her with crutches but was unsuccessful, after 10 feet she 

had severe pain and being unable to safely ambulate.





Orthopedist was not contacted immediately as it is felt that this is not a 

surgical case.





Hospitalist was contacted for admission.














Past Medical History


Cardiac Medical History: Reports: Hypertension


Pulmonary Medical History: Reports: Asthma, Chronic Obstructive Pulmonary 

Disease (COPD), Pneumonia


Endocrine Medical History: Reports: Hypothyroidism


GI Medical History: Reports: Gastroesophageal Reflux Disease


Musculoskeltal Medical History: Reports: Arthritis - RA, Fibromyalgia, Other - 

Rheumatoid arthritis


Hematology: Reports: Other


Hematology History Note: 





Systemic lupus erythematosus





Past Surgical History


Past Surgical History: Reports: Hysterectomy, Orthopedic Surgery - L arm





Social History


Information Source: Patient


Smoking Status: Never Smoker


Frequency of Alcohol Use: None


Hx Recreational Drug Use: No


Hx Prescription Drug Abuse: No


Past Social History Note: 





Lives with her  and her grandson as she has custody of him.





Family History


Family History: Reviewed & Not Pertinent


Parental Family History Reviewed: Yes


Children Family History Reviewed: NA


Sibling(s) Family History Reviewed.: NA





Medication/Allergy


Home Medications: 








Acetaminophen with Codeine [Tylenol #3 Tablet] 1 each PO Q4HP PRN #30 tablet 06/ 19/17 








Allergies/Adverse Reactions: 


 





hydrochlorothiazide [Hydrochlorothiazide] Allergy (Verified 11/04/18 20:11)


 


morphine [Morphine] Allergy (Verified 11/04/18 20:11)


 


Penicillins Allergy (Verified 11/04/18 20:11)


 


rofecoxib [From Vioxx] Allergy (Verified 11/04/18 20:11)


 


Sulfa (Sulfonamide Antibiotics) Allergy (Verified 11/04/18 20:11)


 











Review of Systems


Review of Systems: 





As outlined in the HPI, others negative





Physical Exam


Vital Signs: 


 











Temp Pulse Resp BP Pulse Ox


 


 99.2 F   72   20   158/80 H  99 


 


 11/04/18 21:45  11/04/18 21:45  11/04/18 21:45  11/04/18 21:45  11/04/18 21:45








 Intake & Output











 11/03/18 11/04/18 11/05/18





 07:59 06:59 06:59


 


Weight   95.254 kg











Additional comments: 





General appearance: Well-developed, well-nourished, alert and cooperative, and 

appears to be in no acute distress


Head: Normocephalic


Eyes: PEERL, EOMI, vision is grossly intact.  Ears: External auditory canal and 

tympanic membranes clear, hearing grossly intact.  Nose: No nasal discharge.  

Throat: Oral cavity and pharynx normal.  No inflammation, swelling, exudate or 

lesions.


Neck: Neck supple, nontender without lymphadenopathy, masses or thyromegaly.


Cardiac: Normal S1 and S2.  No S3, S4 or murmurs.  Rhythm is regular.  There is 

no peripheral edema, cyanosis or pallor.  Extremities are warm and well 

perfused.  Capillary refill is less than 2 seconds.  No carotid bruits.


Lungs: Clear to auscultation and percussion without rales, rhonchi, wheezing or 

diminished breath sounds.  Not using accessory muscles.


Abdomen: Positive bowel sounds.  Soft.  Nondistended, nontender.  No guarding 

or rebound.  No masses.  No hepatosplenomegaly


Extremities: Right knee: Tender, abrasion noted anterolateral, severe pain with 

R OM, could not bear weight.  Left ankle: Tender to palpation with mild edema 

and mild deformity.


Neurological: Cranial nerves II through XII grossly intact.  Strength and 

sensation symmetric and intact throughout.  Reflexes 2+ throughout.


Skin: Skin normal color, texture and turgor , warm and dry.


Psychiatric:  The mental examination revealed the patient was oriented to person

, place, and time.  The patient was able to demonstrate good judgment on recent

, without hallucinations, abnormal affect or abnormal behaviors.





Results


Laboratory Results: 


 





 11/04/18 22:55 





 11/04/18 22:55 





 











  11/04/18 11/04/18





  22:55 22:55


 


WBC  6.6 


 


RBC  4.50 


 


Hgb  13.6 


 


Hct  40.3 


 


MCV  90 


 


MCH  30.3 


 


MCHC  33.9 


 


RDW  13.1 


 


Plt Count  234 


 


Seg Neutrophils %  65.8 


 


Lymphocytes %  24.7 


 


Monocytes %  6.3 


 


Eosinophils %  2.2 


 


Basophils %  1.0 


 


Absolute Neutrophils  4.4 


 


Absolute Lymphocytes  1.6 


 


Absolute Monocytes  0.4 


 


Absolute Eosinophils  0.1 


 


Absolute Basophils  0.1 


 


Sodium   141.6


 


Potassium   3.6


 


Chloride   104


 


Carbon Dioxide   27


 


Anion Gap   11


 


BUN   15


 


Creatinine   0.87


 


Est GFR ( Amer)   > 60


 


Est GFR (Non-Af Amer)   > 60


 


Glucose   110


 


Calcium   9.2











Impressions: 


 





Ankle X-Ray  11/04/18 19:43


IMPRESSION:  Transverse Nondisplaced fracture of the distal fibula- lower 

lateral malleolus.  No other fracture or dislocation.


 








Knee X-Ray  11/04/18 19:43


IMPRESSION:  Possible nondisplaced longitudinal fracture in the lateral body of 

the patella.


 








Lower Extremity CT  11/04/18 21:09


IMPRESSION:


 


Nondisplaced acute patellar fracture. Moderate degenerative


changes with probable loose body calcification in the


suprapatellar joint with mild joint effusion.


 














Assessment & Plan





- Diagnosis


(1) Fall


Qualifiers: 


   Encounter type: initial encounter   Qualified Code(s): W19.XXXA - 

Unspecified fall, initial encounter   


Is this a current diagnosis for this admission?: Yes   


Plan: 


Mechanical fall trying to  a small pumpkin.  Found with left distal 

fibular fracture and lower lateral malleolus as well as nondisplaced right 

patella fracture with a small joint effusion.  In the emergency department 

attempted to walk her with right knee and left lower extremity braces but 

patient has intolerable pain.  We will keep the patient under observation 

overnight for orthopedics consult and recommendation as these lesions seems to 

be nonsurgical and physical therapy evaluation.  IV/p.o. pain medications place 

as needed as well as antiemetics as needed.








(2) Fracture of distal fibula


Qualifiers: 


   Encounter type: initial encounter   Fracture type: closed   Fracture 

morphology: unspecified fracture morphology   Laterality: left   Qualified Code(

s): S82.832A - Other fracture of upper and lower end of left fibula, initial 

encounter for closed fracture   


Is this a current diagnosis for this admission?: Yes   


Plan: 


As above.  Tells me she feels better with the brace on her left lower extremity








(3) Patella fracture


Qualifiers: 


   Encounter type: initial encounter   Fracture type: closed   Fracture 

morphology: longitudinal   Fracture alignment: nondisplaced   Laterality: right

   Qualified Code(s): S82.024A - Nondisplaced longitudinal fracture of right 

patella, initial encounter for closed fracture   


Is this a current diagnosis for this admission?: Yes   


Plan: 


As above.  Cannot tolerate right knee brace.








(4) Hypertension


Qualifiers: 


   Hypertension type: essential hypertension   Qualified Code(s): I10 - 

Essential (primary) hypertension   


Is this a current diagnosis for this admission?: Yes   


Plan: 


Blood pressure 150/80.  Continue with home antihypertensive medications.








(5) Lupus


Qualifiers: 


   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 

lupus erythematosus   


Is this a current diagnosis for this admission?: Yes   


Plan: 


Patient is on [employer]








(6) COPD (chronic obstructive pulmonary disease)


Is this a current diagnosis for this admission?: Yes   


Plan: 


No current respiratory symptoms, continue with home bronchodilators.








(7) DVT prophylaxis


Is this a current diagnosis for this admission?: Yes   


Plan: 


Heparin








- Time


Time Spent: 50 to 70 Minutes





- Plan Summary


Plan Summary: 





Case discussed with patient and her  who is at the bedside, agree with 

plan.

## 2023-02-21 NOTE — CONSULTS
Consult Note: Hematology/Oncology    Date of consultation: 2/21/2023 10:50 AM    Referring provider: Radha Dong D.O.     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 Patient was admitted to [facility] for acute on chronic heart failure exacerbation where she was noted to have elevated BNP, BLE edema, CXR revealing bilateral pulmonary edema, elevated troponin, new onset afib with RVR (started on metoprolol which was later discontinued due to pauses noted on cardiac monitor) and initiated on IV diuresis. Echocardiogram reveal preserved EF with grade 2 diastolic dysfunction, RVSP 40-45, mild MR, mild TR, no LVH. S/p LHC with cardiology 2/20/23 reveal non-obstructive CAD. Workup was initiated for infiltrative cardiomyopathy, and Heme/Onc consulted for elevated kappa 52 and lambda 175 with concerns for possible amyloidosis.     Patient with no family hx of amyloidosis or significant heart disease. 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     Past Medical History:    Past Medical History:   Diagnosis Date    Blindness of left eye     HFpEF     Hyperlipemia     Stroke (HCC) 08/01/2022   Denies history of COPD (no PFT performed). Was placed on albuterol inhaler after medical provider noted some wheezing when she was exposed to her  who had covid at the time.    Past surgical history:    Past Surgical History:   Procedure Laterality Date    CYSTECTOMY  1963    CATARACT EXTRACTION WITH IOL       Allergies:  Atorvastatin and Pcn [penicillins]    Medications:     Current Facility-Administered Medications   Medication Dose Route Frequency Provider Last Rate Last Admin    [START ON 2/22/2023] furosemide (LASIX) tablet 40 mg  40 mg Oral BID DIURETIC Leah Segovia M.D.        [START ON 2/22/2023] spironolactone (ALDACTONE) tablet 25 mg  25 mg Oral Q DAY Leah Segovia M.D.        acetaminophen (Tylenol) tablet 650 mg  650 mg Oral Q6HRS PRN ANAMIKA NavarroO.   650 mg at 02/20/23 2122    albuterol inhaler 2 Puff  2 Puff Inhalation Q6HRS PRN CELSA Navarro.O.        [Held by provider] apixaban (ELIQUIS) tablet 5 mg  5 mg Oral BID Radha Dong D.O.   5 mg at 02/19/23 0622    aspirin EC (ECOTRIN) tablet 81 mg  81 mg Oral DAILY Radha Dong D.O.   81 mg at 02/21/23 0428    ezetimibe (ZETIA) tablet 10 mg  10 mg Oral DAILY Radha Dong, D.O.   10 mg at 02/21/23 0428    fluticasone (FLONASE) nasal spray 50 mcg  1 Spray Nasal QHS CELSA Navarro.O.   50 mcg at 02/20/23 2123    Respiratory Therapy Consult   Nebulization Continuous RT Radha Dong D.O.        rosuvastatin (CRESTOR) tablet 5 mg  5 mg Oral Q EVENING Radha Dong D.O.   5 mg at 02/19/23 1730    senna-docusate (PERICOLACE or SENOKOT S) 8.6-50 MG per tablet 2 Tablet  2 Tablet Oral BID Radha Dong D.O.   2 Tablet at 02/19/23 1730    And    polyethylene glycol/lytes (MIRALAX) PACKET 1 Packet  1 Packet Oral QDAY PRN CELSA Navarro.O.        And    magnesium hydroxide (MILK OF MAGNESIA) suspension 30 mL  30 mL Oral QDAY PRN CELSA Navarro.O.        And    bisacodyl (DULCOLAX) suppository 10 mg  10 mg Rectal QDAY PRN Radha A Iker, D.O.         Social History:     Social History     Socioeconomic History    Marital status:      Spouse name: Not on file    Number of children: 2    Years of education: Not on file    Highest education level: Not on file   Occupational History    retired   Tobacco Use    Smoking status: Never    Smokeless tobacco: Never   Vaping Use    Vaping  Use: Never used   Substance and Sexual Activity    Alcohol use: Yes     Alcohol/week: Less than 1 drink a week, usually with dinner     Types: 1 Glasses of wine     Comment: occ glass of wine    Drug use: No    Sexual activity: Yes     Partners: Male     Comment:  for 57 years. Documented on 4/10/19.   Other Topics Concern    Not on file   Social History Narrative    Not on file     Family History:     Family History   Problem Relation Age of Onset    Other Mother         TIA, FTT, no known cardiac dx    Other Father         Abdominal aneurysm rupture, no known cardiac dx    Alcohol abuse Brother     Heart Disease, cardiac stent, brain bleed Brother     No Known Problems Daughter     No Known Problems Daughter      Review of Systems:   All other review of systems are negative except what was mentioned above in the HPI.    Constitutional: No fever, chills, weight loss ,malaise/fatigue.    HEENT: No new auditory or visual complaints. No sore throat and neck pain.     Respiratory: No new cough, sputum production, wheezing. + shortness of breath    Cardiovascular: No new chest pain, palpitations. + orthopnea, + leg swelling.    Gastrointestinal: No heartburn, nausea, vomiting, abdominal pain, hematochezia or melena     Genitourinary: Negative for dysuria, hematuria    Musculoskeletal: No new arthralgias or myalgias   Skin: Negative for rash and itching.    Neurological: Negative for focal weakness or headaches.    Endo/Heme/Allergies: No abnormal bleed/bruise.      Physical Exam:   Vitals:   /61   Pulse 69   Temp 37 °C (98.6 °F) (Temporal)   Resp 14   Wt 62.8 kg (138 lb 7.2 oz)   SpO2 97%   BMI 25.32 kg/m²     General: Not in acute distress, alert and oriented x 3  HEENT: No pallor, icterus. Oropharynx clear. No macroglossia  Neck: Supple, no palpable masses.  CVS: regular rate and rhythm, no rubs or gallops  RESP: Clear to auscultate bilaterally, no wheezing or crackles.   ABD: Soft, non tender, non  distended, no palpable organomegaly  EXT: No edema or cyanosis. BLE skin wrinkled.  CNS: Alert and oriented x3, No focal deficits.  Skin- No rash    Labs:   Recent Labs     02/20/23  0314 02/21/23  0213   RBC 4.76 4.02*   HEMOGLOBIN 13.7 12.0   HEMATOCRIT 42.8 36.6*   PLATELETCT 235 271     Lab Results   Component Value Date/Time    SODIUM 136 02/21/2023 02:13 AM    POTASSIUM 3.4 (L) 02/21/2023 02:13 AM    CHLORIDE 97 02/21/2023 02:13 AM    CO2 32 02/21/2023 02:13 AM    GLUCOSE 85 02/21/2023 02:13 AM    BUN 22 02/21/2023 02:13 AM    CREATININE 0.83 02/21/2023 02:13 AM        Assessment and Plan:    #Monoclonal gammopathy   #HFpEF   #Atrial fibrillation   #Elevated BNP   #Elevated Troponin   #Hyperlipidemia   #First degree AV block    No macroglossia, abnormal renal/liver function, hypercalcemia, abnormal skin lesions, relevant family hx, or significant anemia noted on exam/chart. + bilateral neuropathy to BLE. Gamma, alpha, beta globulin within normal limits.     - OMER gel show faint bands in IgM kappa and lambda. Serum free kappa 52 and lambda 175   elevated, kappa/lambda ratio 0.30 low. Could have MGUS.  - in setting of HFpEF, elevated light chains, abnormal light chain ratio, OMER with IgM bands, history of elevated troponin, agree with workup for amyloidosis. Recommend outpatient heme followup with bone marrow biopsy for further workup.   - technetium scan ordered by cardiology would help identify ATTR amyloidosis.      Patient agreed and verbalized her agreement and understanding with the current plan.  I answered all questions and concerns she has at this time. Thank you for allowing me to participate in her care.      Diamante Espinal D.O.  [facility] Internal Medicine, PGY-2

## 2023-02-21 NOTE — PROGRESS NOTES
Hospital Medicine Daily Progress Note    Date of Service  2/21/2023    Chief Complaint  Regla Meier is a 82 y.o. female admitted 2/17/2023 with sob    Hospital Course  82 y.o. female with a past medical history of COPD, CVA, CHF with preserved ejection fraction, hyperlipidemia who presented 2/17/2023 with shortness of breath and lower extremity edema over the past 4 weeks despite taking Lasix 20 mg daily.  She was admitted to [facility] where she was noted to have an elevated BNP, severe pitting edema of bilateral lower extremities and chest x-ray with bilateral pulmonary edema.  Her troponin trended up to 166 and she was noted to be in new onset A-fib with RVR for which she was started on Eliquis and metoprolol.  Her metoprolol was later discontinued because patient was having pauses on telemetry.  She underwent a 2D echo that revealed preserved EF with grade 2 diastolic dysfunction with RVSP of 40-45. There was concern of infiltrative heart disease given her free kappa was elevated at 52, free lambda elevated 175, monoclonal protein was still pending.  Cardiology recommended transfer to [facility] for right and left heart catheterization.   Cardiology following. Plan [facility] 2/20 which showed nonobstructive coronary artery disease.  We will also plan to have 1-2+ aortic regurgitation.  Patient was started on amiodarone for rhythm control.  Cardiology recommended nuclear technetium amyloid scan.  Oncology consulted for elevated Keppa and lambda patient will need LP since bone marrow biopsy for further work-up.    Interval Problem Update  -Remains on 4 L nasal cannula  -Heme-onc consulted today  -Transition from IV to p.o. diuretics, started spironolactone  -Patient overall feeling better    I have discussed this patient's plan of care and discharge plan at IDT rounds today with Case Management, Nursing, Nursing leadership, and other members of the IDT  team.    Consultants/Specialty  cardiology  oncology    Code Status  Full Code    Disposition  Patient is not medically cleared for discharge.   Anticipate discharge to to home with close outpatient follow-up.  I have placed the appropriate orders for post-discharge needs.    Review of Systems  Review of Systems   Respiratory:  Negative for shortness of breath.    Cardiovascular:  Negative for chest pain and leg swelling.   Neurological:  Positive for weakness.   All other systems reviewed and are negative.     Physical Exam  Temp:  [36.5 °C (97.7 °F)-37.2 °C (99 °F)] 37 °C (98.6 °F)  Pulse:  [69-88] 69  Resp:  [14-18] 14  BP: ()/(46-96) 108/61  SpO2:  [90 %-97 %] 97 %    Physical Exam  Vitals and nursing note reviewed.   Constitutional:       General: She is not in acute distress.     Appearance: Normal appearance. She is not ill-appearing, toxic-appearing or diaphoretic.   HENT:      Head: Normocephalic and atraumatic.      Nose: Nose normal.      Mouth/Throat:      Pharynx: Oropharynx is clear.   Eyes:      Conjunctiva/sclera: Conjunctivae normal.   Cardiovascular:      Rate and Rhythm: Normal rate. Rhythm irregular.      Pulses: Normal pulses.      Heart sounds: Normal heart sounds.   Pulmonary:      Effort: Pulmonary effort is normal.      Breath sounds: No rales.   Abdominal:      General: Abdomen is flat. Bowel sounds are normal.      Palpations: Abdomen is soft.   Musculoskeletal:         General: Normal range of motion.      Cervical back: Normal range of motion and neck supple.      Right lower leg: No edema.      Left lower leg: No edema.   Skin:     General: Skin is warm and dry.   Neurological:      General: No focal deficit present.      Mental Status: She is alert and oriented to person, place, and time. Mental status is at baseline.   Psychiatric:         Mood and Affect: Mood normal.         Behavior: Behavior normal.       Fluids    Intake/Output Summary (Last 24 hours) at 2/21/2023  1219  Last data filed at 2/21/2023 0932  Gross per 24 hour   Intake 710 ml   Output 1750 ml   Net -1040 ml         Laboratory  Recent Labs     02/20/23  0314 02/21/23  0213   WBC 7.3 6.8   RBC 4.76 4.02*   HEMOGLOBIN 13.7 12.0   HEMATOCRIT 42.8 36.6*   MCV 89.9 91.0   MCH 28.8 29.9   MCHC 32.0* 32.8*   RDW 44.2 43.8   PLATELETCT 235 271   MPV 8.3* 8.2*       Recent Labs     02/19/23  1541 02/20/23  0314 02/21/23  0213   SODIUM 136 136 136   POTASSIUM 3.9 3.8 3.4*   CHLORIDE 97 97 97   CO2 33 30 32   GLUCOSE 131* 97 85   BUN 22 22 22   CREATININE 0.83 0.70 0.83   CALCIUM 8.5 8.6 8.2*                     Imaging  CL-LEFT HEART CATHETERIZATION WITH POSSIBLE INTERVENTION    (Results Pending)   NM-CARDIAC AMYLOIDOSIS PYP SCAN    (Results Pending)   NM-CARDIAC AMYLOIDOSIS PYP SCAN    (Results Pending)          Assessment/Plan  * Acute on chronic congestive heart failure (HCC)- (present on admission)  Assessment & Plan  IV lasix-->PO lasix  Fluid and salt restriction  Strict I's/O  2D echo reveals preserved EF  Cardiology on board  [facility] 2/20: Nonobstructive CAD  Nuclear technetium amyloid scan  Suspecting infiltrative heart disease, free kappa was elevated at 52, free lambda elevated 175. Hem/onc consulted, planning outpatient bone marrow biopsy      DNR (do not resuscitate)  Assessment & Plan  Patient is DNR/DNI status    Acute on chronic respiratory failure with hypoxia ([facility])- (present on admission)  Assessment & Plan  Currently on 1-2 L of oxygen via nasal cannula  Wean O2 as tolerated, RT protocol    History of CVA (cerebrovascular accident)  Assessment & Plan  Continue Eliquis and Crestor    Troponin level elevated- (present on admission)  Assessment & Plan  In the setting of acute on chronic congestive heart failure  Denies active chest pain  Continuous cardiac monitoring with serial EKG and troponin  Plan for cardiac catheterization 2/20    AF (atrial fibrillation) ([facility])- (present on admission)  Assessment &  Plan  Transitioning to amiodarone, started 200 mg twice daily  Continue Eliquis.       COPD (chronic obstructive pulmonary disease) (HCC)- (present on admission)  Assessment & Plan  Does not appear to be in acute exacerbation  I have added Symbicort to her regimen  Albuterol as needed    Hyperlipemia- (present on admission)  Assessment & Plan  Continue Crestor and Zetia         VTE prophylaxis: SCDs/TEDs and therapeutic anticoagulation with Eliquis    I have performed a physical exam and reviewed and updated ROS and Plan today (2/21/2023). In review of yesterday's note (2/20/2023), there are no changes except as documented above.

## 2023-02-21 NOTE — PROGRESS NOTES
Cardiology Follow Up Progress Note    Date of Service  2/21/2023    Attending Physician  Kat Alcantar M.D.    [facility]  Regla Meier is a 82 y.o. female admitted 2/17/2023 with HFpEF, prior CVA in 2022 who was admitted to [facility] on 2/17/2023 with acute on chronic heart failure exacerbation.  She was found to have elevated troponins that peaked at 166. Given heart failure exacerbation and elevated troponin, she was transferred to [facility] by cardiology for cath.     Interim Events  2/20: Symptomatically improved, edema resolved.  No chest pain.  2/21: Continues to feel better with diuresis.  Tolerated [facility] yesterday    Review of Systems  Review of Systems   Respiratory:  Negative for chest tightness and shortness of breath.    Cardiovascular:  Negative for palpitations.   Gastrointestinal:  Negative for abdominal pain and nausea.   Neurological:  Negative for dizziness and headaches.     Vital signs in last 24 hours  Temp:  [36.5 °C (97.7 °F)-37.2 °C (99 °F)] 37 °C (98.6 °F)  Pulse:  [69-88] 69  Resp:  [14-18] 14  BP: ()/(46-96) 108/61  SpO2:  [90 %-97 %] 97 %    Physical Exam  Physical Exam  Constitutional:       General: She is not in acute distress.     Comments: Frail.  Petite.  Elderly.   Cardiovascular:      Rate and Rhythm: Normal rate and regular rhythm.      Heart sounds: Normal heart sounds, S1 normal and S2 normal. No murmur heard.    No friction rub. No gallop.   Pulmonary:      Effort: Pulmonary effort is normal.      Breath sounds: Normal breath sounds. No wheezing, rhonchi or rales.   Musculoskeletal:      Right lower leg: No edema.      Left lower leg: No edema.   Skin:     General: Skin is warm and dry.   Neurological:      Mental Status: She is alert and oriented to person, place, and time.   Psychiatric:         Behavior: Behavior normal.       Lab Review  Lab Results   Component Value Date/Time    WBC 6.8 02/21/2023 02:13 AM    RBC 4.02 (L) 02/21/2023  02:13 AM    HEMOGLOBIN 12.0 02/21/2023 02:13 AM    HEMATOCRIT 36.6 (L) 02/21/2023 02:13 AM    MCV 91.0 02/21/2023 02:13 AM    MCH 29.9 02/21/2023 02:13 AM    MCHC 32.8 (L) 02/21/2023 02:13 AM    MPV 8.2 (L) 02/21/2023 02:13 AM      Lab Results   Component Value Date/Time    SODIUM 136 02/21/2023 02:13 AM    POTASSIUM 3.4 (L) 02/21/2023 02:13 AM    CHLORIDE 97 02/21/2023 02:13 AM    CO2 32 02/21/2023 02:13 AM    GLUCOSE 85 02/21/2023 02:13 AM    BUN 22 02/21/2023 02:13 AM    CREATININE 0.83 02/21/2023 02:13 AM      Lab Results   Component Value Date/Time    ASTSGOT 37 02/15/2023 02:28 AM    ALTSGPT 23 02/15/2023 02:28 AM     Lab Results   Component Value Date/Time    CHOLSTRLTOT 198 02/15/2023 02:28 AM     (H) 02/15/2023 02:28 AM    HDL 69 02/15/2023 02:28 AM    TRIGLYCERIDE 113 02/15/2023 02:28 AM    TROPONINT 166 (H) 02/15/2023 05:44 PM       No results for input(s): NTPROBNP in the last 72 hours.    Cardiac Imaging and Procedures Review  EKG:  My personal interpretation of the EKG dated/19/2023 is sinus rhythm, PACs, anterior MI, inferior MI, low voltage    Rhythm: My personal interpretation of the rhythm dated 2/21/2023 is sinus rhythm    Echocardiogram: 2/15/2023  Normal left ventricular systolic function.  Grade II diastolic dysfunction.  Mild mitral regurgitation.  Mild to moderate aortic insufficiency.  Mild tricuspid regurgitation.  Right ventricular systolic pressure is estimated to be  40-45  mmHg.    Imaging  Chest X-Ray: 2/14/2023   BILATERAL pleural effusions with overlying atelectasis which could obscure an additional process  2.  Persistently enlarged cardiac silhouette     MPI:  7/11/2022  Small sized, equivocal, non-reversible, mild severity defects in the apical septal wall and mid inferolateral wall. Both most consistent with artifact given the distribution, less likely small scars.    * SSS 2. SDS 0. No reversible ischemia.    * Normal left ventricular size, ejection fraction, and wall  motion.   ECG INTERPRETATION    Assessment:  HFpEF acute on chronic  Afib new onset spontaneously converting to sinus rhythm  Nonobstructive CAD by [facility] 2/20/2023  Aortic regurgitation mild to moderate  Mitral regurgitation, mild to moderate  Pulmonary hypertension, mild  Concern for amyloid  Minimally elevated free light chains  Prior abnormal MPI  Coronary calcification  CVA 8/1/2022  COPD    Recommendations Discussion  Heart failure resolving, symptomatically improved  Maintaining sinus rhythm  Start amiodarone for rhythm control  Nuclear technetium amyloid scan  Adjust diuretics  Continue spironolactone  Does not need aspirin from a cardiac standpoint, high risk for bleeding with apixaban  Monitor renal function  Reviewed coronary angiogram results with the patient and family showing nonobstructive CAD  Continue rosuvastatin, target LDL less than 70  Being evaluated for gammopathy by Dr. Payton and possible amyloid    I personally reviewed the patient's treatment plan with Kurt Payton MD    Thank you for allowing me to participate in the care of this patient.    Please contact me with any questions.    Luis Alberto Ferguson M.D.   Cardiologist, [facility]  [phone]

## 2023-02-21 NOTE — PROGRESS NOTES
Vasc-band taken down per order, no oozing or bleeding noted. Dressing placed over site, patient educated not to use wrist for 48hrs. VSS   Zygomaticofacial Flap Text: Given the location of the defect, shape of the defect and the proximity to free margins a zygomaticofacial flap was deemed most appropriate for repair.  Using a sterile surgical marker, the appropriate flap was drawn incorporating the defect and placing the expected incisions within the relaxed skin tension lines where possible. The area thus outlined was incised deep to adipose tissue with a #15 scalpel blade with preservation of a vascular pedicle.  The skin margins were undermined to an appropriate distance in all directions utilizing iris scissors.  The flap was then placed into the defect and anchored with interrupted buried subcutaneous sutures.

## 2023-02-21 NOTE — PROGRESS NOTES
Bedside report received from JULIETTE Carter, and patient care assumed. Patient was sleeping soundly. Personal belonging and call light placed within reach of patient.  Bed is locked and in lowest position.

## 2023-02-21 NOTE — CARE PLAN
The patient is Watcher - Medium risk of patient condition declining or worsening    Shift Goals  Clinical Goals: post-op vitals;  Patient Goals: sleep  Family Goals: REGIS    Progress made toward(s) clinical / shift goals:    Problem: Knowledge Deficit - Standard  Goal: Patient and family/care givers will demonstrate understanding of plan of care, disease process/condition, diagnostic tests and medications  Outcome: Progressing  Note: POC discussed with patient. Questions and concerns were addressed at the time.     Problem: Fall Risk  Goal: Patient will remain free from falls  Outcome: Progressing  Note: Patient is at moderate risk for falling. Bed alarm is on. Bed is locked and in lowest position. Personal belonging and call light are within reach.     Problem: Pain - Standard  Goal: Alleviation of pain or a reduction in pain to the patient’s comfort goal  Outcome: Progressing  Note: Pain assessment performed and pain is managed per MAR.     Problem: Respiratory  Goal: Patient will achieve/maintain optimum respiratory ventilation and gas exchange  Outcome: Progressing  Note: Patient is being weaned off oxygen. Patient is saturating at 93% at 4L. Will lower oxygen level to 3L.     Problem: Care Map:  Day 3 Optimal Outcome for the Heart Failure Patient  Goal: Day 3:  Optimal Care of the heart failure patient  Outcome: Progressing  Intervention: Instruct patient to write down weights on symptom tracker daily  Note: Patient was educated on recording daily weight at home.  Intervention: Daily weight documented.  Use stand up scale if patient able. Compare to previous weight  Note: Patient will be weighed on stand up scale in the morning.  Intervention: Assess and document 2 hour post diuretic output  Note: Lasix was given at 2100. Will check output at 2300.  Intervention: Assess edema every shift (peripheral, sacral, periorbital, perineal/scrotal, and abdominal)  Note: Edema assessed: patient legs swelling has gone down  significantly from self report.       Patient is not progressing towards the following goals:

## 2023-02-21 NOTE — PROGRESS NOTES
NOC HOSPITALIST CROSS COVER    Notified by RN regarding hypotension of 83/46.      Vitals:    02/21/23 0708   BP: 108/61   Pulse: 69   Resp: 14   Temp: 37 °C (98.6 °F)   SpO2: 97%          Plan:  #Hypotension  -LR @ 125 mL/hr x 2 hours for a total of 250 mL  -Cautious use of further boluses given need for diuresis with pleural effusions  -Repeat BP post infusion         -----------------------------------------------------------------------------------------------------------    Electronically signed by:  Cuca Weber, JUHI, APRN, JANETP-BC  Hospitalist Services

## 2023-02-22 LAB
ANION GAP SERPL CALC-SCNC: 7 MMOL/L (ref 7–16)
BUN SERPL-MCNC: 24 MG/DL (ref 8–22)
CALCIUM SERPL-MCNC: 8.3 MG/DL (ref 8.5–10.5)
CHLORIDE SERPL-SCNC: 98 MMOL/L (ref 96–112)
CO2 SERPL-SCNC: 28 MMOL/L (ref 20–33)
CREAT SERPL-MCNC: 0.71 MG/DL (ref 0.5–1.4)
GFR SERPLBLD CREATININE-BSD FMLA CKD-EPI: 85 ML/MIN/1.73 M 2
GLUCOSE SERPL-MCNC: 103 MG/DL (ref 65–99)
POTASSIUM SERPL-SCNC: 4.3 MMOL/L (ref 3.6–5.5)
SODIUM SERPL-SCNC: 133 MMOL/L (ref 135–145)

## 2023-02-22 PROCEDURE — 99232 SBSQ HOSP IP/OBS MODERATE 35: CPT | Performed by: INTERNAL MEDICINE

## 2023-02-22 PROCEDURE — 700102 HCHG RX REV CODE 250 W/ 637 OVERRIDE(OP): Performed by: INTERNAL MEDICINE

## 2023-02-22 PROCEDURE — A9270 NON-COVERED ITEM OR SERVICE: HCPCS | Performed by: INTERNAL MEDICINE

## 2023-02-22 PROCEDURE — 770020 HCHG ROOM/CARE - TELE (206)

## 2023-02-22 PROCEDURE — 80048 BASIC METABOLIC PNL TOTAL CA: CPT

## 2023-02-22 RX ADMIN — EZETIMIBE 10 MG: 10 TABLET ORAL at 05:32

## 2023-02-22 RX ADMIN — SPIRONOLACTONE 25 MG: 25 TABLET ORAL at 05:33

## 2023-02-22 RX ADMIN — AMIODARONE HYDROCHLORIDE 200 MG: 200 TABLET ORAL at 17:38

## 2023-02-22 RX ADMIN — ACETAMINOPHEN 650 MG: 325 TABLET, FILM COATED ORAL at 02:11

## 2023-02-22 RX ADMIN — APIXABAN 5 MG: 5 TABLET, FILM COATED ORAL at 17:38

## 2023-02-22 RX ADMIN — FUROSEMIDE 40 MG: 40 TABLET ORAL at 05:32

## 2023-02-22 RX ADMIN — ACETAMINOPHEN 650 MG: 325 TABLET, FILM COATED ORAL at 21:08

## 2023-02-22 RX ADMIN — AMIODARONE HYDROCHLORIDE 200 MG: 200 TABLET ORAL at 05:32

## 2023-02-22 RX ADMIN — ROSUVASTATIN 5 MG: 10 TABLET, FILM COATED ORAL at 17:37

## 2023-02-22 RX ADMIN — APIXABAN 5 MG: 5 TABLET, FILM COATED ORAL at 05:32

## 2023-02-22 RX ADMIN — FLUTICASONE PROPIONATE 50 MCG: 50 SPRAY, METERED NASAL at 19:40

## 2023-02-22 RX ADMIN — SENNOSIDES AND DOCUSATE SODIUM 2 TABLET: 50; 8.6 TABLET ORAL at 17:38

## 2023-02-22 ASSESSMENT — ENCOUNTER SYMPTOMS
EYES NEGATIVE: 1
PALPITATIONS: 0
SHORTNESS OF BREATH: 0
CHEST TIGHTNESS: 0
ENDOCRINE NEGATIVE: 1
FATIGUE: 0
CONSTIPATION: 0
ABDOMINAL PAIN: 0
FLANK PAIN: 0
WEAKNESS: 0
PSYCHIATRIC NEGATIVE: 1
ALLERGIC/IMMUNOLOGIC NEGATIVE: 1
HEADACHES: 0
HEMATOLOGIC/LYMPHATIC NEGATIVE: 1
DIZZINESS: 0
ARTHRALGIAS: 0
CHILLS: 0
COUGH: 0
DIAPHORESIS: 0
FEVER: 0

## 2023-02-22 ASSESSMENT — FIBROSIS 4 INDEX: FIB4 SCORE: 2.31

## 2023-02-22 ASSESSMENT — PAIN DESCRIPTION - PAIN TYPE: TYPE: ACUTE PAIN

## 2023-02-22 NOTE — PROGRESS NOTES
Cardiology Follow Up Progress Note    Date of Service  2/22/2023    Attending Physician  Leah Segovia M.D.    Chief Complaint   SOB    HPI  Regla Meier is a 82 y.o. female admitted 2/17/2023 with with HFpEF, prior CVA in 2022 who was admitted to [facility] on 2/17/2023 with acute on chronic heart failure exacerbation.  She was found to have elevated troponins that peaked at 166. Given heart failure exacerbation and elevated troponin, she was transferred to [facility] by cardiology for cath.        Interim Events  2/20: Symptomatically improved, edema resolved.  No chest pain.  2/21: Continues to feel better with diuresis.  Tolerated [facility] 2/20  This morning pt states she is feeling better, no longer coughing, has the urge to make a bowel movement, and has slight pain in her toes.     Review of Systems  Review of Systems   Constitutional:  Negative for chills, diaphoresis, fatigue and fever.   HENT:  Negative for congestion.    Eyes: Negative.    Respiratory:  Negative for cough and shortness of breath.    Cardiovascular:  Negative for chest pain, palpitations and leg swelling.   Gastrointestinal:  Negative for abdominal pain and constipation.   Endocrine: Negative.    Genitourinary:  Negative for difficulty urinating and flank pain.   Musculoskeletal:  Negative for arthralgias.   Allergic/Immunologic: Negative.    Neurological:  Negative for dizziness, weakness and headaches.   Hematological: Negative.    Psychiatric/Behavioral: Negative.       Vital signs in last 24 hours  Temp:  [36.8 °C (98.2 °F)-37.1 °C (98.8 °F)] 36.8 °C (98.2 °F)  Pulse:  [74-82] 82  Resp:  [16-20] 18  BP: ()/(54-72) 117/72  SpO2:  [90 %-97 %] 91 %    Physical Exam  Physical Exam  Constitutional:       Appearance: Normal appearance. She is not ill-appearing or toxic-appearing.      Comments: Frail   HENT:      Head: Normocephalic and atraumatic.   Cardiovascular:      Rate and Rhythm: Normal rate  and regular rhythm.      Pulses: Normal pulses.      Heart sounds: Normal heart sounds. No murmur heard.    No friction rub. No gallop.   Pulmonary:      Effort: Pulmonary effort is normal. No respiratory distress.      Breath sounds: No wheezing, rhonchi or rales.      Comments: Diminished basilar breath sounds, on 1L NC  Chest:      Chest wall: No tenderness.   Abdominal:      General: Abdomen is flat. There is no distension.      Palpations: Abdomen is soft.   Musculoskeletal:         General: Tenderness present. No swelling.      Right lower leg: No edema.      Left lower leg: No edema.   Skin:     General: Skin is warm and dry.      Capillary Refill: Capillary refill takes 2 to 3 seconds.   Neurological:      General: No focal deficit present.      Mental Status: She is alert and oriented to person, place, and time.   Psychiatric:         Mood and Affect: Mood normal.         Behavior: Behavior normal.       Lab Review  Lab Results   Component Value Date/Time    WBC 6.8 02/21/2023 02:13 AM    RBC 4.02 (L) 02/21/2023 02:13 AM    HEMOGLOBIN 12.0 02/21/2023 02:13 AM    HEMATOCRIT 36.6 (L) 02/21/2023 02:13 AM    MCV 91.0 02/21/2023 02:13 AM    MCH 29.9 02/21/2023 02:13 AM    MCHC 32.8 (L) 02/21/2023 02:13 AM    MPV 8.2 (L) 02/21/2023 02:13 AM      Lab Results   Component Value Date/Time    SODIUM 133 (L) 02/22/2023 01:24 AM    POTASSIUM 4.3 02/22/2023 01:24 AM    CHLORIDE 98 02/22/2023 01:24 AM    CO2 28 02/22/2023 01:24 AM    GLUCOSE 103 (H) 02/22/2023 01:24 AM    BUN 24 (H) 02/22/2023 01:24 AM    CREATININE 0.71 02/22/2023 01:24 AM      Lab Results   Component Value Date/Time    ASTSGOT 37 02/15/2023 02:28 AM    ALTSGPT 23 02/15/2023 02:28 AM     Lab Results   Component Value Date/Time    CHOLSTRLTOT 198 02/15/2023 02:28 AM     (H) 02/15/2023 02:28 AM    HDL 69 02/15/2023 02:28 AM    TRIGLYCERIDE 113 02/15/2023 02:28 AM    TROPONINT 166 (H) 02/15/2023 05:44 PM       No results for input(s): NTPROBNP in the  last 72 hours.    Cardiac Imaging and Procedures Review  EKG:  My personal interpretation of the EKG dated 2/19/23 is NSR, with PACs, previous inferior/anterior MI. Continues in NS on tele 2/22/23.     Echocardiogram:  2/15/23  Normal left ventricular systolic function.  Grade II diastolic dysfunction.  Mild mitral regurgitation.  Mild to moderate aortic insufficiency.  Mild tricuspid regurgitation.  Right ventricular systolic pressure is estimated to be  40-45  mmHg.    Cardiac Catheterization:  [facility] 2/20  1.  Left main coronary artery:  Normal.  2.  Left anterior descending artery: 40% disease in proximal portion, calcified.    3.  Left circumflex coronary artery: Luminal irregularities.  Gives two marginal branches, which have no significant disease   4.  Right coronary artery: 50% disease in midportion. This is a right dominant system.  5.  Left ventricular end diastolic pressure:  20 mmHg.  No signficant gradient across the aortic valve.  6.  Ascending aortic root angiogram showed normal sized ascending aorta, 1-2+ aortic regurgitation.    Imaging  Chest X-Ray:  2/14/2023   BILATERAL pleural effusions with overlying atelectasis which could obscure an additional process  Persistently enlarged cardiac silhouette      Assessment/Plan  HFpEF acute on chronic  Afib new onset spontaneously converting to sinus rhythm  Nonobstructive CAD by [facility] 2/20/2023  Aortic regurgitation mild to moderate  Mitral regurgitation, mild to moderate  Pulmonary hypertension, mild  Concern for amyloid  Minimally elevated free light chains  Prior abnormal MPI  Coronary calcification  CVA 8/1/2022  COPD    -Nuclear technetium amyloid scan set for tomorrow  -Pt given inhalation spirometer for diminished lung sounds and previous bilateral pleural effusions   -Symptoms improving continue lasix, cont spironolactone   -Pt in NSR  -Cont CMP for renal fxn  -continue rosuvastatin target LDL <70     Thank you for allowing me to participate in the  care of this patient.  I will continue to follow this patient    Please contact me with any questions.    Odette Whitney, Student   Cardiologist, [facility]  [phone]

## 2023-02-22 NOTE — PROGRESS NOTES
Hospital Medicine Daily Progress Note    Date of Service  2/22/2023    Chief Complaint  Regla Meier is a 82 y.o. female admitted 2/17/2023 with sob    Hospital Course  82 y.o. female with a past medical history of COPD, CVA, CHF with preserved ejection fraction, hyperlipidemia who presented 2/17/2023 with shortness of breath and lower extremity edema over the past 4 weeks despite taking Lasix 20 mg daily.  She was admitted to [facility] where she was noted to have an elevated BNP, severe pitting edema of bilateral lower extremities and chest x-ray with bilateral pulmonary edema.  Her troponin trended up to 166 and she was noted to be in new onset A-fib with RVR for which she was started on Eliquis and metoprolol.  Her metoprolol was later discontinued because patient was having pauses on telemetry.  She underwent a 2D echo that revealed preserved EF with grade 2 diastolic dysfunction with RVSP of 40-45. There was concern of infiltrative heart disease given her free kappa was elevated at 52, free lambda elevated 175, monoclonal protein was still pending.  Cardiology recommended transfer to [facility] for right and left heart catheterization.   Cardiology following. Plan [facility] 2/20 which showed nonobstructive coronary artery disease.  We will also plan to have 1-2+ aortic regurgitation.  Patient was started on amiodarone for rhythm control.  Cardiology recommended nuclear technetium amyloid scan.  Oncology consulted for elevated Keppa and lambda patient will need LP since bone marrow biopsy for further work-up.    Interval Problem Update  - Weaned down to 1L NC  - had 2 pauses overnight, longest 1.75 seconds, asymptomatic  - going for amyloid scan tomorrow    I have discussed this patient's plan of care and discharge plan at IDT rounds today with Case Management, Nursing, Nursing leadership, and other members of the IDT team.    Consultants/Specialty  cardiology  oncology    Code Status  Full  Code    Disposition  Patient is not medically cleared for discharge.   Anticipate discharge to to home with close outpatient follow-up.  I have placed the appropriate orders for post-discharge needs.    Review of Systems  Review of Systems   Respiratory:  Negative for shortness of breath.    Cardiovascular:  Negative for chest pain and leg swelling.   All other systems reviewed and are negative.     Physical Exam  Temp:  [36.8 °C (98.2 °F)-37.1 °C (98.8 °F)] 36.8 °C (98.2 °F)  Pulse:  [74-82] 82  Resp:  [16-20] 18  BP: ()/(54-72) 117/72  SpO2:  [90 %-97 %] 91 %    Physical Exam  Vitals and nursing note reviewed.   Constitutional:       General: She is not in acute distress.     Appearance: Normal appearance. She is not ill-appearing, toxic-appearing or diaphoretic.   HENT:      Head: Normocephalic and atraumatic.      Nose: Nose normal.      Mouth/Throat:      Pharynx: Oropharynx is clear.   Eyes:      Conjunctiva/sclera: Conjunctivae normal.   Cardiovascular:      Rate and Rhythm: Normal rate. Rhythm irregular.      Pulses: Normal pulses.      Heart sounds: Normal heart sounds.   Pulmonary:      Effort: Pulmonary effort is normal.      Breath sounds: No wheezing, rhonchi or rales.   Abdominal:      General: Abdomen is flat. Bowel sounds are normal.      Palpations: Abdomen is soft.   Musculoskeletal:         General: Normal range of motion.      Cervical back: Normal range of motion and neck supple.      Right lower leg: Edema (trace) present.      Left lower leg: Edema (trace) present.   Skin:     General: Skin is warm and dry.   Neurological:      General: No focal deficit present.      Mental Status: She is alert and oriented to person, place, and time. Mental status is at baseline.   Psychiatric:         Mood and Affect: Mood normal.         Behavior: Behavior normal.       Fluids    Intake/Output Summary (Last 24 hours) at 2/22/2023 1143  Last data filed at 2/22/2023 0500  Gross per 24 hour   Intake 530  ml   Output --   Net 530 ml         Laboratory  Recent Labs     02/20/23 0314 02/21/23  0213   WBC 7.3 6.8   RBC 4.76 4.02*   HEMOGLOBIN 13.7 12.0   HEMATOCRIT 42.8 36.6*   MCV 89.9 91.0   MCH 28.8 29.9   MCHC 32.0* 32.8*   RDW 44.2 43.8   PLATELETCT 235 271   MPV 8.3* 8.2*       Recent Labs     02/20/23  0314 02/21/23  0213 02/22/23  0124   SODIUM 136 136 133*   POTASSIUM 3.8 3.4* 4.3   CHLORIDE 97 97 98   CO2 30 32 28   GLUCOSE 97 85 103*   BUN 22 22 24*   CREATININE 0.70 0.83 0.71   CALCIUM 8.6 8.2* 8.3*                     Imaging  CL-LEFT HEART CATHETERIZATION WITH POSSIBLE INTERVENTION    (Results Pending)   NM-CARDIAC AMYLOIDOSIS PYP SCAN    (Results Pending)          Assessment/Plan  * Acute on chronic congestive heart failure (HCC)- (present on admission)  Assessment & Plan  IV lasix-->PO lasix  Fluid and salt restriction  Strict I's/O  2D echo reveals preserved EF  Cardiology on board  [facility] 2/20: Nonobstructive CAD  Nuclear technetium amyloid scan  Suspecting infiltrative heart disease, free kappa was elevated at 52, free lambda elevated 175. Hem/onc consulted, planning outpatient bone marrow biopsy      DNR (do not resuscitate)  Assessment & Plan  Patient is DNR/DNI status    Acute on chronic respiratory failure with hypoxia ([facility])- (present on admission)  Assessment & Plan  Currently on 1-2 L of oxygen via nasal cannula  Wean O2 as tolerated, RT protocol    History of CVA (cerebrovascular accident)  Assessment & Plan  Continue Eliquis and Crestor    Troponin level elevated- (present on admission)  Assessment & Plan  In the setting of acute on chronic congestive heart failure  Denies active chest pain  Continuous cardiac monitoring with serial EKG and troponin  Plan for cardiac catheterization 2/20    AF (atrial fibrillation) ([facility])- (present on admission)  Assessment & Plan  Transitioning to amiodarone, started 200 mg twice daily  Continue Eliquis.       COPD (chronic obstructive pulmonary disease) ([facility])-  (present on admission)  Assessment & Plan  Does not appear to be in acute exacerbation  I have added Symbicort to her regimen  Albuterol as needed    Hyperlipemia- (present on admission)  Assessment & Plan  Continue Crestor and Zetia         VTE prophylaxis: SCDs/TEDs and therapeutic anticoagulation with Eliquis    I have performed a physical exam and reviewed and updated ROS and Plan today (2/22/2023). In review of yesterday's note (2/21/2023), there are no changes except as documented above.

## 2023-02-22 NOTE — PROGRESS NOTES
Assumed care of patient at bedside report from day RN. Updated on POC. Patient currently A & O x 4 ; on 1 L O2 nasal cannula; up x1 assist with front wheel walker; without complaints of acute pain. Assessment completed Call light within reach. Whiteboard updated. Fall precautions in place. Bed locked and in lowest position. All questions answered. No other needs indicated at this time.

## 2023-02-22 NOTE — PROGRESS NOTES
Monitor Summary  Rhythm: SR  Rate: 73-89  Ectopy: Rare PAC, 1.45-1.75 second pauses  .17 / .09 / .41

## 2023-02-22 NOTE — PROGRESS NOTES
[facility] Cardiology Follow-up Note    Date of Service:    2/22/2023      Name:   Regla Meier   YOB: 1940  Age:   82 y.o.  female   MRN:   [id number]    Reason for cardiology consult: SOB    Attending Provider: Dr Segovia    Primary Cardiologist: Dr Jo Ann CAMPBELL  Regla Meier is a 82 y.o. female admitted 2/17/2023 with with HFpEF, prior CVA in 2022 who was admitted to [facility] on 2/17/2023 with acute on chronic heart failure exacerbation.  She was found to have elevated troponins that peaked at 166. Given heart failure exacerbation and elevated troponin, she was transferred to [facility] by cardiology for cath.        Interim Events  2/20: Symptomatically improved, edema resolved.  No chest pain.  2/21: Continues to feel better with diuresis.  Tolerated LHC 2/20.  Vitals remained stable.  Cumulative net I&O -4.5 L  Weight 59.7kg, 18 pound decrease.  This morning pt states she is feeling better, no longer coughing, has the urge to make a bowel movement, and has slight pain in her toes.  Spoke to nuclear medicine, they will be able to obtain her NM amyloid study tomorrow     Review of Systems  Review of Systems   Constitutional:  Negative for chills, diaphoresis, fatigue and fever.   HENT:  Negative for congestion.    Eyes: Negative.    Respiratory:  Negative for cough and shortness of breath.    Cardiovascular:  Negative for chest pain, palpitations and leg swelling.   Gastrointestinal:  Negative for abdominal pain and constipation.   Endocrine: Negative.    Genitourinary:  Negative for difficulty urinating and flank pain.   Musculoskeletal:  Negative for arthralgias.   Allergic/Immunologic: Negative.    Neurological:  Negative for dizziness, weakness and headaches.   Hematological: Negative.    Psychiatric/Behavioral: Negative.    All other review of systems reviewed and negative.    Past medical, surgical, social, and family history reviewed and  unchanged from admission except as noted in HPI.    Medications: Reviewed in MAR  Current Facility-Administered Medications   Medication Dose Frequency Provider Last Rate Last Admin    spironolactone (ALDACTONE) tablet 25 mg  25 mg Q DAY Leah Segovia M.D.   25 mg at 02/22/23 0533    furosemide (LASIX) tablet 40 mg  40 mg Q DAY Luis Alberto Ferguson M.D.   40 mg at 02/22/23 0532    amiodarone (Cordarone) tablet 200 mg  200 mg TWICE DAILY Luis Alberto Ferguson M.D.   200 mg at 02/22/23 0532    acetaminophen (Tylenol) tablet 650 mg  650 mg Q6HRS PRN ANAMIKA NavarroOHubert   650 mg at 02/22/23 0211    albuterol inhaler 2 Puff  2 Puff Q6HRS PRN ANAMIKA NavarroO.        apixaban (ELIQUIS) tablet 5 mg  5 mg BID ANAMIKA NavarroO.   5 mg at 02/22/23 0532    ezetimibe (ZETIA) tablet 10 mg  10 mg DAILY Radha Dong D.O.   10 mg at 02/22/23 0532    fluticasone (FLONASE) nasal spray 50 mcg  1 [place] QHS CELSA Navarro.O.   50 mcg at 02/21/23 2100    Respiratory Therapy Consult   Continuous RT CELSA Navarro.O.        rosuvastatin (CRESTOR) tablet 5 mg  5 mg Q EVENING CELSA Navarro.O.   5 mg at 02/21/23 1837    senna-docusate (PERICOLACE or SENOKOT S) 8.6-50 MG per tablet 2 Tablet  2 Tablet BID CELSA Navarro.O.   2 Tablet at 02/19/23 1730    And    polyethylene glycol/lytes (MIRALAX) PACKET 1 Packet  1 Packet QDAY PRN ANAMIKA NavarroO.        And    magnesium hydroxide (MILK OF MAGNESIA) suspension 30 mL  30 mL QDAY PRN CELSA Navarro.O.        And    bisacodyl (DULCOLAX) suppository 10 mg  10 mg QDAY PRN ANAMIKA NavarroO.       Last reviewed on 2/17/2023  3:05 PM by Hermila Arias   Allergies   Allergen Reactions    Atorvastatin Myalgia     Severe muscle weakness    Pcn [Penicillins] Hives       Physical Exam  Body mass index is 24.07 kg/m². /72   Pulse 82   Temp 36.8 °C (98.2 °F) (Temporal)   Resp 18   Wt 59.7 kg (131 lb 9.8 oz)   SpO2 91%    Vitals:    02/21/23 1910  02/21/23 2311 02/22/23 0340 02/22/23 0718   BP: 96/60 104/63 119/72 117/72   Pulse: 81 79 74 82   Resp: 18 18 18 18   Temp:    36.8 °C (98.2 °F)   TempSrc: Temporal Temporal Temporal Temporal   SpO2: 93% 91% 92% 91%   Weight: 61.6 kg (135 lb 12.9 oz)  59.7 kg (131 lb 9.8 oz)     Oxygen Therapy:  Pulse Oximetry: 91 %, O2 (LPM): 1, O2 Delivery Device: Silicone Nasal Cannula    General: no acute distress, chronically ill appearing, frail  Neck: no JVD, no bruits  Lungs: CTAB, diminished at bilateral bases, normal effort. no wheezing, rales, or rhonchi  Heart: RRR, normal S1 /S2, no murmur, no rub  EXT:  lower extremity edema, 2+ radial pulses. 2+ pedal pulses.   Abdomen: soft, non tender, non distended  Neurological: No focal deficits, no facial asymmetry.  Normal speech  Psychiatric: Appropriate affect, alert and oriented x 3  Skin: Warm and dry extremities, no rashes    Labs (personally reviewed):     Lab Results   Component Value Date/Time    SODIUM 133 (L) 02/22/2023 01:24 AM    POTASSIUM 4.3 02/22/2023 01:24 AM    CHLORIDE 98 02/22/2023 01:24 AM    CO2 28 02/22/2023 01:24 AM    GLUCOSE 103 (H) 02/22/2023 01:24 AM    BUN 24 (H) 02/22/2023 01:24 AM    CREATININE 0.71 02/22/2023 01:24 AM     Lab Results   Component Value Date/Time    ALKPHOSPHAT 95 02/15/2023 02:28 AM    ASTSGOT 37 02/15/2023 02:28 AM    ALTSGPT 23 02/15/2023 02:28 AM    TBILIRUBIN 0.2 02/15/2023 02:28 AM      Lab Results   Component Value Date/Time    CHOLSTRLTOT 198 02/15/2023 02:28 AM     (H) 02/15/2023 02:28 AM    HDL 69 02/15/2023 02:28 AM    TRIGLYCERIDE 113 02/15/2023 02:28 AM       Cardiac Imaging and Procedures Review:      EKG 2/19/23: My Personal interpretation reveals SR PAC's, anterior MI, inferior MI, low voltage    Echo 2/15/23:  Normal left ventricular systolic function.  Grade II diastolic dysfunction.  Mild mitral regurgitation.  Mild to moderate aortic insufficiency.  Mild tricuspid regurgitation.  Right ventricular systolic  pressure is estimated to be  40-45  mmHg.    Imaging  Chest X-Ray: 2/14/2023   BILATERAL pleural effusions with overlying atelectasis which could obscure an additional process  2.  Persistently enlarged cardiac silhouette     MPI:  7/11/2022  Small sized, equivocal, non-reversible, mild severity defects in the apical septal wall and mid inferolateral wall. Both most consistent with artifact given the distribution, less likely small scars.    * SSS 2. SDS 0. No reversible ischemia.    * Normal left ventricular size, ejection fraction, and wall motion.   ECG INTERPRETATION    Assessment and Medical Decision Making:    HFpEF acute on chronic  Aortic regurgitation mild to moderate  Mitral regurgitation, mild to moderate  - Heart failure resolving, symptomatically improved with diuresis  - Weights decreased 18 lb since admission  - Adjust diuretics, continue furosemide 40mg daily  - Continue spironolactone 25mg daily    Afib new onset spontaneously converting to sinus rhythm  - Currently NSR  - Continue amiodarone 200mg bid x 2 weeks then 200mg daily   - Continue apixaban 5mg bid    Nonobstructive CAD by [facility] 2/20/2023  -No aspirin given apixaban  -continue rosuvastatin 5mg target LDL <70     Concern for amyloid  Minimally elevated free light chains  - Being evaluated for gammopathy by Dr. Payton and possible amyloid  - Nuclear technetium amyloid scan tomorrow    Thank you for allowing me to participate in this patients care.  Please contact me with any questions or concerns.    Please see Dr. Ferguson's attestation for additions and further recommendations.    I personally spent a total of 16 minutes which includes face-to-face time and non-face-to-face time spent on preparing to see the patient, reviewing hospital notes and tests, obtaining history from the patient, performing a medically appropriate exam, counseling and educating the patient, ordering medications/tests/procedures/referrals as clinically indicated, and  documenting information in the electronic medical record.      PLEASE NOTE: Some of this dictation was created using voice recognition software. I have made every reasonable attempt to correct obvious errors, but I expect that there are errors of grammar and possibly content that I did not discover before finalizing the note.     MADDIE Dominguez.   [facility]  [phone]

## 2023-02-22 NOTE — CARE PLAN
The patient is Stable - Low risk of patient condition declining or worsening    Shift Goals  Clinical Goals: decrease O2 needs  Patient Goals: sleep  Family Goals: REGIS    Progress made toward(s) clinical / shift goals:  Patient able to ambulate to and from washroom without shortness of breath    Patient is not progressing towards the following goals:

## 2023-02-22 NOTE — PROGRESS NOTES
Cardiology Follow Up Progress Note    Date of Service  2/22/2023    Attending Physician  Kat Alcantar M.D.    HPI  Regla Meier is a 82 y.o. female admitted 2/17/2023 with HFpEF, prior CVA in 2022 who was admitted to [facility] on 2/17/2023 with acute on chronic heart failure exacerbation.  She was found to have elevated troponins that peaked at 166. Given heart failure exacerbation and elevated troponin, she was transferred to [facility] by cardiology for cath.     Interim Events  2/20: Symptomatically improved, edema resolved.  No chest pain.  2/21: Continues to feel better with diuresis.  Tolerated [facility] yesterday  2/2022: No cardiac symptoms    Review of Systems  Review of Systems   Respiratory:  Negative for chest tightness and shortness of breath.    Cardiovascular:  Negative for palpitations.   Neurological:  Negative for dizziness.     Vital signs in last 24 hours  Temp:  [36.8 °C (98.2 °F)-37.1 °C (98.8 °F)] 36.8 °C (98.2 °F)  Pulse:  [74-82] 82  Resp:  [16-20] 18  BP: ()/(54-72) 117/72  SpO2:  [90 %-97 %] 91 %    Physical Exam  Physical Exam  Constitutional:       General: She is not in acute distress.     Comments: Frail.  Petite.  Elderly.   Cardiovascular:      Rate and Rhythm: Normal rate and regular rhythm.      Heart sounds: Normal heart sounds, S1 normal and S2 normal. No murmur heard.    No friction rub. No gallop.   Pulmonary:      Effort: Pulmonary effort is normal.      Breath sounds: Normal breath sounds. No wheezing, rhonchi or rales.   Musculoskeletal:      Right lower leg: No edema.      Left lower leg: No edema.   Skin:     General: Skin is warm and dry.   Neurological:      Mental Status: She is alert and oriented to person, place, and time.   Psychiatric:         Behavior: Behavior normal.       Lab Review  Lab Results   Component Value Date/Time    WBC 6.8 02/21/2023 02:13 AM    RBC 4.02 (L) 02/21/2023 02:13 AM    HEMOGLOBIN 12.0 02/21/2023 02:13  AM    HEMATOCRIT 36.6 (L) 02/21/2023 02:13 AM    MCV 91.0 02/21/2023 02:13 AM    MCH 29.9 02/21/2023 02:13 AM    MCHC 32.8 (L) 02/21/2023 02:13 AM    MPV 8.2 (L) 02/21/2023 02:13 AM      Lab Results   Component Value Date/Time    SODIUM 133 (L) 02/22/2023 01:24 AM    POTASSIUM 4.3 02/22/2023 01:24 AM    CHLORIDE 98 02/22/2023 01:24 AM    CO2 28 02/22/2023 01:24 AM    GLUCOSE 103 (H) 02/22/2023 01:24 AM    BUN 24 (H) 02/22/2023 01:24 AM    CREATININE 0.71 02/22/2023 01:24 AM      Lab Results   Component Value Date/Time    ASTSGOT 37 02/15/2023 02:28 AM    ALTSGPT 23 02/15/2023 02:28 AM     Lab Results   Component Value Date/Time    CHOLSTRLTOT 198 02/15/2023 02:28 AM     (H) 02/15/2023 02:28 AM    HDL 69 02/15/2023 02:28 AM    TRIGLYCERIDE 113 02/15/2023 02:28 AM    TROPONINT 166 (H) 02/15/2023 05:44 PM       No results for input(s): NTPROBNP in the last 72 hours.    Cardiac Imaging and Procedures Review  EKG:  My personal interpretation of the EKG dated/19/2023 is sinus rhythm, PACs, anterior MI, inferior MI, low voltage    Rhythm: My personal interpretation of the rhythm dated 2/21/2023 is sinus rhythm    Echocardiogram: 2/15/2023  Normal left ventricular systolic function.  Grade II diastolic dysfunction.  Mild mitral regurgitation.  Mild to moderate aortic insufficiency.  Mild tricuspid regurgitation.  Right ventricular systolic pressure is estimated to be  40-45  mmHg.    Imaging  Chest X-Ray: 2/14/2023   BILATERAL pleural effusions with overlying atelectasis which could obscure an additional process  2.  Persistently enlarged cardiac silhouette     MPI:  7/11/2022  Small sized, equivocal, non-reversible, mild severity defects in the apical septal wall and mid inferolateral wall. Both most consistent with artifact given the distribution, less likely small scars.    * SSS 2. SDS 0. No reversible ischemia.    * Normal left ventricular size, ejection fraction, and wall motion.   ECG  INTERPRETATION    Assessment:  HFpEF acute on chronic  Afib new onset spontaneously converting to sinus rhythm  Nonobstructive CAD by [facility] 2/20/2023  Aortic regurgitation mild to moderate  Mitral regurgitation, mild to moderate  Pulmonary hypertension, mild  Concern for amyloid  Minimally elevated free light chains  Prior abnormal MPI  Coronary calcification  CVA 8/1/2022  COPD    Recommendations Discussion  Heart failure resolving, symptomatically improved  Maintaining sinus rhythm  Continue amiodarone for rhythm control  Nuclear technetium amyloid scan still pending  Now on p.o. furosemide  Continue spironolactone  Does not need aspirin from a cardiac standpoint, high risk for bleeding with apixaban  Daily BMP  Continue rosuvastatin, target LDL less than 70  Being evaluated for gammopathy by Dr. Payton and possible amyloid    Thank you for allowing me to participate in the care of this patient.    Please contact me with any questions.    Luis Alberto Ferguson M.D.   Cardiologist, [facility]  [phone]

## 2023-02-22 NOTE — CARE PLAN
The patient is Watcher - Medium risk of patient condition declining or worsening    Shift Goals safety  Clinical Goals: monitor hematoma  Patient Goals: sleep  Family Goals: REGIS    Progress made toward(s) clinical / shift goals:    Problem: Fall Risk  Goal: Patient will remain free from falls  Outcome: Progressing   Cook sharon fall scale utilized. Bed alarm in place. Pt is a one person assist with a front wheel walker. Educated pt and family to call for appropriate assistance prior to ambulating.     Patient is not progressing towards the following goals:

## 2023-02-22 NOTE — PROGRESS NOTES
Progress Note: Hematology/Oncology    Date of consultation: 2/21/2023 10:50 AM    Referring provider: Radha Dong D.O.     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 Patient was admitted to [facility] for acute on chronic heart failure exacerbation where she was noted to have elevated BNP, BLE edema, CXR revealing bilateral pulmonary edema, elevated troponin, new onset afib with RVR (started on metoprolol which was later discontinued due to pauses noted on cardiac monitor) and initiated on IV diuresis. Echocardiogram reveal preserved EF with grade 2 diastolic dysfunction, RVSP 40-45, mild MR, mild TR, no LVH. S/p LHC with cardiology 2/20/23 reveal non-obstructive CAD. Workup was initiated for infiltrative cardiomyopathy, and Heme/Onc consulted for elevated kappa 52 and lambda 175 with concerns for possible amyloidosis.     Patient with no family hx of amyloidosis or significant heart disease. 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     Interval history:   No acute complaints at this time.   Pending amyloidosis PYP scan.     Past Medical History:    Past Medical History:   Diagnosis Date    Blindness of left eye     HFpEF     Hyperlipemia     Stroke (HCC) 08/01/2022   Denies history of COPD (no PFT performed). Was placed on albuterol inhaler after medical provider noted some wheezing when she was exposed to her  who had covid at the time.    Past surgical history:    Past Surgical History:   Procedure Laterality Date    CYSTECTOMY  1963    CATARACT  EXTRACTION WITH IOL       Allergies:  Atorvastatin and Pcn [penicillins]    Medications:    Current Facility-Administered Medications   Medication Dose Route Frequency Provider Last Rate Last Admin    spironolactone (ALDACTONE) tablet 25 mg  25 mg Oral Q DAY Leah Segovia M.D.   25 mg at 02/22/23 0533    furosemide (LASIX) tablet 40 mg  40 mg Oral Q DAY Luis Alberto Ferguson M.D.   40 mg at 02/22/23 0532    amiodarone (Cordarone) tablet 200 mg  200 mg Oral TWICE DAILY Luis Alberto Ferguson M.D.   200 mg at 02/22/23 0532    acetaminophen (Tylenol) tablet 650 mg  650 mg Oral Q6HRS PRN CELSA Navarro.O.   650 mg at 02/22/23 0211    albuterol inhaler 2 Puff  2 Puff Inhalation Q6HRS PRN CELSA Navarro.O.        apixaban (ELIQUIS) tablet 5 mg  5 mg Oral BID CELSA Navarro.O.   5 mg at 02/22/23 0532    ezetimibe (ZETIA) tablet 10 mg  10 mg Oral DAILY Radha Dong D.O.   10 mg at 02/22/23 0532    fluticasone (FLONASE) nasal spray 50 mcg  1 Spray Nasal QHS Radha Dong D.O.   50 mcg at 02/21/23 2100    Respiratory Therapy Consult   Nebulization Continuous RT Radha Dong D.O.        rosuvastatin (CRESTOR) tablet 5 mg  5 mg Oral Q EVENING Radha Dong D.O.   5 mg at 02/21/23 1837    senna-docusate (PERICOLACE or SENOKOT S) 8.6-50 MG per tablet 2 Tablet  2 Tablet Oral BID Radha Dong D.O.   2 Tablet at 02/19/23 1730    And    polyethylene glycol/lytes (MIRALAX) PACKET 1 Packet  1 Packet Oral QDAY PRN Radha Dong D.O.        And    magnesium hydroxide (MILK OF MAGNESIA) suspension 30 mL  30 mL Oral QDAY PRN Radha Dong D.O.        And    bisacodyl (DULCOLAX) suppository 10 mg  10 mg Rectal QDAY PRN Radha Dong D.O.         Social History:     Social History     Socioeconomic History    Marital status:      Spouse name: Not on file    Number of children: 2    Years of education: Not on file    Highest education level: Not on file   Occupational History    retired    Tobacco Use    Smoking status: Never    Smokeless tobacco: Never   Vaping Use    Vaping Use: Never used   Substance and Sexual Activity    Alcohol use: Yes     Alcohol/week: Less than 1 drink a week, usually with dinner     Types: 1 Glasses of wine     Comment: occ glass of wine    Drug use: No    Sexual activity: Yes     Partners: Male     Comment:  for 57 years. Documented on 4/10/19.   Other Topics Concern    Not on file   Social History Narrative    Not on file     Family History:     Family History   Problem Relation Age of Onset    Other Mother         TIA, FTT, no known cardiac dx    Other Father         Abdominal aneurysm rupture, no known cardiac dx    Alcohol abuse Brother     Heart Disease, cardiac stent, brain bleed Brother     No Known Problems Daughter     No Known Problems Daughter      Review of Systems:   All other review of systems are negative except what was mentioned above in the HPI.    Constitutional: No fever, chills, weight loss ,malaise/fatigue.    HEENT: No new auditory or visual complaints. No sore throat and neck pain.     Respiratory: No new cough, sputum production, wheezing. + shortness of breath    Cardiovascular: No new chest pain, palpitations. No orthopnea, no leg swelling.    Gastrointestinal: No heartburn, nausea, vomiting, abdominal pain, hematochezia or melena     Genitourinary: Negative for dysuria, hematuria    Musculoskeletal: No new arthralgias or myalgias   Skin: Negative for rash and itching.    Neurological: Negative for focal weakness or headaches.    Endo/Heme/Allergies: No abnormal bleed/bruise.      Physical Exam:   Vitals:   /72   Pulse 82   Temp 36.8 °C (98.2 °F) (Temporal)   Resp 18   Wt 59.7 kg (131 lb 9.8 oz)   SpO2 91%   BMI 24.07 kg/m²     General: Not in acute distress, alert and oriented x 3  HEENT: No pallor, icterus. Oropharynx clear. No macroglossia  Neck: Supple, no palpable masses.  CVS: regular rate and rhythm, no rubs or  gallops  RESP: Clear to auscultate bilaterally, no wheezing or crackles.   ABD: Soft, non tender, non distended, no palpable organomegaly  EXT: No edema or cyanosis. BLE skin wrinkled.  CNS: Alert and oriented x3, No focal deficits.  Skin- No rash    Labs:   Recent Labs     02/20/23  0314 02/21/23  0213   RBC 4.76 4.02*   HEMOGLOBIN 13.7 12.0   HEMATOCRIT 42.8 36.6*   PLATELETCT 235 271       Lab Results   Component Value Date/Time    SODIUM 133 (L) 02/22/2023 01:24 AM    POTASSIUM 4.3 02/22/2023 01:24 AM    CHLORIDE 98 02/22/2023 01:24 AM    CO2 28 02/22/2023 01:24 AM    GLUCOSE 103 (H) 02/22/2023 01:24 AM    BUN 24 (H) 02/22/2023 01:24 AM    CREATININE 0.71 02/22/2023 01:24 AM        Assessment and Plan:    #Monoclonal gammopathy   #Elevated free light chains  #HFpEF   #Atrial fibrillation with spontaneous conversion to sinus rhythm  #Nonobstructive CAD by [facility] 2/20/2023  #Pulmonary hypertension  #Elevated BNP   #Elevated Troponin   #Hyperlipidemia   #First degree AV block    No macroglossia, abnormal renal/liver function, hypercalcemia, abnormal skin lesions, relevant family hx, or significant anemia noted on exam/chart. + bilateral neuropathy to BLE. Gamma, alpha, beta globulin within normal limits.     - OMER gel show faint bands in IgM kappa and lambda. Serum free kappa 52 and lambda 175 elevated, kappa/lambda ratio 0.30 low. Could have MGUS.  - in setting of HFpEF, elevated light chains, abnormal light chain ratio, OMER with IgM bands, history of elevated troponin, agree with workup for amyloidosis. Recommend outpatient heme followup with bone marrow biopsy for further workup. We will schedule outpatient appt upon discharge.  - technetium scan ordered by cardiology would help identify ATTR amyloidosis.      Patient agreed and verbalized her agreement and understanding with the current plan.  I answered all questions and concerns she has at this time. Thank you for allowing me to participate in her  care.      Diamante Espinal D.O.  R Internal Medicine, PGY-2  Pt seen and examined with PGY2.Agree with plan

## 2023-02-23 ENCOUNTER — PHARMACY VISIT (OUTPATIENT)
Dept: PHARMACY | Facility: MEDICAL CENTER | Age: 83
End: 2023-02-23
Payer: COMMERCIAL

## 2023-02-23 ENCOUNTER — APPOINTMENT (OUTPATIENT)
Dept: RADIOLOGY | Facility: MEDICAL CENTER | Age: 83
DRG: 286 | End: 2023-02-23
Attending: INTERNAL MEDICINE
Payer: MEDICARE

## 2023-02-23 VITALS
SYSTOLIC BLOOD PRESSURE: 108 MMHG | TEMPERATURE: 97.3 F | OXYGEN SATURATION: 91 % | HEART RATE: 79 BPM | BODY MASS INDEX: 24.07 KG/M2 | WEIGHT: 131.61 LBS | DIASTOLIC BLOOD PRESSURE: 70 MMHG | RESPIRATION RATE: 16 BRPM

## 2023-02-23 PROBLEM — R79.89 TROPONIN LEVEL ELEVATED: Status: RESOLVED | Noted: 2023-02-14 | Resolved: 2023-02-23

## 2023-02-23 PROCEDURE — RXMED WILLOW AMBULATORY MEDICATION CHARGE: Performed by: INTERNAL MEDICINE

## 2023-02-23 PROCEDURE — A9270 NON-COVERED ITEM OR SERVICE: HCPCS | Performed by: INTERNAL MEDICINE

## 2023-02-23 PROCEDURE — 700102 HCHG RX REV CODE 250 W/ 637 OVERRIDE(OP): Performed by: INTERNAL MEDICINE

## 2023-02-23 PROCEDURE — 99239 HOSP IP/OBS DSCHRG MGMT >30: CPT | Performed by: INTERNAL MEDICINE

## 2023-02-23 PROCEDURE — A9538 TC99M PYROPHOSPHATE: HCPCS

## 2023-02-23 RX ORDER — FUROSEMIDE 40 MG/1
40 TABLET ORAL 2 TIMES DAILY
Qty: 60 TABLET | Refills: 0 | Status: ON HOLD | OUTPATIENT
Start: 2023-02-23 | End: 2023-03-05

## 2023-02-23 RX ORDER — FUROSEMIDE 40 MG/1
40 TABLET ORAL DAILY
Qty: 30 TABLET | Refills: 3 | Status: SHIPPED | OUTPATIENT
Start: 2023-02-23 | End: 2023-02-23 | Stop reason: SDUPTHER

## 2023-02-23 RX ORDER — AMIODARONE HYDROCHLORIDE 200 MG/1
200 TABLET ORAL 2 TIMES DAILY
Qty: 60 TABLET | Refills: 0 | Status: SHIPPED | OUTPATIENT
Start: 2023-02-23 | End: 2023-02-23 | Stop reason: SDUPTHER

## 2023-02-23 RX ORDER — SPIRONOLACTONE 25 MG/1
25 TABLET ORAL DAILY
Qty: 30 TABLET | Refills: 3 | Status: SHIPPED | OUTPATIENT
Start: 2023-02-24 | End: 2023-03-20 | Stop reason: SDUPTHER

## 2023-02-23 RX ORDER — AMIODARONE HYDROCHLORIDE 200 MG/1
TABLET ORAL
Qty: 44 TABLET | Refills: 0 | Status: ON HOLD | OUTPATIENT
Start: 2023-02-23 | End: 2023-03-05

## 2023-02-23 RX ADMIN — APIXABAN 5 MG: 5 TABLET, FILM COATED ORAL at 04:21

## 2023-02-23 RX ADMIN — EZETIMIBE 10 MG: 10 TABLET ORAL at 04:20

## 2023-02-23 RX ADMIN — ACETAMINOPHEN 650 MG: 325 TABLET, FILM COATED ORAL at 02:13

## 2023-02-23 RX ADMIN — AMIODARONE HYDROCHLORIDE 200 MG: 200 TABLET ORAL at 04:20

## 2023-02-23 ASSESSMENT — PAIN DESCRIPTION - PAIN TYPE: TYPE: ACUTE PAIN

## 2023-02-23 NOTE — PROGRESS NOTES
Progress Note: Hematology/Oncology    Date of consultation: 2/21/2023 10:50 AM    Referring provider: Radha Dong D.O.     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 Patient was admitted to [facility] for acute on chronic heart failure exacerbation where she was noted to have elevated BNP, BLE edema, CXR revealing bilateral pulmonary edema, elevated troponin, new onset afib with RVR (started on metoprolol which was later discontinued due to pauses noted on cardiac monitor) and initiated on IV diuresis. Echocardiogram reveal preserved EF with grade 2 diastolic dysfunction, RVSP 40-45, mild MR, mild TR, no LVH. S/p LHC with cardiology 2/20/23 reveal non-obstructive CAD. Workup was initiated for infiltrative cardiomyopathy, and Heme/Onc consulted for elevated kappa 52 and lambda 175 with concerns for possible amyloidosis.     Patient with no family hx of amyloidosis or significant heart disease. 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     Interval history:   No acute complaints at this time.   Pending amyloidosis PYP scan.     Past Medical History:    Past Medical History:   Diagnosis Date    Blindness of left eye     HFpEF     Hyperlipemia     Stroke (HCC) 08/01/2022   Denies history of COPD (no PFT performed). Was placed on albuterol inhaler after medical provider noted some wheezing when she was exposed to her  who had covid at the time.    Past surgical history:    Past Surgical History:   Procedure Laterality Date    CYSTECTOMY  1963    CATARACT  EXTRACTION WITH IOL       Allergies:  Atorvastatin and Pcn [penicillins]    Medications:    Current Facility-Administered Medications   Medication Dose Route Frequency Provider Last Rate Last Admin    Pneumococcal 20-Mari Conj Vacc (PREVNAR 20) syringe 0.5 mL  0.5 mL Intramuscular Once PRN Leah Segovia M.D.        spironolactone (ALDACTONE) tablet 25 mg  25 mg Oral Q DAY Leah Segovia M.D.   25 mg at 02/22/23 0533    furosemide (LASIX) tablet 40 mg  40 mg Oral Q DAY Luis Alberto Ferguson M.D.   40 mg at 02/22/23 0532    amiodarone (Cordarone) tablet 200 mg  200 mg Oral TWICE DAILY Luis Alberto Ferguson M.D.   200 mg at 02/23/23 0420    acetaminophen (Tylenol) tablet 650 mg  650 mg Oral Q6HRS PRN CELSA Navarro.O.   650 mg at 02/23/23 0213    albuterol inhaler 2 Puff  2 Puff Inhalation Q6HRS PRN CELSA Navarro.OHubert        apixaban (ELIQUIS) tablet 5 mg  5 mg Oral BID Radha Dong D.O.   5 mg at 02/23/23 0421    ezetimibe (ZETIA) tablet 10 mg  10 mg Oral DAILY Radha Dong D.O.   10 mg at 02/23/23 0420    fluticasone (FLONASE) nasal spray 50 mcg  1 Spray Nasal QHS Radha Dong D.O.   50 mcg at 02/22/23 1940    Respiratory Therapy Consult   Nebulization Continuous RT Radha Dong D.O.        rosuvastatin (CRESTOR) tablet 5 mg  5 mg Oral Q EVENING Radha Dong D.O.   5 mg at 02/22/23 1737    senna-docusate (PERICOLACE or SENOKOT S) 8.6-50 MG per tablet 2 Tablet  2 Tablet Oral BID Radha Dong D.O.   2 Tablet at 02/22/23 1738    And    polyethylene glycol/lytes (MIRALAX) PACKET 1 Packet  1 Packet Oral QDAY PRN Radha Dong D.O.        And    magnesium hydroxide (MILK OF MAGNESIA) suspension 30 mL  30 mL Oral QDAY PRN Radha Dong D.O.        And    bisacodyl (DULCOLAX) suppository 10 mg  10 mg Rectal QDAY PRN Radha Dong D.O.         Social History:     Social History     Socioeconomic History    Marital status:      Spouse name: Not on file    Number of children:  2    Years of education: Not on file    Highest education level: Not on file   Occupational History    retired   Tobacco Use    Smoking status: Never    Smokeless tobacco: Never   Vaping Use    Vaping Use: Never used   Substance and Sexual Activity    Alcohol use: Yes     Alcohol/week: Less than 1 drink a week, usually with dinner     Types: 1 Glasses of wine     Comment: occ glass of wine    Drug use: No    Sexual activity: Yes     Partners: Male     Comment:  for 57 years. Documented on 4/10/19.   Other Topics Concern    Not on file   Social History Narrative    Not on file     Family History:     Family History   Problem Relation Age of Onset    Other Mother         TIA, FTT, no known cardiac dx    Other Father         Abdominal aneurysm rupture, no known cardiac dx    Alcohol abuse Brother     Heart Disease, cardiac stent, brain bleed Brother     No Known Problems Daughter     No Known Problems Daughter      Review of Systems:   All other review of systems are negative except what was mentioned above in the HPI.    Constitutional: No fever, chills, weight loss ,malaise/fatigue.    HEENT: No new auditory or visual complaints. No sore throat and neck pain.     Respiratory: No new cough, sputum production, wheezing. + shortness of breath    Cardiovascular: No new chest pain, palpitations. No orthopnea, no leg swelling.    Gastrointestinal: No heartburn, nausea, vomiting, abdominal pain, hematochezia or melena     Genitourinary: Negative for dysuria, hematuria    Musculoskeletal: No new arthralgias or myalgias   Skin: Negative for rash and itching.    Neurological: Negative for focal weakness or headaches.    Endo/Heme/Allergies: No abnormal bleed/bruise.      Physical Exam:   Vitals:   /70   Pulse 79   Temp 36.3 °C (97.3 °F) (Temporal)   Resp 16   Wt 59.7 kg (131 lb 9.8 oz)   SpO2 91%   BMI 24.07 kg/m²     General: Not in acute distress, alert and oriented x 3  HEENT: No pallor, icterus.  Oropharynx clear. No macroglossia  Neck: Supple, no palpable masses.  CVS: regular rate and rhythm, no rubs or gallops  RESP: Clear to auscultate bilaterally, no wheezing or crackles.   ABD: Soft, non tender, non distended, no palpable organomegaly  EXT: No edema or cyanosis. BLE skin wrinkled.  CNS: Alert and oriented x3, No focal deficits.  Skin- No rash    Labs:   Recent Labs     02/21/23 0213   RBC 4.02*   HEMOGLOBIN 12.0   HEMATOCRIT 36.6*   PLATELETCT 271       Lab Results   Component Value Date/Time    SODIUM 133 (L) 02/22/2023 01:24 AM    POTASSIUM 4.3 02/22/2023 01:24 AM    CHLORIDE 98 02/22/2023 01:24 AM    CO2 28 02/22/2023 01:24 AM    GLUCOSE 103 (H) 02/22/2023 01:24 AM    BUN 24 (H) 02/22/2023 01:24 AM    CREATININE 0.71 02/22/2023 01:24 AM        Assessment and Plan:    #Monoclonal gammopathy   #Elevated free light chains  #HFpEF   #Atrial fibrillation with spontaneous conversion to sinus rhythm  #Nonobstructive CAD by [facility] 2/20/2023  #Pulmonary hypertension  #Elevated BNP   #Elevated Troponin   #Hyperlipidemia   #First degree AV block    No macroglossia, abnormal renal/liver function, hypercalcemia, abnormal skin lesions, relevant family hx, or significant anemia noted on exam/chart. + bilateral neuropathy to BLE. Gamma, alpha, beta globulin within normal limits.     - OMER gel show faint bands in IgM kappa and lambda. Serum free kappa 52 and lambda 175 elevated, kappa/lambda ratio 0.30 low. Could have MGUS.  - in setting of HFpEF, elevated light chains, abnormal light chain ratio, OMER with IgM bands, history of elevated troponin, agree with workup for amyloidosis. Recommend outpatient heme followup with bone marrow biopsy for further workup. We will schedule outpatient appt upon discharge.  - technetium scan ordered by cardiology would help identify ATTR amyloidosis.  - Recommend obtaining UPEP for urinary light chains/OMER    Diamante Espinal D.O.  [facility], PGY-2

## 2023-02-23 NOTE — DIETARY
Nutrition Services Brief Update:    Day 6 of admit.  Regla Meier is a 82 y.o. female with admitting DX of Acute on chronic diastolic heart failure.     Current Diet: Cardiac.     Consult received for hypoalbuminemia.      Low albumin is not an indicator for malnutrition. Per chart review pt is eating well with most meals %. Pt has had some wt loss since admission however pt came in with 3+ pitting edema and is on lasix. Fluid accumulation is now at 1+. Pt also has a very healthy BMI for age. Completing nutrition consult at this time.     RD to monitor per department policy.

## 2023-02-23 NOTE — CARE PLAN
Problem: Knowledge Deficit - Standard  Goal: Patient and family/care givers will demonstrate understanding of plan of care, disease process/condition, diagnostic tests and medications  Outcome: Met     Problem: Fall Risk  Goal: Patient will remain free from falls  Outcome: Met     Problem: Pain - Standard  Goal: Alleviation of pain or a reduction in pain to the patient’s comfort goal  Outcome: Met     Problem: Hemodynamics  Goal: Patient's hemodynamics, fluid balance and neurologic status will be stable or improve  Outcome: Met     Problem: Respiratory  Goal: Patient will achieve/maintain optimum respiratory ventilation and gas exchange  Outcome: Met     Problem: Fluid Volume  Goal: Fluid volume balance will be maintained  Outcome: Met     Problem: Care Map:  Day 3 Optimal Outcome for the Heart Failure Patient  Goal: Day 3:  Optimal Care of the heart failure patient  Outcome: Met   The patient is Stable - Low risk of patient condition declining or worsening    Shift Goals  Clinical Goals: wean O2, amyloidosis scan  Patient Goals: rest, test results  Family Goals: REGIS    Progress made toward(s) clinical / shift goals:  patient on RA, nuc med testing done    Patient is not progressing towards the following goals:

## 2023-02-23 NOTE — PROGRESS NOTES
Patient transported off unit to [facility] with  and all belongings, tele box and IV removed

## 2023-02-23 NOTE — DISCHARGE INSTRUCTIONS
HF Patient Discharge Instructions  Monitor your weight daily, and maintain a weight chart, to track your weight changes.   Activity as tolerated, unless your Doctor has ordered otherwise. Other activity order: as tolerated.  Follow a low fat, low cholesterol, low salt diet unless instructed otherwise by your Doctor. Read the labels on the back of food products and track your intake of fat, cholesterol and salt.   Fluid Restriction Yes. If a Fluid Restriction has been ordered by your Doctor, measure fluids with a measuring cup to ensure that you are not exceeding the restriction.   No smoking.  Oxygen No. If your Doctor has ordered that you wear Oxygen at home, it is important to wear it as ordered.  Did you receive an explanation from staff on the importance of taking each of your medications and why it is necessary to keep taking them unless your doctor says to stop? Yes  Were all of your questions answered about how to manage your heart failure and what to do if you have increased signs and symptoms after you go home? Yes  Do you feel like your heart failure care team involved you in the care treatment plan and allowed you to make decisions regarding your care while in the hospital and addressed any discharge needs you might have? Yes    See the educational handout provided at discharge for more information on monitoring your daily weight, activity and diet. This also explains more about Heart Failure, symptoms of a flare-up and some of the tests that you have undergone.     Warning Signs of a Flare-Up include:  Swelling in the ankles or lower legs.  Shortness of breath, while at rest, or while doing normal activities.   Shortness of breath at night when in bed, or coughing in bed.   Requiring more pillows to sleep at night, or needing to sit up at night to sleep.  Feeling weak, dizzy or fatigued.     When to call your Doctor:  Call [facility] seven days a week from 8:00 a.m. to 8:00 p.m. for  medical questions [phone].  Call your Primary Care Physician or Cardiologist if:   You experience any pain radiating to your jaw or neck.  You have any difficulty breathing.  You experience weight gain of 3 lbs in a day or 5 lbs in a week.   You feel any palpitations or irregular heartbeats.  You become dizzy or lose consciousness.   If you have had an angiogram or had a pacemaker or AICD placed, and experience:  Bleeding, drainage or swelling at the surgical / puncture site.  Fever greater than 100.0 F  Shock from internal defibrillator.  Cool and / or numb extremities.     Please access the AHA My HF Guide/Heart Failure Interactive Workbook:   [email or web address]                  Heart Failure Action Plan  A heart failure action plan helps you understand what to do when you have symptoms of heart failure. Follow the plan that was created by you and your health care provider. Review your plan each time you visit your health care provider.  Red zone  These signs and symptoms mean you should get medical help right away:  You have trouble breathing when resting.  You have a dry cough that is getting worse.  You have swelling or pain in your legs or abdomen that is getting worse.  You suddenly gain more than 2-3 lb (0.9-1.4 kg) in a day, or more than 5 lb (2.3 kg) in one week. This amount may be more or less depending on your condition.  You have trouble staying awake or you feel confused.  You have chest pain.  You do not have an appetite.  You pass out.  If you experience any of these symptoms:  Call your local emergency services (911 in the [place] right away or seek help at the emergency department of the nearest hospital.  Yellow zone  These signs and symptoms mean your condition may be getting worse and you should make some changes:  You have trouble breathing when you are active or you need to sleep with extra pillows.  You have swelling in your legs or abdomen.  You gain 2-3 lb (0.9-1.4  kg) in one day, or 5 lb (2.3 kg) in one week. This amount may be more or less depending on your condition.  You get tired easily.  You have trouble sleeping.  You have a dry cough.  If you experience any of these symptoms:  Contact your health care provider within the next day.  Your health care provider may adjust your medicines.  Green zone  These signs mean you are doing well and can continue what you are doing:  You do not have shortness of breath.  You have very little swelling or no new swelling.  Your weight is stable (no gain or loss).  You have a normal activity level.  You do not have chest pain or any other new symptoms.  Follow these instructions at home:  Take over-the-counter and prescription medicines only as told by your health care provider.  Weigh yourself daily. Your target weight is __________ lb (__________ kg).  Call your health care provider if you gain more than __________ lb (__________ kg) in a day, or more than __________ lb (__________ kg) in one week.  Eat a heart-healthy diet. Work with a diet and nutrition specialist (dietitian) to create an eating plan that is best for you.  Keep all follow-up visits as told by your health care provider. This is important.  Where to find more information  American Heart Association: www.heart.org  Summary  Follow the action plan that was created by you and your health care provider.  Get help right away if you have any symptoms in the Red zone.  This information is not intended to replace advice given to you by your health care provider. Make sure you discuss any questions you have with your health care provider.  Document Released: 01/27/2018 Document Revised: 11/30/2018 Document Reviewed: 01/27/2018  Elsevier Patient Education © 2020 [employer] Inc[employer]

## 2023-02-23 NOTE — CARE PLAN
The patient is Watcher - Medium risk of patient condition declining or worsening    Shift Goals Wean O2 as tolerated  Clinical Goals: monitor O2, wean O2 as tolerated  Patient Goals: sleep  Family Goals: REGIS    Progress made toward(s) clinical / shift goals:    Problem: Fluid Volume  Goal: Fluid volume balance will be maintained  Outcome: Progressing   Patient demonstrates good intake and output.    Patient is not progressing towards the following goals:

## 2023-02-23 NOTE — PROGRESS NOTES
Assumed care of patient at bedside report from day RN. Updated on POC. Patient currently A & O x 4; on 1 L O2 nasal cannula; up x1 assist with front wheel walker; without complaints of acute pain. Assessment completed Call light within reach. Whiteboard updated. Fall precautions in place. Bed locked and in lowest position. All questions answered. No other needs indicated at this time.

## 2023-02-23 NOTE — HEART FAILURE PROGRAM
Discharge Order Quality Check-Up - HFpEF    Patient has the below appointment which is appropriate for anticipated discharge to home today.    HF Education Documented? Point one is documented, messaged RN,   Where we stand right now on HFpEF MEASURES per review of most recent notes and discharge med rec.    Anticoagulation for atrial arrhythmia, if applicable: apixaban  Glycemic control for DM + HF: n/a  Lipid lowering medication for DM + HF: n/a  Pneumococcal vaccine: MESSAGECELSA HARTMAN, PHARMACIST  Influenza vaccine for the current flu season: current  Documentation of nicotine cessation counseling: never per h/p    Thank you, Greta Cardio RN Navigator [id number]    Mar 01, 2023 11:20 AM   (Arrive by 11:05 AM)   Established Patient with SHANNON Ontiveros   [facility]) [address]   [address]   [place] [address]   [phone]      Mar 03, 2023 12:45 PM   Established Patient with SHANNON Dominguez   [facility] (--) [address], [address]   [place] [address]   [phone]

## 2023-02-23 NOTE — HEART FAILURE PROGRAM
Cardiology following was transferred from our [facility] for angiogram. Non obstructive CAD found.     Had new Afib which spontaneously converted to SR. Apixaban.    Amyloid scan tomorrow. Attending note today indicates dispo will be to home- appointment requested.    Nurses: Please document HF education in the education tab and populate the HF Care Plan. These tools will guide day to day care of the HF patient.    Providers: below are Guideline Directed Medical Therapy (GDMT) for HFpEF. If any cannot be prescribed by discharge, would you please note the clinical reason for each in your discharge summary? Thanks! Greta  Anticoagulation for atrial arrhythmia, if applicable  Glycemic control for DM + HF, if applicable  Lipid lowering medication for DM + HF, if applicable  Pneumococcal vaccine, if not previously received, If refused PLEASE DOCUMENT  Influenza vaccine for the current flu season, if not previously received If refused PLEASE DOCUMENT  Documentation of nicotine cessation counseling, if applicable  Acute Referral to disease management program specializing in heart failure care- asked that schedulers notify me if patient is not able to be seen at the Heart Failure Clinic  7 calendar day f/u appointment scheduled prior to discharge, appearing on signed after visit summary.

## 2023-02-23 NOTE — PROGRESS NOTES
Arrive to dcl via wheelchair. A/O, dc instructions reviewed w/ pt, verbalized understanding. DC home in stable condition.

## 2023-02-23 NOTE — PROGRESS NOTES
Monitor Summary  Rhythm: SR with first degree block  Rate: 67-89  Ectopy: rare PAC, rare PVC, 1.55 second pause  .21 / .08 / .36

## 2023-02-24 ENCOUNTER — PATIENT OUTREACH (OUTPATIENT)
Dept: MEDICAL GROUP | Facility: PHYSICIAN GROUP | Age: 83
End: 2023-02-24
Payer: MEDICARE

## 2023-02-24 NOTE — DISCHARGE SUMMARY
Discharge Summary    CHIEF COMPLAINT ON ADMISSION  No chief complaint on file.      Reason for Admission  CHF exacerbation, needing R and L *     Admission Date  2/17/2023    CODE STATUS  FULL    HPI & HOSPITAL COURSE      82 y.o. female with a past medical history of COPD, CVA, CHF with preserved ejection fraction, hyperlipidemia who presented 2/17/2023 with shortness of breath and lower extremity edema over the past 4 weeks despite taking Lasix 20 mg daily.  She was admitted to [facility] where she was noted to have an elevated BNP, severe pitting edema of bilateral lower extremities and chest x-ray with bilateral pulmonary edema.  Her troponin trended up to 166 and she was noted to be in new onset A-fib with RVR for which she was started on Eliquis and metoprolol.  Her metoprolol was later discontinued because patient was having pauses on telemetry.  She underwent a 2D echo that revealed preserved EF with grade 2 diastolic dysfunction with RVSP of 40-45. There was concern of infiltrative heart disease given her free kappa was elevated at 52, free lambda elevated 175, monoclonal protein was still pending.      Cardiology following. [facility] 2/20/23 showed nonobstructive coronary artery disease.  She was found to have atrial fibrillation.  Patient was started on amiodarone for rhythm control (200 mg twice daily x14 days then 200 mg daily).  She was also started on Eliquis.  She was also started on spironolactone.  Cardiology recommended nuclear technetium amyloid scan which was obtained on day of discharge and final read pending.    Oncology consulted for elevated free light chains.  The OMER gel show faint bands and IgM kappa and lambda.  Serum free kappa 52 and lambda 175 which are elevated, kappa/lambda ratio 0.3 (low).  Oncology recommended outpatient heme-onc follow-up with bone marrow biopsy for further work-up.    On day of discharge she was weaned to room air, tolerating p.o. and agreeable to  discharge.  Gave return precautions.    Therefore, she is discharged in good and stable condition to home with close outpatient follow-up.    The patient met 2-midnight criteria for an inpatient stay at the time of discharge.    Discharge Date  2/23/2023    FOLLOW UP ITEMS POST DISCHARGE  PCP follow-up within 1 week, monitor electrolytes and renal function  Cardiology follow-up, follow-up amyloid scan reading  Oncology follow-up, likely needs bone marrow biopsy    DISCHARGE DIAGNOSES  Principal Problem:    Acute on chronic congestive heart failure (HCC) POA: Yes  Active Problems:    Hyperlipemia POA: Yes    COPD (chronic obstructive pulmonary disease) (HCC) POA: Yes    AF (atrial fibrillation) (HCC) POA: Yes    History of CVA (cerebrovascular accident) POA: Unknown    Acute on chronic respiratory failure with hypoxia (HCC) POA: Yes    DNR (do not resuscitate) POA: Unknown  Resolved Problems:    Troponin level elevated POA: Yes      FOLLOW UP  Future Appointments   Date Time Provider Department Center   3/1/2023 11:20 AM SHANNON Ontiveros   3/3/2023 12:45 PM SHANNON Dominguez [facility] None   3/3/2023  3:00 PM [facility]R [facility]   3/3/2023  3:30 PM Adela Umanzor P.A.-C. [facility]   6/20/2023 11:00 AM SHANNON Ontiveros A.P.R.N.  [facility] [id number]  [phone]    Schedule an appointment as soon as possible for a visit in 1 week(s)        MEDICATIONS ON DISCHARGE     Medication List        START taking these medications        Instructions   amiodarone 200 MG Tabs  Start taking on: February 23, 2023  Commonly known as: Cordarone   Doctor's comments: Please adjust sig to just say then 1 tablet there after (rather than 16 days, just wanted to give 30 day supply)  Take 1 Tablet by mouth 2 times a day for 14 days, THEN 1 Tablet every day thereafter.     Eliquis 2.5mg Tabs  Generic drug: apixaban   Take 1  Tablet by mouth 2 times a day.  Dose: 2.5 mg     spironolactone 25 MG Tabs  Start taking on: February 24, 2023  Commonly known as: ALDACTONE   Take 1 Tablet by mouth every day.  Dose: 25 mg            CONTINUE taking these medications        Instructions   albuterol 108 (90 Base) MCG/ACT Aers inhalation aerosol   Inhale 2 Puffs every 6 hours as needed for Shortness of Breath.  Dose: 2 Puff     benzonatate 100 MG Caps  Commonly known as: TESSALON   Take 1 Capsule by mouth 3 times a day as needed for Cough.  Dose: 100 mg     CO Q 10 PO   Take 1 Tablet by mouth every day.  Dose: 1 Tablet     ezetimibe 10 MG Tabs  Commonly known as: ZETIA   Take 1 Tablet by mouth every day.  Dose: 10 mg     fluticasone 50 MCG/ACT nasal spray  Commonly known as: FLONASE   Administer 1 Spray into affected nostril(S) at bedtime.  Dose: 1 Spray     furosemide 40 MG Tabs  Commonly known as: LASIX   Take 1 Tablet by mouth 2 times a day.  Dose: 40 mg     Lutein 40 MG Caps   Take 40 mg by mouth every day.  Dose: 40 mg     QUERCETIN PO   Take 1 Tablet by mouth every day.  Dose: 1 Tablet     rosuvastatin 5 MG Tabs  Commonly known as: CRESTOR   Take 1 Tablet by mouth every evening.  Dose: 5 mg     TURMERIC PO   Take 1 Capsule by mouth every day.  Dose: 1 Capsule     VITAMIN B12 PO   Take 1 Tablet by mouth every day.  Dose: 1 Tablet            STOP taking these medications      asa/apap/caffeine 250-250-65 MG Tabs  Commonly known as: EXCEDRIN     aspirin 81 MG EC tablet     potassium chloride SA 20 MEQ Tbcr  Commonly known as: Kdur              Allergies  Allergies   Allergen Reactions    Atorvastatin Myalgia     Severe muscle weakness    Pcn [Penicillins] Hives       DIET  No orders of the defined types were placed in this encounter.      ACTIVITY  As tolerated.  Weight bearing as tolerated    CONSULTATIONS  Cardiology  Oncology    PROCEDURES    Left heart catheterization 2/20/2023  Procedures performed:  Coronary arteriograms  Left heart  catheterization and Aortic root arteriogram      Final impression:  Non-obstructive coronary artery disease  1-2+ aortic regurgitation.     Recommendations:  Guideline directed medical therapy   Cardiovascular Risk factor modification     Discharge exam:  Physical Exam  Constitutional:       General: She is not in acute distress.     Appearance: She is not ill-appearing, toxic-appearing or diaphoretic.   HENT:      Head: Normocephalic and atraumatic.      Nose: Nose normal.      Mouth/Throat:      Mouth: Mucous membranes are moist.   Eyes:      General: No scleral icterus.     Conjunctiva/sclera: Conjunctivae normal.   Cardiovascular:      Rate and Rhythm: Normal rate and regular rhythm.      Heart sounds: No murmur heard.    No friction rub. No gallop.   Pulmonary:      Effort: Pulmonary effort is normal.      Breath sounds: Normal breath sounds.   Abdominal:      General: Bowel sounds are normal. There is no distension.      Palpations: Abdomen is soft.      Tenderness: There is no abdominal tenderness.   Musculoskeletal:      Cervical back: Normal range of motion.      Right lower leg: Edema present.      Left lower leg: Edema present.   Skin:     Coloration: Skin is not jaundiced.      Findings: No rash.   Neurological:      Mental Status: She is alert and oriented to person, place, and time. Mental status is at baseline.   Psychiatric:         Mood and Affect: Mood normal.         Behavior: Behavior normal.         LABORATORY  Lab Results   Component Value Date    SODIUM 133 (L) 02/22/2023    POTASSIUM 4.3 02/22/2023    CHLORIDE 98 02/22/2023    CO2 28 02/22/2023    GLUCOSE 103 (H) 02/22/2023    BUN 24 (H) 02/22/2023    CREATININE 0.71 02/22/2023        Lab Results   Component Value Date    WBC 6.8 02/21/2023    HEMOGLOBIN 12.0 02/21/2023    HEMATOCRIT 36.6 (L) 02/21/2023    PLATELETCT 271 02/21/2023      EC-ECHOCARDIOGRAM COMPLETE W/O CONT  Transthoracic  Echo Report    Echocardiography  Laboratory    CONCLUSIONS  Normal left ventricular systolic function.  Grade II diastolic dysfunction.  Mild mitral regurgitation.  Mild to moderate aortic insufficiency.  Mild tricuspid regurgitation.  Right ventricular systolic pressure is estimated to be  40-45  mmHg.    DANIELITO CRAWFORD  Exam Date:         02/15/2023                      15:51  Exam Location:     Inpatient  Priority:          Routine    Ordering Physician:        RENY MARINELLI  Referring Physician:       [id number] Alonzo  Sonographer:               Tiny Harman                              RDCS, RVT    Age:    82     Gender:    F  MRN:    [id number]  :    1940  BSA:    1.76   Ht (in):    62     Wt (lb):    165  Exam Type:     Complete    Indications:     Arrhythmia  ICD Codes:       427.9    CPT Codes:       38690    BP:   116    /   68     HR:   87  Technical Quality:       Technically difficult study -                            adequate information is obtained    MEASUREMENTS  (Male / Female) Normal Values  2D ECHO  LV Diastolic Diameter PLAX        4.4 cm                4.2 - 5.9 / 3.9 - 5.3   cm  LV Systolic Diameter PLAX         2.8 cm                2.1 - 4.0 cm  IVS Diastolic Thickness           1 cm                    LVPW Diastolic Thickness          1.1 cm                  LVOT Diameter                     2 cm                    Estimated LV Ejection Fraction    60 %                    LV Ejection Fraction MOD BP       57.6 %                >= 55  %  LV Ejection Fraction MOD 4C       63.4 %                  LV Ejection Fraction MOD 2C       57.1 %                  IVC Diameter                      2 cm                      DOPPLER  AV Peak Velocity                  1.2 m/s                 AV Peak Gradient                  6 mmHg                  AV Mean Gradient                  3.6 mmHg                AI Pressure Half Time             324 ms                  LVOT Peak Velocity                1 m/s                   AV  Area Cont Eq vti               2.8 cm2                 MV Velocity Time Integral         26.6 cm                 Mitral E Point Velocity           1.2 m/s                 Mitral E to A Ratio               1.6                     MV Pressure Half Time             68 ms                   MV Area PHT                       3.2 cm2                 MV Deceleration Time              235 ms                  MR ERO PISA                       0.13 cm2                MR Regurgitant Volume PISA        15 cm3                  TR Peak Velocity                  276 cm/s                PV Peak Velocity                  1.1 m/s                 PV Peak Gradient                  5.2 mmHg                RVOT Peak Velocity                0.69 m/s                  * Indicates values subject to auto-interpretation  LV EF:  60    %    FINDINGS  Left Ventricle  Normal left ventricular chamber size. Normal left ventricular wall   thickness. Normal left ventricular systolic function. The left   ventricular ejection fraction is visually estimated to be 60%. No   regional wall motion abnormalities. Grade II diastolic dysfunction.    Right Ventricle  Normal right ventricular size. Normal right ventricular systolic   function.    Right Atrium  Enlarged right atrium. Dilated inferior vena cava without inspiratory   collapse.    Left Atrium  Mildly dilated left atrium. Left atrial volume index is  39 mL/sq m.    Mitral Valve  Thickened mitral valve leaflets. No mitral stenosis. Mild mitral   regurgitation. ERO by PISA method is 0.1   sq cm.    Aortic Valve  Bicuspid aortic valve. No aortic valve stenosis. Mild to moderate   aortic insufficiency. Pressure half time is  312 msec.    Tricuspid Valve  Structurally normal tricuspid valve. No tricuspid stenosis. Mild   tricuspid regurgitation. Right ventricular systolic pressure is   estimated to be  40-45  mmHg.    Pulmonic Valve  The pulmonic valve is not well visualized. No pulmonic stenosis. Trace    pulmonic insufficiency.    Pericardium  Normal pericardium without effusion. Pleural effusion present. Ascites   present.    Aorta  The ascending aorta diameter is  3.5  cm.    Jose Alfredo Lauren MD  (Electronically Signed)  Final Date:     15 February 2023                   17:14        Total time of the discharge process exceeds 34 minutes.

## 2023-02-24 NOTE — PROGRESS NOTES
LVM 2/24/23  This patient qualifies for a Transitional Care Management visit as they are being seen within the required time and two attempts were made to contact this patient within two business days to review discharge per CMS guidelines.  You therefore can see them as a TCM visit and charge appropriately.    Coding guide  43821      -face-to-face within 14 days      -moderate medical decision complexity  63392      -face-to-face within 7 days      -high medical decision complexity

## 2023-02-27 NOTE — PROGRESS NOTES
This patient qualifies for a Transitional Care Management visit as they are being seen within the required time and two attempts were made to contact this patient within two business days to review discharge per CMS guidelines.  You therefore can see them as a TCM visit and charge appropriately.    Coding guide  [id number]      -face-to-face within 14 days      -moderate medical decision complexity  63373      -face-to-face within 7 days      -high medical decision complexity

## 2023-03-01 ENCOUNTER — APPOINTMENT (OUTPATIENT)
Dept: MEDICAL GROUP | Facility: MEDICAL CENTER | Age: 83
End: 2023-03-01
Payer: MEDICARE

## 2023-03-01 ENCOUNTER — HOSPITAL ENCOUNTER (INPATIENT)
Facility: MEDICAL CENTER | Age: 83
LOS: 3 days | DRG: 292 | End: 2023-03-05
Attending: STUDENT IN AN ORGANIZED HEALTH CARE EDUCATION/TRAINING PROGRAM | Admitting: STUDENT IN AN ORGANIZED HEALTH CARE EDUCATION/TRAINING PROGRAM
Payer: MEDICARE

## 2023-03-01 ENCOUNTER — APPOINTMENT (OUTPATIENT)
Dept: RADIOLOGY | Facility: MEDICAL CENTER | Age: 83
DRG: 292 | End: 2023-03-01
Attending: STUDENT IN AN ORGANIZED HEALTH CARE EDUCATION/TRAINING PROGRAM
Payer: MEDICARE

## 2023-03-01 DIAGNOSIS — I50.33 ACUTE ON CHRONIC DIASTOLIC HEART FAILURE (HCC): ICD-10-CM

## 2023-03-01 DIAGNOSIS — I50.1 PULMONARY EDEMA CARDIAC CAUSE (HCC): ICD-10-CM

## 2023-03-01 DIAGNOSIS — J90 CHRONIC BILATERAL PLEURAL EFFUSIONS: ICD-10-CM

## 2023-03-01 DIAGNOSIS — J44.9 CHRONIC OBSTRUCTIVE PULMONARY DISEASE, UNSPECIFIED COPD TYPE (HCC): ICD-10-CM

## 2023-03-01 DIAGNOSIS — R06.02 SOB (SHORTNESS OF BREATH): ICD-10-CM

## 2023-03-01 DIAGNOSIS — R76.8 ELEVATED SERUM IMMUNOGLOBULIN FREE LIGHT CHAIN LEVEL: ICD-10-CM

## 2023-03-01 LAB — EKG IMPRESSION: NORMAL

## 2023-03-01 PROCEDURE — 84484 ASSAY OF TROPONIN QUANT: CPT

## 2023-03-01 PROCEDURE — 80053 COMPREHEN METABOLIC PANEL: CPT

## 2023-03-01 PROCEDURE — 700111 HCHG RX REV CODE 636 W/ 250 OVERRIDE (IP): Performed by: STUDENT IN AN ORGANIZED HEALTH CARE EDUCATION/TRAINING PROGRAM

## 2023-03-01 PROCEDURE — 700102 HCHG RX REV CODE 250 W/ 637 OVERRIDE(OP): Performed by: STUDENT IN AN ORGANIZED HEALTH CARE EDUCATION/TRAINING PROGRAM

## 2023-03-01 PROCEDURE — 85025 COMPLETE CBC W/AUTO DIFF WBC: CPT

## 2023-03-01 PROCEDURE — A9270 NON-COVERED ITEM OR SERVICE: HCPCS | Performed by: STUDENT IN AN ORGANIZED HEALTH CARE EDUCATION/TRAINING PROGRAM

## 2023-03-01 PROCEDURE — 71045 X-RAY EXAM CHEST 1 VIEW: CPT

## 2023-03-01 PROCEDURE — 36415 COLL VENOUS BLD VENIPUNCTURE: CPT

## 2023-03-01 PROCEDURE — 93005 ELECTROCARDIOGRAM TRACING: CPT | Performed by: STUDENT IN AN ORGANIZED HEALTH CARE EDUCATION/TRAINING PROGRAM

## 2023-03-01 PROCEDURE — 93005 ELECTROCARDIOGRAM TRACING: CPT

## 2023-03-01 PROCEDURE — 99285 EMERGENCY DEPT VISIT HI MDM: CPT

## 2023-03-01 RX ORDER — FAMOTIDINE 20 MG/1
20 TABLET, FILM COATED ORAL ONCE
Status: COMPLETED | OUTPATIENT
Start: 2023-03-01 | End: 2023-03-01

## 2023-03-01 RX ORDER — DIPHENHYDRAMINE HCL 25 MG
25 TABLET ORAL ONCE
Status: COMPLETED | OUTPATIENT
Start: 2023-03-01 | End: 2023-03-01

## 2023-03-01 RX ORDER — PREDNISONE 20 MG/1
60 TABLET ORAL ONCE
Status: COMPLETED | OUTPATIENT
Start: 2023-03-01 | End: 2023-03-01

## 2023-03-01 RX ADMIN — PREDNISONE 60 MG: 20 TABLET ORAL at 23:26

## 2023-03-01 RX ADMIN — DIPHENHYDRAMINE HYDROCHLORIDE 25 MG: 25 TABLET ORAL at 23:19

## 2023-03-01 RX ADMIN — FAMOTIDINE 20 MG: 20 TABLET ORAL at 23:19

## 2023-03-01 ASSESSMENT — FIBROSIS 4 INDEX: FIB4 SCORE: 2.31

## 2023-03-02 PROBLEM — Z88.9 DRUG ALLERGY: Status: ACTIVE | Noted: 2023-03-02

## 2023-03-02 PROBLEM — I50.43 CHF (CONGESTIVE HEART FAILURE), NYHA CLASS I, ACUTE ON CHRONIC, COMBINED (HCC): Status: ACTIVE | Noted: 2023-03-02

## 2023-03-02 PROBLEM — N17.9 AKI (ACUTE KIDNEY INJURY) (HCC): Status: ACTIVE | Noted: 2023-03-02

## 2023-03-02 PROBLEM — Z71.89 ADVANCED CARE PLANNING/COUNSELING DISCUSSION: Status: ACTIVE | Noted: 2023-03-02

## 2023-03-02 LAB
ALBUMIN SERPL BCP-MCNC: 3.2 G/DL (ref 3.2–4.9)
ALBUMIN/GLOB SERPL: 1.1 G/DL
ALP SERPL-CCNC: 121 U/L (ref 30–99)
ALT SERPL-CCNC: 46 U/L (ref 2–50)
ANION GAP SERPL CALC-SCNC: 12 MMOL/L (ref 7–16)
AST SERPL-CCNC: 60 U/L (ref 12–45)
BASOPHILS # BLD AUTO: 0.5 % (ref 0–1.8)
BASOPHILS # BLD: 0.05 K/UL (ref 0–0.12)
BILIRUB SERPL-MCNC: 0.4 MG/DL (ref 0.1–1.5)
BUN SERPL-MCNC: 48 MG/DL (ref 8–22)
CALCIUM ALBUM COR SERPL-MCNC: 9.6 MG/DL (ref 8.5–10.5)
CALCIUM SERPL-MCNC: 9 MG/DL (ref 8.5–10.5)
CHLORIDE SERPL-SCNC: 94 MMOL/L (ref 96–112)
CO2 SERPL-SCNC: 24 MMOL/L (ref 20–33)
CREAT SERPL-MCNC: 1.29 MG/DL (ref 0.5–1.4)
EOSINOPHIL # BLD AUTO: 0.06 K/UL (ref 0–0.51)
EOSINOPHIL NFR BLD: 0.6 % (ref 0–6.9)
GFR SERPLBLD CREATININE-BSD FMLA CKD-EPI: 42 ML/MIN/1.73 M 2
GLOBULIN SER CALC-MCNC: 2.8 G/DL (ref 1.9–3.5)
GLUCOSE SERPL-MCNC: 120 MG/DL (ref 65–99)
HCT VFR BLD AUTO: 37.1 % (ref 37–47)
HGB BLD-MCNC: 12.1 G/DL (ref 12–16)
IMM GRANULOCYTES # BLD AUTO: 0.05 K/UL (ref 0–0.11)
IMM GRANULOCYTES NFR BLD AUTO: 0.5 % (ref 0–0.9)
LYMPHOCYTES # BLD AUTO: 2.31 K/UL (ref 1–4.8)
LYMPHOCYTES NFR BLD: 24.4 % (ref 22–41)
MCH RBC QN AUTO: 29.4 PG (ref 27–33)
MCHC RBC AUTO-ENTMCNC: 32.6 G/DL (ref 33.6–35)
MCV RBC AUTO: 90 FL (ref 81.4–97.8)
MONOCYTES # BLD AUTO: 0.86 K/UL (ref 0–0.85)
MONOCYTES NFR BLD AUTO: 9.1 % (ref 0–13.4)
NEUTROPHILS # BLD AUTO: 6.14 K/UL (ref 2–7.15)
NEUTROPHILS NFR BLD: 64.9 % (ref 44–72)
NRBC # BLD AUTO: 0 K/UL
NRBC BLD-RTO: 0 /100 WBC
NT-PROBNP SERPL IA-MCNC: 7446 PG/ML (ref 0–125)
PLATELET # BLD AUTO: 246 K/UL (ref 164–446)
PMV BLD AUTO: 9.2 FL (ref 9–12.9)
POTASSIUM SERPL-SCNC: 5.4 MMOL/L (ref 3.6–5.5)
PROT SERPL-MCNC: 6 G/DL (ref 6–8.2)
RBC # BLD AUTO: 4.12 M/UL (ref 4.2–5.4)
SODIUM SERPL-SCNC: 130 MMOL/L (ref 135–145)
TROPONIN T SERPL-MCNC: 153 NG/L (ref 6–19)
TROPONIN T SERPL-MCNC: 160 NG/L (ref 6–19)
WBC # BLD AUTO: 9.5 K/UL (ref 4.8–10.8)

## 2023-03-02 PROCEDURE — A9270 NON-COVERED ITEM OR SERVICE: HCPCS | Performed by: STUDENT IN AN ORGANIZED HEALTH CARE EDUCATION/TRAINING PROGRAM

## 2023-03-02 PROCEDURE — 700102 HCHG RX REV CODE 250 W/ 637 OVERRIDE(OP): Performed by: STUDENT IN AN ORGANIZED HEALTH CARE EDUCATION/TRAINING PROGRAM

## 2023-03-02 PROCEDURE — 700111 HCHG RX REV CODE 636 W/ 250 OVERRIDE (IP): Performed by: STUDENT IN AN ORGANIZED HEALTH CARE EDUCATION/TRAINING PROGRAM

## 2023-03-02 PROCEDURE — 770020 HCHG ROOM/CARE - TELE (206)

## 2023-03-02 PROCEDURE — 84484 ASSAY OF TROPONIN QUANT: CPT

## 2023-03-02 PROCEDURE — 36415 COLL VENOUS BLD VENIPUNCTURE: CPT

## 2023-03-02 PROCEDURE — 99223 1ST HOSP IP/OBS HIGH 75: CPT | Mod: AI | Performed by: STUDENT IN AN ORGANIZED HEALTH CARE EDUCATION/TRAINING PROGRAM

## 2023-03-02 PROCEDURE — 83880 ASSAY OF NATRIURETIC PEPTIDE: CPT

## 2023-03-02 PROCEDURE — 96374 THER/PROPH/DIAG INJ IV PUSH: CPT

## 2023-03-02 RX ORDER — PERPHENAZINE/AMITRIPTYLINE HCL 4 MG-25 MG
40 TABLET ORAL DAILY
Status: DISCONTINUED | OUTPATIENT
Start: 2023-03-02 | End: 2023-03-02

## 2023-03-02 RX ORDER — ACETAMINOPHEN 325 MG/1
650 TABLET ORAL EVERY 4 HOURS PRN
Status: DISCONTINUED | OUTPATIENT
Start: 2023-03-02 | End: 2023-03-05 | Stop reason: HOSPADM

## 2023-03-02 RX ORDER — FUROSEMIDE 10 MG/ML
20 INJECTION INTRAMUSCULAR; INTRAVENOUS ONCE
Status: COMPLETED | OUTPATIENT
Start: 2023-03-02 | End: 2023-03-02

## 2023-03-02 RX ORDER — ROSUVASTATIN CALCIUM 10 MG/1
5 TABLET, COATED ORAL EVERY EVENING
Status: DISCONTINUED | OUTPATIENT
Start: 2023-03-02 | End: 2023-03-05 | Stop reason: HOSPADM

## 2023-03-02 RX ORDER — EZETIMIBE 10 MG/1
10 TABLET ORAL DAILY
Status: DISCONTINUED | OUTPATIENT
Start: 2023-03-02 | End: 2023-03-02

## 2023-03-02 RX ORDER — FUROSEMIDE 10 MG/ML
40 INJECTION INTRAMUSCULAR; INTRAVENOUS
Status: DISCONTINUED | OUTPATIENT
Start: 2023-03-02 | End: 2023-03-04

## 2023-03-02 RX ORDER — ALBUTEROL SULFATE 90 UG/1
2 AEROSOL, METERED RESPIRATORY (INHALATION) EVERY 6 HOURS PRN
Status: DISCONTINUED | OUTPATIENT
Start: 2023-03-02 | End: 2023-03-05 | Stop reason: HOSPADM

## 2023-03-02 RX ORDER — UBIDECARENONE 75 MG
100 CAPSULE ORAL DAILY
Status: DISCONTINUED | OUTPATIENT
Start: 2023-03-02 | End: 2023-03-02

## 2023-03-02 RX ADMIN — ACETAMINOPHEN 650 MG: 325 TABLET, FILM COATED ORAL at 19:11

## 2023-03-02 RX ADMIN — FUROSEMIDE 40 MG: 10 INJECTION, SOLUTION INTRAMUSCULAR; INTRAVENOUS at 18:00

## 2023-03-02 RX ADMIN — APIXABAN 2.5 MG: 5 TABLET, FILM COATED ORAL at 08:08

## 2023-03-02 RX ADMIN — FUROSEMIDE 20 MG: 10 INJECTION, SOLUTION INTRAMUSCULAR; INTRAVENOUS at 04:44

## 2023-03-02 RX ADMIN — ROSUVASTATIN 5 MG: 10 TABLET, FILM COATED ORAL at 17:39

## 2023-03-02 RX ADMIN — APIXABAN 2.5 MG: 5 TABLET, FILM COATED ORAL at 17:39

## 2023-03-02 ASSESSMENT — COGNITIVE AND FUNCTIONAL STATUS - GENERAL
WALKING IN HOSPITAL ROOM: A LITTLE
DAILY ACTIVITIY SCORE: 24
SUGGESTED CMS G CODE MODIFIER MOBILITY: CJ
CLIMB 3 TO 5 STEPS WITH RAILING: A LITTLE
MOBILITY SCORE: 21
CLIMB 3 TO 5 STEPS WITH RAILING: A LITTLE
MOBILITY SCORE: 21
STANDING UP FROM CHAIR USING ARMS: A LITTLE
STANDING UP FROM CHAIR USING ARMS: A LITTLE
SUGGESTED CMS G CODE MODIFIER MOBILITY: CJ
WALKING IN HOSPITAL ROOM: A LITTLE
SUGGESTED CMS G CODE MODIFIER DAILY ACTIVITY: CH

## 2023-03-02 ASSESSMENT — FIBROSIS 4 INDEX
FIB4 SCORE: 2.91
FIB4 SCORE: 2.91

## 2023-03-02 ASSESSMENT — PAIN DESCRIPTION - PAIN TYPE
TYPE: CHRONIC PAIN
TYPE: CHRONIC PAIN

## 2023-03-02 ASSESSMENT — PATIENT HEALTH QUESTIONNAIRE - PHQ9
SUM OF ALL RESPONSES TO PHQ9 QUESTIONS 1 AND 2: 0
2. FEELING DOWN, DEPRESSED, IRRITABLE, OR HOPELESS: NOT AT ALL
1. LITTLE INTEREST OR PLEASURE IN DOING THINGS: NOT AT ALL

## 2023-03-02 ASSESSMENT — LIFESTYLE VARIABLES
TOTAL SCORE: 0
DOES PATIENT WANT TO STOP DRINKING: NO
HAVE PEOPLE ANNOYED YOU BY CRITICIZING YOUR DRINKING: NO
HAVE YOU EVER FELT YOU SHOULD CUT DOWN ON YOUR DRINKING: NO
CONSUMPTION TOTAL: NEGATIVE
TOTAL SCORE: 0
HOW MANY TIMES IN THE PAST YEAR HAVE YOU HAD 5 OR MORE DRINKS IN A DAY: 0
EVER HAD A DRINK FIRST THING IN THE MORNING TO STEADY YOUR NERVES TO GET RID OF A HANGOVER: NO
ON A TYPICAL DAY WHEN YOU DRINK ALCOHOL HOW MANY DRINKS DO YOU HAVE: 1
TOTAL SCORE: 0
AVERAGE NUMBER OF DAYS PER WEEK YOU HAVE A DRINK CONTAINING ALCOHOL: 0
EVER FELT BAD OR GUILTY ABOUT YOUR DRINKING: NO
ALCOHOL_USE: YES

## 2023-03-02 ASSESSMENT — CHA2DS2 SCORE
DIABETES: NO
CHA2DS2 VASC SCORE: 6
PRIOR STROKE OR TIA OR THROMBOEMBOLISM: YES
CHF OR LEFT VENTRICULAR DYSFUNCTION: YES
VASCULAR DISEASE: NO
AGE 75 OR GREATER: YES
HYPERTENSION: NO
AGE 65 TO 74: NO
SEX: FEMALE

## 2023-03-02 ASSESSMENT — ENCOUNTER SYMPTOMS
GASTROINTESTINAL NEGATIVE: 1
MUSCULOSKELETAL NEGATIVE: 1
PSYCHIATRIC NEGATIVE: 1
EYES NEGATIVE: 1
RESPIRATORY NEGATIVE: 1
CARDIOVASCULAR NEGATIVE: 1

## 2023-03-02 NOTE — CARE PLAN
The patient is Stable - Low risk of patient condition declining or worsening         Progress made toward(s) clinical / shift goals:    Problem: Knowledge Deficit - Standard  Goal: Patient and family/care givers will demonstrate understanding of plan of care, disease process/condition, diagnostic tests and medications  Outcome: Progressing     Problem: Care Map:  Admission Optimal Outcome for the Heart Failure Patient  Goal: Admission:  Optimal Care of the heart failure patient  Outcome: Progressing

## 2023-03-02 NOTE — ASSESSMENT & PLAN NOTE
Possibly from cardiorenal syndrome versus medication induced  Avoid nephrotoxic medication  Improving with diuresis   Monitor closely with aggressive duiresis   Serial BMP

## 2023-03-02 NOTE — ASSESSMENT & PLAN NOTE
Suspected that patient's rash came from her recent initiation of amiodarone, reports improvement of rash upon my assessment with her after benadryl and prednisone  At this time would hold amiodarone  Will discuss with cards if alternative is needed as it was started for rhythm control. Currently she looks to be in sinus, however due to low voltage it is difficult to discern p waves   Continue tele

## 2023-03-02 NOTE — ED PROVIDER NOTES
ED Provider Note    CHIEF COMPLAINT  Chief Complaint   Patient presents with    Allergic Reaction    Shortness of Breath       EXTERNAL RECORDS REVIEWED  Inpatient Notes Was admitted February 2023 for evaluation of CHF exacerbation.  Has a history of COPD CVA CHF with preserved ejection fraction.  Was found to be in new onset atrial fibrillation was started on metoprolol and Eliquis, did have a left heart cath at that time which showed nonobstructive coronary artery disease, also had a nuclear Teczem's amyloid scan which was negative for amyloidosis, her last echo was reviewed did show grade 2 diastolic dysfunction    HPI/ROS  LIMITATION TO HISTORY   Select: : None  OUTSIDE HISTORIAN(S):  Family grandson reports that the patient developed a rash on her back which has since improved after eating dinner and taking her medications.    Regla Meier is a 82 y.o. female who presents evaluation of a rash and itching with associated shortness of breath.  Patient has a history of CHF COPD prior CVA.  States this evening after eating dinner she developed a diffuse urticarial rash with associated shortness of breath.  As well as diffuse pruritus to her entire body.  No prior history of allergies or anaphylaxis.  The rash did resolve after she put a steroid cream on it.  Patient has additional complaint of shortness of breath.  Was recently diagnosed with CHF, has been compliant with her medications though does admit to worsening right lower rib pain and orthopnea over the past day.  She is short of breath with exertion.  PAST MEDICAL HISTORY   has a past medical history of Blindness of left eye, COPD (chronic obstructive pulmonary disease) ([facility]), Hyperlipemia, and Stroke ([facility]) (08/01/2022).    SURGICAL HISTORY   has a past surgical history that includes cystectomy (1963) and cataract extraction with iol.    FAMILY HISTORY  Family History   Problem Relation Age of Onset    Other Mother         TIA     Other Father       "   Abdominal aneurysm     Alcohol abuse Brother     Heart Disease Brother     No Known Problems Daughter     No Known Problems Daughter        SOCIAL HISTORY  Social History     Tobacco Use    Smoking status: Never    Smokeless tobacco: Never   Vaping Use    Vaping Use: Never used   Substance and Sexual Activity    Alcohol use: Yes     Alcohol/week: 0.6 oz     Types: 1 Glasses of wine per week     Comment: occ glass of wine    Drug use: No    Sexual activity: Yes     Partners: Male     Comment:  for 57 years. Documented on 4/10/19.       CURRENT MEDICATIONS  Home Medications       Reviewed by Brenda Holland R.N. (Registered Nurse) on 03/01/23 at 2231  Med List Status: Not Addressed     Medication Last Dose Status   albuterol 108 (90 Base) MCG/ACT Aero Soln inhalation aerosol  Active   amiodarone (CORDARONE) 200 MG Tab  Active   apixaban (ELIQUIS) 2.5mg Tab  Active   benzonatate (TESSALON) 100 MG Cap  Active   Coenzyme Q10 (CO Q 10 PO)  Active   Cyanocobalamin (VITAMIN B12 PO)  Active   ezetimibe (ZETIA) 10 MG Tab  Active   fluticasone (FLONASE) 50 MCG/ACT nasal spray  Active   furosemide (LASIX) 40 MG Tab  Active   Lutein 40 MG Cap  Active   QUERCETIN PO  Active   rosuvastatin (CRESTOR) 5 MG Tab  Active   spironolactone (ALDACTONE) 25 MG Tab  Active   TURMERIC PO  Active                    ALLERGIES  Allergies   Allergen Reactions    Atorvastatin Myalgia     Severe muscle weakness    Pcn [Penicillins] Hives       PHYSICAL EXAM  VITAL SIGNS: /57   Pulse 65   Temp 36.4 °C (97.6 °F) (Temporal)   Resp 18   Ht 1.575 m (5' 2\")   Wt 62.7 kg (138 lb 3.7 oz)   SpO2 96%   BMI 25.28 kg/m²      Pulse ox interpretation: I interpret this pulse ox as normal.  VITALS - vital signs documented prior to this note have been reviewed and noted,  GENERAL - awake, alert, oriented, GCS 15, no apparent distress, non-toxic  appearing  HEENT - normocephalic, atraumatic, pupils equal, sclera anicteric, mucus  membranes " moist  NECK - supple, no meningismus, full active range of motion, trachea midline  CARDIOVASCULAR -irregularly iregular rate/rhythm, no murmurs/gallops/rubs  PULMONARY - no respiratory distress, speaking in full sentences, clear to  auscultation bilaterally, no wheezing/ronchi/rales, no accessory muscle use  GASTROINTESTINAL - soft, non-tender, non-distended, no rebound, guarding,  or peritonitis  GENITOURINARY - Deferred  NEUROLOGIC - Awake alert, normal mental status, speech fluid, cognition  normal, moves all extremities  MUSCULOSKELETAL - no obvious asymmetry or deformities present  EXTREMITIES - warm, well-perfused, no cyanosis or significant edema  DERMATOLOGIC - warm, dry, no rashes, no jaundice  PSYCHIATRIC - normal affect, normal insight, normal concentration    DIAGNOSTIC STUDIES / PROCEDURES  EKG  I have independently interpreted this EKG  Shows atrial fibrillation at a rate of 61 with a left axis dissociation intraventricular conduction delay, there is no STEMI pattern no T wave inversion, this is unchanged when compared to prior interpretation is atrial fibrillation with IVCD as interpreted by myself    LABS  Troponin is elevated, though near the patient's baseline BNP is decreased compared to prior.  Does have a new hyponatremia    RADIOLOGY  I have independently interpreted the diagnostic imaging associated with this visit and am waiting the final reading from the radiologist.   My preliminary interpretation is a follows pulmonary edema and bilateral pleural effusions  Radiologist interpretation:   DX-CHEST-PORTABLE (1 VIEW)   Final Result         1.  Pulmonary edema and/or infiltrates are identified, which appear somewhat increased since the prior exam.   2.  Layering bilateral pleural effusions, similar compared to prior study.   3.  Cardiomegaly   4.  Atherosclerosis           COURSE & MEDICAL DECISION MAKING    ED Observation Status? Yes; I am placing the patient in to an observation status due  to a diagnostic uncertainty as well as therapeutic intensity. Patient placed in observation status at 11:11 PM, 3/1/2023.     Observation plan is as follows: Observation for progression to anaphylaxis repeat labs    Reevaluation at 0 430 patient is hypoxic on room air requiring 2 L nasal cannula    Upon Reevaluation, the patient's condition has: not improved; and will be escalated to hospitalization.    Patient discharged from ED Observation status at 0435 (Time) 3/2 (Date).       Critical care    Critical Care Procedure Note    Total critical care time: Approximately 36 minutes    Upon my assess due to a high probability of clinically significant, life threatening deterioration, secondary to hypoxic respiratory failure likely secondary to CHF exacerbation the patient required my direct attention and intervention. This critical care time included obtaining a history; examining the patient; pulse oximetry; ordering and review of studies; arranging urgent treatment with development of a management plan; evaluation of patient's response to treatment; frequent reassessment; and, discussions with other providers.    was exclusive of separately billable procedures and treating other patients and teaching time.      INITIAL ASSESSMENT, COURSE AND PLAN  Care Narrative: Patient presented for evaluation of a diffuse urticarial rash as well as pruritus shortness of breath and orthopnea.  Differential included allergic reaction anaphylaxis CHF exacerbation pleural effusion pneumothorax  Atypical ACS among many other considerations her EKG is nonischemic does show slow atrial fibrillation which is unchanged when compared to prior.  Her troponin is elevated though near her baseline.  BMP is improving compared to prior however she does have worsening pulmonary edema and is hypoxic desaturating to 89 to 88% requiring 2 L supplemental oxygenation.  CMP otherwise remarkable for mild hyponatremia.  Regards to her allergic reaction she  was given prednisone Benadryl and Pepcid with improvement of her pruritus and no progression to anaphylaxis.  I believe the patient's orthopnea and shortness of breath are likely secondary to her underlying CHF.  She will be given a dose of Lasix and admitted to medicine for further care and evaluation of a suspected acute on chronic CHF exacerbation            ADDITIONAL PROBLEM LIST  Allergic reaction  chf  DISPOSITION AND DISCUSSIONS  I have discussed management of the patient with the following physicians and ELIO's:  hospitalist    Discussion of management with other QHP or appropriate source(s): None     Barriers to care at this time, including but not limited to:  none .     Decision tools and prescription drugs considered including, but not limited to:  lasix considered anaphylactic dose epinephrine then no signs to suggest progression to anaphylaxis .    FINAL DIAGNOSIS  1 CHF exacerbation  2.  Allergic reaction  3.  Hyponatremia  4.  Elevated troponin       Electronically signed by: Adonis Nicholson D.O., 3/1/2023 11:11 PM

## 2023-03-02 NOTE — ED NOTES
Pt wheeled to the restroom via WC with significant assistance. Returned to bed and re-placed on monitors without incident. No further complaints at this time.

## 2023-03-02 NOTE — ASSESSMENT & PLAN NOTE
Goals of cares discussed with patient and  at bedside.  Initially DNR/DNI, however at this time they wish to revoke that and to think about her CODE STATUS.  For now they would like to leave as full code

## 2023-03-02 NOTE — ASSESSMENT & PLAN NOTE
Recurrent exacerbation   Recent echo with normal EF, grade II diastolic disfucntion   PPY scan neg for amyloid   aldactone started, dose increased   IV lasix changed to po torsemide today by cardiology   Monitor Is/Os, daily weights   Low salt, cardiac diet   Continues to appear volume overloaded, monitor

## 2023-03-02 NOTE — ED NOTES
ERP at bedside. Labs collected and sent. Medicated per MAR. Rounded on pt. Pt resting comfortably in bed at this time. On vitals monitor. Respirations equal and unlabored. Vitals signs stable. No acute distress at this time. Call light within reach. Grandson at bedside

## 2023-03-02 NOTE — ED NOTES
Med rec completed per patient, family at bedside, and RX bottles (Bottles returned)  Allergies reviewed  No PO Antibiotics in the last 30 days

## 2023-03-02 NOTE — H&P
Hospital Medicine History & Physical Note    Date of Service  3/2/2023    Primary Care Physician  SHANNON Ontiveros    Consultants  None    Code Status  Full Code    Chief Complaint  Chief Complaint   Patient presents with    Allergic Reaction    Shortness of Breath       History of Presenting Illness  Regla Meier is a 82 y.o. female who presented 3/1/2023 with an itchy sensation around her entire body for the last 24 hours.    Patient has a history of COPD, CVA, heart failure with preserved ejection fraction, hyperlipidemia.  Recently discharged from [facility].  Admitted for CHF exacerbation, was found to have atrial fibrillation, new onset.  To which patient was started on Eliquis and metoprolol.  Metoprolol then discontinued due to pauses seen on telemetry.  Patient had a cardiac catheterization showing nonobstructive coronary artery disease.  She was then discharged with Eliquis and amiodarone.    Of note, patient was found to have free light chains elevation.  Oncology recommended outpatient work-up for bone marrow biopsy.    For the last 24 hours, patient has reported an intense itching sensation on her entire body.  She describes having associated redness and small bumps on her chest and back.  In addition, she began to have progressively worsening shortness of breath and increased lower extremity swelling during this time.  She reports compliance with her medications.  As symptoms not improved, decision was made to come to the ED for evaluation.    In the ED, patient found to have normal vital signs.  Pertinent labs include hyponatremia, hypochloremia, ERNESTINE, elevated troponin, BNP 7446.  Chest x-ray showing pulmonary edema with bilateral pleural effusions.    I discussed the plan of care with patient.    Review of Systems  Review of Systems   Constitutional:  Positive for malaise/fatigue.   HENT: Negative.     Eyes: Negative.    Respiratory: Negative.     Cardiovascular: Negative.     Gastrointestinal: Negative.    Genitourinary: Negative.    Musculoskeletal: Negative.    Skin: Negative.    Endo/Heme/Allergies: Negative.    Psychiatric/Behavioral: Negative.       Past Medical History   has a past medical history of Blindness of left eye, COPD (chronic obstructive pulmonary disease) (HCC), Hyperlipemia, and Stroke ([facility]) (08/01/2022).    Surgical History   has a past surgical history that includes cystectomy (1963) and cataract extraction with iol.     Family History  family history includes Alcohol abuse in her brother; Heart Disease in her brother; No Known Problems in her daughter and daughter; Other in her father and mother.   Family history reviewed with patient. There is no family history that is pertinent to the chief complaint.     Social History   reports that she has never smoked. She has never used smokeless tobacco. She reports current alcohol use of about 0.6 oz per week. She reports that she does not use drugs.    Allergies  Allergies   Allergen Reactions    Atorvastatin Myalgia     Severe muscle weakness    Pcn [Penicillins] Hives       Medications  Prior to Admission Medications   Prescriptions Last Dose Informant Patient Reported? Taking?   Coenzyme Q10 (CO Q 10 PO)  Patient Yes No   Sig: Take 1 Tablet by mouth every day.   Cyanocobalamin (VITAMIN B12 PO)  Patient Yes No   Sig: Take 1 Tablet by mouth every day.   Lutein 40 MG Cap  Patient Yes No   Sig: Take 40 mg by mouth every day.   QUERCETIN PO  Patient Yes No   Sig: Take 1 Tablet by mouth every day.   TURMERIC PO  Patient Yes No   Sig: Take 1 Capsule by mouth every day.   albuterol 108 (90 Base) MCG/ACT Aero Soln inhalation aerosol  Patient No No   Sig: Inhale 2 Puffs every 6 hours as needed for Shortness of Breath.   amiodarone (CORDARONE) 200 MG Tab   No No   Sig: Take 1 Tablet by mouth 2 times a day for 14 days, THEN 1 Tablet every day thereafter.   apixaban (ELIQUIS) 2.5mg Tab   No No   Sig: Take 1 Tablet by mouth 2 times  a day.   benzonatate (TESSALON) 100 MG Cap  Patient No No   Sig: Take 1 Capsule by mouth 3 times a day as needed for Cough.   ezetimibe (ZETIA) 10 MG Tab  Patient No No   Sig: Take 1 Tablet by mouth every day.   fluticasone (FLONASE) 50 MCG/ACT nasal spray  Patient No No   Sig: Administer 1 Spray into affected nostril(S) at bedtime.   furosemide (LASIX) 40 MG Tab   No No   Sig: Take 1 Tablet by mouth 2 times a day.   rosuvastatin (CRESTOR) 5 MG Tab  Patient No No   Sig: Take 1 Tablet by mouth every evening.   spironolactone (ALDACTONE) 25 MG Tab   No No   Sig: Take 1 Tablet by mouth every day.      Facility-Administered Medications: None       Physical Exam  Temp:  [36.4 °C (97.6 °F)] 36.4 °C (97.6 °F)  Pulse:  [63-68] 65  Resp:  [13-20] 20  BP: (105-118)/(57-75) 110/62  SpO2:  [90 %-96 %] 96 %  Blood Pressure : 110/62   Temperature: 36.4 °C (97.6 °F)   Pulse: 65   Respiration: 20   Pulse Oximetry: 96 %       Physical Exam  Constitutional:       Appearance: Normal appearance. She is normal weight.   HENT:      Head: Normocephalic.      Nose: Nose normal.      Mouth/Throat:      Mouth: Mucous membranes are moist.   Eyes:      Pupils: Pupils are equal, round, and reactive to light.   Cardiovascular:      Rate and Rhythm: Normal rate. Rhythm irregular.      Pulses: Normal pulses.   Pulmonary:      Effort: Pulmonary effort is normal.      Comments: Rales heard at lung bases bilaterally  Abdominal:      General: Abdomen is flat. Bowel sounds are normal.      Palpations: Abdomen is soft.   Musculoskeletal:      Cervical back: Neck supple.      Comments: Pitting edema 3+ bilaterally   Skin:     General: Skin is warm.      Comments: Areas of redness around her upper shoulders bilaterally, mild maculopapular rash noted around her shoulders   Neurological:      General: No focal deficit present.      Mental Status: She is alert and oriented to person, place, and time. Mental status is at baseline.   Psychiatric:         Mood  and Affect: Mood normal.         Behavior: Behavior normal.         Thought Content: Thought content normal.         Judgment: Judgment normal.       Laboratory:  Recent Labs     03/01/23 2249   WBC 9.5   RBC 4.12*   HEMOGLOBIN 12.1   HEMATOCRIT 37.1   MCV 90.0   MCH 29.4   MCHC 32.6*   PLATELETCT 246   MPV 9.2     Recent Labs     03/01/23 2249   SODIUM 130*   POTASSIUM 5.4   CHLORIDE 94*   CO2 24   GLUCOSE 120*   BUN 48*   CREATININE 1.29   CALCIUM 9.0     Recent Labs     03/01/23 2249   ALTSGPT 46   ASTSGOT 60*   ALKPHOSPHAT 121*   TBILIRUBIN 0.4   GLUCOSE 120*         Recent Labs     03/02/23  0035   NTPROBNP 7446*         Recent Labs     03/01/23 2249 03/02/23  0035   TROPONINT 160* 153*       Imaging:  DX-CHEST-PORTABLE (1 VIEW)   Final Result         1.  Pulmonary edema and/or infiltrates are identified, which appear somewhat increased since the prior exam.   2.  Layering bilateral pleural effusions, similar compared to prior study.   3.  Cardiomegaly   4.  Atherosclerosis          X-Ray:  I have personally reviewed the images and compared with prior images.    Assessment/Plan:  Justification for Admission Status  I anticipate this patient will require at least two midnights for appropriate medical management, necessitating inpatient admission because patient has CHF exacerbation and a possible drug reaction      * CHF (congestive heart failure), NYHA class I, acute on chronic, combined (HCC)- (present on admission)  Assessment & Plan  Admit patient to telemetry  Lasix IV twice daily  Elevate head of bed  Input output    Drug allergy  Assessment & Plan  Suspected that patient's rash came from her recent initiation of amiodarone, reports improvement of rash upon my assessment with her after benadryl and prednisone  At this time would hold amiodarone and recommend cardiology consult in a.m. for reconciliation of medications      ERNESTINE (acute kidney injury) (HCC)  Assessment & Plan  Possibly from cardiorenal  syndrome versus medication induced  Avoid nephrotoxic medication  Diurese cautiously  Serial BMP    Advanced care planning/counseling discussion  Assessment & Plan  Goals of cares discussed with patient and  at bedside.  Initially DNR/DNI, however at this time they wish to revoke that and to think about her CODE STATUS.  For now they would like to leave as full code    AF (atrial fibrillation) ([facility])- (present on admission)  Assessment & Plan  Continue Eliquis  Avoid beta-blockers due to pauses during last admission    Acute CVA (cerebrovascular accident) ([facility])- (present on admission)  Assessment & Plan  Continue eliquis and crestor    Elevated troponin  Assessment & Plan  Found to have 2 elevated troponins that flat trended in the setting of heart failure exacerbation and ERNESTINE  Recent non obstructive cardiac catheterization  Low suspicio of ACS, would not pursue further work-up at this time    Hyperlipemia- (present on admission)  Assessment & Plan  Continue crestor         VTE prophylaxis: therapeutic anticoagulation with Eliquis

## 2023-03-02 NOTE — ED NOTES
from Lab called with critical result of troponin 160 at 0008. Critical lab result read back to .   Dr. Nicholson notified of critical lab result at 0009.  Critical lab result read back by Dr. Nicholson.

## 2023-03-02 NOTE — ASSESSMENT & PLAN NOTE
Found to have 2 elevated troponins that flat trended in the setting of heart failure exacerbation and ERNESTINE  Recent non obstructive cardiac catheterization  Low suspicio of ACS, would not pursue further work-up at this time

## 2023-03-02 NOTE — ED TRIAGE NOTES
"  Chief Complaint   Patient presents with    Allergic Reaction    Shortness of Breath     Pt presents to triage for above complaints. Pt reports suspected allergic reaction to unknown substance due to whole body itching  all day today. Pt also reports SOB and states \"it feels like a steal band around my rib cage\". Pt denies CP, denies oral swelling, sore or swollen throat or difficulty swallowing. Pt speaking in full sentences and managing secretions but is tachypneic in triage. EKG obtained in triage.    Pt is alert/oriented, following commands, and answering questions appropriately. Pt placed in lobby and educated on triage process. Pt encouraged to alert staff for any changes in condition.    ./57   Pulse 65   Temp 36.4 °C (97.6 °F) (Temporal)   Resp 18   Ht 1.575 m (5' 2\")   Wt 62.7 kg (138 lb 3.7 oz)   SpO2 96%   BMI 25.28 kg/m²     "

## 2023-03-02 NOTE — ED NOTES
Pt medicated per MAR. Rounded on pt. Pt resting comfortably in bed at this time. On vitals monitor. Respirations equal and unlabored. Vitals signs stable. No acute distress at this time. Call light within reach.

## 2023-03-02 NOTE — ED NOTES
Pt roomed in [facility]. Changed into hospital gown and placed on monitor. Chart up for ERP. Call light within reach. Pt in no acute distress at this time.

## 2023-03-02 NOTE — ED NOTES
Darren from Lab called with critical result of Ammonia 288 at 0347. Critical lab result read back to Darren.   Dr. Nicholson notified of critical lab result at 0347.  Critical lab result read back by Dr. Nicholson .

## 2023-03-02 NOTE — ASSESSMENT & PLAN NOTE
MORALES.fib noted on previous EKGs, currently in sinus rhythm  Continue Eliquis  Avoid beta-blockers due to pauses and bradycardia   Amiodarone discontinued due to possible allergy   Cardiology following, will need outpatient follow up

## 2023-03-03 ENCOUNTER — PATIENT OUTREACH (OUTPATIENT)
Dept: HEALTH INFORMATION MANAGEMENT | Facility: OTHER | Age: 83
End: 2023-03-03

## 2023-03-03 ENCOUNTER — APPOINTMENT (OUTPATIENT)
Dept: MEDICAL GROUP | Facility: MEDICAL CENTER | Age: 83
End: 2023-03-03
Payer: MEDICARE

## 2023-03-03 PROBLEM — I50.33 ACUTE ON CHRONIC DIASTOLIC HEART FAILURE (HCC): Status: ACTIVE | Noted: 2023-03-02

## 2023-03-03 LAB
ANION GAP SERPL CALC-SCNC: 10 MMOL/L (ref 7–16)
BASOPHILS # BLD AUTO: 0.2 % (ref 0–1.8)
BASOPHILS # BLD: 0.02 K/UL (ref 0–0.12)
BUN SERPL-MCNC: 41 MG/DL (ref 8–22)
CALCIUM SERPL-MCNC: 8.4 MG/DL (ref 8.5–10.5)
CHLORIDE SERPL-SCNC: 96 MMOL/L (ref 96–112)
CO2 SERPL-SCNC: 24 MMOL/L (ref 20–33)
CREAT SERPL-MCNC: 1.02 MG/DL (ref 0.5–1.4)
EKG IMPRESSION: NORMAL
EOSINOPHIL # BLD AUTO: 0 K/UL (ref 0–0.51)
EOSINOPHIL NFR BLD: 0 % (ref 0–6.9)
ERYTHROCYTE [DISTWIDTH] IN BLOOD BY AUTOMATED COUNT: 46.1 FL (ref 35.9–50)
GFR SERPLBLD CREATININE-BSD FMLA CKD-EPI: 55 ML/MIN/1.73 M 2
GLUCOSE SERPL-MCNC: 123 MG/DL (ref 65–99)
HCT VFR BLD AUTO: 32.4 % (ref 37–47)
HGB BLD-MCNC: 11.2 G/DL (ref 12–16)
IMM GRANULOCYTES # BLD AUTO: 0.08 K/UL (ref 0–0.11)
IMM GRANULOCYTES NFR BLD AUTO: 0.7 % (ref 0–0.9)
LYMPHOCYTES # BLD AUTO: 1.55 K/UL (ref 1–4.8)
LYMPHOCYTES NFR BLD: 14.5 % (ref 22–41)
MAGNESIUM SERPL-MCNC: 2.3 MG/DL (ref 1.5–2.5)
MCH RBC QN AUTO: 31.8 PG (ref 27–33)
MCHC RBC AUTO-ENTMCNC: 34.6 G/DL (ref 33.6–35)
MCV RBC AUTO: 92 FL (ref 81.4–97.8)
MONOCYTES # BLD AUTO: 0.86 K/UL (ref 0–0.85)
MONOCYTES NFR BLD AUTO: 8.1 % (ref 0–13.4)
NEUTROPHILS # BLD AUTO: 8.16 K/UL (ref 2–7.15)
NEUTROPHILS NFR BLD: 76.5 % (ref 44–72)
NRBC # BLD AUTO: 0 K/UL
NRBC BLD-RTO: 0 /100 WBC
PLATELET # BLD AUTO: 215 K/UL (ref 164–446)
PMV BLD AUTO: 8.9 FL (ref 9–12.9)
POTASSIUM SERPL-SCNC: 4.9 MMOL/L (ref 3.6–5.5)
RBC # BLD AUTO: 3.52 M/UL (ref 4.2–5.4)
SODIUM SERPL-SCNC: 130 MMOL/L (ref 135–145)
TROPONIN T SERPL-MCNC: 146 NG/L (ref 6–19)
WBC # BLD AUTO: 10.7 K/UL (ref 4.8–10.8)

## 2023-03-03 PROCEDURE — 700102 HCHG RX REV CODE 250 W/ 637 OVERRIDE(OP): Performed by: INTERNAL MEDICINE

## 2023-03-03 PROCEDURE — 700102 HCHG RX REV CODE 250 W/ 637 OVERRIDE(OP): Performed by: STUDENT IN AN ORGANIZED HEALTH CARE EDUCATION/TRAINING PROGRAM

## 2023-03-03 PROCEDURE — 36415 COLL VENOUS BLD VENIPUNCTURE: CPT

## 2023-03-03 PROCEDURE — A9270 NON-COVERED ITEM OR SERVICE: HCPCS | Performed by: STUDENT IN AN ORGANIZED HEALTH CARE EDUCATION/TRAINING PROGRAM

## 2023-03-03 PROCEDURE — 93010 ELECTROCARDIOGRAM REPORT: CPT | Performed by: INTERNAL MEDICINE

## 2023-03-03 PROCEDURE — 80048 BASIC METABOLIC PNL TOTAL CA: CPT

## 2023-03-03 PROCEDURE — 770020 HCHG ROOM/CARE - TELE (206)

## 2023-03-03 PROCEDURE — 99233 SBSQ HOSP IP/OBS HIGH 50: CPT | Performed by: INTERNAL MEDICINE

## 2023-03-03 PROCEDURE — 93005 ELECTROCARDIOGRAM TRACING: CPT | Performed by: STUDENT IN AN ORGANIZED HEALTH CARE EDUCATION/TRAINING PROGRAM

## 2023-03-03 PROCEDURE — 84484 ASSAY OF TROPONIN QUANT: CPT

## 2023-03-03 PROCEDURE — 85025 COMPLETE CBC W/AUTO DIFF WBC: CPT

## 2023-03-03 PROCEDURE — 700111 HCHG RX REV CODE 636 W/ 250 OVERRIDE (IP): Performed by: STUDENT IN AN ORGANIZED HEALTH CARE EDUCATION/TRAINING PROGRAM

## 2023-03-03 PROCEDURE — A9270 NON-COVERED ITEM OR SERVICE: HCPCS | Performed by: INTERNAL MEDICINE

## 2023-03-03 PROCEDURE — 99233 SBSQ HOSP IP/OBS HIGH 50: CPT | Performed by: STUDENT IN AN ORGANIZED HEALTH CARE EDUCATION/TRAINING PROGRAM

## 2023-03-03 PROCEDURE — 83735 ASSAY OF MAGNESIUM: CPT

## 2023-03-03 RX ORDER — NITROGLYCERIN 0.4 MG/1
0.4 TABLET SUBLINGUAL
Status: DISCONTINUED | OUTPATIENT
Start: 2023-03-03 | End: 2023-03-05 | Stop reason: HOSPADM

## 2023-03-03 RX ORDER — SPIRONOLACTONE 25 MG/1
12.5 TABLET ORAL
Status: DISCONTINUED | OUTPATIENT
Start: 2023-03-03 | End: 2023-03-04

## 2023-03-03 RX ADMIN — NITROGLYCERIN 0.4 MG: 0.4 TABLET, ORALLY DISINTEGRATING SUBLINGUAL at 11:47

## 2023-03-03 RX ADMIN — PREDNISONE 60 MG: 10 TABLET ORAL at 06:23

## 2023-03-03 RX ADMIN — FUROSEMIDE 40 MG: 10 INJECTION, SOLUTION INTRAMUSCULAR; INTRAVENOUS at 16:29

## 2023-03-03 RX ADMIN — FUROSEMIDE 40 MG: 10 INJECTION, SOLUTION INTRAMUSCULAR; INTRAVENOUS at 06:23

## 2023-03-03 RX ADMIN — APIXABAN 2.5 MG: 5 TABLET, FILM COATED ORAL at 16:29

## 2023-03-03 RX ADMIN — APIXABAN 2.5 MG: 5 TABLET, FILM COATED ORAL at 06:23

## 2023-03-03 RX ADMIN — SPIRONOLACTONE 12.5 MG: 25 TABLET ORAL at 17:54

## 2023-03-03 RX ADMIN — ROSUVASTATIN 5 MG: 10 TABLET, FILM COATED ORAL at 16:29

## 2023-03-03 RX ADMIN — ACETAMINOPHEN 650 MG: 325 TABLET, FILM COATED ORAL at 21:33

## 2023-03-03 SDOH — ECONOMIC STABILITY: HOUSING INSECURITY
IN THE LAST 12 MONTHS, WAS THERE A TIME WHEN YOU DID NOT HAVE A STEADY PLACE TO SLEEP OR SLEPT IN A SHELTER (INCLUDING NOW)?: NO

## 2023-03-03 SDOH — ECONOMIC STABILITY: TRANSPORTATION INSECURITY
IN THE PAST 12 MONTHS, HAS THE LACK OF TRANSPORTATION KEPT YOU FROM MEDICAL APPOINTMENTS OR FROM GETTING MEDICATIONS?: NO

## 2023-03-03 SDOH — ECONOMIC STABILITY: HOUSING INSECURITY: IN THE LAST 12 MONTHS, HOW MANY PLACES HAVE YOU LIVED?: 1

## 2023-03-03 SDOH — ECONOMIC STABILITY: INCOME INSECURITY: IN THE LAST 12 MONTHS, WAS THERE A TIME WHEN YOU WERE NOT ABLE TO PAY THE MORTGAGE OR RENT ON TIME?: NO

## 2023-03-03 SDOH — ECONOMIC STABILITY: INCOME INSECURITY: HOW HARD IS IT FOR YOU TO PAY FOR THE VERY BASICS LIKE FOOD, HOUSING, MEDICAL CARE, AND HEATING?: NOT HARD AT ALL

## 2023-03-03 ASSESSMENT — ENCOUNTER SYMPTOMS
NEUROLOGICAL NEGATIVE: 1
PSYCHIATRIC NEGATIVE: 1
DIZZINESS: 0
CARDIOVASCULAR NEGATIVE: 1
RESPIRATORY NEGATIVE: 1
HEMATOLOGIC/LYMPHATIC NEGATIVE: 1
ENDOCRINE NEGATIVE: 1
CONSTITUTIONAL NEGATIVE: 1
ALLERGIC/IMMUNOLOGIC NEGATIVE: 1
LIGHT-HEADEDNESS: 0
EYES NEGATIVE: 1
ABDOMINAL DISTENTION: 0
NAUSEA: 0
SHORTNESS OF BREATH: 0
GASTROINTESTINAL NEGATIVE: 1
ARTHRALGIAS: 0
NUMBNESS: 0
PALPITATIONS: 0

## 2023-03-03 ASSESSMENT — FIBROSIS 4 INDEX
FIB4 SCORE: 3.33
FIB4 SCORE: 3.33

## 2023-03-03 ASSESSMENT — PAIN DESCRIPTION - PAIN TYPE: TYPE: ACUTE PAIN

## 2023-03-03 NOTE — PROGRESS NOTES
Monitor Summary  SB/SR  Rate 55-68  0.24/0.09/0.48    1x 2.3 sec pause overnight, asymptomatic.

## 2023-03-03 NOTE — PROGRESS NOTES
Cardiology Follow Up Progress Note    Date of Service  3/3/2023    Attending Physician  Lotus Baptiste M.D.    Chief Complaint   Chest pain    HPI  Regla Meier is a 82 y.o. female admitted 3/1/2023 with chest pain    Interim Events  Chest pain today  Cath recently showed no obstructive disease  BP stable  Still with some SOB      Review of Systems  Review of Systems   Constitutional: Negative.    HENT: Negative.     Eyes: Negative.    Respiratory: Negative.  Negative for shortness of breath.    Cardiovascular: Negative.  Negative for chest pain, palpitations and leg swelling.   Gastrointestinal: Negative.  Negative for abdominal distention and nausea.   Endocrine: Negative.    Genitourinary: Negative.    Musculoskeletal:  Negative for arthralgias.   Skin: Negative.    Allergic/Immunologic: Negative.    Neurological: Negative.  Negative for dizziness, light-headedness and numbness.   Hematological: Negative.    Psychiatric/Behavioral: Negative.       Vital signs in last 24 hours  Temp:  [36.6 °C (97.9 °F)-37.2 °C (99 °F)] 37 °C (98.6 °F)  Pulse:  [54-75] 71  Resp:  [16-18] 18  BP: ()/(54-67) 107/61  SpO2:  [95 %-100 %] 95 %    Physical Exam  Physical Exam  Vitals and nursing note reviewed.   Constitutional:       General: She is not in acute distress.     Appearance: Normal appearance. She is normal weight. She is not ill-appearing, toxic-appearing or diaphoretic.   HENT:      Head: Normocephalic and atraumatic.      Right Ear: Ear canal and external ear normal.      Left Ear: Ear canal and external ear normal.      Nose: Nose normal. No congestion or rhinorrhea.      Mouth/Throat:      Mouth: Mucous membranes are moist.      Pharynx: Oropharynx is clear. No oropharyngeal exudate or posterior oropharyngeal erythema.   Eyes:      General: No scleral icterus.        Right eye: No discharge.         Left eye: No discharge.      Extraocular Movements: Extraocular movements intact.       Conjunctiva/sclera: Conjunctivae normal.      Pupils: Pupils are equal, round, and reactive to light.   Neck:      Vascular: No carotid bruit.   Cardiovascular:      Rate and Rhythm: Normal rate and regular rhythm.      Pulses: Normal pulses.      Heart sounds: Normal heart sounds. No murmur heard.    No gallop.   Pulmonary:      Effort: Pulmonary effort is normal. No respiratory distress.      Breath sounds: Normal breath sounds. No stridor. No wheezing, rhonchi or rales.   Abdominal:      General: Abdomen is flat. Bowel sounds are normal. There is no distension.      Palpations: Abdomen is soft. There is no mass.      Tenderness: There is no abdominal tenderness. There is no guarding or rebound.      Hernia: No hernia is present.   Musculoskeletal:         General: No swelling or tenderness. Normal range of motion.      Cervical back: Normal range of motion and neck supple. No rigidity. No muscular tenderness.      Right lower leg: No edema.      Left lower leg: No edema.   Lymphadenopathy:      Cervical: No cervical adenopathy.   Skin:     General: Skin is warm and dry.      Capillary Refill: Capillary refill takes 2 to 3 seconds.      Coloration: Skin is not jaundiced or pale.      Findings: No bruising or lesion.   Neurological:      General: No focal deficit present.      Mental Status: She is alert and oriented to person, place, and time. Mental status is at baseline.      Cranial Nerves: No cranial nerve deficit.      Motor: No weakness.   Psychiatric:         Mood and Affect: Mood normal.         Behavior: Behavior normal.         Thought Content: Thought content normal.         Judgment: Judgment normal.       Lab Review  Lab Results   Component Value Date/Time    WBC 10.7 03/03/2023 02:46 AM    RBC 3.52 (L) 03/03/2023 02:46 AM    HEMOGLOBIN 11.2 (L) 03/03/2023 02:46 AM    HEMATOCRIT 32.4 (L) 03/03/2023 02:46 AM    MCV 92.0 03/03/2023 02:46 AM    MCH 31.8 03/03/2023 02:46 AM    MCHC 34.6 03/03/2023 02:46  AM    MPV 8.9 (L) 03/03/2023 02:46 AM      Lab Results   Component Value Date/Time    SODIUM 130 (L) 03/03/2023 02:46 AM    POTASSIUM 4.9 03/03/2023 02:46 AM    CHLORIDE 96 03/03/2023 02:46 AM    CO2 24 03/03/2023 02:46 AM    GLUCOSE 123 (H) 03/03/2023 02:46 AM    BUN 41 (H) 03/03/2023 02:46 AM    CREATININE 1.02 03/03/2023 02:46 AM      Lab Results   Component Value Date/Time    ASTSGOT 60 (H) 03/01/2023 10:49 PM    ALTSGPT 46 03/01/2023 10:49 PM     Lab Results   Component Value Date/Time    CHOLSTRLTOT 198 02/15/2023 02:28 AM     (H) 02/15/2023 02:28 AM    HDL 69 02/15/2023 02:28 AM    TRIGLYCERIDE 113 02/15/2023 02:28 AM    TROPONINT 146 (H) 03/03/2023 11:29 AM       Recent Labs     03/02/23  0035   NTPROBNP 7446*       Cardiac Imaging and Procedures Review  EKG 2/19/23: My Personal interpretation reveals SR PAC's, anterior MI, inferior MI, low voltage     Echo 2/15/23:  Normal left ventricular systolic function.  Grade II diastolic dysfunction.  Mild mitral regurgitation.  Mild to moderate aortic insufficiency.  Mild tricuspid regurgitation.  Right ventricular systolic pressure is estimated to be  40-45  mmHg.    Cardiac catheterization: Dated 2/20/2023 personally reviewed and interpreted by myself showing  Findings:  1.  Left main coronary artery:  Normal.  2.  Left anterior descending artery: 40% disease in proximal portion, calcified.    3.  Left circumflex coronary artery: Luminal irregularities.  Gives two marginal branches, which have no significant disease   4.  Right coronary artery: 50% disease in midportion..  This is a right dominant system.  5.  Left ventricular end diastolic pressure:  20 mmHg.  No signficant gradient across the aortic valve.  6.  Ascending aortic root angiogram showed normal sized ascending aorta, 1-2+ aortic regurgitation.     Imaging  Chest X-Ray: 2/14/2023   BILATERAL pleural effusions with overlying atelectasis which could obscure an additional process  2.  Persistently  enlarged cardiac silhouette     MPI:  7/11/2022  Small sized, equivocal, non-reversible, mild severity defects in the apical septal wall and mid inferolateral wall. Both most consistent with artifact given the distribution, less likely small scars.    * SSS 2. SDS 0. No reversible ischemia.    * Normal left ventricular size, ejection fraction, and wall motion.   ECG INTERPRETATION    Assessment/Plan  No new Assessment & Plan notes have been filed under this hospital service since the last note was generated.  Service: Cardiology  82-year-old female with shortness of breath and chest pain.  At this point given her lack of findings in the Cath Lab this is not obstructive coronary disease.  Her symptoms may be more related to a little bit of fluid overload.  I would not recommend any further cardiac testing at this point.  Her chest x-ray from recently does show large bilateral pleural effusions.  I would recommend aggressive diuresis.  I will add on low dose spironolactone to assist with fluid removal.  I would also like to see a repeat chest x-ray tomorrow.    Thank you for allowing me to participate in the care of this patient.  I will continue to follow this patient    Please contact me with any questions.    Andrade Cherry M.D.   Cardiologist, [facility]  [phone] - [phone]

## 2023-03-03 NOTE — CARE PLAN
The patient is Stable - Low risk of patient condition declining or worsening    Shift Goals  Clinical Goals: Monitor I/Os, labs  Patient Goals: rest  Family Goals: n/a      Problem: Knowledge Deficit - Standard  Goal: Patient and family/care givers will demonstrate understanding of plan of care, disease process/condition, diagnostic tests and medications  Outcome: Progressing     Problem: Pain - Standard  Goal: Alleviation of pain or a reduction in pain to the patient’s comfort goal  Outcome: Progressing

## 2023-03-03 NOTE — RESPIRATORY CARE
COPD EDUCATION by COPD CLINICAL EDUCATOR  3/3/2023 at 8:01 AM by Ngoc Ervin, RRT     Patient reviewed by COPD education team. Patient does not have a history or diagnosis of COPD and is a non-smoker (never smoked).  Therefore, patient does not qualify for the COPD program.

## 2023-03-03 NOTE — PROGRESS NOTES
1600: Patient is rosa maria into the low 40s. I looked at tele and looks like sinus rosa maria. Patient is alert and asymptomatic. I alerted Dr. Baptiste, she said to watch and monitor. Patient was having rosa maria and pause episodes during last admission.

## 2023-03-03 NOTE — PROGRESS NOTES
81 yo female with recent admission for CHF exacerbation presents with diffuse pruritis, lower extremity swelling and worsening shortness of breath   - trop and BNP consistent with previous   - xray showing pulmonary edema   - continue IV lasix   - bradycardia, asymptomatic, avoiding AV tiffanie blocking agents   - had been placed on amio, there was concern for this being possible cause of pruritis, this has been held   - will discuss plan of care with cardiology   - there was concern for amyloidosis on prior admit however nuc med cardiac exam did not suggest this.     Continue tele monitor   Monitor volume status, intake/output and daily weights     Lotus Baptiste M.D.

## 2023-03-03 NOTE — CARE PLAN
The patient is Stable - Low risk of patient condition declining or worsening    Shift Goals  Clinical Goals: diuresis, wean O2  Patient Goals: rest, mobilize, BM  Family Goals: updated on POC at bedside    Progress made toward(s) clinical / shift goals:    Problem: Knowledge Deficit - Standard  Goal: Patient and family/care givers will demonstrate understanding of plan of care, disease process/condition, diagnostic tests and medications  Outcome: Progressing     Problem: Care Map:  Admission Optimal Outcome for the Heart Failure Patient  Goal: Admission:  Optimal Care of the heart failure patient  Outcome: Progressing     Problem: Care Map:  Day 1 Optimal Outcome for the Heart Failure Patient  Goal: Day 1:  Optimal Care of the heart failure patient  Outcome: Progressing     Problem: Pain - Standard  Goal: Alleviation of pain or a reduction in pain to the patient’s comfort goal  Outcome: Progressing     Problem: Fall Risk  Goal: Patient will remain free from falls  Outcome: Progressing       Patient is not progressing towards the following goals:

## 2023-03-03 NOTE — PROGRESS NOTES
MD Baptiste made aware of patients trop 146, downtrending. Patient states 0/10 chest pain/ tightness at this time. Will continue to monitor.

## 2023-03-03 NOTE — HEART FAILURE PROGRAM
Readmission for HF- discharged on 2/23/23. It looks like patient is also having a drug allergy reaction to recent initiation of amiodarone.    Cardiology last saw patient the day before he discharge 2/22/23. Was being evaluated for amyloid    Never nicotine per h/p  AF on apixaban  Current influenza  Missing pneumococcal - messaged pharmacist  No DM dx    Patient would have followed up today with Katarzyna Sierra, cardiology [facility]. I've asked for HF clinic.    I've asked SW to assess due to readmission and RD to provide diet education. I've also asked BS nursing to initiate HF education and daily weights with concurrent education.    Nurses: Please document HF education in the education tab and populate the HF Care Plan. These tools will guide day to day care of the HF patient.    Providers: below are Guideline Directed Medical Therapy (GDMT) for HFpEF. If any cannot be prescribed by discharge, would you please note the clinical reason for each in your discharge summary? Thanks! Greta  Anticoagulation for atrial arrhythmia, if applicable  Glycemic control for DM + HF, if applicable  Lipid lowering medication for DM + HF, if applicable  Pneumococcal vaccine, if not previously received, If refused PLEASE DOCUMENT  Influenza vaccine for the current flu season, if not previously received If refused PLEASE DOCUMENT  Documentation of nicotine cessation counseling, if applicable  Acute Referral to disease management program specializing in heart failure care- asked that schedulers notify me if patient is not able to be seen at the Heart Failure Clinic  7 calendar day f/u appointment scheduled prior to discharge, appearing on signed after visit summary.

## 2023-03-03 NOTE — PROGRESS NOTES
1120 Patient walked in hallway with family. Patient assessed in chair and states she has new chest tightness 7/10. Vital signs stable. STAT EKG ordered, trop ordered, MD Baptiste at bedside. O2 placed. Patient moved back into bed, states 4/10 chest pain. Awaiting EKG  1128 MD Baptiste made aware of EKG. 3/10 chest pain. nitro SL ordered and given per MAR.

## 2023-03-04 ENCOUNTER — APPOINTMENT (OUTPATIENT)
Dept: RADIOLOGY | Facility: MEDICAL CENTER | Age: 83
DRG: 292 | End: 2023-03-04
Attending: INTERNAL MEDICINE
Payer: MEDICARE

## 2023-03-04 PROBLEM — I50.1 PULMONARY EDEMA CARDIAC CAUSE (HCC): Status: ACTIVE | Noted: 2023-03-04

## 2023-03-04 PROBLEM — J90 CHRONIC BILATERAL PLEURAL EFFUSIONS: Status: ACTIVE | Noted: 2023-03-04

## 2023-03-04 LAB
ANION GAP SERPL CALC-SCNC: 9 MMOL/L (ref 7–16)
BUN SERPL-MCNC: 37 MG/DL (ref 8–22)
CALCIUM SERPL-MCNC: 8.5 MG/DL (ref 8.5–10.5)
CHLORIDE SERPL-SCNC: 98 MMOL/L (ref 96–112)
CO2 SERPL-SCNC: 27 MMOL/L (ref 20–33)
CREAT SERPL-MCNC: 0.97 MG/DL (ref 0.5–1.4)
GFR SERPLBLD CREATININE-BSD FMLA CKD-EPI: 59 ML/MIN/1.73 M 2
GLUCOSE SERPL-MCNC: 118 MG/DL (ref 65–99)
MAGNESIUM SERPL-MCNC: 2.3 MG/DL (ref 1.5–2.5)
POTASSIUM SERPL-SCNC: 3.8 MMOL/L (ref 3.6–5.5)
SODIUM SERPL-SCNC: 134 MMOL/L (ref 135–145)

## 2023-03-04 PROCEDURE — 36415 COLL VENOUS BLD VENIPUNCTURE: CPT

## 2023-03-04 PROCEDURE — A9270 NON-COVERED ITEM OR SERVICE: HCPCS | Performed by: STUDENT IN AN ORGANIZED HEALTH CARE EDUCATION/TRAINING PROGRAM

## 2023-03-04 PROCEDURE — 700102 HCHG RX REV CODE 250 W/ 637 OVERRIDE(OP): Performed by: STUDENT IN AN ORGANIZED HEALTH CARE EDUCATION/TRAINING PROGRAM

## 2023-03-04 PROCEDURE — A9270 NON-COVERED ITEM OR SERVICE: HCPCS | Performed by: INTERNAL MEDICINE

## 2023-03-04 PROCEDURE — 99233 SBSQ HOSP IP/OBS HIGH 50: CPT | Performed by: INTERNAL MEDICINE

## 2023-03-04 PROCEDURE — 71046 X-RAY EXAM CHEST 2 VIEWS: CPT

## 2023-03-04 PROCEDURE — 700111 HCHG RX REV CODE 636 W/ 250 OVERRIDE (IP): Performed by: STUDENT IN AN ORGANIZED HEALTH CARE EDUCATION/TRAINING PROGRAM

## 2023-03-04 PROCEDURE — 83735 ASSAY OF MAGNESIUM: CPT

## 2023-03-04 PROCEDURE — 700102 HCHG RX REV CODE 250 W/ 637 OVERRIDE(OP): Performed by: INTERNAL MEDICINE

## 2023-03-04 PROCEDURE — 770020 HCHG ROOM/CARE - TELE (206)

## 2023-03-04 PROCEDURE — 80048 BASIC METABOLIC PNL TOTAL CA: CPT

## 2023-03-04 PROCEDURE — 99233 SBSQ HOSP IP/OBS HIGH 50: CPT | Performed by: STUDENT IN AN ORGANIZED HEALTH CARE EDUCATION/TRAINING PROGRAM

## 2023-03-04 RX ORDER — TORSEMIDE 20 MG/1
20 TABLET ORAL
Status: DISCONTINUED | OUTPATIENT
Start: 2023-03-04 | End: 2023-03-05 | Stop reason: HOSPADM

## 2023-03-04 RX ORDER — SPIRONOLACTONE 25 MG/1
25 TABLET ORAL
Status: DISCONTINUED | OUTPATIENT
Start: 2023-03-04 | End: 2023-03-05 | Stop reason: HOSPADM

## 2023-03-04 RX ADMIN — FUROSEMIDE 40 MG: 10 INJECTION, SOLUTION INTRAMUSCULAR; INTRAVENOUS at 05:54

## 2023-03-04 RX ADMIN — ROSUVASTATIN 5 MG: 10 TABLET, FILM COATED ORAL at 17:23

## 2023-03-04 RX ADMIN — APIXABAN 2.5 MG: 5 TABLET, FILM COATED ORAL at 05:53

## 2023-03-04 RX ADMIN — SPIRONOLACTONE 25 MG: 25 TABLET ORAL at 17:23

## 2023-03-04 RX ADMIN — TORSEMIDE 20 MG: 20 TABLET ORAL at 17:23

## 2023-03-04 RX ADMIN — APIXABAN 2.5 MG: 5 TABLET, FILM COATED ORAL at 17:23

## 2023-03-04 RX ADMIN — PREDNISONE 60 MG: 10 TABLET ORAL at 05:53

## 2023-03-04 RX ADMIN — ACETAMINOPHEN 650 MG: 325 TABLET, FILM COATED ORAL at 19:33

## 2023-03-04 ASSESSMENT — ENCOUNTER SYMPTOMS
ARTHRALGIAS: 0
CONSTITUTIONAL NEGATIVE: 1
PALPITATIONS: 0
FEVER: 0
NEUROLOGICAL NEGATIVE: 1
ENDOCRINE NEGATIVE: 1
ALLERGIC/IMMUNOLOGIC NEGATIVE: 1
HEADACHES: 0
PSYCHIATRIC NEGATIVE: 1
VOMITING: 0
BLURRED VISION: 0
SHORTNESS OF BREATH: 1
DIZZINESS: 0
CARDIOVASCULAR NEGATIVE: 1
RESPIRATORY NEGATIVE: 1
HEMATOLOGIC/LYMPHATIC NEGATIVE: 1
ORTHOPNEA: 1
ABDOMINAL DISTENTION: 0
LIGHT-HEADEDNESS: 0
MYALGIAS: 0
GASTROINTESTINAL NEGATIVE: 1
DOUBLE VISION: 0
MEMORY LOSS: 1
ABDOMINAL PAIN: 0
NAUSEA: 0
CHILLS: 0
SHORTNESS OF BREATH: 0
NUMBNESS: 0
EYES NEGATIVE: 1
SORE THROAT: 0
NERVOUS/ANXIOUS: 0

## 2023-03-04 ASSESSMENT — PAIN DESCRIPTION - PAIN TYPE: TYPE: ACUTE PAIN

## 2023-03-04 ASSESSMENT — FIBROSIS 4 INDEX: FIB4 SCORE: 3.33

## 2023-03-04 NOTE — PROGRESS NOTES
Cardiology Follow Up Progress Note    Date of Service  3/4/2023    Attending Physician  Lotus Baptiste M.D.    Chief Complaint   Chest pain    HPI  Regla Meier is a 82 y.o. female admitted 3/1/2023 with chest pain    Interim Events  No further chest pain.  Chest x-ray repeated shows continued bilateral pleural effusions  Diuretics at discharge last time were Lasix 40 Lasix 20 with no spironolactone.  Increase spironolactone dose to 25.  Patient would like to go home today.      Review of Systems  Review of Systems   Constitutional: Negative.    HENT: Negative.     Eyes: Negative.    Respiratory: Negative.  Negative for shortness of breath.    Cardiovascular: Negative.  Negative for chest pain, palpitations and leg swelling.   Gastrointestinal: Negative.  Negative for abdominal distention and nausea.   Endocrine: Negative.    Genitourinary: Negative.    Musculoskeletal:  Negative for arthralgias.   Skin: Negative.    Allergic/Immunologic: Negative.    Neurological: Negative.  Negative for dizziness, light-headedness and numbness.   Hematological: Negative.    Psychiatric/Behavioral: Negative.       Vital signs in last 24 hours  Temp:  [36.3 °C (97.3 °F)-36.9 °C (98.4 °F)] 36.3 °C (97.3 °F)  Pulse:  [68-95] 68  Resp:  [16-18] 17  BP: (102-131)/(61-78) 110/68  SpO2:  [90 %-92 %] 90 %    Physical Exam  Physical Exam  Vitals and nursing note reviewed.   Constitutional:       General: She is not in acute distress.     Appearance: Normal appearance. She is normal weight. She is not ill-appearing, toxic-appearing or diaphoretic.   HENT:      Head: Normocephalic and atraumatic.      Right Ear: Ear canal and external ear normal.      Left Ear: Ear canal and external ear normal.      Nose: Nose normal. No congestion or rhinorrhea.      Mouth/Throat:      Mouth: Mucous membranes are moist.      Pharynx: Oropharynx is clear. No oropharyngeal exudate or posterior oropharyngeal erythema.   Eyes:      General: No  scleral icterus.        Right eye: No discharge.         Left eye: No discharge.      Extraocular Movements: Extraocular movements intact.      Conjunctiva/sclera: Conjunctivae normal.      Pupils: Pupils are equal, round, and reactive to light.   Neck:      Vascular: No carotid bruit.   Cardiovascular:      Rate and Rhythm: Normal rate and regular rhythm.      Pulses: Normal pulses.      Heart sounds: Normal heart sounds. No murmur heard.    No gallop.   Pulmonary:      Effort: Pulmonary effort is normal. No respiratory distress.      Breath sounds: Normal breath sounds. No stridor. No wheezing, rhonchi or rales.   Abdominal:      General: Abdomen is flat. Bowel sounds are normal. There is no distension.      Palpations: Abdomen is soft. There is no mass.      Tenderness: There is no abdominal tenderness. There is no guarding or rebound.      Hernia: No hernia is present.   Musculoskeletal:         General: No swelling or tenderness. Normal range of motion.      Cervical back: Normal range of motion and neck supple. No rigidity. No muscular tenderness.      Right lower leg: No edema.      Left lower leg: No edema.   Lymphadenopathy:      Cervical: No cervical adenopathy.   Skin:     General: Skin is warm and dry.      Capillary Refill: Capillary refill takes 2 to 3 seconds.      Coloration: Skin is not jaundiced or pale.      Findings: No bruising or lesion.   Neurological:      General: No focal deficit present.      Mental Status: She is alert and oriented to person, place, and time. Mental status is at baseline.      Cranial Nerves: No cranial nerve deficit.      Motor: No weakness.   Psychiatric:         Mood and Affect: Mood normal.         Behavior: Behavior normal.         Thought Content: Thought content normal.         Judgment: Judgment normal.       Lab Review  Lab Results   Component Value Date/Time    WBC 10.7 03/03/2023 02:46 AM    RBC 3.52 (L) 03/03/2023 02:46 AM    HEMOGLOBIN 11.2 (L) 03/03/2023  02:46 AM    HEMATOCRIT 32.4 (L) 03/03/2023 02:46 AM    MCV 92.0 03/03/2023 02:46 AM    MCH 31.8 03/03/2023 02:46 AM    MCHC 34.6 03/03/2023 02:46 AM    MPV 8.9 (L) 03/03/2023 02:46 AM      Lab Results   Component Value Date/Time    SODIUM 134 (L) 03/04/2023 02:42 AM    POTASSIUM 3.8 03/04/2023 02:42 AM    CHLORIDE 98 03/04/2023 02:42 AM    CO2 27 03/04/2023 02:42 AM    GLUCOSE 118 (H) 03/04/2023 02:42 AM    BUN 37 (H) 03/04/2023 02:42 AM    CREATININE 0.97 03/04/2023 02:42 AM      Lab Results   Component Value Date/Time    ASTSGOT 60 (H) 03/01/2023 10:49 PM    ALTSGPT 46 03/01/2023 10:49 PM     Lab Results   Component Value Date/Time    CHOLSTRLTOT 198 02/15/2023 02:28 AM     (H) 02/15/2023 02:28 AM    HDL 69 02/15/2023 02:28 AM    TRIGLYCERIDE 113 02/15/2023 02:28 AM    TROPONINT 146 (H) 03/03/2023 11:29 AM       Recent Labs     03/02/23  0035   NTPROBNP [id number]*         Cardiac Imaging and Procedures Review  EKG 2/19/23: My Personal interpretation reveals SR PAC's, anterior MI, inferior MI, low voltage     Echo 2/15/23:  Normal left ventricular systolic function.  Grade II diastolic dysfunction.  Mild mitral regurgitation.  Mild to moderate aortic insufficiency.  Mild tricuspid regurgitation.  Right ventricular systolic pressure is estimated to be  40-45  mmHg.    Cardiac catheterization: Dated 2/20/2023 personally reviewed and interpreted by myself showing  Findings:  1.  Left main coronary artery:  Normal.  2.  Left anterior descending artery: 40% disease in proximal portion, calcified.    3.  Left circumflex coronary artery: Luminal irregularities.  Gives two marginal branches, which have no significant disease   4.  Right coronary artery: 50% disease in midportion..  This is a right dominant system.  5.  Left ventricular end diastolic pressure:  20 mmHg.  No signficant gradient across the aortic valve.  6.  Ascending aortic root angiogram showed normal sized ascending aorta, 1-2+ aortic regurgitation.      Imaging  Chest X-Ray: 2/14/2023   BILATERAL pleural effusions with overlying atelectasis which could obscure an additional process  2.  Persistently enlarged cardiac silhouette     MPI:  7/11/2022  Small sized, equivocal, non-reversible, mild severity defects in the apical septal wall and mid inferolateral wall. Both most consistent with artifact given the distribution, less likely small scars.    * SSS 2. SDS 0. No reversible ischemia.    * Normal left ventricular size, ejection fraction, and wall motion.   ECG INTERPRETATION    Assessment/Plan  No new Assessment & Plan notes have been filed under this hospital service since the last note was generated.  Service: Cardiology  82-year-old female with shortness of breath and chest pain.  At this point given her lack of findings in the Cath Lab this is not obstructive coronary disease.  I think she is likely been under diuresed.  I would increase her spironolactone to 25.  I have stopped her IV Lasix and switch to 20 of torsemide twice daily.  We will continue to watch her weights and chest x-ray.  I think she is stable for discharge and though she has bounced back to few times she never been on this dose of diuretics.  I discussed the case with Dr. Baptiste attending who might want to keep her for 24 more hours for physical therapy reasons.    Thank you for allowing me to participate in the care of this patient.  I will continue to follow this patient    Please contact me with any questions.    Andrade Cherry M.D.   Cardiologist, [facility]  [phone]

## 2023-03-04 NOTE — CARE PLAN
The patient is Stable - Low risk of patient condition declining or worsening    Shift Goals  Clinical Goals: monitor I&O, chest xray  Patient Goals: discharge planning  Family Goals: updated on POC at bedside    Progress made toward(s) clinical / shift goals:    Problem: Knowledge Deficit - Standard  Goal: Patient and family/care givers will demonstrate understanding of plan of care, disease process/condition, diagnostic tests and medications  Outcome: Progressing     Problem: Care Map:  Admission Optimal Outcome for the Heart Failure Patient  Goal: Admission:  Optimal Care of the heart failure patient  Outcome: Progressing     Problem: Care Map:  Day 1 Optimal Outcome for the Heart Failure Patient  Goal: Day 1:  Optimal Care of the heart failure patient  Outcome: Progressing     Problem: Care Map:  Day 2 Optimal Outcome for the Heart Failure Patient  Goal: Day 2:  Optimal Care of the heart failure patient  Outcome: Progressing     Problem: Pain - Standard  Goal: Alleviation of pain or a reduction in pain to the patient’s comfort goal  Outcome: Progressing     Problem: Fall Risk  Goal: Patient will remain free from falls  Outcome: Progressing       Patient is not progressing towards the following goals:

## 2023-03-04 NOTE — PROGRESS NOTES
[facility] Medicine Daily Progress Note    Date of Service  3/3/2023    Chief Complaint  Regla Meier is a 82 y.o. female admitted 3/1/2023 with shortness of breath and edema     Hospital Course  82 y.o. female with a past medical history of COPD, history of CVA, CHF with preserved ejection fraction and grade II diastolic dysfunction and hyperlipidemia who presented 3/1/23 with shortness of breath and lower extremity edema. She was just recently admitted 2/17 to 2/23 for the same. She has been compliant with her medications and diuretics. She also noted diffuse pruritis without overt rash after starting amiodarone, thus on admission this was stopped.  Troponin and BNP were similar to previous hospitalization. She had LE edema and pulmonary edema noted on imaging and was started on IV lasix.     On her previous admission there was concern of infiltrative heart disease given her free kappa was elevated at 52, free lambda elevated 175, she underwent PPY scan which was not suggestive of amyloidosis.   she had LHC 2/20/23 showing nonobstructive coronary artery disease.  She was found to have atrial fibrillation and was started on amio for rhythm control as well as eliquis.   She was given referral to onc at her last discharge but has yet to have her outpatient visit.        Interval Problem Update  Pt seen and examined, she is less edematous and feels that her breathing is improved. She is on room air currently.   - pt had just walked with family and upon returning develop substernal pressure like chest pain with some associated nausea. EKG was done which showed low voltage and mild ST elevation in V3 which is consistent with her previous EKGs. She was given nitro and placed on oxygen with resolution of her symptoms.   - she has been bradycardic since admission, 2.3 second pause overnight. She was taken of BB last admission due to similar findings on tele.   - cardiology was consulted given to help with  medications management given her 3rd readmission for recurrent symptoms. Appreciate their assistance   - continue IV lasix, aldactone added, continue to hold amiodoarone and avoid BB. Monitor on tele     I have discussed this patient's plan of care and discharge plan at IDT rounds today with Case Management, Nursing, Nursing leadership, and other members of the IDT team.    Consultants/Specialty  cardiology    Code Status  Full Code    Disposition  Patient is not medically cleared for discharge.   Anticipate discharge to to home with close outpatient follow-up.  I have placed the appropriate orders for post-discharge needs.    Review of Systems  ROS     Physical Exam  Temp:  [36.6 °C (97.9 °F)-37 °C (98.6 °F)] 37 °C (98.6 °F)  Pulse:  [64-95] 95  Resp:  [16-18] 18  BP: ()/(55-78) 131/78  SpO2:  [92 %-100 %] 92 %    Physical Exam    Fluids    Intake/Output Summary (Last 24 hours) at 3/3/2023 1910  Last data filed at 3/3/2023 0600  Gross per 24 hour   Intake --   Output 300 ml   Net -300 ml       Laboratory  Recent Labs     03/01/23 2249 03/03/23  0246   WBC 9.5 10.7   RBC 4.12* 3.52*   HEMOGLOBIN 12.1 11.2*   HEMATOCRIT 37.1 32.4*   MCV 90.0 92.0   MCH 29.4 31.8   MCHC 32.6* 34.6   RDW  --  46.1   PLATELETCT 246 215   MPV 9.2 8.9*     Recent Labs     03/01/23 2249 03/03/23  0246   SODIUM 130* 130*   POTASSIUM 5.4 4.9   CHLORIDE 94* 96   CO2 24 24   GLUCOSE 120* 123*   BUN 48* 41*   CREATININE 1.29 1.02   CALCIUM 9.0 8.4*                   Imaging  DX-CHEST-PORTABLE (1 VIEW)   Final Result         1.  Pulmonary edema and/or infiltrates are identified, which appear somewhat increased since the prior exam.   2.  Layering bilateral pleural effusions, similar compared to prior study.   3.  Cardiomegaly   4.  Atherosclerosis           Assessment/Plan  * Acute on chronic diastolic heart failure (HCC)- (present on admission)  Assessment & Plan  Recurrent exacerbation   Recent echo with normal EF, diastolic  disfucntion   PPY scan neg for amyloid  Cont. IV lasix, aldactone started   Monitor Is/Os, daily weights   Low salt, cardiac diet   Cardiology consulted   Lasix IV twice daily    Drug allergy  Assessment & Plan  Suspected that patient's rash came from her recent initiation of amiodarone, reports improvement of rash upon my assessment with her after benadryl and prednisone  At this time would hold amiodarone  Will discuss with cards if alternative is needed as it was started for rhythm control. Currently she looks to be in sinus, however due to low voltage it is difficult to discern p waves   Continue tele     ERNESTINE (acute kidney injury) ([facility])  Assessment & Plan  Possibly from cardiorenal syndrome versus medication induced  Avoid nephrotoxic medication  Improving with diuresis   Monitor closely with aggressive lasix   Serial BMP    Advanced care planning/counseling discussion  Assessment & Plan  Goals of cares discussed with patient and  at bedside.  Initially DNR/DNI, however at this time they wish to revoke that and to think about her CODE STATUS.  For now they would like to leave as full code    AF (atrial fibrillation) ([facility])- (present on admission)  Assessment & Plan  A.fib, EKG and tele look like Sinus rosa maria, 1st degree HB  Continue Eliquis  Avoid beta-blockers due to pauses and bradycardia   Cardiology following     Acute CVA (cerebrovascular accident) ([facility])- (present on admission)  Assessment & Plan  Continue eliquis and crestor    Elevated troponin  Assessment & Plan  Found to have 2 elevated troponins that flat trended in the setting of heart failure exacerbation and ERNESTINE  Recent non obstructive cardiac catheterization  Low suspicio of ACS, would not pursue further work-up at this time    Hyperlipemia- (present on admission)  Assessment & Plan  Continue crestor          VTE prophylaxis: therapeutic anticoagulation with Eliquis    I have performed a physical exam and reviewed and updated ROS and Plan  today (3/3/2023). In review of yesterday's note (3/2/2023), there are no changes except as documented above.

## 2023-03-04 NOTE — CARE PLAN
The patient is Stable - Low risk of patient condition declining or worsening    Shift Goals  Clinical Goals: monitor i/os, comfort  Patient Goals: rest  Family Goals: n/a    Problem: Pain - Standard  Goal: Alleviation of pain or a reduction in pain to the patient’s comfort goal  Outcome: Progressing     Problem: Fall Risk  Goal: Patient will remain free from falls  Outcome: Progressing

## 2023-03-04 NOTE — DIETARY
Nutrition Services: Heart Failure Diet Education Consult   Day 1 of admit.  Regla Meier is a 82 y.o. female with admitting DX of CHF (congestive heart failure), NYHA class I, acute on chronic, combined (HCC) [I50.43]    RD able to visit pt at bedside to provide heart healthy low sodium diet education. RD discussed low sodium diet including label reading, tips for reducing sodium intake. RD provided handout reinforcing topics discussed. Pt demonstrated good readiness and verbal evidence of learning. RD able to answer all questions to patient's satisfaction.     No other education needs identified at this time. Consider referral to outpatient nutrition services for continuation of education as indicated or per pt preferences.     Please re-consult RD as indicated.

## 2023-03-04 NOTE — HOSPITAL COURSE
82 y.o. female with a past medical history of COPD, history of CVA, CHF with preserved ejection fraction and grade II diastolic dysfunction and hyperlipidemia who presented 3/1/23 with shortness of breath and lower extremity edema. She was just recently admitted 2/17 to 2/23 for the same. She has been compliant with her medications and diuretics. She also noted diffuse pruritis without overt rash after starting amiodarone, thus on admission this was stopped.  Troponin and BNP were similar to previous hospitalization. She had LE edema and pulmonary edema noted on imaging and was started on IV lasix.     On her previous admission there was concern of infiltrative heart disease given her free kappa was elevated at 52, free lambda elevated 175, she underwent PPY scan which was not suggestive of amyloidosis.   she had LHC 2/20/23 showing nonobstructive coronary artery disease.  She was found to have atrial fibrillation and was started on amio for rhythm control as well as eliquis.   She was given referral to onc at her last discharge but has yet to have her outpatient visit.

## 2023-03-05 ENCOUNTER — APPOINTMENT (OUTPATIENT)
Dept: RADIOLOGY | Facility: MEDICAL CENTER | Age: 83
DRG: 292 | End: 2023-03-05
Attending: STUDENT IN AN ORGANIZED HEALTH CARE EDUCATION/TRAINING PROGRAM
Payer: MEDICARE

## 2023-03-05 ENCOUNTER — PHARMACY VISIT (OUTPATIENT)
Dept: PHARMACY | Facility: MEDICAL CENTER | Age: 83
End: 2023-03-05
Payer: COMMERCIAL

## 2023-03-05 VITALS
TEMPERATURE: 97.5 F | DIASTOLIC BLOOD PRESSURE: 69 MMHG | RESPIRATION RATE: 16 BRPM | OXYGEN SATURATION: 90 % | HEART RATE: 71 BPM | HEIGHT: 62 IN | BODY MASS INDEX: 25.72 KG/M2 | WEIGHT: 139.77 LBS | SYSTOLIC BLOOD PRESSURE: 158 MMHG

## 2023-03-05 LAB
ANION GAP SERPL CALC-SCNC: 10 MMOL/L (ref 7–16)
BUN SERPL-MCNC: 34 MG/DL (ref 8–22)
CALCIUM SERPL-MCNC: 8.3 MG/DL (ref 8.5–10.5)
CHLORIDE SERPL-SCNC: 97 MMOL/L (ref 96–112)
CO2 SERPL-SCNC: 26 MMOL/L (ref 20–33)
CREAT SERPL-MCNC: 0.99 MG/DL (ref 0.5–1.4)
EKG IMPRESSION: NORMAL
GFR SERPLBLD CREATININE-BSD FMLA CKD-EPI: 57 ML/MIN/1.73 M 2
GLUCOSE SERPL-MCNC: 114 MG/DL (ref 65–99)
MAGNESIUM SERPL-MCNC: 2.1 MG/DL (ref 1.5–2.5)
POTASSIUM SERPL-SCNC: 3.6 MMOL/L (ref 3.6–5.5)
SODIUM SERPL-SCNC: 133 MMOL/L (ref 135–145)

## 2023-03-05 PROCEDURE — 71045 X-RAY EXAM CHEST 1 VIEW: CPT

## 2023-03-05 PROCEDURE — 80048 BASIC METABOLIC PNL TOTAL CA: CPT

## 2023-03-05 PROCEDURE — 93005 ELECTROCARDIOGRAM TRACING: CPT | Performed by: STUDENT IN AN ORGANIZED HEALTH CARE EDUCATION/TRAINING PROGRAM

## 2023-03-05 PROCEDURE — A9270 NON-COVERED ITEM OR SERVICE: HCPCS | Performed by: INTERNAL MEDICINE

## 2023-03-05 PROCEDURE — 700111 HCHG RX REV CODE 636 W/ 250 OVERRIDE (IP): Performed by: STUDENT IN AN ORGANIZED HEALTH CARE EDUCATION/TRAINING PROGRAM

## 2023-03-05 PROCEDURE — A9270 NON-COVERED ITEM OR SERVICE: HCPCS | Performed by: STUDENT IN AN ORGANIZED HEALTH CARE EDUCATION/TRAINING PROGRAM

## 2023-03-05 PROCEDURE — 93010 ELECTROCARDIOGRAM REPORT: CPT | Performed by: INTERNAL MEDICINE

## 2023-03-05 PROCEDURE — 99239 HOSP IP/OBS DSCHRG MGMT >30: CPT | Performed by: STUDENT IN AN ORGANIZED HEALTH CARE EDUCATION/TRAINING PROGRAM

## 2023-03-05 PROCEDURE — 700102 HCHG RX REV CODE 250 W/ 637 OVERRIDE(OP): Performed by: INTERNAL MEDICINE

## 2023-03-05 PROCEDURE — 36415 COLL VENOUS BLD VENIPUNCTURE: CPT

## 2023-03-05 PROCEDURE — 83735 ASSAY OF MAGNESIUM: CPT

## 2023-03-05 PROCEDURE — 700102 HCHG RX REV CODE 250 W/ 637 OVERRIDE(OP): Performed by: STUDENT IN AN ORGANIZED HEALTH CARE EDUCATION/TRAINING PROGRAM

## 2023-03-05 PROCEDURE — RXMED WILLOW AMBULATORY MEDICATION CHARGE: Performed by: STUDENT IN AN ORGANIZED HEALTH CARE EDUCATION/TRAINING PROGRAM

## 2023-03-05 RX ORDER — PREDNISONE 20 MG/1
40 TABLET ORAL DAILY
Qty: 4 TABLET | Refills: 0 | Status: SHIPPED | OUTPATIENT
Start: 2023-03-05 | End: 2023-03-07

## 2023-03-05 RX ORDER — TORSEMIDE 20 MG/1
20 TABLET ORAL 2 TIMES DAILY
Qty: 60 TABLET | Refills: 3 | Status: SHIPPED | OUTPATIENT
Start: 2023-03-05 | End: 2023-03-20 | Stop reason: SDUPTHER

## 2023-03-05 RX ADMIN — TORSEMIDE 20 MG: 20 TABLET ORAL at 05:11

## 2023-03-05 RX ADMIN — PREDNISONE 60 MG: 10 TABLET ORAL at 05:11

## 2023-03-05 RX ADMIN — APIXABAN 2.5 MG: 5 TABLET, FILM COATED ORAL at 05:11

## 2023-03-05 ASSESSMENT — PAIN DESCRIPTION - PAIN TYPE: TYPE: ACUTE PAIN

## 2023-03-05 NOTE — PROGRESS NOTES
Hospital Medicine Daily Progress Note    Date of Service  3/4/2023    Chief Complaint  Regla Meier is a 82 y.o. female admitted 3/1/2023 with shortness of breath and edema     Hospital Course  82 y.o. female with a past medical history of COPD, history of CVA, CHF with preserved ejection fraction and grade II diastolic dysfunction and hyperlipidemia who presented 3/1/23 with shortness of breath and lower extremity edema. She was just recently admitted 2/17 to 2/23 for the same. She has been compliant with her medications and diuretics. She also noted diffuse pruritis without overt rash after starting amiodarone, thus on admission this was stopped.  Troponin and BNP were similar to previous hospitalization. She had LE edema and pulmonary edema noted on imaging and was started on IV lasix.     On her previous admission there was concern of infiltrative heart disease given her free kappa was elevated at 52, free lambda elevated 175, she underwent PPY scan which was not suggestive of amyloidosis.   she had LHC 2/20/23 showing nonobstructive coronary artery disease.  She was found to have atrial fibrillation and was started on amio for rhythm control as well as eliquis.   She was given referral to onc at her last discharge but has yet to have her outpatient visit.        Interval Problem Update  Pt seen and examined, she states she feels better, however still with 2+ pitting edema bilaterally   - repeat xray still showing pleural effusions and pulmonary edema   - IV lasix changed to oral torsemide 20 mg BID, aldactone increased to 25 mg daily by cardiology   - HR 60-70's rare ectopy   - net output 1L since admission with stable weight       Had a long discussion with both patient independently and than again with large group of family regarding medical situation and ongoing plan of care. I spent 32 minutes discussing patients diagnosis of diastolic heart failure and her volume overload. I recommended ongoing  monitoring of her overnight on oral diuretics to ensure doses are adequate and appropriate as she has had multiple admissions for recurrent volume overload. Both patient and family were in agreement in plan.     I have discussed this patient's plan of care and discharge plan at IDT rounds today with Case Management, Nursing, Nursing leadership, and other members of the IDT team.    Consultants/Specialty  cardiology    Code Status  Full Code    Disposition  Patient is not medically cleared for discharge.   Anticipate discharge to to home with close outpatient follow-up.  I have placed the appropriate orders for post-discharge needs.    Review of Systems  Review of Systems   Constitutional:  Positive for malaise/fatigue. Negative for chills and fever.   HENT:  Negative for sore throat.    Eyes:  Negative for blurred vision and double vision.   Respiratory:  Positive for shortness of breath (with exertion).    Cardiovascular:  Positive for orthopnea and leg swelling. Negative for chest pain.   Gastrointestinal:  Negative for abdominal pain, nausea and vomiting.   Genitourinary:  Negative for dysuria and urgency.   Musculoskeletal:  Negative for myalgias.   Neurological:  Negative for dizziness and headaches.   Psychiatric/Behavioral:  Positive for memory loss. The patient is not nervous/anxious.       Physical Exam  Temp:  [36.3 °C (97.3 °F)-37 °C (98.6 °F)] 37 °C (98.6 °F)  Pulse:  [68-75] 71  Resp:  [16-18] 18  BP: (102-116)/(61-72) 108/61  SpO2:  [90 %-93 %] 93 %    Physical Exam  Vitals and nursing note reviewed.   Constitutional:       Appearance: She is normal weight.      Comments: Frail elderly female   HENT:      Head: Normocephalic and atraumatic.      Mouth/Throat:      Mouth: Mucous membranes are moist.      Pharynx: Oropharynx is clear.   Eyes:      Conjunctiva/sclera: Conjunctivae normal.   Cardiovascular:      Rate and Rhythm: Normal rate and regular rhythm.      Heart sounds: Normal heart sounds.    Pulmonary:      Effort: Pulmonary effort is normal.      Breath sounds: Rales present. No rhonchi.   Abdominal:      General: Bowel sounds are normal.      Palpations: Abdomen is soft.   Musculoskeletal:      Cervical back: Neck supple.      Right lower leg: Edema present.      Left lower leg: Edema present.      Comments: 2+ pitting edema    Skin:     General: Skin is warm.      Coloration: Pallor: venous stasis skin changes.   Neurological:      General: No focal deficit present.      Mental Status: She is alert and oriented to person, place, and time. Mental status is at baseline.   Psychiatric:         Mood and Affect: Mood normal.         Behavior: Behavior normal.         Thought Content: Thought content normal.         Judgment: Judgment normal.       Fluids    Intake/Output Summary (Last 24 hours) at 3/4/2023 1631  Last data filed at 3/3/2023 2200  Gross per 24 hour   Intake --   Output 600 ml   Net -600 ml       Laboratory  Recent Labs     03/01/23 2249 03/03/23 0246   WBC 9.5 10.7   RBC 4.12* 3.52*   HEMOGLOBIN 12.1 11.2*   HEMATOCRIT 37.1 32.4*   MCV 90.0 92.0   MCH 29.4 31.8   MCHC 32.6* 34.6   RDW  --  46.1   PLATELETCT 246 215   MPV 9.2 8.9*     Recent Labs     03/01/23 2249 03/03/23 0246 03/04/23  0242   SODIUM 130* 130* 134*   POTASSIUM 5.4 4.9 3.8   CHLORIDE 94* 96 98   CO2 24 24 27   GLUCOSE 120* 123* 118*   BUN 48* 41* 37*   CREATININE 1.29 1.02 0.97   CALCIUM 9.0 8.4* 8.5                   Imaging  DX-CHEST-2 VIEWS   Final Result         No significant interval change.      DX-CHEST-PORTABLE (1 VIEW)   Final Result         1.  Pulmonary edema and/or infiltrates are identified, which appear somewhat increased since the prior exam.   2.  Layering bilateral pleural effusions, similar compared to prior study.   3.  Cardiomegaly   4.  Atherosclerosis           Assessment/Plan  * Acute on chronic diastolic heart failure (HCC)- (present on admission)  Assessment & Plan  Recurrent exacerbation    Recent echo with normal EF, grade II diastolic disfucntion   PPY scan neg for amyloid   aldactone started, dose increased   IV lasix changed to po torsemide today by cardiology   Monitor Is/Os, daily weights   Low salt, cardiac diet   Continues to appear volume overloaded, monitor       Chronic bilateral pleural effusions- (present on admission)  Assessment & Plan  Due to volume overload, see treatment plan for CHF    Pulmonary edema cardiac cause (HCC)- (present on admission)  Assessment & Plan  Acute recurrent pulmonary edema secondary to heart failure exacerbation   PE and pleural effusions on imaging   Aggressive diuresis GDMT for HF   Monitor Is/Os   Encourage IS and mobility   O2 per protocol     Drug allergy  Assessment & Plan  Suspected that patient's rash came from her recent initiation of amiodarone, reports improvement of rash upon my assessment with her after benadryl and prednisone  At this time would hold amiodarone  Will discuss with cards if alternative is needed as it was started for rhythm control. Currently she looks to be in sinus, however due to low voltage it is difficult to discern p waves   Continue tele     ERNESTINE (acute kidney injury) ([facility])  Assessment & Plan  Possibly from cardiorenal syndrome versus medication induced  Avoid nephrotoxic medication  Improving with diuresis   Monitor closely with aggressive duiresis   Serial BMP    Advanced care planning/counseling discussion  Assessment & Plan  Goals of cares discussed with patient and  at bedside.  Initially DNR/DNI, however at this time they wish to revoke that and to think about her CODE STATUS.  For now they would like to leave as full code    AF (atrial fibrillation) ([facility])- (present on admission)  Assessment & Plan  A.fib noted on previous EKGs, currently in sinus rhythm  Continue Eliquis  Avoid beta-blockers due to pauses and bradycardia   Amiodarone discontinued due to possible allergy   Cardiology following, will need  outpatient follow up    Acute CVA (cerebrovascular accident) (HCC)- (present on admission)  Assessment & Plan  Continue eliquis and crestor    Elevated troponin  Assessment & Plan  Found to have 2 elevated troponins that flat trended in the setting of heart failure exacerbation and ERNESTINE  Recent non obstructive cardiac catheterization  Low suspicio of ACS, would not pursue further work-up at this time    Hyperlipemia- (present on admission)  Assessment & Plan  Continue crestor          VTE prophylaxis: therapeutic anticoagulation with Eliquis    I have performed a physical exam and reviewed and updated ROS and Plan today (3/4/2023). In review of yesterday's note (3/3/2023), there are no changes except as documented above.

## 2023-03-05 NOTE — DISCHARGE SUMMARY
Discharge Summary    CHIEF COMPLAINT ON ADMISSION  Chief Complaint   Patient presents with    Allergic Reaction    Shortness of Breath       Reason for Admission  Allergic Reaction      Admission Date  3/1/2023    CODE STATUS  Full Code    HPI & HOSPITAL COURSE  This is a very pleasant 82 y.o. female with a past medical history of chronic obstructive pulmonary disease not on chronic oxygen, history of CVA, history of congestive diastolic heart failure with preserved ejection fraction and hyperlipidemia who presented 3/1/23 with shortness of breath and lower extremity edema.  Patient has had several recent admissions, most recent being 2/17 to 2/23 for for similar complaint. She has been compliant with her medications and diuretics. She also noted diffuse pruritis without overt rash after starting amiodarone, thus on admission this was stopped.  Her pruritus did improve.  Troponin and BNP were similar to previous hospitalization. She had LE edema and pulmonary edema noted on imaging and was started on IV lasix.   Her troponin remained stable despite an episode of chest pain her troponin continued to trend down she had no significant EKG changes.  Suspect her chest pain that occurred while she was admitted was secondary to exertional demand in the setting of hypervolemia.    On her previous admission there was concern of infiltrative heart disease given her free kappa was elevated at 52, free lambda elevated 175, she underwent PPY scan which was not suggestive of cardiac amyloidosis.   She had C 2/20/23 showing nonobstructive coronary artery disease.  She was found to have atrial fibrillation and was started on amio for rhythm control as well as eliquis.   She was given referral to onc at her last discharge but has yet to have her outpatient visit.     Patient tolerated IV diuresis well and continued to improve clinically, IV Lasix was changed to p.o. torsemide and she was monitored on oral regiment to ensure  adequate response prior to her discharge to help avoid readmission.  She was extensively educated on importance of monitoring her salt intake as well as her fluid intake and was given information of when to contact her doctor to avoid rehospitalization.    In terms of her atrial fibrillation during admission she was maintaining sinus rhythm although bradycardic initially this did improve.  Patient is rate controlled and will continue her Eliquis.  She was instructed to follow-up with heart failure clinic and cardiology regarding ongoing evaluation and monitoring.  At the time of discharge patient is ambulatory independently, hemodynamically stable and saturating well on room air.       Therefore, she is discharged in fair and stable condition to home with close outpatient follow-up.    The patient met 2-midnight criteria for an inpatient stay at the time of discharge.    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 which may be contributing to her recurrent admissions  Blood pressure, heart rate  Chronic medical conditions    DISCHARGE DIAGNOSES  Principal Problem:    Acute on chronic diastolic heart failure (HCC) POA: Yes  Active Problems:    Hyperlipemia POA: Yes    Elevated troponin POA: Yes    Acute CVA (cerebrovascular accident) (HCC) POA: Yes    AF (atrial fibrillation) (HCC) POA: Yes    Advanced care planning/counseling discussion POA: Yes    ERNESTINE (acute kidney injury) (HCC) POA: Yes    Drug allergy POA: Yes    Pulmonary edema cardiac cause (HCC) POA: Yes    Chronic bilateral pleural effusions POA: Yes  Resolved Problems:    * No resolved hospital problems. *      FOLLOW UP  Future Appointments   Date Time Provider Department Center   3/10/2023  9:20 AM SHANNON Ontiveros   3/15/2023  3:15 PM Leah Jain P.A.-C. [facility] None   6/20/2023 11:00 AM Linda  SHANNON Kauffman A.P.R.N.  [facility]  [place] [address]  [phone]    Schedule an appointment as soon as possible for a visit in 1 week(s)  hospital follow up    SHANNON Dominguez  [address]  [address]  [place] [id number] [phone]    Schedule an appointment as soon as possible for a visit in 1 week(s)  hospital follow up, heart failure clinic, call to make appointment in the next 1-2 weeks      MEDICATIONS ON DISCHARGE     Medication List        START taking these medications        Instructions   predniSONE 20 MG Tabs  Commonly known as: DELTASONE   Take 2 Tablets by mouth every day for 2 days.  Dose: 40 mg     torsemide 20 MG Tabs  Commonly known as: DEMADEX   Take 1 Tablet by mouth 2 times a day.  Dose: 20 mg            CONTINUE taking these medications        Instructions   albuterol 108 (90 Base) MCG/ACT Aers inhalation aerosol   Inhale 2 Puffs every 6 hours as needed for Shortness of Breath.  Dose: 2 Puff     CO Q 10 PO   Take 1 Tablet by mouth every day.  Dose: 1 Tablet     Eliquis 2.5mg Tabs  Generic drug: apixaban   Take 1 Tablet by mouth 2 times a day.  Dose: 2.5 mg     fluticasone 50 MCG/ACT nasal spray  Commonly known as: FLONASE   Administer 1 Spray into affected nostril(S) at bedtime.  Dose: 1 Spray     Lutein 40 MG Caps   Take 40 mg by mouth every day.  Dose: 40 mg     QUERCETIN PO   Take 1 Tablet by mouth every day.  Dose: 1 Tablet     rosuvastatin 5 MG Tabs  Commonly known as: CRESTOR   Take 1 Tablet by mouth every evening.  Dose: 5 mg     spironolactone 25 MG Tabs  Commonly known as: ALDACTONE   Take 1 Tablet by mouth every day.  Dose: 25 mg     TURMERIC PO   Take 1 Capsule by mouth every day.  Dose: 1 Capsule     VITAMIN B12 PO   Take 1 Tablet by mouth every day.  Dose: 1 Tablet            STOP taking these medications      amiodarone 200 MG Tabs  Commonly known as: Cordarone     benzonatate 100 MG Caps  Commonly known as:  TESSALON     furosemide 40 MG Tabs  Commonly known as: LASIX              Allergies  Allergies   Allergen Reactions    Atorvastatin Myalgia     Severe muscle weakness    Pcn [Penicillins] Hives       DIET  Orders Placed This Encounter   Procedures    Diet Order Diet: Cardiac     Standing Status:   Standing     Number of Occurrences:   1     Order Specific Question:   Diet:     Answer:   Cardiac [6]       ACTIVITY  As tolerated.      CONSULTATIONS  Cardiology    PROCEDURES  N/A    LABORATORY  Lab Results   Component Value Date    SODIUM 133 (L) 03/05/2023    POTASSIUM 3.6 03/05/2023    CHLORIDE 97 03/05/2023    CO2 26 03/05/2023    GLUCOSE 114 (H) 03/05/2023    BUN 34 (H) 03/05/2023    CREATININE 0.99 03/05/2023        Lab Results   Component Value Date    WBC 10.7 03/03/2023    HEMOGLOBIN 11.2 (L) 03/03/2023    HEMATOCRIT 32.4 (L) 03/03/2023    PLATELETCT 215 03/03/2023        Total time of the discharge process exceeds 36 minutes.

## 2023-03-05 NOTE — CARE PLAN
The patient is Watcher - Medium risk of patient condition declining or worsening    Shift Goals monitor for chest pain and heart rhythm.  Clinical Goals: monitor heart rhythm and for chest pain  Patient Goals: sleep  Family Goals: n/a    Progress made toward(s) clinical / shift goals:    Problem: Care Map:  Day 2 Optimal Outcome for the Heart Failure Patient  Goal: Day 2:  Optimal Care of the heart failure patient  Outcome: Progressing   Monitor daily weights and monitor tolerance for ambulation and distance traveled.    Patient is not progressing towards the following goals:

## 2023-03-05 NOTE — PROGRESS NOTES
Bedside report received from night RN, pt care assumed, assessment completed. Pt is A&O4, pain 0/10, SR on the monitor. Updated on POC, questions answered. Bed in lowest, locked position, treaded socks on, call light and belongings within reach.

## 2023-03-05 NOTE — PROGRESS NOTES
Monitor Summary  Rhythm: SR with first degree block and bundle branch block  Rate: 66-80  Ectopy: N/A  .21 / .11 / .35

## 2023-03-05 NOTE — DISCHARGE INSTRUCTIONS
Take medications as directed, some of your medications have changed, please review carefully   Follow up with cardiology and your primary care doctor in the next 1-2 weeks for post hospitalization follow up   Referral to oncology made, I have included office information below, call if you do not hear anything in the next 1 week   Monitor your  fluid and salt intake.   Check daily weights, if your weight goes up by 3-5lbs or your are mores short of breath call the cardiology office and get further recommendations       Heart Failure, Diagnosis    Heart failure is a condition in which the heart has trouble pumping blood because it has become weak or stiff. This means that the heart does not pump blood well enough for the body to stay healthy. For some people with heart failure, fluid may back up into the lungs. There may also be swelling (edema) in the lower legs. Heart failure is usually a long-term (chronic) condition. It is important for you to take good care of yourself and follow the treatment plan from your health care provider.  What are the causes?  This condition may be caused by:  High blood pressure (hypertension). Hypertension causes the heart muscle to work harder than normal. This makes the heart stiff or weak.  Coronary artery disease, or CAD. CAD is the buildup of cholesterol and fat (plaque) in the arteries of the heart.  Heart attack, also called myocardial infarction. This injures the heart muscle, making it hard for the heart to pump blood.  Abnormal heart valves. The valves do not open and close properly, forcing the heart to pump harder to keep the blood flowing.  Heart muscle disease (cardiomyopathy or myocarditis). This is damage to the heart muscle. It can increase the risk of heart failure.  Lung disease. The heart works harder when the lungs are not healthy.  Abnormal heart rhythms. These can lead to heart failure.  What increases the risk?  The risk of heart failure increases as a person  ages. This condition is also more likely to develop in people who:  Are overweight.  Are male.  Smoke or chew tobacco.  Abuse alcohol or illegal drugs.  Have taken medicines that can damage the heart, such as chemotherapy drugs.  Have diabetes.  Have abnormal heart rhythms.  Have thyroid problems.  Have low blood counts (anemia).  What are the signs or symptoms?  Symptoms of this condition include:  Shortness of breath with activity, such as when climbing stairs.  A cough that does not go away.  Swelling of the feet, ankles, legs, or abdomen.  Losing weight for no reason.  Trouble breathing when lying flat (orthopnea).  Waking from sleep because of the need to sit up and get more air.  Rapid heartbeat.  Tiredness (fatigue) and loss of energy.  Feeling light-headed, dizzy, or close to fainting.  Loss of appetite.  Nausea.  Waking up more often during the night to urinate (nocturia).  Confusion.  How is this diagnosed?  This condition is diagnosed based on:  Your medical history, symptoms, and a physical exam.  Diagnostic tests, which may include:  Echocardiogram.  Electrocardiogram (ECG).  Chest X-ray.  Blood tests.  Exercise stress test.  Radionuclide scans.  Cardiac catheterization and angiogram.  How is this treated?  Treatment for this condition is aimed at managing the symptoms of heart failure.  Medicines  Treatment may include medicines that:  Help lower blood pressure by relaxing (dilating) the blood vessels. These medicines are called ACE inhibitors (angiotensin-converting enzyme) and ARBs (angiotensin receptor blockers).  Cause the kidneys to remove salt and water from the blood through urination (diuretics).  Improve heart muscle strength and prevent the heart from beating too fast (beta blockers).  Increase the force of the heartbeat (digoxin).  Healthy behavior changes         Treatment may also include making healthy lifestyle changes, such as:  Reaching and staying at a healthy weight.  Quitting  smoking or chewing tobacco.  Eating heart-healthy foods.  Limiting or avoiding alcohol.  Stopping the use of illegal drugs.  Being physically active.    Other treatments  Other treatments may include:  Procedures to open blocked arteries or repair damaged valves.  Placing a pacemaker to improve heart function (cardiac resynchronization therapy).  Placing a device to treat serious abnormal heart rhythms (implantable cardioverter defibrillator, or ICD).  Placing a device to improve the pumping ability of the heart (left ventricular assist device, or LVAD).  Receiving a healthy heart from a donor (heart transplant). This is done when other treatments have not helped.  Follow these instructions at home:  Manage other health conditions as told by your health care provider. These may include hypertension, diabetes, thyroid disease, or abnormal heart rhythms.  Get ongoing education and support as needed. Learn as much as you can about heart failure.  Keep all follow-up visits as told by your health care provider. This is important.  Summary  Heart failure is a condition in which the heart has trouble pumping blood because it has become weak or stiff.  This condition is caused by high blood pressure and other diseases of the heart and lungs.  Symptoms of this condition include shortness of breath, tiredness (fatigue), nausea, and swelling of the feet, ankles, legs, or abdomen.  Treatments for this condition may include medicines, lifestyle changes, and surgery.  Manage other health conditions as told by your health care provider.  This information is not intended to replace advice given to you by your health care provider. Make sure you discuss any questions you have with your health care provider.  Document Released: 12/18/2006 Document Revised: 03/06/2020 Document Reviewed: 03/06/2020  Elsevier Patient Education © 2020 [employer]      Heart Failure, Self Care  Heart failure is a serious condition. This document  explains the things you need to do to take care of yourself after a heart failure diagnosis. You may be asked to change your diet, take certain medicines, and make other lifestyle changes in order to stay as healthy as possible. Your health care provider may also give you more specific instructions. If you have problems or questions, contact your health care provider.  What are the risks?  Having heart failure puts you at higher risk for certain problems. These problems can get worse if you do not take good care of yourself. Problems may include:  Blood clotting problems. This may cause a stroke.  Damage to the kidneys, liver, or lungs.  Abnormal heart rhythms.  Supplies needed:  Scale for monitoring weight.  Blood pressure monitor.  Notebook.  Medicines.  How to care for yourself when you have heart failure  Medicines  Take over-the-counter and prescription medicines only as told by your health care provider. Medicines reduce the workload of your heart, slow the progression of heart failure, and improve symptoms. Take your medicines every day.  Do not stop taking your medicine unless your health care provider tells you to do so.  Do not skip any dose of medicine.  Refill your prescriptions before you run out of medicine.  Eating and drinking    Eat heart-healthy foods. Talk with a dietitian to make an eating plan that is right for you.  Choose foods that contain no trans fat and are low in saturated fat and cholesterol. Healthy choices include fresh or frozen fruits and vegetables, fish, lean meats, legumes, fat-free or low-fat dairy products, and whole-grain or high-fiber foods.  Limit salt (sodium) if told by your health care provider. Sodium restriction may reduce symptoms of heart failure. Ask a dietitian to recommend heart-healthy seasonings.  Use healthy cooking methods instead of frying. Healthy methods include roasting, grilling, broiling, baking, poaching, steaming, and stir-frying.  Limit your fluid  intake, if directed by your health care provider. Fluid restriction may reduce symptoms of heart failure.  Alcohol use  Do not drink alcohol if:  Your health care provider tells you not to drink.  Your heart was damaged by alcohol, or you have severe heart failure.  You are pregnant, may be pregnant, or are planning to become pregnant.  If you drink alcohol:  Limit how much you use to:  0-1 drink a day for women.  0-2 drinks a day for men.  Be aware of how much alcohol is in your drink. In the [place] one drink equals one 12 oz bottle of beer (355 mL), one 5 oz glass of wine (148 mL), or one 1½ oz glass of hard liquor (44 mL).  Lifestyle    Do not use any products that contain nicotine or tobacco, such as cigarettes, e-cigarettes, and chewing tobacco. If you need help quitting, ask your health care provider.  Do not use nicotine gum or patches before talking to your health care provider.  Do not use illegal drugs.  Work with your health care provider to safely reach the right body weight.  Do physical activity if told by your health care provider. Talk to your health care provider before you begin an exercise if:  You are an older adult.  You have severe heart failure.  Learn to manage stress. If you need help to do this, ask your health care provider.  Participate in or seek rehabilitation as needed to keep or improve your independence and quality of life.  Plan rest periods when you get tired.  Monitoring important information    Weigh yourself every day. This will help you to notice if too much fluid is building up in your body.  Weigh yourself every morning after you urinate and before you eat breakfast.  Wear the same amount of clothing each time you weigh yourself.  Record your daily weight. Provide your health care provider with your weight record.  Monitor and record your pulse and blood pressure as told by your health care provider.  Dealing with extreme temperatures  If the weather is extremely hot:  Avoid  vigorous physical activity.  Use air conditioning or fans, or find a cooler location.  Avoid caffeine and alcohol.  Wear loose-fitting, lightweight, and light-colored clothing.  If the weather is extremely cold:  Avoid vigorous activity.  Layer your clothes.  Wear mittens or gloves, a hat, and a scarf when you go outside.  Avoid alcohol.  Follow these instructions at home:  Stay up to date with vaccines. Pneumococcal and flu (influenza) vaccines are especially important in preventing infections of the airways.  Keep all follow-up visits as told by your health care provider. This is important.  Contact a health care provider if you:  Have a rapid weight gain.  Have increasing shortness of breath.  Are unable to participate in your usual physical activities.  Get tired easily.  Cough more than normal, especially with physical activity.  Lose your appetite or feel nauseous.  Have any swelling or more swelling in areas such as your hands, feet, ankles, or abdomen.  Are unable to sleep because it is hard to breathe.  Feel like your heart is beating quickly (palpitations).  Become dizzy or light-headed when you stand up.  Get help right away if you:  Have trouble breathing.  Notice or your family notices a change in your awareness, such as having trouble staying awake or concentrating.  Have pain or discomfort in your chest.  Have an episode of fainting (syncope).  These symptoms may represent a serious problem that is an emergency. Do not wait to see if the symptoms will go away. Get medical help right away. Call your local emergency services (911 in the [place] Do not drive yourself to the hospital.  Summary  Heart failure is a serious condition. To care for yourself, you may be asked to change your diet, take certain medicines, and make other lifestyle changes.  Take your medicines every day. Do not stop taking them unless your health care provider tells you to do so.  Eat heart-healthy foods, such as fresh or frozen  fruits and vegetables, fish, lean meats, legumes, fat-free or low-fat dairy products, and whole-grain or high-fiber foods.  Ask your health care provider if you have any alcohol restrictions. You may have to stop drinking alcohol if you have severe heart failure.  Contact your health care provider if you notice problems, such as rapid weight gain or a fast heartbeat. Get help right away if you faint, or have chest pain or trouble breathing.  This information is not intended to replace advice given to you by your health care provider. Make sure you discuss any questions you have with your health care provider.  Document Released: 04/01/2020 Document Revised: 03/31/2020 Document Reviewed: 04/01/2020  Elsevier Patient Education © 2020 [employer]      Prednisone tablets  What is this medicine?  PREDNISONE (PRED ni sone) is a corticosteroid. It is commonly used to treat inflammation of the skin, joints, lungs, and other organs. Common conditions treated include asthma, allergies, and arthritis. It is also used for other conditions, such as blood disorders and diseases of the adrenal glands.  This medicine may be used for other purposes; ask your health care provider or pharmacist if you have questions.  COMMON BRAND NAME(S): Deltasone, Predone, Sterapred, Sterapred DS  What should I tell my health care provider before I take this medicine?  They need to know if you have any of these conditions:  Cushing's syndrome  diabetes  glaucoma  heart disease  high blood pressure  infection (especially a virus infection such as chickenpox, cold sores, or herpes)  kidney disease  liver disease  mental illness  myasthenia gravis  osteoporosis  seizures  stomach or intestine problems  thyroid disease  an unusual or allergic reaction to lactose, prednisone, other medicines, foods, dyes, or preservatives  pregnant or trying to get pregnant  breast-feeding  How should I use this medicine?  Take this medicine by mouth with a glass of  water. Follow the directions on the prescription label. Take this medicine with food. If you are taking this medicine once a day, take it in the morning. Do not take more medicine than you are told to take. Do not suddenly stop taking your medicine because you may develop a severe reaction. Your doctor will tell you how much medicine to take. If your doctor wants you to stop the medicine, the dose may be slowly lowered over time to avoid any side effects.  Talk to your pediatrician regarding the use of this medicine in children. Special care may be needed.  Overdosage: If you think you have taken too much of this medicine contact a poison control center or emergency room at once.  NOTE: This medicine is only for you. Do not share this medicine with others.  What if I miss a dose?  If you miss a dose, take it as soon as you can. If it is almost time for your next dose, talk to your doctor or health care professional. You may need to miss a dose or take an extra dose. Do not take double or extra doses without advice.  What may interact with this medicine?  Do not take this medicine with any of the following medications:  metyrapone  mifepristone  This medicine may also interact with the following medications:  aminoglutethimide  amphotericin B  aspirin and aspirin-like medicines  barbiturates  certain medicines for diabetes, like glipizide or glyburide  cholestyramine  cholinesterase inhibitors  cyclosporine  digoxin  diuretics  ephedrine  female hormones, like estrogens and birth control pills  isoniazid  ketoconazole  NSAIDS, medicines for pain and inflammation, like ibuprofen or naproxen  phenytoin  rifampin  toxoids  vaccines  warfarin  This list may not describe all possible interactions. Give your health care provider a list of all the medicines, herbs, non-prescription drugs, or dietary supplements you use. Also tell them if you smoke, drink alcohol, or use illegal drugs. Some items may interact with your  medicine.  What should I watch for while using this medicine?  Visit your doctor or health care professional for regular checks on your progress. If you are taking this medicine over a prolonged period, carry an identification card with your name and address, the type and dose of your medicine, and your doctor's name and address.  This medicine may increase your risk of getting an infection. Tell your doctor or health care professional if you are around anyone with measles or chickenpox, or if you develop sores or blisters that do not heal properly.  If you are going to have surgery, tell your doctor or health care professional that you have taken this medicine within the last twelve months.  Ask your doctor or health care professional about your diet. You may need to lower the amount of salt you eat.  This medicine may increase blood sugar. Ask your healthcare provider if changes in diet or medicines are needed if you have diabetes.  What side effects may I notice from receiving this medicine?  Side effects that you should report to your doctor or health care professional as soon as possible:  allergic reactions like skin rash, itching or hives, swelling of the face, lips, or tongue  changes in emotions or moods  changes in vision  depressed mood  eye pain  fever or chills, cough, sore throat, pain or difficulty passing urine  signs and symptoms of high blood sugar such as being more thirsty or hungry or having to urinate more than normal. You may also feel very tired or have blurry vision.  swelling of ankles, feet  Side effects that usually do not require medical attention (report to your doctor or health care professional if they continue or are bothersome):  confusion, excitement, restlessness  headache  nausea, vomiting  skin problems, acne, thin and shiny skin  trouble sleeping  weight gain  This list may not describe all possible side effects. Call your doctor for medical advice about side effects. You may  report side effects to FDA at 4-800-FDA-1088.  Where should I keep my medicine?  Keep out of the reach of children.  Store at room temperature between 15 and 30 degrees C (59 and 86 degrees F). Protect from light. Keep container tightly closed. Throw away any unused medicine after the expiration date.  NOTE: This sheet is a summary. It may not cover all possible information. If you have questions about this medicine, talk to your doctor, pharmacist, or health care provider.  © 2020 Elsevier/[employer] (2019-09-17 10:54:22)      Torsemide tablets  What is this medicine?  TORSEMIDE (TORE se mide) is a diuretic. It helps you make more urine and to lose salt and excess water from your body. This medicine is used to treat high blood pressure, and edema or swelling from heart, kidney, or liver disease.  This medicine may be used for other purposes; ask your health care provider or pharmacist if you have questions.  COMMON BRAND NAME(S): Demadex  What should I tell my health care provider before I take this medicine?  They need to know if you have any of these conditions:  abnormal blood electrolytes  diabetes  gout  heart disease  kidney disease  liver disease  small amounts of urine, or difficulty passing urine  an unusual or allergic reaction to torsemide, sulfa drugs, other medicines, foods, dyes, or preservatives  pregnant or trying to get pregnant  breast-feeding  How should I use this medicine?  Take this medicine by mouth with a glass of water. Follow the directions on the prescription label. You may take this medicine with or without food. If it upsets your stomach, take it with food or milk. Do not take your medicine more often than directed. Remember that you will need to pass more urine after taking this medicine. Do not take your medicine at a time of day that will cause you problems. Do not take at bedtime.  Talk to your pediatrician regarding the use of this medicine in children. Special care may be  needed.  Overdosage: If you think you have taken too much of this medicine contact a poison control center or emergency room at once.  NOTE: This medicine is only for you. Do not share this medicine with others.  What if I miss a dose?  If you miss a dose, take it as soon as you can. If it is almost time for your next dose, take only that dose. Do not take double or extra doses.  What may interact with this medicine?  alcohol  certain antibiotics given by injection  certain heart medicines like digoxin  diuretics  lithium  medicines for diabetes  medicines for blood pressure  medicines for cholesterol like cholestyramine  medicines that relax muscles for surgery  NSAIDs, medicines for pain and inflammation, like ibuprofen or naproxen  OTC supplements like ginseng and ephedra  probenecid  steroid medicines like prednisone or cortisone  This list may not describe all possible interactions. Give your health care provider a list of all the medicines, herbs, non-prescription drugs, or dietary supplements you use. Also tell them if you smoke, drink alcohol, or use illegal drugs. Some items may interact with your medicine.  What should I watch for while using this medicine?  Visit your doctor or health care professional for regular checks on your progress. Check your blood pressure regularly. Ask your doctor or health care professional what your blood pressure should be, and when you should contact him or her. If you are a diabetic, check your blood sugar as directed.  You may need to be on a special diet while taking this medicine. Check with your doctor. Also, ask how many glasses of fluid you need to drink a day. You must not get dehydrated.  You may get drowsy or dizzy. Do not drive, use machinery, or do anything that needs mental alertness until you know how this drug affects you. Do not stand or sit up quickly, especially if you are an older patient. This reduces the risk of dizzy or fainting spells. Alcohol can make  you more drowsy and dizzy. Avoid alcoholic drinks.  What side effects may I notice from receiving this medicine?  Side effects that you should report to your doctor or health care professional as soon as possible:  allergic reactions such as skin rash or itching, hives, swelling of the lips, mouth, tongue or throat  blood in urine or stool  dry mouth  hearing loss or ringing in the ears  irregular heartbeat  muscle pain, weakness or cramps  pain or difficulty passing urine  unusually weak or tired  vomiting or diarrhea  Side effects that usually do not require medical attention (report to your doctor or health care professional if they continue or are bothersome):  dizzy or lightheaded  headache  increased thirst  passing large amounts of urine  sexual difficulties  stomach pain, upset or nausea  This list may not describe all possible side effects. Call your doctor for medical advice about side effects. You may report side effects to FDA at 1-503-FDA-3525.  Where should I keep my medicine?  Keep out of the reach of children.  Store at room temperature between 15 and 30 degrees C (59 and 86 degrees F). Throw away any unused medicine after the expiration date.  NOTE: This sheet is a summary. It may not cover all possible information. If you have questions about this medicine, talk to your doctor, pharmacist, or health care provider.  © 2020 Elsevier/[employer] (2009-09-03 11:35:45)

## 2023-03-05 NOTE — ASSESSMENT & PLAN NOTE
Acute recurrent pulmonary edema secondary to heart failure exacerbation   PE and pleural effusions on imaging   Aggressive diuresis GDMT for HF   Monitor Is/Os   Encourage IS and mobility   O2 per protocol

## 2023-03-06 ENCOUNTER — PATIENT OUTREACH (OUTPATIENT)
Dept: MEDICAL GROUP | Facility: MEDICAL CENTER | Age: 83
End: 2023-03-06
Payer: MEDICARE

## 2023-03-06 NOTE — PROGRESS NOTES
3/3/23    Reason for phone call:  [facility] called patient to complete [facility] assessment. Patient reports she is still in ED.     Plan: [facility] to follow up 3/23. complete [facility] assessment.

## 2023-03-06 NOTE — PROGRESS NOTES
3/6: 1st Patient outreach for TCM.  LVM with contact information.    3/7: 2nd Patient outreach for TCM.  LVM with contact information. Pt does currently have HFV appt with PCP for 3/10, updated appt notes

## 2023-03-08 NOTE — DOCUMENTATION QUERY
[facility]                                                                       Query Response Note      PATIENT:               DANIELITO CRAWFORD  ACCT #:                  [id number]  MRN:                     [id number]  :                      1940  ADMIT DATE:       2023 12:36 PM  DISCH DATE:        2023 2:40 PM  RESPONDING  PROVIDER #:        [id number]           QUERY TEXT:    NSTEMI is documented in the Medical Record in the Interventional Cardiology procedure report from . Please clarify status of this condition:       The patient's Clinical Indicators include:  Findings:  --Patient admitted for SOB and LE edema x 4 weeks despite taking Lasix 20mg daily  --Per H&P, EKG revealed Afib with controlled ventricular response and a rate of 79, no ST elevation noted  --Troponins from [facility] :  87, 113  --Troponins from 02/15 at [facility]:  149, 154, 166  --Per Interventional Cardiology procedure report , indication for procedure:  ''NSTEMI'' stated    Treatment:  --Right and Left heart catheterization with findings of non-obstructive CAD  --Interventional Cardiology consult  --Serial EKG's  --Nitrostat tablet    Risk Factors:  --AE diastolic CHF  --Non obstructive CAD  --AFib  --First degree AV block    Thank you,  Michelle Solano RN, BSN  Clinical   Connect via [employer]  Options provided:   -- NSTEMI is ruled in   -- NSTEMI is  ruled out   -- Other explanation, please specify   -- Unable to determine      Query created by: Michelle Solano on 3/6/2023 3:28 AM    RESPONSE TEXT:    NSTEMI is ruled in          Electronically signed by:  DALE PEREZ MD 3/8/2023 1:47 PM

## 2023-03-09 ENCOUNTER — PATIENT OUTREACH (OUTPATIENT)
Dept: HEALTH INFORMATION MANAGEMENT | Facility: OTHER | Age: 83
End: 2023-03-09
Payer: MEDICARE

## 2023-03-09 NOTE — PROGRESS NOTES
3/9/23    CHW attempted outreach to patient. Patient did not answer, CHW left voicemail asking patient to ask PCP about Home Health referral during 3/10/23 visit.    PLAN: CHW will attempt to call patient 3/13/23. Follow up on Home Health referral status.

## 2023-03-10 ENCOUNTER — OFFICE VISIT (OUTPATIENT)
Dept: MEDICAL GROUP | Facility: MEDICAL CENTER | Age: 83
End: 2023-03-10
Payer: MEDICARE

## 2023-03-10 VITALS
RESPIRATION RATE: 17 BRPM | OXYGEN SATURATION: 97 % | WEIGHT: 124.2 LBS | HEART RATE: 73 BPM | HEIGHT: 62 IN | SYSTOLIC BLOOD PRESSURE: 100 MMHG | BODY MASS INDEX: 22.86 KG/M2 | DIASTOLIC BLOOD PRESSURE: 60 MMHG | TEMPERATURE: 98.2 F

## 2023-03-10 DIAGNOSIS — R25.2 MUSCLE CRAMPING: ICD-10-CM

## 2023-03-10 DIAGNOSIS — L60.9 NAIL ABNORMALITY: ICD-10-CM

## 2023-03-10 DIAGNOSIS — Z09 HOSPITAL DISCHARGE FOLLOW-UP: ICD-10-CM

## 2023-03-10 DIAGNOSIS — I50.812 CHRONIC RIGHT-SIDED HEART FAILURE (HCC): ICD-10-CM

## 2023-03-10 DIAGNOSIS — I50.9 HEART FAILURE, UNSPECIFIED HF CHRONICITY, UNSPECIFIED HEART FAILURE TYPE (HCC): ICD-10-CM

## 2023-03-10 PROCEDURE — 99215 OFFICE O/P EST HI 40 MIN: CPT | Performed by: NURSE PRACTITIONER

## 2023-03-10 ASSESSMENT — FIBROSIS 4 INDEX: FIB4 SCORE: 3.33

## 2023-03-10 NOTE — ASSESSMENT & PLAN NOTE
Pt is here for a hospital f/u, 3/1/2023 - 3/5/2023 [facility],  for allergic reaction, shortness of breath and b/l  lower extremity swelling. She was found to be in A-fib with CHF exacerbation. Amio drip, IV lasix. She did not tolerate PO amio and had an pruritic allergic reaction which had resolved. Her EKG and troponins remained stable during her stay.  Vitals today within normal limits, 100/60 with a pulse of 73.  She denies CP, dyspnea, peripheral edema is under control.  She is complaining of muscle pains and cramps.   She is in stable condition, ambulating well, states feeling much better today.    Pt has multiple lists with multiple issues on each page which she would like to address today. Pt was notified at the beginning of her appt today we can discuss all the concerns related to her hospital visit, and we can address non urgent concerns at a separate appt in order to have adequate time to explore the issues. Pt agreed with a plan.    Patient needed multiple referrals today including home health and hematology oncology (verified referrals in place from hospitalization encounter).  There was concern of infiltrative cardiac disease given the findings below:   Latest Reference Range & Units 02/15/23 02:28   Free Kappa Light Chains 3.30 - 19.40 mg/L 52.08 (H)   Free Lambda Light Chains 5.71 - 26.30 mg/L 175.94 (H)     PPY scan was not suggestive of amyloidosis.   She is scheduled to see cardiology on 3/15/2023.  She is aware to follow-up with cardiac failure clinic. She reports compliance with CHF diet.     Her vitals are within normal limits today.  She is utilizing incentive spirometer and satting at 97% at rest on room air. She denies CP, dyspnea, dizziness or peripheral edema.   Pt reports muscle cramping in bilateral lower extremities. Pt was explained this is likely post syndrome electrolyte deficiency from aggressive diuresing with IV Lasix.  Topical magnesium cream and taking electrolytes p.o.  such as potassium and magnesium are recommended.  Advised to start on 250 mg of over-the-counter daily magnesium supplement.  Pt is also c/o issues with brittle nails, this a chronic problem for approximately 2 years which is becoming worse.  Requesting referral to podiatry.

## 2023-03-11 NOTE — PROGRESS NOTES
Chief Complaint   Patient presents with    Follow-Up     Hospital follow up       HISTORY OF PRESENT ILLNESS: Patient is a 82 y.o. female, established patient who presents today to discuss medical problems as listed below:      Allergies: Atorvastatin and Pcn [penicillins]    Current Outpatient Medications   Medication Sig Dispense Refill    torsemide (DEMADEX) 20 MG Tab Take 1 Tablet by mouth 2 times a day. 60 Tablet 3    apixaban (ELIQUIS) 2.5mg Tab Take 1 Tablet by mouth 2 times a day. 60 Tablet 0    spironolactone (ALDACTONE) 25 MG Tab Take 1 Tablet by mouth every day. 30 Tablet 3    fluticasone (FLONASE) 50 MCG/ACT nasal spray Administer 1 Spray into affected nostril(S) at bedtime. 16 g 1    albuterol 108 (90 Base) MCG/ACT Aero Soln inhalation aerosol Inhale 2 Puffs every 6 hours as needed for Shortness of Breath. 8.5 g 1    Coenzyme Q10 (CO Q 10 PO) Take 1 Tablet by mouth every day.      rosuvastatin (CRESTOR) 5 MG Tab Take 1 Tablet by mouth every evening. 90 Tablet 11    Cyanocobalamin (VITAMIN B12 PO) Take 1 Tablet by mouth every day.      QUERCETIN PO Take 1 Tablet by mouth every day.      Lutein 40 MG Cap Take 40 mg by mouth every day.      TURMERIC PO Take 1 Capsule by mouth every day.       No current facility-administered medications for this visit.       Allergies, past medical history, past surgical history, family history, social history reviewed and updated.    Review of Systems:     - Constitutional: Negative for fever, chills, unexpected weight change, and fatigue/generalized weakness.     - Respiratory: Negative for cough, sputum production, chest congestion, dyspnea, wheezing, and crackles.      - Cardiovascular: Negative for chest pain, palpitations, orthopnea, and bilateral lower extremity edema.      - Musculoskeletal: cramping in bilateral lower extremities    - Skin: brittle nails    - Psychiatric/Behavioral: Negative for depression, suicidal/homicidal ideation and memory loss.      All  "other systems reviewed and are negative    Exam:    /60 (BP Location: Left arm, Patient Position: Sitting, BP Cuff Size: Adult)   Pulse 73   Temp 36.8 °C (98.2 °F) (Temporal)   Resp 17   Ht 1.575 m (5' 2\")   Wt 56.3 kg (124 lb 3.2 oz)   SpO2 97%   BMI 22.72 kg/m²  Body mass index is 22.72 kg/m².    Physical Exam:  Constitutional: Well-developed and well-nourished. Not diaphoretic. No distress.   Skin: dry brittle nails in bilateral hands  Cardiovascular: Regular rate and rhythm, S1 and S2 without murmur, rubs, or gallops.    Chest: Effort normal. Clear to auscultation throughout. No adventitious sounds. Extremities: No cyanosis, clubbing, erythema, nor edema. Distal pulses intact and symmetric.   Musculoskeletal: All major joints AROM full in all directions without pain.  Neurological: Alert and oriented x 3.   Psychiatric:  Behavior, mood, and affect are appropriate.  MA/nursing note and vitals reviewed.    LABS, EKG, CXR, hospital notes: results reviewed and discussed with the patient, questions answered.    My total time spent caring for this patient, including looking over hospital notes, exams, labs, current concerns, discussing plan of care on the day of the encounter was 60 minutes.   This does not include time spent on separately billable procedures/tests.     Assessment/Plan:  Hospital discharge follow-up  Pt is here for a hospital f/u, 3/1/2023 - 3/5/2023 [facility],  for allergic reaction, shortness of breath and b/l  lower extremity swelling. She was found to be in A-fib with CHF exacerbation. Amio drip, IV lasix. She did not tolerate PO amio and had an pruritic allergic reaction which had resolved. Her EKG and troponins remained stable during her stay.  Vitals today within normal limits, 100/60 with a pulse of 73.  She denies CP, dyspnea, peripheral edema is under control.  She is complaining of muscle pains and cramps.   She is in stable condition, ambulating well, states feeling " much better today.    Pt has multiple lists with multiple issues on each page which she would like to address today. Pt was notified at the beginning of her appt today we can discuss all the concerns related to her hospital visit, and we can address non urgent concerns at a separate appt in order to have adequate time to explore the issues. Pt agreed with a plan.    Patient needed multiple referrals today including home health and hematology oncology (verified referrals in place from hospitalization encounter).  There was concern of infiltrative cardiac disease given the findings below:   Latest Reference Range & Units 02/15/23 02:28   Free Kappa Light Chains 3.30 - 19.40 mg/L 52.08 (H)   Free Lambda Light Chains 5.71 - 26.30 mg/L 175.94 (H)     PPY scan was not suggestive of amyloidosis.   She is scheduled to see cardiology on 3/15/2023.  She is aware to follow-up with cardiac failure clinic. She reports compliance with CHF diet.     Her vitals are within normal limits today.  She is utilizing incentive spirometer and satting at 97% at rest on room air. She denies CP, dyspnea, dizziness or peripheral edema.   Pt reports muscle cramping in bilateral lower extremities. Pt was explained this is likely post syndrome electrolyte deficiency from aggressive diuresing with IV Lasix.  Topical magnesium cream and taking electrolytes p.o. such as potassium and magnesium are recommended.  Advised to start on 250 mg of over-the-counter daily magnesium supplement.  Pt is also c/o issues with brittle nails, this a chronic problem for approximately 2 years which is becoming worse.  Requesting referral to podiatry.          1. Hospital discharge follow-up  All hospital records reviewed and discussed with pt.   - Referral to Home Health    2. Muscle cramping  Uncontrolled, stable. Likely electrolyte deficiency in the muscle, advise OTC Mg topical application, PO Mg supplementation 250 mg daily. F/u as needed    3. Nail  "abnormality  - Referral to Podiatry    4. Heart failure, unspecified HF chronicity, unspecified heart failure type (HCC)  Stable. F/u appt on 3/15/23  - Referral to Home Health    5. Chronic right-sided heart failure (HCC)  As discussed in 4  - Referral to Home Health      Discussed with patient possible alternative diagnoses, patient is to take all medications as prescribed.      If symptoms persist FU w/PCP, if symptoms worsen go to emergency room.      If experiencing any side effects from prescribed medications report to the office immediately or go to emergency room.     Reviewed indication, dosage, usage and potential adverse effects of prescribed medications.      Reviewed risks and benefits of treatment plan. Patient verbalizes understanding of all instruction and verbally agrees to plan.     Discussed plan with the patient, and patient agrees to the above.      I personally reviewed prior external notes and test results pertinent to today's visit.      No follow-ups on file. Patient came in with a female  who reluctantly  identified herself as follows: \"let's say I'm a friend of her granddaughter\". The  expressed extreme dissatisfaction about today's appt as the expectation was set to address the issues on the list, pt mentioned brittle nails and memory issues both of which are chronic conditions.  It was explained that reviewing hospital records, labs , imaging, notes, assessing pt's condition today including physical exam, identifying the needs post hospital discharge such as referrals, refills, lab orders etc are the priority and if there is time left we will address some concerns from the list.  It is advised to establish care with a different provider if the care from this provider is dissatisfactory. The satisfaction of  the pt is the priority. This provider took all possible measures to provide the best care for this pt today.   NP student was present during the appointment today to " obtain learning experience.

## 2023-03-13 ENCOUNTER — TELEPHONE (OUTPATIENT)
Dept: MEDICAL GROUP | Facility: MEDICAL CENTER | Age: 83
End: 2023-03-13
Payer: MEDICARE

## 2023-03-13 ENCOUNTER — TELEPHONE (OUTPATIENT)
Dept: CARDIOLOGY | Facility: MEDICAL CENTER | Age: 83
End: 2023-03-13
Payer: MEDICARE

## 2023-03-13 NOTE — TELEPHONE ENCOUNTER
LVM informing pt appt was cxld and that they'd need to call back to r/s. Sent [employer] message. /cg 03/13/23

## 2023-03-14 ENCOUNTER — PATIENT OUTREACH (OUTPATIENT)
Dept: HEALTH INFORMATION MANAGEMENT | Facility: OTHER | Age: 83
End: 2023-03-14
Payer: MEDICARE

## 2023-03-14 NOTE — PROGRESS NOTES
3/14/23    Reason for phone call:  CHW called [facility] to follow up on pt's referral placed on 3/10/23. Representative Jayleen reports pt was seen by [facility] on 3/13/23 and has been approved for occupational health, Nurse and SW consult.    CHW Interventions:  CHW spoke with CHW Angelina SANTANA and confirmed she will be following pt as part of her care team.     Plan: CHW Erin not to follow pt as Home Health referral has been successfully completed.

## 2023-03-15 ENCOUNTER — APPOINTMENT (OUTPATIENT)
Dept: CARDIOLOGY | Facility: MEDICAL CENTER | Age: 83
End: 2023-03-15
Payer: MEDICARE

## 2023-03-20 ENCOUNTER — OFFICE VISIT (OUTPATIENT)
Dept: CARDIOLOGY | Facility: MEDICAL CENTER | Age: 83
End: 2023-03-20
Payer: MEDICARE

## 2023-03-20 VITALS
RESPIRATION RATE: 14 BRPM | SYSTOLIC BLOOD PRESSURE: 106 MMHG | OXYGEN SATURATION: 100 % | HEIGHT: 62 IN | WEIGHT: 132.4 LBS | BODY MASS INDEX: 24.37 KG/M2 | HEART RATE: 79 BPM | DIASTOLIC BLOOD PRESSURE: 64 MMHG

## 2023-03-20 DIAGNOSIS — I63.9 CEREBROVASCULAR ACCIDENT (CVA), UNSPECIFIED MECHANISM (HCC): ICD-10-CM

## 2023-03-20 DIAGNOSIS — I50.1 PULMONARY EDEMA CARDIAC CAUSE (HCC): ICD-10-CM

## 2023-03-20 DIAGNOSIS — I50.9 CHRONIC HEART FAILURE, UNSPECIFIED HEART FAILURE TYPE (HCC): ICD-10-CM

## 2023-03-20 DIAGNOSIS — R06.09 DYSPNEA ON EXERTION: ICD-10-CM

## 2023-03-20 DIAGNOSIS — I50.89 OTHER HEART FAILURE (HCC): ICD-10-CM

## 2023-03-20 DIAGNOSIS — I48.0 PAROXYSMAL ATRIAL FIBRILLATION (HCC): ICD-10-CM

## 2023-03-20 DIAGNOSIS — D68.69 HYPERCOAGULABILITY DUE TO ATRIAL FIBRILLATION (HCC): ICD-10-CM

## 2023-03-20 DIAGNOSIS — N18.31 STAGE 3A CHRONIC KIDNEY DISEASE: ICD-10-CM

## 2023-03-20 DIAGNOSIS — J90 CHRONIC BILATERAL PLEURAL EFFUSIONS: ICD-10-CM

## 2023-03-20 DIAGNOSIS — I50.33 ACUTE ON CHRONIC DIASTOLIC CONGESTIVE HEART FAILURE (HCC): ICD-10-CM

## 2023-03-20 DIAGNOSIS — I48.91 HYPERCOAGULABILITY DUE TO ATRIAL FIBRILLATION (HCC): ICD-10-CM

## 2023-03-20 DIAGNOSIS — I50.30 ACC/AHA STAGE C HEART FAILURE WITH PRESERVED EJECTION FRACTION (HCC): ICD-10-CM

## 2023-03-20 DIAGNOSIS — I51.89 LEFT VENTRICULAR DIASTOLIC DYSFUNCTION, NYHA CLASS 3: ICD-10-CM

## 2023-03-20 LAB — EKG IMPRESSION: NORMAL

## 2023-03-20 PROCEDURE — 99215 OFFICE O/P EST HI 40 MIN: CPT | Performed by: INTERNAL MEDICINE

## 2023-03-20 PROCEDURE — 93000 ELECTROCARDIOGRAM COMPLETE: CPT | Performed by: INTERNAL MEDICINE

## 2023-03-20 RX ORDER — EMPAGLIFLOZIN 10 MG/1
10 TABLET, FILM COATED ORAL DAILY
Qty: 90 TABLET | Refills: 3 | Status: SHIPPED | OUTPATIENT
Start: 2023-03-20

## 2023-03-20 RX ORDER — SPIRONOLACTONE 25 MG/1
25 TABLET ORAL DAILY
Qty: 90 TABLET | Refills: 3 | Status: ON HOLD | OUTPATIENT
Start: 2023-03-20 | End: 2023-05-19

## 2023-03-20 RX ORDER — ROSUVASTATIN CALCIUM 5 MG/1
5 TABLET, COATED ORAL EVERY EVENING
Qty: 100 TABLET | Refills: 4 | Status: SHIPPED | OUTPATIENT
Start: 2023-03-20

## 2023-03-20 RX ORDER — TORSEMIDE 20 MG/1
20 TABLET ORAL 2 TIMES DAILY
Qty: 60 TABLET | Refills: 3 | Status: SHIPPED | OUTPATIENT
Start: 2023-03-20 | End: 2023-03-20

## 2023-03-20 RX ORDER — TORSEMIDE 100 MG/1
50 TABLET ORAL DAILY
Qty: 15 TABLET | Refills: 3 | Status: SHIPPED | OUTPATIENT
Start: 2023-03-20 | End: 2023-04-27 | Stop reason: DRUGHIGH

## 2023-03-20 ASSESSMENT — 6 MINUTE WALK TEST (6MWT): TOTAL DISTANCE WALKED (METERS): 73.15

## 2023-03-20 ASSESSMENT — FIBROSIS 4 INDEX: FIB4 SCORE: 3.33

## 2023-03-20 ASSESSMENT — MINNESOTA LIVING WITH HEART FAILURE QUESTIONNAIRE (MLHF)
DIFFICULTY TO CONCENTRATE OR REMEMBERING THINGS: 5
DIFFICULTY GOING AWAY FROM HOME: 5
MAKING YOU FEEL DEPRESSED: 4
MAKING YOU WORRY: 1
FEELING LIKE A BURDEN TO FAMILY AND FRIENDS: 5
TIRED, FATIGUED OR LOW ON ENERGY: 5
MAKING YOU STAY IN A HOSPITAL: 5
DIFFICULTY WITH SEXUAL ACTIVITIES: 0
DIFFICULTY WITH RECREATIONAL PASTIMES, SPORTS, HOBBIES: 5
LOSS OF SELF CONTROL IN YOUR LIFE: 5
WORKING AROUND THE HOUSE OR YARD DIFFICULT: 4
EATING LESS FOODS YOU LIKE: 0
DIFFICULTY SLEEPING WELL AT NIGHT: 5
WALKING ABOUT OR CLIMBING STAIRS DIFFICULT: 5
HAVING TO SIT OR LIE DOWN DURING THE DAY: 5
TOTAL_SCORE: 79
GIVING YOU SIDE EFFECTS FROM TREATMENTS: 3
DIFFICULTY SOCIALIZING WITH FAMILY OR FRIENDS: 4
SWELLING IN ANKLES OR LEGS: 4
DIFFICULTY WORKING TO EARN A LIVING: 0
MAKING YOU SHORT OF BREATH: 5
COSTING YOU MONEY FOR MEDICAL CARE: 4

## 2023-03-20 ASSESSMENT — ENCOUNTER SYMPTOMS
PALPITATIONS: 0
FALLS: 0
HEADACHES: 0
DIZZINESS: 0
DOUBLE VISION: 0
BRUISES/BLEEDS EASILY: 0
DEPRESSION: 0
FEVER: 0
COUGH: 0
MEMORY LOSS: 0
BLURRED VISION: 0
ABDOMINAL PAIN: 0
SHORTNESS OF BREATH: 1
DIAPHORESIS: 0
SENSORY CHANGE: 0
MYALGIAS: 0

## 2023-03-20 NOTE — PROGRESS NOTES
Chief Complaint   Patient presents with    Atrial Fibrillation    CHF (Acute)     NP Dx: Acute on chronic congestive heart failure (HCC)    Hyperlipidemia       Subjective     Regla Meier is a 82 y.o. female who presents today for cardiac care and management due to recent hospitalization secondary to new onset atrial fibrillation along with heart failure with preserved ejection fraction and heart failure exacerbation.  Patient came out of atrial fibrillation on her own.  She is currently in sinus rhythm today.  She also had prior stroke last year in 2022.  She also has some baseline memory loss.    I personally interpreted the images of her transthoracic echocardiac which showed normal LV function, mild MR, mild AR, RVSP of 40 mmHg.    Patient was able to complete 73 m during his 6 minute walk test. her O2 saturation at baseline was 100% and at the end of the test, the O2 saturation was 98%.     I have personally interpreted EKG today with patient, there is no evidence of acute coronary syndrome, no evidence of prior infarct, normal DE and QT interval, no significant conduction disease. Sinus rhythm.        Past Medical History:   Diagnosis Date    Blindness of left eye     Congestive heart failure (HCC)     Hyperlipemia     Stroke (HCC) 08/01/2022     Past Surgical History:   Procedure Laterality Date    CYSTECTOMY  1963    CATARACT EXTRACTION WITH IOL       Family History   Problem Relation Age of Onset    Other Mother         TIA     Other Father         Abdominal aneurysm     Alcohol abuse Brother     Heart Disease Brother     No Known Problems Daughter     No Known Problems Daughter      Social History     Socioeconomic History    Marital status:      Spouse name: Not on file    Number of children: Not on file    Years of education: Not on file    Highest education level: Not on file   Occupational History    Not on file   Tobacco Use    Smoking status: Never    Smokeless tobacco: Never    Vaping Use    Vaping Use: Never used   Substance and Sexual Activity    Alcohol use: Yes     Alcohol/week: 0.6 oz     Types: 1 Glasses of wine per week     Comment: occ glass of wine    Drug use: No    Sexual activity: Yes     Partners: Male     Comment:  for 57 years. Documented on 4/10/19.   Other Topics Concern    Not on file   Social History Narrative    Not on file     Social Determinants of Health     Financial Resource Strain: Low Risk     Difficulty of Paying Living Expenses: Not hard at all   Food Insecurity: Not on file   Transportation Needs: Unknown    Lack of Transportation (Medical): No    Lack of Transportation (Non-Medical): Not on file   Physical Activity: Not on file   Stress: Not on file   Social Connections: Not on file   Intimate Partner Violence: Not on file   Housing Stability: Low Risk     Unable to Pay for Housing in the Last Year: No    Number of Places Lived in the Last Year: 1    Unstable Housing in the Last Year: No     Allergies   Allergen Reactions    Atorvastatin Myalgia     Severe muscle weakness    Pcn [Penicillins] Hives    Penicillin G      Outpatient Encounter Medications as of 3/20/2023   Medication Sig Dispense Refill    Empagliflozin (JARDIANCE) 10 MG Tab tablet Take 1 Tablet by mouth every day. 90 Tablet 3    apixaban (ELIQUIS) 2.5mg Tab Take 1 Tablet by mouth 2 times a day. 180 Tablet 3    spironolactone (ALDACTONE) 25 MG Tab Take 1 Tablet by mouth every day. 90 Tablet 3    rosuvastatin (CRESTOR) 5 MG Tab Take 1 Tablet by mouth every evening. 100 Tablet 4    torsemide (DEMADEX) 100 MG Tab Take 0.5 Tablets by mouth every day. 15 Tablet 3    fluticasone (FLONASE) 50 MCG/ACT nasal spray Administer 1 Spray into affected nostril(S) at bedtime. 16 g 1    albuterol 108 (90 Base) MCG/ACT Aero Soln inhalation aerosol Inhale 2 Puffs every 6 hours as needed for Shortness of Breath. 8.5 g 1    Coenzyme Q10 (CO Q 10 PO) Take 1 Tablet by mouth every day.      Cyanocobalamin  "(VITAMIN B12 PO) Take 1 Tablet by mouth every day.      QUERCETIN PO Take 1 Tablet by mouth every day.      Lutein 40 MG Cap Take 40 mg by mouth every day.      TURMERIC PO Take 1 Capsule by mouth every day.      [DISCONTINUED] torsemide (DEMADEX) 20 MG Tab Take 1 Tablet by mouth 2 times a day. 60 Tablet 3    [DISCONTINUED] torsemide (DEMADEX) 20 MG Tab Take 1 Tablet by mouth 2 times a day. 60 Tablet 3    [DISCONTINUED] apixaban (ELIQUIS) 2.5mg Tab Take 1 Tablet by mouth 2 times a day. 60 Tablet 0    [DISCONTINUED] spironolactone (ALDACTONE) 25 MG Tab Take 1 Tablet by mouth every day. 30 Tablet 3    [DISCONTINUED] rosuvastatin (CRESTOR) 5 MG Tab Take 1 Tablet by mouth every evening. 90 Tablet 11     No facility-administered encounter medications on file as of 3/20/2023.     Review of Systems   Constitutional:  Negative for diaphoresis and fever.   HENT:  Negative for nosebleeds.    Eyes:  Negative for blurred vision and double vision.   Respiratory:  Positive for shortness of breath. Negative for cough.    Cardiovascular:  Negative for chest pain and palpitations.   Gastrointestinal:  Negative for abdominal pain.   Genitourinary:  Negative for dysuria and frequency.   Musculoskeletal:  Negative for falls and myalgias.   Skin:  Negative for rash.   Neurological:  Negative for dizziness, sensory change and headaches.   Endo/Heme/Allergies:  Does not bruise/bleed easily.   Psychiatric/Behavioral:  Negative for depression and memory loss.             Objective     /64 (BP Location: Left arm, Patient Position: Sitting, BP Cuff Size: Adult)   Pulse 79   Resp 14   Ht 1.575 m (5' 2\")   Wt 60.1 kg (132 lb 6.4 oz)   SpO2 100%   BMI 24.22 kg/m²     Physical Exam  Vitals and nursing note reviewed.   Constitutional:       General: She is not in acute distress.     Appearance: She is not diaphoretic.   HENT:      Head: Normocephalic and atraumatic.      Right Ear: External ear normal.      Left Ear: External ear " normal.      Nose: No congestion or rhinorrhea.   Eyes:      General:         Right eye: No discharge.         Left eye: No discharge.   Neck:      Thyroid: No thyromegaly.      Vascular: No JVD.   Cardiovascular:      Rate and Rhythm: Normal rate and regular rhythm.      Pulses: Normal pulses.   Pulmonary:      Effort: No respiratory distress.   Abdominal:      General: There is no distension.      Tenderness: There is no abdominal tenderness.   Musculoskeletal:         General: No swelling or tenderness.      Right lower leg: Edema present.      Left lower leg: Edema present.   Skin:     General: Skin is warm and dry.   Neurological:      Mental Status: She is alert and oriented to person, place, and time.      Cranial Nerves: No cranial nerve deficit.   Psychiatric:         Behavior: Behavior normal.     Assessment & Plan     1. ACC/AHA stage C heart failure with preserved ejection fraction (HCC)  Empagliflozin (JARDIANCE) 10 MG Tab tablet    CL-RIGHT HEART CATHETERIZATION, CARDIOMEMS    Basic Metabolic Panel    proBrain Natriuretic Peptide, NT    REFERRAL TO CARDIOLOGY - HEART FAILURE NURSING EDUCATION      2. Left ventricular diastolic dysfunction, NYHA class 3  Empagliflozin (JARDIANCE) 10 MG Tab tablet    CL-RIGHT HEART CATHETERIZATION, CARDIOMEMS    Basic Metabolic Panel    proBrain Natriuretic Peptide, NT    REFERRAL TO CARDIOLOGY - HEART FAILURE NURSING EDUCATION      3. Paroxysmal atrial fibrillation (HCC)  EKG    apixaban (ELIQUIS) 2.5mg Tab      4. Hypercoagulability due to atrial fibrillation (HCC)        5. Dyspnea on exertion  Empagliflozin (JARDIANCE) 10 MG Tab tablet    CL-RIGHT HEART CATHETERIZATION, CARDIOMEMS    Basic Metabolic Panel    proBrain Natriuretic Peptide, NT    REFERRAL TO CARDIOLOGY - HEART FAILURE NURSING EDUCATION      6. Stage 3a chronic kidney disease (HCC)        7. Acute on chronic diastolic congestive heart failure (HCC)  spironolactone (ALDACTONE) 25 MG Tab      8. Chronic  bilateral pleural effusions  DISCONTINUED: torsemide (DEMADEX) 20 MG Tab      9. Pulmonary edema cardiac cause (HCC)  DISCONTINUED: torsemide (DEMADEX) 20 MG Tab      10. Chronic heart failure, unspecified heart failure type (HCC)  DISCONTINUED: torsemide (DEMADEX) 20 MG Tab      11. Other heart failure (HCC)  rosuvastatin (CRESTOR) 5 MG Tab      12. Cerebrovascular accident (CVA), unspecified mechanism (HCC)  rosuvastatin (CRESTOR) 5 MG Tab          Medical Decision Making: Today's Assessment/Status/Plan:   Patient is still volume up with legs edema.  We will increase torsemide to 50 mg p.o. once a day.    Based on recent data on SGLT2 and heart failure with preserved ejection fraction, patient will be benefited from Jardiance 10 mg p.o. once a day for further reduction in mortality and hospitalization with absolute risk reduction of 3.3%.  Therefore, I will start patient on Jardiance 10 mg p.o. once a day.  Risks and benefits were explained to patient and patient has agreed to proceed.  Patient    Based on the overall clinical history and profile, patient is a good candidate for Cardiomems implantation system to remotely monitor intracardiac pressures. she will benefit from reduced recurrent hospitalization for heart failure exacerbation and also quality of life improvement. Patient also has a good support system and has shown compliance to medical therapy. she is motivated and will be a successful candidate.    In terms of her paroxysmal atrial fibrillation, patient is in sinus today.  Continue anticoagulation for stroke risk reduction lifetime.    Continue rosuvastatin 5 mg p.o. once a day for LDL control.    This patient requires highest level of care due to multiple comorbidities along with recent hospitalization of extreme, acute decompensation.  My office visit with patient today requires highest level of care and attempts to reduce future cardiovascular hospitalization along with mortality.  I personally  interpreted all of patient's EKG findings tracings, transthoracic echocardiogram images, blood test results in order to formulate a comprehensive cardiovascular plan of care.  I spent a significant amount of time discussing with patient and family about the potential side effects of all of patient's medications along with education on the benefits of adding new medication and changing her current medical regimen to further reduce cardiovascular mortality.

## 2023-03-24 ENCOUNTER — HOSPITAL ENCOUNTER (OUTPATIENT)
Dept: LAB | Facility: MEDICAL CENTER | Age: 83
End: 2023-03-24
Attending: NURSE PRACTITIONER
Payer: MEDICARE

## 2023-03-24 ENCOUNTER — OFFICE VISIT (OUTPATIENT)
Dept: MEDICAL GROUP | Facility: PHYSICIAN GROUP | Age: 83
End: 2023-03-24
Payer: MEDICARE

## 2023-03-24 VITALS
HEART RATE: 72 BPM | HEIGHT: 62 IN | SYSTOLIC BLOOD PRESSURE: 100 MMHG | DIASTOLIC BLOOD PRESSURE: 62 MMHG | TEMPERATURE: 97.3 F | WEIGHT: 131.7 LBS | BODY MASS INDEX: 24.24 KG/M2 | OXYGEN SATURATION: 98 %

## 2023-03-24 DIAGNOSIS — R60.0 EDEMA OF BOTH LOWER EXTREMITIES: ICD-10-CM

## 2023-03-24 DIAGNOSIS — I48.0 PAROXYSMAL ATRIAL FIBRILLATION (HCC): ICD-10-CM

## 2023-03-24 DIAGNOSIS — E78.2 MIXED HYPERLIPIDEMIA: ICD-10-CM

## 2023-03-24 DIAGNOSIS — R60.0 LEG EDEMA: Chronic | ICD-10-CM

## 2023-03-24 DIAGNOSIS — I25.10 CORONARY ARTERY DISEASE INVOLVING NATIVE CORONARY ARTERY OF NATIVE HEART WITHOUT ANGINA PECTORIS: ICD-10-CM

## 2023-03-24 DIAGNOSIS — I50.812 CHRONIC RIGHT-SIDED HEART FAILURE (HCC): ICD-10-CM

## 2023-03-24 DIAGNOSIS — I50.32 CHRONIC HEART FAILURE WITH PRESERVED EJECTION FRACTION (HCC): Chronic | ICD-10-CM

## 2023-03-24 PROBLEM — I50.30 HEART FAILURE WITH PRESERVED EJECTION FRACTION (HCC): Chronic | Status: ACTIVE | Noted: 2023-02-14

## 2023-03-24 PROBLEM — Z09 HOSPITAL DISCHARGE FOLLOW-UP: Status: RESOLVED | Noted: 2022-07-20 | Resolved: 2023-03-24

## 2023-03-24 PROBLEM — J96.21 ACUTE ON CHRONIC RESPIRATORY FAILURE WITH HYPOXIA (HCC): Status: RESOLVED | Noted: 2023-02-17 | Resolved: 2023-03-24

## 2023-03-24 PROBLEM — I63.9 ACUTE CVA (CEREBROVASCULAR ACCIDENT) (HCC): Status: RESOLVED | Noted: 2022-08-15 | Resolved: 2023-03-24

## 2023-03-24 PROBLEM — I50.9 ACUTE ON CHRONIC CONGESTIVE HEART FAILURE (HCC): Status: RESOLVED | Noted: 2023-02-14 | Resolved: 2023-03-24

## 2023-03-24 PROBLEM — I50.1 PULMONARY EDEMA CARDIAC CAUSE (HCC): Status: RESOLVED | Noted: 2023-03-04 | Resolved: 2023-03-24

## 2023-03-24 PROBLEM — L60.3 NAIL BRITTLENESS: Status: RESOLVED | Noted: 2022-11-03 | Resolved: 2023-03-24

## 2023-03-24 PROBLEM — R41.0 CONFUSION AND DISORIENTATION: Status: RESOLVED | Noted: 2023-02-10 | Resolved: 2023-03-24

## 2023-03-24 PROBLEM — M54.6 THORACIC BACK PAIN: Status: RESOLVED | Noted: 2023-01-25 | Resolved: 2023-03-24

## 2023-03-24 PROBLEM — I50.33 ACUTE ON CHRONIC DIASTOLIC HEART FAILURE (HCC): Status: RESOLVED | Noted: 2023-03-02 | Resolved: 2023-03-24

## 2023-03-24 PROBLEM — R79.89 ELEVATED TROPONIN: Status: RESOLVED | Noted: 2022-07-09 | Resolved: 2023-03-24

## 2023-03-24 PROBLEM — Z20.822 CLOSE EXPOSURE TO COVID-19 VIRUS: Status: RESOLVED | Noted: 2021-12-07 | Resolved: 2023-03-24

## 2023-03-24 PROBLEM — R05.9 COUGH: Status: RESOLVED | Noted: 2021-12-07 | Resolved: 2023-03-24

## 2023-03-24 PROBLEM — R06.02 SOB (SHORTNESS OF BREATH): Status: RESOLVED | Noted: 2023-02-14 | Resolved: 2023-03-24

## 2023-03-24 PROBLEM — N17.9 AKI (ACUTE KIDNEY INJURY) (HCC): Status: RESOLVED | Noted: 2023-03-02 | Resolved: 2023-03-24

## 2023-03-24 PROBLEM — J90 CHRONIC BILATERAL PLEURAL EFFUSIONS: Status: RESOLVED | Noted: 2023-03-04 | Resolved: 2023-03-24

## 2023-03-24 PROBLEM — R68.89 FLU-LIKE SYMPTOMS: Status: RESOLVED | Noted: 2022-12-28 | Resolved: 2023-03-24

## 2023-03-24 PROBLEM — I47.10 SUPRAVENTRICULAR TACHYCARDIA (HCC): Status: RESOLVED | Noted: 2023-02-10 | Resolved: 2023-03-24

## 2023-03-24 LAB
ALBUMIN SERPL BCP-MCNC: 2.9 G/DL (ref 3.2–4.9)
ALBUMIN/GLOB SERPL: 1 G/DL
ALP SERPL-CCNC: 98 U/L (ref 30–99)
ALT SERPL-CCNC: 34 U/L (ref 2–50)
ANION GAP SERPL CALC-SCNC: 10 MMOL/L (ref 7–16)
AST SERPL-CCNC: 44 U/L (ref 12–45)
BILIRUB SERPL-MCNC: 0.6 MG/DL (ref 0.1–1.5)
BUN SERPL-MCNC: 32 MG/DL (ref 8–22)
CALCIUM ALBUM COR SERPL-MCNC: 9.7 MG/DL (ref 8.5–10.5)
CALCIUM SERPL-MCNC: 8.8 MG/DL (ref 8.5–10.5)
CHLORIDE SERPL-SCNC: 100 MMOL/L (ref 96–112)
CO2 SERPL-SCNC: 27 MMOL/L (ref 20–33)
CREAT SERPL-MCNC: 1.14 MG/DL (ref 0.5–1.4)
GFR SERPLBLD CREATININE-BSD FMLA CKD-EPI: 48 ML/MIN/1.73 M 2
GLOBULIN SER CALC-MCNC: 3 G/DL (ref 1.9–3.5)
GLUCOSE SERPL-MCNC: 89 MG/DL (ref 65–99)
POTASSIUM SERPL-SCNC: 3.9 MMOL/L (ref 3.6–5.5)
PROT SERPL-MCNC: 5.9 G/DL (ref 6–8.2)
SODIUM SERPL-SCNC: 137 MMOL/L (ref 135–145)

## 2023-03-24 PROCEDURE — 36415 COLL VENOUS BLD VENIPUNCTURE: CPT

## 2023-03-24 PROCEDURE — 80053 COMPREHEN METABOLIC PANEL: CPT

## 2023-03-24 PROCEDURE — 99214 OFFICE O/P EST MOD 30 MIN: CPT | Performed by: FAMILY MEDICINE

## 2023-03-24 ASSESSMENT — FIBROSIS 4 INDEX: FIB4 SCORE: 3.33

## 2023-03-24 NOTE — PROGRESS NOTES
"CHIEF COMPLAINT / REASON FOR VISIT  Regla Meier is a 82 y.o. female that presents today to [facility] care.    HISTORY OF PRESENT ILLNESS  Recently had torsemide increased, started on empagliflozin.  From a breathing perspective she feels that she is doing well, denies dyspnea or orthopnea.  She has seen some improvement in her lower extremity edema since her recent cardiology visit, but it is still present    Had stroke in August 2022, has had some intermittent confusion. Memory issues  Blind in left eye,  Has home health with PT, SLP     and patient [place] [facility] [place] [facility] [place] close to their son/daughter    Past Surgical History:   Procedure Laterality Date    CYSTECTOMY  1963    CATARACT EXTRACTION WITH IOL         Social History     Tobacco Use    Smoking status: Never    Smokeless tobacco: Never   Vaping Use    Vaping Use: Never used   Substance Use Topics    Alcohol use: Yes     Alcohol/week: 0.6 oz     Types: 1 Glasses of wine per week     Comment: occ glass of wine    Drug use: No       OBJECTIVE    /62 (BP Location: Right arm, Patient Position: Sitting, BP Cuff Size: Adult)   Pulse 72   Temp 36.3 °C (97.3 °F) (Temporal)   Ht 1.575 m (5' 2\")   Wt 59.7 kg (131 lb 11.2 oz)   SpO2 98%   BMI 24.09 kg/m²     PHYSICAL EXAM  Constitutional: Sitting comfortably, in no acute distress, responds to questions appropriately.  Head: Normocephalic  Eyes:  No conjunctival injection, no scleral icterus, PERRL  Ears: External ear canals clear, TMs pearly grey with visualized bony landmarks and crisp light reflex  Mouth: Oral mucosa moist. Good dentition  Throat: Oropharynx clear without erythema or tonsillar exudates  Neck: No cervical or supraclavicular lymphadenopathy  Heart: Regular S1 S2, no murmurs, rub, or gallops  Lungs: Clear to auscultation bilaterally, no wheezes, rales, or rhonchi  Extremities: 3+ pitting lower extremity edema to knees. 2+ symmetric radial pulses  Skin: Warm and dry, no rashes " or lesions on face or exposed upper extremities    ASSESSMENT & PLAN  1. Chronic right-sided heart failure (HCC)  2. Chronic heart failure with preserved ejection fraction (HCC)  3. Edema of both lower extremities  Had cardiac amyloidosis PYP scan which was negative for ATTR cardiac amyloidosis. Last TTE on 2/14/2023 showed normal LVEF, grade 2 diastolic dysfunction, eRVSP 40 to 45 mmHg. Follows with cardiology, recently started on empagliflozin 10 mg daily and had torsemide increased to 50 mg daily. She is also on spironolactone 25 mg daily. Currently hypervolemic, but since she just had her torsemide increased and empagliflozin started we will not make any changes at this time.  She will be seeing cardiology in April 2023  - Basic Metabolic Panel; Future    4. Paroxysmal atrial fibrillation (HCC)  New onset 2023 in setting of heart failure exacerbation.  Spontaneously converted to sinus rhythm.  Recommended apixaban 2.5 mg twice daily indefinitely by cardiology.    5. Coronary artery disease involving native coronary artery of native heart without angina pectoris  Catheter angiography on 2/20/2023 showed nonobstructive coronary artery disease, recommended aggressive medical management    6. Mixed hyperlipidemia  Chronic, following with cardiology, last LDL-C 106 on 2/15/2023.  Currently taking rosuvastatin 5 mg daily.  Reportedly did not tolerate atorvastatin due to muscle weakness

## 2023-03-28 ENCOUNTER — TELEPHONE (OUTPATIENT)
Dept: CARDIOLOGY | Facility: MEDICAL CENTER | Age: 83
End: 2023-03-28
Payer: MEDICARE

## 2023-03-28 ENCOUNTER — OFFICE VISIT (OUTPATIENT)
Dept: CARDIOLOGY | Facility: MEDICAL CENTER | Age: 83
End: 2023-03-28
Attending: INTERNAL MEDICINE
Payer: MEDICARE

## 2023-03-28 DIAGNOSIS — R06.09 DYSPNEA ON EXERTION: ICD-10-CM

## 2023-03-28 DIAGNOSIS — Z71.89 ENCOUNTER FOR EDUCATION ABOUT HEART FAILURE: ICD-10-CM

## 2023-03-28 DIAGNOSIS — I50.30 ACC/AHA STAGE C HEART FAILURE WITH PRESERVED EJECTION FRACTION (HCC): ICD-10-CM

## 2023-03-28 DIAGNOSIS — I51.89 LEFT VENTRICULAR DIASTOLIC DYSFUNCTION, NYHA CLASS 3: ICD-10-CM

## 2023-03-28 PROCEDURE — 98960 EDU&TRN PT SELF-MGMT NQHP 1: CPT | Performed by: NURSE PRACTITIONER

## 2023-03-28 PROCEDURE — 99999 PR NO CHARGE: CPT

## 2023-03-28 NOTE — Clinical Note
Please review- only concern is I believe we are scheduling out in May and [place] other daughter. Per appointment with pt and local daughter- Pt will have all the assistance needed in both locations post procedure.   Jun Peña, If TRELL approves, please schedule for next available (May) date.

## 2023-03-28 NOTE — PROGRESS NOTES
CardioMEMs Review and Education Visit  HFpEF, Stage C, NYHA III, EF 60%  HF d/t Pulm HTN-   Inclusion criteria: HF Hospitalization on 2/14/23 and/or NT-proBNP 7446   Exclusion criteria: (Chest circumference >65 inches if BMI >35, active infection, Hx recurrent PE/DVT. Congenital heart disease/Mechanical Tricuspid valve, ESRD/HD, CV event < 90 days)  BMI: 24.09  GFR: 48  CRT/ICD: n/a (implant > 90 days?n/a)  MLHFQ: 3/20/23  6MWT: 3/20/23  OAC: eliquis INR n/a  Agreeable to DAPT for 30 days post-implant n/a (if not on chronic OAC)  Education: provided via Demo, video, home instruction.   -Discussed Importance of PA pressure monitoring  -Patient responsibilities: Compliance discussion completed.  Pt is able to lay on pillow/ Able to connect to Internet or working cell phone/and appears to be knowledgeable regarding her diuretic therapy and management.  Patient to complete blood work for periodic monitoring.  The clinic will reach out to the patient with potential phone calls/text messages.  She does not have an electric bed, couch, water bed, metal in vicinity (3ft)  -Support person: Family in both [facility] and [facility].- [place].    Day of procedure, second set of ears  -Procedure process reviewed  -Equipment overview reviewed  -Financial Barriers were not identified  -Any other Concerns such as access to telephone, visual or hearing disability. None    Charted routed to JG for final approval.

## 2023-03-28 NOTE — TELEPHONE ENCOUNTER
TT    Caller: Ladonna Dee (daughter)    Topic/issue: ACTIVE SYMPTOMS    Ladonna states that patient has gained 4-5 lbs since Saturday, Per Ladonna pt also had a couple falls and she states that patient just isn't herself. Ladonna also states that patient is having some memory issues and some edema in her lower extremities. Would like to know if patient should head into the ER. Please advise.    Thank you,  Carlos JACKMAN      Callback Number: [phone] (home)

## 2023-03-28 NOTE — TELEPHONE ENCOUNTER
Returned Ladonna's call. She reports pt's weight on Sat was 126.4 lbs, today it is 132.4 lbs. Pt did eat some oranges and toast w/ peach butter before weighing today. No new or worsening SOB. Swelling up to mid thigh, not pitting per Ladonna and not worsened since last appt w/ TT per pt. Pt did increase Torsemide to 50 mg daily as instructed by TT at last appt.     Pt did fall yesterday twice, once when getting out of bed and then she tripped going to the bathroom a few moments later, pt reports not hitting her head and no obvious signs of bleeding. Ladonna states that pt is more confused this morning than normal although she has some baseline confusion since her stroke last year. Pt is also fatigued. MARIA T RN did see pt yesterday, did not express concern.     BP's today 80/48, 76/42, 73/49, 87/47 HR 75. Pt took meds around 9-9:30 am today, BP's were taken around 11-11:30 am. BP while on the phone 91/57, pt reports feeling tired but no other s/s. Pt drinks about 4 -16 ounce glasses of water a day and has been adhering to a 2g sodium diet. Pt does have a HF edu appt today, discussed w/ pt, family, and Miguel SEGOVIA. Pt will attend appt but was informed that she may still be advised to go to the ER. Advised Ladonna to bring pt's BP cuff in for comparison.

## 2023-03-28 NOTE — PATIENT INSTRUCTIONS
CardioMems Patient Education Resources    -Pre-implant videos and handouts https://www.cardiovascular.[employer]/us/en/hcp/products/heart-failure/pulmonary-pressure-monitors/cardiomems/patient-education/pre-implant.html    -Financial questions:  https://www.[employer].org/patients-and-visitors/billing or call [phone].   [facility] Patient Therapy Access (PTA) team: [phone]    CPT Billing codes:  10936- Monthly remote monitoring  36874- Implant    -Post-implant resources: https://www.cardiovascular.[employer]/us/en/patients/treatments-therapies/pulmonary-artery-pressure-monitoring-for-heart-failure/managing-heart-failure.html    -Remote Care Team [phone] for device trouble shooting.

## 2023-04-18 ENCOUNTER — PATIENT MESSAGE (OUTPATIENT)
Dept: CARDIOLOGY | Facility: MEDICAL CENTER | Age: 83
End: 2023-04-18
Payer: MEDICARE

## 2023-04-18 DIAGNOSIS — I50.30 ACC/AHA STAGE C HEART FAILURE WITH PRESERVED EJECTION FRACTION (HCC): ICD-10-CM

## 2023-04-21 PROBLEM — I69.354 HEMIPLEGIA AND HEMIPARESIS FOLLOWING CEREBRAL INFARCTION AFFECTING LEFT NON-DOMINANT SIDE (HCC): Status: ACTIVE | Noted: 2023-04-21

## 2023-04-24 ENCOUNTER — HOSPITAL ENCOUNTER (OUTPATIENT)
Dept: LAB | Facility: MEDICAL CENTER | Age: 83
End: 2023-04-24
Attending: INTERNAL MEDICINE
Payer: MEDICARE

## 2023-04-24 DIAGNOSIS — R06.09 DYSPNEA ON EXERTION: ICD-10-CM

## 2023-04-24 DIAGNOSIS — I51.89 LEFT VENTRICULAR DIASTOLIC DYSFUNCTION, NYHA CLASS 3: ICD-10-CM

## 2023-04-24 DIAGNOSIS — I50.30 ACC/AHA STAGE C HEART FAILURE WITH PRESERVED EJECTION FRACTION (HCC): ICD-10-CM

## 2023-04-24 PROCEDURE — 80048 BASIC METABOLIC PNL TOTAL CA: CPT

## 2023-04-24 PROCEDURE — 83880 ASSAY OF NATRIURETIC PEPTIDE: CPT

## 2023-04-24 PROCEDURE — 36415 COLL VENOUS BLD VENIPUNCTURE: CPT

## 2023-04-25 LAB
ANION GAP SERPL CALC-SCNC: 10 MMOL/L (ref 7–16)
BUN SERPL-MCNC: 36 MG/DL (ref 8–22)
CALCIUM SERPL-MCNC: 8.8 MG/DL (ref 8.5–10.5)
CHLORIDE SERPL-SCNC: 101 MMOL/L (ref 96–112)
CO2 SERPL-SCNC: 27 MMOL/L (ref 20–33)
CREAT SERPL-MCNC: 1.25 MG/DL (ref 0.5–1.4)
GFR SERPLBLD CREATININE-BSD FMLA CKD-EPI: 43 ML/MIN/1.73 M 2
GLUCOSE SERPL-MCNC: 87 MG/DL (ref 65–99)
NT-PROBNP SERPL IA-MCNC: ABNORMAL PG/ML (ref 0–125)
POTASSIUM SERPL-SCNC: 4.5 MMOL/L (ref 3.6–5.5)
SODIUM SERPL-SCNC: 138 MMOL/L (ref 135–145)

## 2023-04-27 ENCOUNTER — OFFICE VISIT (OUTPATIENT)
Dept: URGENT CARE | Facility: PHYSICIAN GROUP | Age: 83
End: 2023-04-27
Payer: MEDICARE

## 2023-04-27 ENCOUNTER — OFFICE VISIT (OUTPATIENT)
Dept: CARDIOLOGY | Facility: MEDICAL CENTER | Age: 83
End: 2023-04-27
Payer: MEDICARE

## 2023-04-27 VITALS
HEART RATE: 54 BPM | RESPIRATION RATE: 13 BRPM | OXYGEN SATURATION: 94 % | WEIGHT: 130 LBS | HEIGHT: 62 IN | SYSTOLIC BLOOD PRESSURE: 102 MMHG | BODY MASS INDEX: 23.92 KG/M2 | DIASTOLIC BLOOD PRESSURE: 60 MMHG

## 2023-04-27 VITALS
WEIGHT: 125 LBS | TEMPERATURE: 99 F | RESPIRATION RATE: 16 BRPM | HEART RATE: 60 BPM | OXYGEN SATURATION: 94 % | BODY MASS INDEX: 23 KG/M2 | HEIGHT: 62 IN | SYSTOLIC BLOOD PRESSURE: 102 MMHG | DIASTOLIC BLOOD PRESSURE: 74 MMHG

## 2023-04-27 DIAGNOSIS — R93.1 AGATSTON CAC SCORE 200-399: ICD-10-CM

## 2023-04-27 DIAGNOSIS — R60.0 EDEMA OF BOTH LOWER EXTREMITIES: ICD-10-CM

## 2023-04-27 DIAGNOSIS — D68.69 SECONDARY HYPERCOAGULABLE STATE (HCC): ICD-10-CM

## 2023-04-27 DIAGNOSIS — Z86.73 HISTORY OF STROKE: ICD-10-CM

## 2023-04-27 DIAGNOSIS — N18.31 STAGE 3A CHRONIC KIDNEY DISEASE: ICD-10-CM

## 2023-04-27 DIAGNOSIS — I50.30 ACC/AHA STAGE C HEART FAILURE WITH PRESERVED EJECTION FRACTION (HCC): ICD-10-CM

## 2023-04-27 DIAGNOSIS — I48.0 PAROXYSMAL ATRIAL FIBRILLATION (HCC): ICD-10-CM

## 2023-04-27 DIAGNOSIS — I50.812 CHRONIC RIGHT-SIDED HEART FAILURE (HCC): ICD-10-CM

## 2023-04-27 DIAGNOSIS — R19.7 DIARRHEA, UNSPECIFIED TYPE: ICD-10-CM

## 2023-04-27 DIAGNOSIS — E78.2 MIXED HYPERLIPIDEMIA: ICD-10-CM

## 2023-04-27 DIAGNOSIS — I50.9 HEART FAILURE, NYHA CLASS 2 (HCC): ICD-10-CM

## 2023-04-27 DIAGNOSIS — I50.32 CHRONIC HEART FAILURE WITH PRESERVED EJECTION FRACTION (HCC): Chronic | ICD-10-CM

## 2023-04-27 PROCEDURE — 1125F AMNT PAIN NOTED PAIN PRSNT: CPT | Performed by: PHYSICIAN ASSISTANT

## 2023-04-27 PROCEDURE — 3074F SYST BP LT 130 MM HG: CPT | Performed by: PHYSICIAN ASSISTANT

## 2023-04-27 PROCEDURE — 99213 OFFICE O/P EST LOW 20 MIN: CPT | Performed by: NURSE PRACTITIONER

## 2023-04-27 PROCEDURE — 99214 OFFICE O/P EST MOD 30 MIN: CPT | Performed by: PHYSICIAN ASSISTANT

## 2023-04-27 PROCEDURE — 3078F DIAST BP <80 MM HG: CPT | Performed by: PHYSICIAN ASSISTANT

## 2023-04-27 RX ORDER — TORSEMIDE 100 MG/1
50 TABLET ORAL 2 TIMES DAILY
Qty: 30 TABLET | Refills: 2 | Status: ON HOLD | OUTPATIENT
Start: 2023-04-27 | End: 2023-05-19 | Stop reason: SDUPTHER

## 2023-04-27 ASSESSMENT — ENCOUNTER SYMPTOMS
RESPIRATORY NEGATIVE: 1
CONSTIPATION: 0
MUSCULOSKELETAL NEGATIVE: 1
DIARRHEA: 1
NEUROLOGICAL NEGATIVE: 1
HEARTBURN: 0
NAUSEA: 0
CARDIOVASCULAR NEGATIVE: 1
BLOOD IN STOOL: 0
ABDOMINAL PAIN: 0
VOMITING: 0
CONSTITUTIONAL NEGATIVE: 1

## 2023-04-27 ASSESSMENT — FIBROSIS 4 INDEX
FIB4 SCORE: 2.84
FIB4 SCORE: 2.84

## 2023-04-27 NOTE — PROGRESS NOTES
Chief Complaint   Patient presents with    Coronary Artery Disease    Atrial Fibrillation    Hyperlipidemia       Subjective     Belinda Meier is a 82 y.o. female with a history of atrial fibrillation and HFpEF who presents today for follow up    Her primary cardiologist is Dr. Epps. Last seen 3/20/2023. At that exam, she was doing well. She was still volume up and her torsemide was increased to 50mg PO daily. She was started on Jardiance and referred for cardiomems.She was to follow up in 3 weeks.    Today, she reports continued lower extremity edema and shortness of breath on exertion. No chest pain or palpitations. No orthopnea or PND. No dizziness or lightheadedness. No syncope or presyncope.      Her home weight is stable at 125.6    Past Medical History:   Diagnosis Date    Blindness of left eye     Congestive heart failure (HCC)     Hyperlipemia     Stroke (HCC) 08/01/2022     Past Surgical History:   Procedure Laterality Date    CYSTECTOMY  1963    CATARACT EXTRACTION WITH IOL       Family History   Problem Relation Age of Onset    Other Mother         TIA     Other Father         Abdominal aneurysm     Alcohol abuse Brother     Heart Disease Brother     No Known Problems Daughter     No Known Problems Daughter      Social History     Socioeconomic History    Marital status:      Spouse name: Not on file    Number of children: Not on file    Years of education: Not on file    Highest education level: Not on file   Occupational History    Not on file   Tobacco Use    Smoking status: Never    Smokeless tobacco: Never   Vaping Use    Vaping Use: Never used   Substance and Sexual Activity    Alcohol use: Yes     Alcohol/week: 0.6 oz     Types: 1 Glasses of wine per week     Comment: occ glass of wine    Drug use: No    Sexual activity: Yes     Partners: Male     Comment:  for 57 years. Documented on 4/10/19.   Other Topics Concern    Not on file   Social History Narrative    Not on file      Social Determinants of Health     Financial Resource Strain: Low Risk  (3/3/2023)    Overall Financial Resource Strain (CARDIA)     Difficulty of Paying Living Expenses: Not hard at all   Food Insecurity: Not on file   Transportation Needs: Unknown (3/3/2023)    PRAPARE - Transportation     Lack of Transportation (Medical): No     Lack of Transportation (Non-Medical): Not on file   Physical Activity: Not on file   Stress: Not on file   Social Connections: Not on file   Intimate Partner Violence: Not on file   Housing Stability: Low Risk  (3/3/2023)    Housing Stability Vital Sign     Unable to Pay for Housing in the Last Year: No     Number of Places Lived in the Last Year: 1     Unstable Housing in the Last Year: No     Allergies   Allergen Reactions    Atorvastatin Myalgia     Severe muscle weakness    Pcn [Penicillins] Hives    Amiodarone Rash     rash     No facility-administered encounter medications on file as of 4/27/2023.     Outpatient Encounter Medications as of 4/27/2023   Medication Sig Dispense Refill    torsemide (DEMADEX) 100 MG Tab Take 0.5 Tablets by mouth 2 times a day. 30 Tablet 2    Empagliflozin (JARDIANCE) 10 MG Tab tablet Take 1 Tablet by mouth every day. 90 Tablet 3    apixaban (ELIQUIS) 2.5mg Tab Take 1 Tablet by mouth 2 times a day. 180 Tablet 3    spironolactone (ALDACTONE) 25 MG Tab Take 1 Tablet by mouth every day. 90 Tablet 3    rosuvastatin (CRESTOR) 5 MG Tab Take 1 Tablet by mouth every evening. 100 Tablet 4    fluticasone (FLONASE) 50 MCG/ACT nasal spray Administer 1 Spray into affected nostril(S) at bedtime. 16 g 1    [DISCONTINUED] albuterol 108 (90 Base) MCG/ACT Aero Soln inhalation aerosol Inhale 2 Puffs every 6 hours as needed for Shortness of Breath. 8.5 g 1    Coenzyme Q10 (CO Q 10 PO) Take 1 Tablet by mouth every day.      Cyanocobalamin (VITAMIN B12 PO) Take 1 Tablet by mouth every day.      QUERCETIN PO Take 1 Tablet by mouth every day.      Lutein 40 MG Cap Take 40 mg  "by mouth every day.      TURMERIC PO Take 1 Capsule by mouth every day.      [DISCONTINUED] torsemide (DEMADEX) 100 MG Tab Take 0.5 Tablets by mouth every day. 15 Tablet 3     Review of Systems   Constitutional: Negative.  Negative for chills, fever and malaise/fatigue.   HENT: Negative.     Eyes: Negative.  Negative for blurred vision and double vision.   Respiratory:  Positive for shortness of breath. Negative for cough.    Cardiovascular:  Positive for leg swelling. Negative for chest pain, palpitations, orthopnea, claudication and PND.   Gastrointestinal: Negative.  Negative for abdominal pain, nausea and vomiting.   Genitourinary: Negative.    Musculoskeletal: Negative.  Negative for myalgias.   Skin: Negative.  Negative for rash.   Neurological: Negative.  Negative for dizziness, loss of consciousness, weakness and headaches.   Endo/Heme/Allergies: Negative.  Does not bruise/bleed easily.   Psychiatric/Behavioral: Negative.                Objective     /60 (BP Location: Left arm, Patient Position: Sitting, BP Cuff Size: Adult)   Pulse (!) 54   Resp 13   Ht 1.575 m (5' 2\")   Wt 59 kg (130 lb)   SpO2 94%   BMI 23.78 kg/m²     Physical Exam  Constitutional:       General: She is not in acute distress.     Appearance: Normal appearance.   HENT:      Head: Normocephalic and atraumatic.      Right Ear: External ear normal.      Left Ear: External ear normal.   Eyes:      General: No scleral icterus.     Extraocular Movements: Extraocular movements intact.      Conjunctiva/sclera: Conjunctivae normal.      Pupils: Pupils are equal, round, and reactive to light.   Cardiovascular:      Rate and Rhythm: Normal rate and regular rhythm.      Pulses: Normal pulses.      Heart sounds: Normal heart sounds. No murmur heard.     No friction rub. No gallop.   Pulmonary:      Effort: Pulmonary effort is normal.      Breath sounds: Normal breath sounds.   Abdominal:      General: Bowel sounds are normal.      " Palpations: Abdomen is soft.      Tenderness: There is no abdominal tenderness.   Musculoskeletal:         General: Normal range of motion.      Cervical back: Normal range of motion and neck supple.      Right lower leg: Edema (1+) present.      Left lower leg: Edema (1+) present.   Skin:     General: Skin is warm and dry.      Capillary Refill: Capillary refill takes less than 2 seconds.   Neurological:      General: No focal deficit present.      Mental Status: She is alert and oriented to person, place, and time.   Psychiatric:         Mood and Affect: Mood normal.         Behavior: Behavior normal.         Judgment: Judgment normal.        Latest Reference Range & Units 04/24/23 07:34   Sodium 135 - 145 mmol/L 138   Potassium 3.6 - 5.5 mmol/L 4.5   Chloride 96 - 112 mmol/L 101   Co2 20 - 33 mmol/L 27   Anion Gap 7.0 - 16.0  10.0   Glucose 65 - 99 mg/dL 87   Bun 8 - 22 mg/dL 36 (H)   Creatinine 0.50 - 1.40 mg/dL 1.25   GFR (CKD-EPI) >60 mL/min/1.73 m 2 43 !   Calcium 8.5 - 10.5 mg/dL 8.8   (H): Data is abnormally high  !: Data is abnormal     Latest Reference Range & Units 04/24/23 07:34   NT-proBNP 0 - 125 pg/mL 42321 (H)   (H): Data is abnormally high    Cardiovascular imaging and procedures:    Echocardiogram 2/15/2023  CONCLUSIONS  Normal left ventricular systolic function.  Grade II diastolic dysfunction.  Mild mitral regurgitation.  Mild to moderate aortic insufficiency.  Mild tricuspid regurgitation.  Right ventricular systolic pressure is estimated to be  40-45  mmHg.    NM stress test 7/11/2022  Myocardial Perfusion   Report   NUCLEAR IMAGING INTERPRETATION   * Small sized, equivocal, non-reversible, mild severity defects in the apical   septal wall and mid inferolateral wall. Both most consistent with artifact    given the distribution, less likely small scars.    * SSS 2. SDS 0. No reversible ischemia.    * Normal left ventricular size, ejection fraction, and wall motion.   ECG INTERPRETATION    Negative stress ECG for ischemia.    CT Calcium scoring 9/13/2022  FINDINGS:     Coronary calcification:  LMA - 0.0  LCX - 0.0  LAD - 348.8  RCA - 0.0  PDA - 0.0     Total Calcium Score: 348.8     Percentile: Calcium score is above the 75th percentile for the patient's age and sex.     Other findings:  Heart: There is small pericardial effusion..  Lungs: There are small bilateral pleural effusions..  Mediastinum: Normal.  Upper abdomen: Normal.     IMPRESSION:     Calcium Score of 100-399 AND >75th percentile:         Assessment & Plan     1. Chronic heart failure with preserved ejection fraction (HCC)  Basic Metabolic Panel    proBrain Natriuretic Peptide, NT      2. Chronic right-sided heart failure (HCC)  Basic Metabolic Panel    proBrain Natriuretic Peptide, NT      3. Edema of both lower extremities  Basic Metabolic Panel    proBrain Natriuretic Peptide, NT      4. ACC/AHA stage C heart failure with preserved ejection fraction (HCC)        5. Heart failure, NYHA class 2 ([facility])        6. Paroxysmal atrial fibrillation ([facility])        7. Secondary hypercoagulable state ([facility])        8. Agatston CAC score 200-399        9. Mixed hyperlipidemia        10. Stage 3a chronic kidney disease (HCC)        11. History of stroke            Medical Decision Making: Today's Assessment/Status/Plan:        HFpEF  - Continued symptoms of atrial fibrillation and shortness of breath.  - She remains volume overloaded on exam.  - Increase torsemide to 50mg BID  - Repeat BMP and NT-proBNP in 1 week  - Continue spironolactone 25mg daily and jardiance 10mg daily    Paroxysmal Atrial Fibrillation  History of stroke  - Asymptomatic  - Regular rate and rhythm on exam. Rate is well self- controlled.  - Continue eliquis 2.5mg BID for CVA prevention    Elevated coronary calcium score  - No symptoms  - Stress testing 7/2022 suggestive of artifact vs scar, but with no reversible ischemia  - Continue rosuvastatin 5mg daily and anticoagulation with  eliquis.     CKD  -Stable  - Repeat BMP per above    Follow up in 3-4 weeks or sooner with changes.    Encouraged her to reach out via MyChart or telephone with any questions or concerns.    Thank you for allowing me to participate in the care of Regla Meier .    Leah Jain PA-C, Cardiology  [facility]  [address], [address] [place] [address]  Phone: [phone]  Fax: [phone]    PLEASE NOTE: This note was created using voice recognition software. I have made every reasonable attempt to correct obvious errors, but I expect that there are errors of grammar and possibly content that I did not discover before finalizing the note.     I personally spent a total of 35 minutes which includes face-to-face time and non-face-to-face time spent on preparing to see the patient, reviewing hospital notes and tests, obtaining history from the patient, performing a medically appropriate exam, counseling and educating the patient, ordering medications/tests/procedures/referrals as clinically indicated, and documenting information in the electronic medical record.

## 2023-04-28 NOTE — PROGRESS NOTES
Subjective     Belinda Meier is a 82 y.o. female who presents with Diarrhea (X1week diarrhea ) and Itching (Feels an intense itchy from head to toe )            Diarrhea   Pertinent negatives include no abdominal pain or vomiting.  has been experiencing diarrhea x1 week with abdominal cramping.  Denies fever, nausea, vomiting, dysuria.  Was seen by her cardiologist earlier today.   was placed on new medication but has not started this yet.  Does take various heart medications for heart failure.  Does get short of breath on exertion but this is not new.  Does experience mild lower extremity swelling as well with this condition.  Denies changes in diet or food allergies.  Denies colon problems abdominal surgeries.  Has been is present.  No recent travel.   recently has been taking over-the-counter supplements in the last week.  No over-the-counter medications for diarrhea taken.  Eating and drinking with no problems.    PMH:  has a past medical history of Blindness of left eye, Congestive heart failure (HCC), Hyperlipemia, and Stroke (HCC) (08/01/2022).  MEDS:   Current Outpatient Medications:     torsemide (DEMADEX) 100 MG Tab, Take 0.5 Tablets by mouth 2 times a day., Disp: 30 Tablet, Rfl: 2    Empagliflozin (JARDIANCE) 10 MG Tab tablet, Take 1 Tablet by mouth every day., Disp: 90 Tablet, Rfl: 3    apixaban (ELIQUIS) 2.5mg Tab, Take 1 Tablet by mouth 2 times a day., Disp: 180 Tablet, Rfl: 3    spironolactone (ALDACTONE) 25 MG Tab, Take 1 Tablet by mouth every day., Disp: 90 Tablet, Rfl: 3    rosuvastatin (CRESTOR) 5 MG Tab, Take 1 Tablet by mouth every evening., Disp: 100 Tablet, Rfl: 4    fluticasone (FLONASE) 50 MCG/ACT nasal spray, Administer 1 Spray into affected nostril(S) at bedtime., Disp: 16 g, Rfl: 1    albuterol 108 (90 Base) MCG/ACT Aero Soln inhalation aerosol, Inhale 2 Puffs every 6 hours as needed for Shortness of Breath., Disp: 8.5 g, Rfl: 1    Coenzyme Q10 (CO Q 10 PO), Take 1  "Tablet by mouth every day., Disp: , Rfl:     Cyanocobalamin (VITAMIN B12 PO), Take 1 Tablet by mouth every day., Disp: , Rfl:     QUERCETIN PO, Take 1 Tablet by mouth every day., Disp: , Rfl:     Lutein 40 MG Cap, Take 40 mg by mouth every day., Disp: , Rfl:     TURMERIC PO, Take 1 Capsule by mouth every day., Disp: , Rfl:   ALLERGIES:   Allergies   Allergen Reactions    Atorvastatin Myalgia     Severe muscle weakness    Pcn [Penicillins] Hives    Amiodarone Rash     rash    Penicillin G      SURGHX:   Past Surgical History:   Procedure Laterality Date    CYSTECTOMY  1963    CATARACT EXTRACTION WITH IOL       SOCHX:  reports that she has never smoked. She has never used smokeless tobacco. She reports current alcohol use of about 0.6 oz per week. She reports that she does not use drugs.  FH: Family history was reviewed, no pertinent findings to report      Review of Systems   Constitutional: Negative.    Respiratory: Negative.     Cardiovascular: Negative.    Gastrointestinal:  Positive for diarrhea. Negative for abdominal pain, blood in stool, constipation, heartburn, melena, nausea and vomiting.   Genitourinary: Negative.    Musculoskeletal: Negative.    Neurological: Negative.    All other systems reviewed and are negative.           Objective     /74 (BP Location: Left arm, Patient Position: Sitting, BP Cuff Size: Adult)   Pulse 60   Temp 37.2 °C (99 °F) (Temporal)   Resp 16   Ht 1.575 m (5' 2\")   Wt 56.7 kg (125 lb)   SpO2 94%   BMI 22.86 kg/m²      Physical Exam  Vitals reviewed.   Constitutional:       General: She is awake. She is not in acute distress.     Appearance: Normal appearance. She is well-developed. She is not ill-appearing, toxic-appearing or diaphoretic.   HENT:      Head: Normocephalic.   Cardiovascular:      Rate and Rhythm: Normal rate.   Pulmonary:      Effort: Pulmonary effort is normal. No tachypnea, accessory muscle usage or respiratory distress.   Abdominal:      General: " Bowel sounds are normal. There is no distension.      Palpations: Abdomen is soft.      Tenderness: There is no abdominal tenderness. There is no guarding or rebound.   Musculoskeletal:         General: Normal range of motion.   Skin:     General: Skin is warm and dry.   Neurological:      Mental Status: She is alert and oriented to person, place, and time.   Psychiatric:         Attention and Perception: Attention normal.         Mood and Affect: Mood normal.         Speech: Speech normal.         Behavior: Behavior normal. Behavior is cooperative.                           Assessment & Plan        1. Diarrhea, unspecified type    Discontinue vitamin supplements unless ok'd by primary or cardiologist  -May use over the counter Immodium as directed for diarrhea  -Maintain water status slowly with sips of water and electrolyte fluid, increase to larger amounts as tolerated  -Introduce solid foods when diarrhea ceases and becomes firmer without abdominal cramping. Eat items from the BRAT diet- trying small amount with one item at a time  -May use Lanolin, diaper rash ointment or Vaseline to soothe anus for raw skin  -Monitor for fever, increased abdominal pain, nausea/vomiting, lethargy, continued diarrhea- need re-evaluation in urgent care  This information has been written down for patient at discharge  May follow-up with primary care provider, if needed     -Education provided: see above    -Return to urgent care as needed if new or worsening symptoms  -Patient expresses understanding to treatment and plan of care  -Questions were encouraged and answered  -Recommended over the counter meds were written down when requested   -Advised to follow-up with the primary care provider for recheck, reevaluation, and consideration of further management as needed     -Time spent evaluating this patient was at least 30 minutes. This time comprises of the following: preparing for visit/chart review, patient history taken into  account pertinent to symptoms, patient counseling/education for needed treatment outpatient, exam/evaluation/treatment plan provided, ordering any appropriate lab/test/procedures/meds, independent interpretation of any clinic testing, medication management with any other listed medications and chart documentation.

## 2023-05-04 ENCOUNTER — HOSPITAL ENCOUNTER (OUTPATIENT)
Dept: LAB | Facility: MEDICAL CENTER | Age: 83
End: 2023-05-04
Attending: PHYSICIAN ASSISTANT
Payer: MEDICARE

## 2023-05-04 DIAGNOSIS — I50.32 CHRONIC HEART FAILURE WITH PRESERVED EJECTION FRACTION (HCC): Chronic | ICD-10-CM

## 2023-05-04 DIAGNOSIS — I50.812 CHRONIC RIGHT-SIDED HEART FAILURE (HCC): ICD-10-CM

## 2023-05-04 DIAGNOSIS — R60.0 EDEMA OF BOTH LOWER EXTREMITIES: ICD-10-CM

## 2023-05-04 LAB
ANION GAP SERPL CALC-SCNC: 10 MMOL/L (ref 7–16)
BUN SERPL-MCNC: 28 MG/DL (ref 8–22)
CALCIUM SERPL-MCNC: 8.6 MG/DL (ref 8.5–10.5)
CHLORIDE SERPL-SCNC: 101 MMOL/L (ref 96–112)
CO2 SERPL-SCNC: 28 MMOL/L (ref 20–33)
CREAT SERPL-MCNC: 1.24 MG/DL (ref 0.5–1.4)
FASTING STATUS PATIENT QL REPORTED: NORMAL
GFR SERPLBLD CREATININE-BSD FMLA CKD-EPI: 44 ML/MIN/1.73 M 2
GLUCOSE SERPL-MCNC: 87 MG/DL (ref 65–99)
NT-PROBNP SERPL IA-MCNC: ABNORMAL PG/ML (ref 0–125)
POTASSIUM SERPL-SCNC: 3.3 MMOL/L (ref 3.6–5.5)
SODIUM SERPL-SCNC: 139 MMOL/L (ref 135–145)

## 2023-05-04 PROCEDURE — 80048 BASIC METABOLIC PNL TOTAL CA: CPT

## 2023-05-04 PROCEDURE — 36415 COLL VENOUS BLD VENIPUNCTURE: CPT

## 2023-05-04 PROCEDURE — 83880 ASSAY OF NATRIURETIC PEPTIDE: CPT

## 2023-05-10 ENCOUNTER — TELEPHONE (OUTPATIENT)
Dept: CARDIOLOGY | Facility: MEDICAL CENTER | Age: 83
End: 2023-05-10
Payer: MEDICARE

## 2023-05-10 DIAGNOSIS — E87.6 HYPOKALEMIA: ICD-10-CM

## 2023-05-10 RX ORDER — POTASSIUM CHLORIDE 20 MEQ/1
20 TABLET, EXTENDED RELEASE ORAL DAILY
Qty: 30 TABLET | Refills: 11 | Status: SHIPPED | OUTPATIENT
Start: 2023-05-10

## 2023-05-10 NOTE — TELEPHONE ENCOUNTER
Caller: Shelby Ladonna (daughter)    Topic/issue: Ladonna is returning call    Callback Number:  [phone]    Thank you,   Anai CAMPBELL

## 2023-05-10 NOTE — TELEPHONE ENCOUNTER
Phone Number Called: [phone]    Call outcome: Did not leave a detailed message. Requested patient to call back.    Message: Called to inform patient of AM recommendations. Unable to reach patient. Left message for call back. Potassium and BMP ordered per AM recommendations.       ----- Message from Leah Jain P.A.-C. sent at 5/10/2023 10:19 AM PDT -----  We can start potassium supplementation with 20mg daily and repeat BMP in 5 days

## 2023-05-11 NOTE — TELEPHONE ENCOUNTER
Phone Number Called: [phone]    Call outcome: Spoke to patient regarding message below.    Message: Called to inform patient of AM recommendations.     Spoke with patient's daughter. Verbalized understanding. No further questions at this time.     ----- Message from Leah Jain P.A.-C. sent at 5/10/2023 10:19 AM PDT -----  We can start potassium supplementation with 20mg daily and repeat BMP in 5 days

## 2023-05-12 DIAGNOSIS — R06.2 WHEEZING: ICD-10-CM

## 2023-05-12 PROCEDURE — 1125F AMNT PAIN NOTED PAIN PRSNT: CPT | Performed by: NURSE PRACTITIONER

## 2023-05-12 RX ORDER — ALBUTEROL SULFATE 90 UG/1
AEROSOL, METERED RESPIRATORY (INHALATION)
Qty: 7 EACH | Refills: 0 | Status: SHIPPED | OUTPATIENT
Start: 2023-05-12

## 2023-05-12 NOTE — TELEPHONE ENCOUNTER
Received request via: Pharmacy    Was the patient seen in the last year in this department? Yes  3/24/2023  Does the patient have an active prescription (recently filled or refills available) for medication(s) requested? No    Does the patient have residential Plus and need 100 day supply (blood pressure, diabetes and cholesterol meds only)? Patient does not have SCP

## 2023-05-15 ENCOUNTER — APPOINTMENT (OUTPATIENT)
Dept: RADIOLOGY | Facility: MEDICAL CENTER | Age: 83
DRG: 242 | End: 2023-05-15
Attending: EMERGENCY MEDICINE
Payer: MEDICARE

## 2023-05-15 ENCOUNTER — APPOINTMENT (OUTPATIENT)
Dept: URGENT CARE | Facility: PHYSICIAN GROUP | Age: 83
End: 2023-05-15
Payer: MEDICARE

## 2023-05-15 ENCOUNTER — HOSPITAL ENCOUNTER (INPATIENT)
Facility: MEDICAL CENTER | Age: 83
LOS: 4 days | DRG: 242 | End: 2023-05-19
Attending: EMERGENCY MEDICINE | Admitting: INTERNAL MEDICINE
Payer: MEDICARE

## 2023-05-15 DIAGNOSIS — R57.0 CARDIOGENIC SHOCK (HCC): ICD-10-CM

## 2023-05-15 DIAGNOSIS — R00.1 BRADYCARDIA: ICD-10-CM

## 2023-05-15 DIAGNOSIS — I95.9 HYPOTENSION, UNSPECIFIED HYPOTENSION TYPE: ICD-10-CM

## 2023-05-15 DIAGNOSIS — R82.998 URINE WBC INCREASED: ICD-10-CM

## 2023-05-15 DIAGNOSIS — R79.89 ELEVATED TSH: ICD-10-CM

## 2023-05-15 DIAGNOSIS — I50.33 ACUTE ON CHRONIC HEART FAILURE WITH PRESERVED EJECTION FRACTION (HCC): Chronic | ICD-10-CM

## 2023-05-15 LAB
ALBUMIN SERPL BCP-MCNC: 3.3 G/DL (ref 3.2–4.9)
ALBUMIN/GLOB SERPL: 1 G/DL
ALP SERPL-CCNC: 138 U/L (ref 30–99)
ALT SERPL-CCNC: 49 U/L (ref 2–50)
ANION GAP SERPL CALC-SCNC: 16 MMOL/L (ref 7–16)
APPEARANCE UR: ABNORMAL
AST SERPL-CCNC: 72 U/L (ref 12–45)
BACTERIA #/AREA URNS HPF: ABNORMAL /HPF
BASOPHILS # BLD AUTO: 0.6 % (ref 0–1.8)
BASOPHILS # BLD: 0.06 K/UL (ref 0–0.12)
BILIRUB SERPL-MCNC: 0.6 MG/DL (ref 0.1–1.5)
BILIRUB UR QL STRIP.AUTO: NEGATIVE
BUN SERPL-MCNC: 51 MG/DL (ref 8–22)
CALCIUM ALBUM COR SERPL-MCNC: 9.6 MG/DL (ref 8.5–10.5)
CALCIUM SERPL-MCNC: 9 MG/DL (ref 8.5–10.5)
CHLORIDE SERPL-SCNC: 95 MMOL/L (ref 96–112)
CO2 SERPL-SCNC: 25 MMOL/L (ref 20–33)
COLOR UR: ABNORMAL
CREAT SERPL-MCNC: 1.87 MG/DL (ref 0.5–1.4)
EKG IMPRESSION: NORMAL
EOSINOPHIL # BLD AUTO: 0.04 K/UL (ref 0–0.51)
EOSINOPHIL NFR BLD: 0.4 % (ref 0–6.9)
EPI CELLS #/AREA URNS HPF: NEGATIVE /HPF
ERYTHROCYTE [DISTWIDTH] IN BLOOD BY AUTOMATED COUNT: 54.4 FL (ref 35.9–50)
GFR SERPLBLD CREATININE-BSD FMLA CKD-EPI: 27 ML/MIN/1.73 M 2
GLOBULIN SER CALC-MCNC: 3.2 G/DL (ref 1.9–3.5)
GLUCOSE SERPL-MCNC: 104 MG/DL (ref 65–99)
GLUCOSE UR STRIP.AUTO-MCNC: 250 MG/DL
GRAN CASTS #/AREA URNS LPF: ABNORMAL /LPF
HCT VFR BLD AUTO: 40.2 % (ref 37–47)
HGB BLD-MCNC: 13.3 G/DL (ref 12–16)
HYALINE CASTS #/AREA URNS LPF: ABNORMAL /LPF
IMM GRANULOCYTES # BLD AUTO: 0.04 K/UL (ref 0–0.11)
IMM GRANULOCYTES NFR BLD AUTO: 0.4 % (ref 0–0.9)
KETONES UR STRIP.AUTO-MCNC: ABNORMAL MG/DL
LEUKOCYTE ESTERASE UR QL STRIP.AUTO: ABNORMAL
LYMPHOCYTES # BLD AUTO: 2.88 K/UL (ref 1–4.8)
LYMPHOCYTES NFR BLD: 30.9 % (ref 22–41)
MCH RBC QN AUTO: 30.4 PG (ref 27–33)
MCHC RBC AUTO-ENTMCNC: 33.1 G/DL (ref 33.6–35)
MCV RBC AUTO: 91.8 FL (ref 81.4–97.8)
MICRO URNS: ABNORMAL
MONOCYTES # BLD AUTO: 0.95 K/UL (ref 0–0.85)
MONOCYTES NFR BLD AUTO: 10.2 % (ref 0–13.4)
NEUTROPHILS # BLD AUTO: 5.36 K/UL (ref 2–7.15)
NEUTROPHILS NFR BLD: 57.5 % (ref 44–72)
NITRITE UR QL STRIP.AUTO: NEGATIVE
NRBC # BLD AUTO: 0 K/UL
NRBC BLD-RTO: 0 /100 WBC
NT-PROBNP SERPL IA-MCNC: ABNORMAL PG/ML (ref 0–125)
PH UR STRIP.AUTO: 6 [PH] (ref 5–8)
PLATELET # BLD AUTO: 257 K/UL (ref 164–446)
PMV BLD AUTO: 9.1 FL (ref 9–12.9)
POTASSIUM SERPL-SCNC: 4.3 MMOL/L (ref 3.6–5.5)
PROT SERPL-MCNC: 6.5 G/DL (ref 6–8.2)
PROT UR QL STRIP: 300 MG/DL
RBC # BLD AUTO: 4.38 M/UL (ref 4.2–5.4)
RBC # URNS HPF: ABNORMAL /HPF
RBC UR QL AUTO: ABNORMAL
SODIUM SERPL-SCNC: 136 MMOL/L (ref 135–145)
SP GR UR STRIP.AUTO: 1.01
T4 FREE SERPL-MCNC: 1.4 NG/DL (ref 0.93–1.7)
TROPONIN T SERPL-MCNC: 243 NG/L (ref 6–19)
TSH SERPL DL<=0.005 MIU/L-ACNC: 17.5 UIU/ML (ref 0.38–5.33)
UROBILINOGEN UR STRIP.AUTO-MCNC: 0.2 MG/DL
WBC # BLD AUTO: 9.3 K/UL (ref 4.8–10.8)
WBC #/AREA URNS HPF: ABNORMAL /HPF

## 2023-05-15 PROCEDURE — 84481 FREE ASSAY (FT-3): CPT

## 2023-05-15 PROCEDURE — 84443 ASSAY THYROID STIM HORMONE: CPT

## 2023-05-15 PROCEDURE — 83605 ASSAY OF LACTIC ACID: CPT

## 2023-05-15 PROCEDURE — 80053 COMPREHEN METABOLIC PANEL: CPT

## 2023-05-15 PROCEDURE — 94760 N-INVAS EAR/PLS OXIMETRY 1: CPT

## 2023-05-15 PROCEDURE — 71045 X-RAY EXAM CHEST 1 VIEW: CPT

## 2023-05-15 PROCEDURE — 770022 HCHG ROOM/CARE - ICU (200)

## 2023-05-15 PROCEDURE — 84439 ASSAY OF FREE THYROXINE: CPT

## 2023-05-15 PROCEDURE — 36415 COLL VENOUS BLD VENIPUNCTURE: CPT

## 2023-05-15 PROCEDURE — 83880 ASSAY OF NATRIURETIC PEPTIDE: CPT

## 2023-05-15 PROCEDURE — 82533 TOTAL CORTISOL: CPT

## 2023-05-15 PROCEDURE — 84145 PROCALCITONIN (PCT): CPT

## 2023-05-15 PROCEDURE — 81001 URINALYSIS AUTO W/SCOPE: CPT

## 2023-05-15 PROCEDURE — 93005 ELECTROCARDIOGRAM TRACING: CPT

## 2023-05-15 PROCEDURE — 84484 ASSAY OF TROPONIN QUANT: CPT

## 2023-05-15 PROCEDURE — 83735 ASSAY OF MAGNESIUM: CPT

## 2023-05-15 PROCEDURE — 96365 THER/PROPH/DIAG IV INF INIT: CPT

## 2023-05-15 PROCEDURE — 87040 BLOOD CULTURE FOR BACTERIA: CPT

## 2023-05-15 PROCEDURE — 85025 COMPLETE CBC W/AUTO DIFF WBC: CPT

## 2023-05-15 PROCEDURE — 700105 HCHG RX REV CODE 258: Performed by: EMERGENCY MEDICINE

## 2023-05-15 PROCEDURE — 93005 ELECTROCARDIOGRAM TRACING: CPT | Performed by: EMERGENCY MEDICINE

## 2023-05-15 PROCEDURE — 99291 CRITICAL CARE FIRST HOUR: CPT

## 2023-05-15 PROCEDURE — 700101 HCHG RX REV CODE 250: Performed by: EMERGENCY MEDICINE

## 2023-05-15 RX ORDER — POLYETHYLENE GLYCOL 3350 17 G/17G
1 POWDER, FOR SOLUTION ORAL
Status: DISCONTINUED | OUTPATIENT
Start: 2023-05-15 | End: 2023-05-19 | Stop reason: HOSPADM

## 2023-05-15 RX ORDER — AMOXICILLIN 250 MG
2 CAPSULE ORAL 2 TIMES DAILY
Status: DISCONTINUED | OUTPATIENT
Start: 2023-05-16 | End: 2023-05-19 | Stop reason: HOSPADM

## 2023-05-15 RX ORDER — SODIUM CHLORIDE 9 MG/ML
500 INJECTION, SOLUTION INTRAVENOUS ONCE
Status: COMPLETED | OUTPATIENT
Start: 2023-05-15 | End: 2023-05-16

## 2023-05-15 RX ORDER — BISACODYL 10 MG
10 SUPPOSITORY, RECTAL RECTAL
Status: DISCONTINUED | OUTPATIENT
Start: 2023-05-15 | End: 2023-05-19 | Stop reason: HOSPADM

## 2023-05-15 RX ORDER — EPINEPHRINE HCL IN 0.9 % NACL 4MG/250ML
0-.5 PLASTIC BAG, INJECTION (ML) INTRAVENOUS CONTINUOUS
Status: DISCONTINUED | OUTPATIENT
Start: 2023-05-15 | End: 2023-05-18

## 2023-05-15 RX ORDER — ACETAMINOPHEN 325 MG/1
650 TABLET ORAL EVERY 6 HOURS PRN
Status: DISCONTINUED | OUTPATIENT
Start: 2023-05-15 | End: 2023-05-17

## 2023-05-15 RX ORDER — ONDANSETRON 2 MG/ML
4 INJECTION INTRAMUSCULAR; INTRAVENOUS EVERY 4 HOURS PRN
Status: DISCONTINUED | OUTPATIENT
Start: 2023-05-15 | End: 2023-05-19 | Stop reason: HOSPADM

## 2023-05-15 RX ORDER — ONDANSETRON 4 MG/1
4 TABLET, ORALLY DISINTEGRATING ORAL EVERY 4 HOURS PRN
Status: DISCONTINUED | OUTPATIENT
Start: 2023-05-15 | End: 2023-05-19 | Stop reason: HOSPADM

## 2023-05-15 RX ADMIN — SODIUM CHLORIDE 500 ML: 9 INJECTION, SOLUTION INTRAVENOUS at 22:23

## 2023-05-15 RX ADMIN — EPINEPHRINE 0.01 MCG/KG/MIN: 1 INJECTION INTRAMUSCULAR; INTRAVENOUS; SUBCUTANEOUS at 22:49

## 2023-05-15 ASSESSMENT — FIBROSIS 4 INDEX: FIB4 SCORE: 2.84

## 2023-05-16 ENCOUNTER — APPOINTMENT (OUTPATIENT)
Dept: CARDIOLOGY | Facility: MEDICAL CENTER | Age: 83
DRG: 242 | End: 2023-05-16
Attending: PHYSICIAN ASSISTANT
Payer: MEDICARE

## 2023-05-16 PROBLEM — R79.89 ELEVATED TSH: Status: ACTIVE | Noted: 2023-05-16

## 2023-05-16 PROBLEM — R82.81 PYURIA: Status: ACTIVE | Noted: 2023-05-16

## 2023-05-16 PROBLEM — N18.31 STAGE 3A CHRONIC KIDNEY DISEASE: Status: ACTIVE | Noted: 2023-05-16

## 2023-05-16 PROBLEM — R00.1 BRADYCARDIA: Status: ACTIVE | Noted: 2023-05-16

## 2023-05-16 PROBLEM — D68.69 SECONDARY HYPERCOAGULABLE STATE (HCC): Status: ACTIVE | Noted: 2023-05-16

## 2023-05-16 LAB
ALBUMIN SERPL BCP-MCNC: 3 G/DL (ref 3.2–4.9)
ALBUMIN/GLOB SERPL: 1.1 G/DL
ALP SERPL-CCNC: 117 U/L (ref 30–99)
ALT SERPL-CCNC: 45 U/L (ref 2–50)
ANION GAP SERPL CALC-SCNC: 13 MMOL/L (ref 7–16)
ANION GAP SERPL CALC-SCNC: 15 MMOL/L (ref 7–16)
APPEARANCE UR: ABNORMAL
AST SERPL-CCNC: 58 U/L (ref 12–45)
BACTERIA #/AREA URNS HPF: ABNORMAL /HPF
BASOPHILS # BLD AUTO: 0.6 % (ref 0–1.8)
BASOPHILS # BLD: 0.05 K/UL (ref 0–0.12)
BILIRUB SERPL-MCNC: 0.3 MG/DL (ref 0.1–1.5)
BILIRUB UR QL STRIP.AUTO: NEGATIVE
BUN SERPL-MCNC: 47 MG/DL (ref 8–22)
BUN SERPL-MCNC: 49 MG/DL (ref 8–22)
CALCIUM ALBUM COR SERPL-MCNC: 9.6 MG/DL (ref 8.5–10.5)
CALCIUM SERPL-MCNC: 8.8 MG/DL (ref 8.5–10.5)
CALCIUM SERPL-MCNC: 8.8 MG/DL (ref 8.5–10.5)
CHLORIDE SERPL-SCNC: 97 MMOL/L (ref 96–112)
CHLORIDE SERPL-SCNC: 97 MMOL/L (ref 96–112)
CO2 SERPL-SCNC: 26 MMOL/L (ref 20–33)
CO2 SERPL-SCNC: 27 MMOL/L (ref 20–33)
COLOR UR: ABNORMAL
CORTIS SERPL-MCNC: 13.6 UG/DL (ref 0–23)
CREAT SERPL-MCNC: 1.83 MG/DL (ref 0.5–1.4)
CREAT SERPL-MCNC: 1.87 MG/DL (ref 0.5–1.4)
EKG IMPRESSION: NORMAL
EOSINOPHIL # BLD AUTO: 0.02 K/UL (ref 0–0.51)
EOSINOPHIL NFR BLD: 0.2 % (ref 0–6.9)
EPI CELLS #/AREA URNS HPF: NEGATIVE /HPF
ERYTHROCYTE [DISTWIDTH] IN BLOOD BY AUTOMATED COUNT: 54.1 FL (ref 35.9–50)
GFR SERPLBLD CREATININE-BSD FMLA CKD-EPI: 27 ML/MIN/1.73 M 2
GFR SERPLBLD CREATININE-BSD FMLA CKD-EPI: 27 ML/MIN/1.73 M 2
GLOBULIN SER CALC-MCNC: 2.8 G/DL (ref 1.9–3.5)
GLUCOSE SERPL-MCNC: 120 MG/DL (ref 65–99)
GLUCOSE SERPL-MCNC: 137 MG/DL (ref 65–99)
GLUCOSE UR STRIP.AUTO-MCNC: 250 MG/DL
GRAN CASTS #/AREA URNS LPF: ABNORMAL /LPF
HCT VFR BLD AUTO: 33.5 % (ref 37–47)
HGB BLD-MCNC: 11.7 G/DL (ref 12–16)
HYALINE CASTS #/AREA URNS LPF: ABNORMAL /LPF
IMM GRANULOCYTES # BLD AUTO: 0.04 K/UL (ref 0–0.11)
IMM GRANULOCYTES NFR BLD AUTO: 0.5 % (ref 0–0.9)
KETONES UR STRIP.AUTO-MCNC: NEGATIVE MG/DL
LACTATE SERPL-SCNC: 1.8 MMOL/L (ref 0.5–2)
LACTATE SERPL-SCNC: 2.2 MMOL/L (ref 0.5–2)
LACTATE SERPL-SCNC: 2.2 MMOL/L (ref 0.5–2)
LEUKOCYTE ESTERASE UR QL STRIP.AUTO: ABNORMAL
LV EJECT FRACT  99904: 60
LV EJECT FRACT MOD 2C 99903: 66.88
LV EJECT FRACT MOD 4C 99902: 59.03
LV EJECT FRACT MOD BP 99901: 63.7
LYMPHOCYTES # BLD AUTO: 2.04 K/UL (ref 1–4.8)
LYMPHOCYTES NFR BLD: 23 % (ref 22–41)
MAGNESIUM SERPL-MCNC: 2.5 MG/DL (ref 1.5–2.5)
MAGNESIUM SERPL-MCNC: 2.6 MG/DL (ref 1.5–2.5)
MCH RBC QN AUTO: 32.8 PG (ref 27–33)
MCHC RBC AUTO-ENTMCNC: 34.9 G/DL (ref 33.6–35)
MCV RBC AUTO: 93.8 FL (ref 81.4–97.8)
MICRO URNS: ABNORMAL
MONOCYTES # BLD AUTO: 0.78 K/UL (ref 0–0.85)
MONOCYTES NFR BLD AUTO: 8.8 % (ref 0–13.4)
MUCOUS THREADS #/AREA URNS HPF: ABNORMAL /HPF
NEUTROPHILS # BLD AUTO: 5.95 K/UL (ref 2–7.15)
NEUTROPHILS NFR BLD: 66.9 % (ref 44–72)
NITRITE UR QL STRIP.AUTO: POSITIVE
NRBC # BLD AUTO: 0 K/UL
NRBC BLD-RTO: 0 /100 WBC
NT-PROBNP SERPL IA-MCNC: ABNORMAL PG/ML (ref 0–125)
PH UR STRIP.AUTO: 5.5 [PH] (ref 5–8)
PLATELET # BLD AUTO: 223 K/UL (ref 164–446)
PMV BLD AUTO: 8.9 FL (ref 9–12.9)
POTASSIUM SERPL-SCNC: 4.3 MMOL/L (ref 3.6–5.5)
POTASSIUM SERPL-SCNC: 4.5 MMOL/L (ref 3.6–5.5)
PROCALCITONIN SERPL-MCNC: 0.15 NG/ML
PROT SERPL-MCNC: 5.8 G/DL (ref 6–8.2)
PROT UR QL STRIP: 300 MG/DL
RBC # BLD AUTO: 3.57 M/UL (ref 4.2–5.4)
RBC # URNS HPF: ABNORMAL /HPF
RBC UR QL AUTO: ABNORMAL
RENAL EPI CELLS #/AREA URNS HPF: ABNORMAL /HPF
SODIUM SERPL-SCNC: 137 MMOL/L (ref 135–145)
SODIUM SERPL-SCNC: 138 MMOL/L (ref 135–145)
SP GR UR STRIP.AUTO: 1.01
T3FREE SERPL-MCNC: 1.93 PG/ML (ref 2–4.4)
T4 FREE SERPL-MCNC: 1.51 NG/DL (ref 0.93–1.7)
TRANS CELLS #/AREA URNS HPF: ABNORMAL /HPF
TROPONIN T SERPL-MCNC: 185 NG/L (ref 6–19)
TROPONIN T SERPL-MCNC: 214 NG/L (ref 6–19)
TROPONIN T SERPL-MCNC: 237 NG/L (ref 6–19)
UROBILINOGEN UR STRIP.AUTO-MCNC: 0.2 MG/DL
WBC # BLD AUTO: 8.9 K/UL (ref 4.8–10.8)
WBC #/AREA URNS HPF: ABNORMAL /HPF

## 2023-05-16 PROCEDURE — 85025 COMPLETE CBC W/AUTO DIFF WBC: CPT

## 2023-05-16 PROCEDURE — 87086 URINE CULTURE/COLONY COUNT: CPT

## 2023-05-16 PROCEDURE — 80048 BASIC METABOLIC PNL TOTAL CA: CPT

## 2023-05-16 PROCEDURE — 99222 1ST HOSP IP/OBS MODERATE 55: CPT | Performed by: INTERNAL MEDICINE

## 2023-05-16 PROCEDURE — 99292 CRITICAL CARE ADDL 30 MIN: CPT | Mod: GC | Performed by: INTERNAL MEDICINE

## 2023-05-16 PROCEDURE — A9270 NON-COVERED ITEM OR SERVICE: HCPCS | Performed by: INTERNAL MEDICINE

## 2023-05-16 PROCEDURE — 83880 ASSAY OF NATRIURETIC PEPTIDE: CPT

## 2023-05-16 PROCEDURE — 99292 CRITICAL CARE ADDL 30 MIN: CPT | Performed by: INTERNAL MEDICINE

## 2023-05-16 PROCEDURE — 83735 ASSAY OF MAGNESIUM: CPT

## 2023-05-16 PROCEDURE — 700102 HCHG RX REV CODE 250 W/ 637 OVERRIDE(OP): Performed by: INTERNAL MEDICINE

## 2023-05-16 PROCEDURE — 87040 BLOOD CULTURE FOR BACTERIA: CPT

## 2023-05-16 PROCEDURE — 83605 ASSAY OF LACTIC ACID: CPT

## 2023-05-16 PROCEDURE — 770022 HCHG ROOM/CARE - ICU (200)

## 2023-05-16 PROCEDURE — 96366 THER/PROPH/DIAG IV INF ADDON: CPT

## 2023-05-16 PROCEDURE — 700105 HCHG RX REV CODE 258: Performed by: INTERNAL MEDICINE

## 2023-05-16 PROCEDURE — 770020 HCHG ROOM/CARE - TELE (206)

## 2023-05-16 PROCEDURE — 93005 ELECTROCARDIOGRAM TRACING: CPT | Performed by: STUDENT IN AN ORGANIZED HEALTH CARE EDUCATION/TRAINING PROGRAM

## 2023-05-16 PROCEDURE — 99291 CRITICAL CARE FIRST HOUR: CPT | Performed by: INTERNAL MEDICINE

## 2023-05-16 PROCEDURE — 93010 ELECTROCARDIOGRAM REPORT: CPT | Mod: 76 | Performed by: INTERNAL MEDICINE

## 2023-05-16 PROCEDURE — 700111 HCHG RX REV CODE 636 W/ 250 OVERRIDE (IP): Performed by: INTERNAL MEDICINE

## 2023-05-16 PROCEDURE — 93306 TTE W/DOPPLER COMPLETE: CPT | Mod: 26 | Performed by: INTERNAL MEDICINE

## 2023-05-16 PROCEDURE — 84484 ASSAY OF TROPONIN QUANT: CPT

## 2023-05-16 PROCEDURE — 93010 ELECTROCARDIOGRAM REPORT: CPT | Performed by: INTERNAL MEDICINE

## 2023-05-16 PROCEDURE — 700111 HCHG RX REV CODE 636 W/ 250 OVERRIDE (IP): Performed by: EMERGENCY MEDICINE

## 2023-05-16 PROCEDURE — 81001 URINALYSIS AUTO W/SCOPE: CPT

## 2023-05-16 PROCEDURE — 93005 ELECTROCARDIOGRAM TRACING: CPT | Performed by: INTERNAL MEDICINE

## 2023-05-16 PROCEDURE — 93306 TTE W/DOPPLER COMPLETE: CPT

## 2023-05-16 PROCEDURE — 80053 COMPREHEN METABOLIC PANEL: CPT

## 2023-05-16 RX ORDER — ROSUVASTATIN CALCIUM 10 MG/1
5 TABLET, COATED ORAL EVERY EVENING
Status: DISCONTINUED | OUTPATIENT
Start: 2023-05-16 | End: 2023-05-19 | Stop reason: HOSPADM

## 2023-05-16 RX ORDER — ALBUTEROL SULFATE 90 UG/1
2 AEROSOL, METERED RESPIRATORY (INHALATION) EVERY 6 HOURS PRN
Status: DISCONTINUED | OUTPATIENT
Start: 2023-05-16 | End: 2023-05-19 | Stop reason: HOSPADM

## 2023-05-16 RX ORDER — SODIUM CHLORIDE, SODIUM LACTATE, POTASSIUM CHLORIDE, AND CALCIUM CHLORIDE .6; .31; .03; .02 G/100ML; G/100ML; G/100ML; G/100ML
500 INJECTION, SOLUTION INTRAVENOUS ONCE
Status: COMPLETED | OUTPATIENT
Start: 2023-05-16 | End: 2023-05-16

## 2023-05-16 RX ADMIN — SODIUM CHLORIDE, POTASSIUM CHLORIDE, SODIUM LACTATE AND CALCIUM CHLORIDE 500 ML: 600; 310; 30; 20 INJECTION, SOLUTION INTRAVENOUS at 03:06

## 2023-05-16 RX ADMIN — ROSUVASTATIN 5 MG: 10 TABLET, FILM COATED ORAL at 17:08

## 2023-05-16 RX ADMIN — CEFTRIAXONE SODIUM 1000 MG: 10 INJECTION, POWDER, FOR SOLUTION INTRAVENOUS at 01:25

## 2023-05-16 RX ADMIN — ALBUTEROL SULFATE 2 PUFF: 90 AEROSOL, METERED RESPIRATORY (INHALATION) at 14:36

## 2023-05-16 RX ADMIN — ACETAMINOPHEN 650 MG: 325 TABLET, FILM COATED ORAL at 21:30

## 2023-05-16 RX ADMIN — SENNOSIDES AND DOCUSATE SODIUM 2 TABLET: 50; 8.6 TABLET ORAL at 17:08

## 2023-05-16 RX ADMIN — CEFTRIAXONE SODIUM 1000 MG: 10 INJECTION, POWDER, FOR SOLUTION INTRAVENOUS at 21:30

## 2023-05-16 ASSESSMENT — ENCOUNTER SYMPTOMS
SHORTNESS OF BREATH: 1
HEADACHES: 0
EYE PAIN: 0
PSYCHIATRIC NEGATIVE: 1
PALPITATIONS: 0
SHORTNESS OF BREATH: 1
SENSORY CHANGE: 0
MYALGIAS: 0
VOMITING: 0
BLURRED VISION: 0
FOCAL WEAKNESS: 0
WEAKNESS: 0
ORTHOPNEA: 0
MYALGIAS: 0
WEAKNESS: 1
COUGH: 0
PND: 0
EYE DISCHARGE: 0
SPUTUM PRODUCTION: 0
CONSTITUTIONAL NEGATIVE: 1
BLOOD IN STOOL: 0
WEAKNESS: 0
FEVER: 0
BRUISES/BLEEDS EASILY: 0
CLAUDICATION: 0
DIARRHEA: 0
DEPRESSION: 0
HEADACHES: 0
ABDOMINAL PAIN: 0
CONSTIPATION: 0
FATIGUE: 1
MUSCULOSKELETAL NEGATIVE: 1
GASTROINTESTINAL NEGATIVE: 1
SHORTNESS OF BREATH: 0
ABDOMINAL PAIN: 0
NERVOUS/ANXIOUS: 0
ORTHOPNEA: 0
SINUS PAIN: 0
EYES NEGATIVE: 1
FEVER: 0
CHILLS: 0
NAUSEA: 0
NECK PAIN: 0
CLAUDICATION: 0
NEUROLOGICAL NEGATIVE: 1
BRUISES/BLEEDS EASILY: 0
HEMOPTYSIS: 0
SORE THROAT: 0
DOUBLE VISION: 0
DOUBLE VISION: 0
HEARTBURN: 0
CHILLS: 0
COUGH: 0
FLANK PAIN: 0
DIZZINESS: 0
SPEECH CHANGE: 0
DIARRHEA: 1
PALPITATIONS: 0
LOSS OF CONSCIOUSNESS: 0
NAUSEA: 0
VOMITING: 0
BACK PAIN: 0
DIZZINESS: 0
BLURRED VISION: 0
WHEEZING: 0

## 2023-05-16 ASSESSMENT — PAIN DESCRIPTION - PAIN TYPE
TYPE: ACUTE PAIN

## 2023-05-16 ASSESSMENT — CHA2DS2 SCORE
AGE 75 OR GREATER: YES
CHF OR LEFT VENTRICULAR DYSFUNCTION: YES
CHA2DS2 VASC SCORE: 8
PRIOR STROKE OR TIA OR THROMBOEMBOLISM: YES
DIABETES: NO
SEX: FEMALE
HYPERTENSION: YES
AGE 65 TO 74: NO
VASCULAR DISEASE: YES

## 2023-05-16 ASSESSMENT — FIBROSIS 4 INDEX: FIB4 SCORE: 3.24

## 2023-05-16 NOTE — ED TRIAGE NOTES
Chief Complaint   Patient presents with    Hypotension     Hypotension x a couple weeks. Patient's  states they have a home monitor and today was worse than normal. Denies dizziness. Reports baseline SBP is in the low 100s.     Dehydration     X1 week. Patient takes lasix and has not been able to keep up with PO fluids.       Patient wheeled to triage for the above complaint. Patient A&Ox4, speaking in full sentences. Patient educated on triage process and encouraged to notify staff if condition worsens. Patient returned to the lobby in stable condition with .

## 2023-05-16 NOTE — ASSESSMENT & PLAN NOTE
Last ECHO from 2/2023 with EF of 60%  Noted grade II diastolic failure  Mild volume overload, pleural effusions and pulmonary edema.  Chronic patient is on room air, JVP plus HJ reflex positive however not grossly volume overloaded and given recent hypotension would avoid intravascular volume depletion.  -Restart gentle diuresis continue to monitor need for titration

## 2023-05-16 NOTE — ASSESSMENT & PLAN NOTE
Due to hypotension either secondary to sepsis versus cardiogenic shock now back to baseline, CKD 3.  -Avoid nephrotoxins

## 2023-05-16 NOTE — ASSESSMENT & PLAN NOTE
Pt is bradycardic  ?heart block, has had PYP scan on Tuesday 23 for possible infiltrative disease because of diastolic dysfunction still not elucidated.  Doing well off norepinephrine drip given paroxysmal A-fib not on rate controllers at home  Start eliquis (held for PPM)

## 2023-05-16 NOTE — ASSESSMENT & PLAN NOTE
Concern for junctional versus slow A-fib versus heart block on initial presentation  Epinephrine gtt. now trialing off with heart rate in the 60s and 70s  -Pacemaker implanted, no bradycardia on telemetry  - EP consulted appreciate recommendations, likely interrogation later today

## 2023-05-16 NOTE — PROGRESS NOTES
Brief Cardiology update:    5/16/2023: She came in with junctional rosa maria late yesterday evening which converted to sinus this morning. She was weaned off epi around 7am this morning and was doing well so the decision for PPM was deferred to more monitoring by EP. She converted back into junction rosa maria this afternoon and became hypotensive requiring reinitiation of epi. She has been made NPO at midnight for PPM tomorrow. For her HFpEF, she is still volume overloaded on exam given rales, moderately elevated JVP and 1+ lower extremity edema in addition to pulmonary edema on chest xray and elevated BNP on admit. A repeat echocardiogram is currently pending. Recommend resuming a low dose of IV lasix while she is on epi. A repeat BNP for this afternoon has also been ordered to better assess her volume status since diuretics were held.     Please reach out with any questions.    Cardiology will continue to follow.    Thank you for allowing me to participate in the care of Regla Meier .    Leah Jain PA-C, Cardiology  [facility], [facility]  [address] [address]  Phone: [phone]  Fax: [phone]    PLEASE NOTE: This Note was created using voice recognition Software. I have made every reasonable attempt to correct obvious errors, but I expect that there are errors of grammar and possibly content that I did not discover before finalizing the note.

## 2023-05-16 NOTE — ASSESSMENT & PLAN NOTE
Free T4 was 1.4  T3 was1.92 Normal 2.0  - Clinically closer to euthyroid unlikely contributor to presentation  - Monitor as outpatient

## 2023-05-16 NOTE — ASSESSMENT & PLAN NOTE
Spoke to patient with  present about resuscitative measures and patient did not want heroics such as cardioversion/CPR.  She was amendable to intubation for a short period of time if it was for supportive measures to get her through an acute crisis, but would not want long term ventilation/tracheostomy.

## 2023-05-16 NOTE — PROGRESS NOTES
Brief Ep Note:    Patient remains in NSR off of epi gtt. Discussed case with Dr. Nixon who recommends holding off on PPM for now and continue ongoing telemetry monitoring off of Epi. Please notify cardiology if patient develops bradycardia/HB. Okay to resume OAC from EP perspective.     We will continue to follow alongside you in the care of this patient.     MADDIE Barahona.     Routing to  to coordinate OV with Dr. Griggs this Thursday.   If we cannot get pt in on Thursday, pt will need to be scheduled for AVF repair due to opening/hole at site per Dr. Griggs.     Anila Walker, BSN, RN

## 2023-05-16 NOTE — PROGRESS NOTES
"[facility] ICU Progress Note      Admit Date: 5/15/2023    Resident(s): Nathalie Ness M.D.   Attending:  ENRIQUE THACKER/ Dr. Juarez     Patient ID:    Name:  Regla Meier   YOB: 1940  Age:  82 y.o.  female   MRN:  [id number]    Hospital Course (carried forward and updated):  \"Regla Meier is a 82 y.o. female with the past medical history of atrial fibrillation on Eliquis, chronic right sided heart failure with RVSP of 40-45 mmHg, dyslipidemia, and stroke with visual defects who presented to the ER with complaints of worsening fatigue and shortness of breath for the last week.  She denies any medication changes and continues to be compliant with her diuretics.  She also notes mild chest pain that is nonradiating.  She denies cough, nausea, vomiting, or recent sick contacts.  She has had some mild diarrhea  with urinary urgency and frequency.  Due to the patient having worsening symptoms she was brought to the ER where she was found to have a heart rate in the 40s with hypotension.  The patient was given 500cc bolus and started on epinephrine infusion.  She was admitted to the ICU for ongoing care.:      Consultants:  Critical Care  EP Cardiology     Interval Events:    -No acute events overnight  - Patient is hemodynamically stable, on minimal dose of epinephrine turned off at 0700.   -Heart rate sustaining in 60s to 70s sinus rhythm  - Patient asymptomatic from a cardiac standpoint states she noticed some urinary frequency and urgency a few days ago as well as some increased leg swelling and feeling fatigued in general.    Daily Multi discipline Rounding Report:  Neuro: aox4, moves all, generalize pain  HR: Sinus 60-70s this am  SBP: 90-100s on 0.01 epi  Tmax: afebrile  GI: Cardiac diet      bowel movement prior to arrival  UOP: adequate  Lines: 2PIV  Resp: RA (bilateral pleural effusions and some pulmonary edema however this has been chronic since chest x-ray on 3/23)  Vte:   PPI/H2: " Not indicated  Antibx: Ceftriaxone    -Continue to trial off of epinephrine monitor heart rate goal greater than 60  -Hold diuresis in the setting of recent hypotension and bradycardia  -Electrophysiology consulted appreciate recommendations and continued monitoring for the need for PPM  -T3 low, but borderline unclear significance and acute illness not requiring further treatment, cortisol is also reasonable low concern for adrenal insufficiency  -  Technetium pyrophosphate scan negative for wild-type amyloidosis on 2/23.  -Continue to monitor need for ICU level care.      Vitals Range last 24h:  Temp:  [35.9 °C (96.7 °F)-36.8 °C (98.2 °F)] 36.8 °C (98.2 °F)  Pulse:  [] 71  Resp:  [16-42] 24  BP: ()/(49-69) 95/57  SpO2:  [85 %-98 %] 97 %      Intake/Output Summary (Last 24 hours) at 5/16/2023 0613  Last data filed at 5/16/2023 0400  Gross per 24 hour   Intake 230.59 ml   Output 150 ml   Net 80.59 ml        Review of Systems   Constitutional:  Positive for malaise/fatigue. Negative for chills and fever.   HENT:  Negative for ear discharge, ear pain, hearing loss, nosebleeds, sinus pain and sore throat.    Eyes:  Negative for blurred vision and pain.   Respiratory:  Negative for cough, hemoptysis, sputum production, shortness of breath and wheezing.    Cardiovascular:  Positive for leg swelling. Negative for chest pain, palpitations, orthopnea and claudication.   Gastrointestinal:  Negative for abdominal pain, blood in stool, constipation, diarrhea, heartburn, melena, nausea and vomiting.   Genitourinary:  Positive for frequency and urgency. Negative for dysuria, flank pain and hematuria.   Musculoskeletal:  Negative for back pain and myalgias.   Skin:  Negative for itching and rash.   Neurological:  Negative for dizziness, sensory change, focal weakness, weakness and headaches.   Endo/Heme/Allergies:  Negative for environmental allergies. Does not bruise/bleed easily.   Psychiatric/Behavioral:  Negative  for depression. The patient is not nervous/anxious.        PHYSICAL EXAM:  Vitals:    05/16/23 0345 05/16/23 0400 05/16/23 0415 05/16/23 0430   BP: 100/58 103/59 99/58 95/57   Pulse: 61 (!) 55 (!) 54 71   Resp: 20 20 (!) 38 (!) 24   Temp:  36.8 °C (98.2 °F)     TempSrc:  Temporal     SpO2: 95% 95% 96% 97%   Weight:       Height:        Body mass index is 22.86 kg/m².    O2 therapy: Pulse Oximetry: 97 %, O2 (LPM): 2, O2 Delivery Device: Nasal Cannula         Physical Exam  General: Well developed, well nourished female, in no acute distress.  HEENT: NC/AT, PERRL, EOMI, no scleral icterus or conjunctival pallor, fair dentition, no nasal discharge or oral erythema or exudates.   Neck: Supple, No cervical or supraclavicular LAD  CV:RRR, no murmurs gallops or Rubs, JVP 7 cm H2O, positive HJ reflex  Pulm: Bibasilar crackles on examination no rales, or rhonchi, wheezing.  GI: Normal bowel sounds, abdomen soft, nontender, nondistended to deep or light palpation in all 4 quadrants, no HSM.  MSK: Radial and dorsalis pedis pulses 2+ and equal bilaterally, respectively.  Strength 5 out of 5 in upper and lower extremities.  No lower extremity edema  Neuro: Patient is alert and oriented x3, no focal deficits  Psych: Appropriate mood and affect         Recent Labs     05/15/23  2104 05/15/23  2341   SODIUM 136  --    POTASSIUM 4.3  --    CHLORIDE 95*  --    CO2 25  --    BUN 51*  --    CREATININE 1.87*  --    MAGNESIUM  --  2.6*   CALCIUM 9.0  --      Recent Labs     05/15/23  2104   ALTSGPT 49   ASTSGOT 72*   ALKPHOSPHAT 138*   TBILIRUBIN 0.6   GLUCOSE 104*     Recent Labs     05/15/23  2104 05/16/23  0538   RBC 4.38 3.57*   HEMOGLOBIN 13.3 11.7*   HEMATOCRIT 40.2 33.5*   PLATELETCT 257 223     Recent Labs     05/15/23  2104 05/16/23  0538   WBC 9.3 8.9   NEUTSPOLYS 57.50 66.90   LYMPHOCYTES 30.90 23.00   MONOCYTES 10.20 8.80   EOSINOPHILS 0.40 0.20   BASOPHILS 0.60 0.60   ASTSGOT 72*  --    ALTSGPT 49  --    ALKPHOSPHAT 138*  --     TBILIRUBIN 0.6  --        Meds:   rosuvastatin  5 mg      EPINEPHrine (Adrenalin) infusion  0-0.5 mcg/kg/min (Ideal) 0.03 mcg/kg/min (05/16/23 0400)    senna-docusate  2 Tablet      And    polyethylene glycol/lytes  1 Packet      And    magnesium hydroxide  30 mL      And    bisacodyl  10 mg      [Held by provider] apixaban  2.5 mg      acetaminophen  650 mg      ondansetron  4 mg      ondansetron  4 mg      cefTRIAXone (ROCEPHIN) IV  1,000 mg          Procedures:  none    Imaging:  DX-CHEST-PORTABLE (1 VIEW)   Final Result         1.  Interstitial pulmonary parenchymal prominence suggest chronic underlying lung disease, component of interstitial edema and/or infiltrates not excluded.   2.  Small bilateral pleural effusions, right greater than left   3.  Cardiomegaly   4.  Atherosclerosis      EC-ECHOCARDIOGRAM COMPLETE W/O CONT    (Results Pending)       ASSESSEMENT and PLAN:    * Hypotension- (present on admission)  Assessment & Plan  ?sepsis vs adrenal insufficiency vs heart block  Doing well off of epinephrine, likely secondary to sepsis versus heart block, however resolving with antibiotics.  Status post 1 L bolus on admission  Hold epinephrine gtt.  Cultures sent: No growth to date  Cortisol within normal limits, Pro-Osmani (within normal limits)  Broad spectrum abx: ceftriaxone  Will hold torsemide, Jardiance, and aldactone    Bradycardia  Assessment & Plan  Concern for junctional versus slow A-fib versus heart block on initial presentation  Epinephrine gtt. now trialing off with heart rate in the 60s and 70s  - Has been worked up for infiltrative disease in the past  - EP consulted: We will monitor for need for pacemaker.    Pyuria- (present on admission)  Assessment & Plan  Patient with symptoms of frequency/urgency  Ceftriaxone started  -Follow urine culture/blood culture    Elevated TSH- (present on admission)  Assessment & Plan  Free T4 was 1.4  T3 was1.92 Normal 2.0  - Clinically closer to euthyroid  unlikely contributor to presentation  - Monitor as outpatient    ERNESTINE (acute kidney injury) ([facility])  Assessment & Plan  Due to hypotension either secondary to sepsis versus cardiogenic shock  S/p 1 L IV fluid, now flat  - Monitor UOP/creat  -Avoid nephrotoxins    DNR (do not resuscitate)- (present on admission)  Assessment & Plan  Spoke to patient with  present about resuscitative measures and patient did not want heroics such as cardioversion/CPR.  She was amendable to intubation for a short period of time if it was for supportive measures to get her through an acute crisis, but would not want long term ventilation/tracheostomy.    History of CVA (cerebrovascular accident)- (present on admission)  Assessment & Plan  Hx of     Heart failure with preserved ejection fraction ([facility])- (present on admission)  Assessment & Plan  Last ECHO from 2/2023 with EF of 60%  Noted grade II diastolic failure  Mild volume overload, pleural effusions and pulmonary edema.  Chronic patient is on room air, JVP plus HJ reflex positive however not grossly volume overloaded and given recent hypotension would avoid intravascular volume depletion.  -Hold home diuresis possible contributor to presentation?      AF (atrial fibrillation) ([facility])- (present on admission)  Assessment & Plan  Pt is bradycardic  ?heart block, has had PYP scan on Tuesday 23 for possible infiltrative disease because of diastolic dysfunction still not elucidated.  Pt is not on rate controllers at home  Epinephrine infusion for HR > 50  Cont eliquis     Chronic right-sided heart failure ([facility])- (present on admission)  Assessment & Plan  RVSP at 40-45 mmHg  Holding diuretics  Judicious fluid resuscitation     Elevated troponin  Assessment & Plan  Serial troponins levels now trending flat likely secondary to demand ischemia as recent cath without significant coronary artery disease, EKG reassuring for no acute ischemia  - EP on board    Legally blind in left eye, as  defined in [place]- (present on admission)  Assessment & Plan  Due to retinal vein occlusion         DISPO: Close monitoring off of epi drip this a.m. continue to monitor for pace possible need for pacemaker.    CODE STATUS: DNR / Emi    Quality Measures:  Feeding: Cardiac diet  Analgesia: Opioids as needed  Sedation: None  Thromboprophylaxis: Hold home AC for possible procedure  Head of bed: >30 degrees  Ulcer prophylaxis: None  Glycemic control: Not indicated  Bowel care: bowel regimen  Indwelling lines: PIV x2  Deescalation of antibiotics: Not necessary      Nathalie Ness M.D.

## 2023-05-16 NOTE — PROGRESS NOTES
4 Eyes Skin Assessment Completed by León MCNEAL RN and Susan LINK RN.    Head WDL  Ears WDL  Nose WDL  Mouth WDL  Neck WDL  Breast/Chest WDL  Shoulder Blades WDL  Spine WDL  (R) Arm/Elbow/Hand Redness and Blanching  (L) Arm/Elbow/Hand Redness and Blanching  Abdomen WDL  Groin WDL  Scrotum/Coccyx/Buttocks Scar,  (R) Leg Edema  (L) Leg Edema  (R) Heel/Foot/Toe Boggy  (L) Heel/Foot/Toe Boggy          Devices In Places Tele Box, Blood Pressure Cuff, and Nasal Cannula      Interventions In Place Gray Ear Foams    Possible Skin Injury No    Pictures Uploaded Into Epic N/A  Wound Consult Placed N/A  RN Wound Prevention Protocol Ordered No

## 2023-05-16 NOTE — H&P
Critical Care History & Physical Note    Date of Service  5/16/2023    Primary Care Physician  Antoni Castillo M.D.    Consultants  cardiology and critical care    Specialist Names: Dr. Remigio Marcum    Code Status  DNAR, I OK    Chief Complaint  Chief Complaint   Patient presents with    Hypotension     Hypotension x a couple weeks. Patient's  states they have a home monitor and today was worse than normal. Denies dizziness. Reports baseline SBP is in the low 100s.     Dehydration     X1 week. Patient takes lasix and has not been able to keep up with PO fluids.        History of Presenting Illness  Regla Meier is a 82 y.o. female with the past medical history of atrial fibrillation on Eliquis, chronic right sided heart failure with RVSP of 40-45 mmHg, dyslipidemia, and stroke with visual defects who presented to the ER with complaints of worsening fatigue and shortness of breath for the last week.  She denies any medication changes and continues to be compliant with her diuretics.  She also notes mild chest pain that is nonradiating.  She denies cough, nausea, vomiting, or recent sick contacts.  She has had some mild diarrhea  with urinary urgency and frequency.  Due to the patient having worsening symptoms she was brought to the ER where she was found to have a heart rate in the 40s with hypotension.  The patient was given 500cc bolus and started on epinephrine infusion.  She was admitted to the ICU for ongoing care.      I discussed the plan of care with patient, family, bedside RN, charge RN, and pharmacy.    Review of Systems  Review of Systems   Constitutional:  Positive for malaise/fatigue. Negative for chills and fever.   HENT:  Negative for congestion and sore throat.    Eyes:  Negative for blurred vision and double vision.   Respiratory:  Positive for shortness of breath. Negative for cough.    Cardiovascular:  Positive for leg swelling. Negative for palpitations and orthopnea.    Gastrointestinal:  Positive for diarrhea. Negative for abdominal pain, nausea and vomiting.   Genitourinary:  Positive for frequency and urgency. Negative for dysuria and hematuria.   Musculoskeletal:  Negative for back pain and neck pain.   Skin:  Negative for rash.   Neurological:  Positive for weakness. Negative for dizziness, sensory change, speech change and headaches.   Endo/Heme/Allergies:  Does not bruise/bleed easily.   Psychiatric/Behavioral:  Negative for depression. The patient is not nervous/anxious.        Past Medical History   has a past medical history of Blindness of left eye, Congestive heart failure (HCC), Hyperlipemia, and Stroke ([facility]) (08/01/2022).    Surgical History   has a past surgical history that includes cystectomy (1963) and cataract extraction with iol.     Family History  family history includes Alcohol abuse in her brother; Heart Disease in her brother; No Known Problems in her daughter and daughter; Other in her father and mother.   Family history reviewed with patient. There is no family history that is pertinent to the chief complaint.     Social History   reports that she has never smoked. She has never used smokeless tobacco. She reports current alcohol use of about 0.6 oz of alcohol per week. She reports that she does not use drugs.    Allergies  Allergies   Allergen Reactions    Atorvastatin Myalgia     Severe muscle weakness    Pcn [Penicillins] Hives    Amiodarone Rash     rash    Penicillin G        Medications  Prior to Admission Medications   Prescriptions Last Dose Informant Patient Reported? Taking?   Coenzyme Q10 (CO Q 10 PO)  Patient Yes No   Sig: Take 1 Tablet by mouth every day.   Cyanocobalamin (VITAMIN B12 PO)  Patient Yes No   Sig: Take 1 Tablet by mouth every day.   Empagliflozin (JARDIANCE) 10 MG Tab tablet   No No   Sig: Take 1 Tablet by mouth every day.   Lutein 40 MG Cap  Patient Yes No   Sig: Take 40 mg by mouth every day.   QUERCETIN PO  Patient Yes No    Sig: Take 1 Tablet by mouth every day.   TURMERIC PO  Patient Yes No   Sig: Take 1 Capsule by mouth every day.   albuterol 108 (90 Base) MCG/ACT Aero Soln inhalation aerosol   No No   Sig: INHALE 2 PUFFS BY MOUTH EVERY 6 HOURS AS NEEDED FOR SHORTNESS OF BREATH   apixaban (ELIQUIS) 2.5mg Tab   No No   Sig: Take 1 Tablet by mouth 2 times a day.   fluticasone (FLONASE) 50 MCG/ACT nasal spray  Patient No No   Sig: Administer 1 Spray into affected nostril(S) at bedtime.   potassium chloride SA (KDUR) 20 MEQ Tab CR   No No   Sig: Take 1 Tablet by mouth every day.   rosuvastatin (CRESTOR) 5 MG Tab   No No   Sig: Take 1 Tablet by mouth every evening.   spironolactone (ALDACTONE) 25 MG Tab   No No   Sig: Take 1 Tablet by mouth every day.   torsemide (DEMADEX) 100 MG Tab   No No   Sig: Take 0.5 Tablets by mouth 2 times a day.      Facility-Administered Medications: None       Physical Exam  Temp:  [36.6 °C (97.9 °F)] 36.6 °C (97.9 °F)  Pulse:  [] 52  Resp:  [16-38] 24  BP: ()/(49-69) 81/52  SpO2:  [85 %-97 %] 95 %  Blood Pressure : (!) 81/52   Temperature: 36.6 °C (97.9 °F)   Pulse: (!) 52   Respiration: (!) 24   Pulse Oximetry: 95 %       Physical Exam  Vitals and nursing note reviewed.   Constitutional:       General: She is not in acute distress.     Appearance: She is ill-appearing. She is not toxic-appearing.   HENT:      Head: Normocephalic and atraumatic.      Right Ear: External ear normal.      Left Ear: External ear normal.      Nose: Nose normal. No rhinorrhea.      Mouth/Throat:      Mouth: Mucous membranes are moist.      Pharynx: Oropharynx is clear. No oropharyngeal exudate.   Eyes:      General: No scleral icterus.     Conjunctiva/sclera: Conjunctivae normal.      Pupils: Pupils are equal, round, and reactive to light.   Cardiovascular:      Rate and Rhythm: Bradycardia present. Rhythm irregular.      Pulses: Normal pulses.      Heart sounds: Normal heart sounds. No murmur heard.  Pulmonary:       Breath sounds: Normal breath sounds. No wheezing.   Chest:      Chest wall: No tenderness.   Abdominal:      General: There is no distension.      Palpations: Abdomen is soft.      Tenderness: There is no abdominal tenderness. There is no guarding or rebound.   Musculoskeletal:         General: Normal range of motion.      Cervical back: Normal range of motion and neck supple.      Right lower leg: No edema.      Left lower leg: No edema.   Lymphadenopathy:      Cervical: No cervical adenopathy.   Skin:     General: Skin is warm and dry.      Capillary Refill: Capillary refill takes less than 2 seconds.      Findings: No rash.   Neurological:      Mental Status: She is alert and oriented to person, place, and time.      Cranial Nerves: No cranial nerve deficit.      Sensory: No sensory deficit.      Motor: No weakness.   Psychiatric:         Mood and Affect: Mood normal.         Behavior: Behavior normal.         Thought Content: Thought content normal.       Laboratory:  Recent Labs     05/15/23  2104   WBC 9.3   RBC 4.38   HEMOGLOBIN 13.3   HEMATOCRIT 40.2   MCV 91.8   MCH 30.4   MCHC 33.1*   RDW 54.4*   PLATELETCT 257   MPV 9.1     Recent Labs     05/15/23  2104   SODIUM 136   POTASSIUM 4.3   CHLORIDE 95*   CO2 25   GLUCOSE 104*   BUN 51*   CREATININE 1.87*   CALCIUM 9.0     Recent Labs     05/15/23  2104   ALTSGPT 49   ASTSGOT 72*   ALKPHOSPHAT 138*   TBILIRUBIN 0.6   GLUCOSE 104*         Recent Labs     05/15/23  2104   NTPROBNP 78984*         Recent Labs     05/15/23  2104   TROPONINT 243*       Imaging:  DX-CHEST-PORTABLE (1 VIEW)   Final Result         1.  Interstitial pulmonary parenchymal prominence suggest chronic underlying lung disease, component of interstitial edema and/or infiltrates not excluded.   2.  Small bilateral pleural effusions, right greater than left   3.  Cardiomegaly   4.  Atherosclerosis          Assessment/Plan:  Justification for Admission Status  I anticipate this patient will  require at least two midnights for appropriate medical management, necessitating inpatient admission because multiple lab abnormalities with hypotension and bradycardia    Patient will need a ICU (Adult and Pediatrics) bed on MEDICAL service .  The need is secondary to active titration of epinephrine infusion for HR goals > 50 and MAP > 65.    * Hypotension- (present on admission)  Assessment & Plan  ?sepsis vs adrenal insufficiency vs heart block  S/p 500cc bolus in ER, will give another 500cc bolus in ICU  Cont epinephrine infusion for HR > 50 and MAP > 65  Cultures sent  Cortisol and procal pending  Broad spectrum abx: ceftriaxone  Will hold torsemide, Jardiance, and aldactone    Pyuria- (present on admission)  Assessment & Plan  Patient with symptoms of frequency/urgency  Ceftriaxone started  Check urine cultures/blood cultures     Elevated TSH- (present on admission)  Assessment & Plan  Free T4 was 1.4  Check free T3    ERNESTINE (acute kidney injury) ([facility])  Assessment & Plan  Due to hypotension  S/p 500cc bolus  Monitor UOP/creat  Avoid nephrotoxins    DNR (do not resuscitate)- (present on admission)  Assessment & Plan  Spoke to patient with  present about resuscitative measures and patient did not want heroics such as cardioversion/CPR.  She was amendable to intubation for a short period of time if it was for supportive measures to get her through an acute crisis, but would not want long term ventilation/tracheostomy.    History of CVA (cerebrovascular accident)- (present on admission)  Assessment & Plan  Hx of     Heart failure with preserved ejection fraction ([facility])- (present on admission)  Assessment & Plan  Last ECHO from 2/2023 with EF of 60%  Noted grade II diastolic failure      AF (atrial fibrillation) ([facility])- (present on admission)  Assessment & Plan  Pt is bradycardic  ?heart block  Pt is not on rate controllers at home  Epinephrine infusion for HR > 50  Cont eliquis     Chronic right-sided heart  failure (HCC)- (present on admission)  Assessment & Plan  RVSP at 40-45 mmHg  Holding diuretics  Judicious fluid resuscitation     Elevated troponin  Assessment & Plan  Serial troponins levels     Legally blind in left eye, as defined in [place]- (present on admission)  Assessment & Plan  Due to retinal vein occlusion         VTE prophylaxis: SCDs/TEDs and therapeutic anticoagulation with Eliquis    The patient is critically ill at this time requiring active titration of epinephrine infusion for symptomatic bradycardia and hypotension.  I have assessed and reassessed the patient's hemodynamics, systolic blood pressures, heart rate, and cardiovascular status.  The patient is at high risk of clinical deterioration, worsening vital organ dysfunction, and death without the above critical care interventions.    Critical Care Time = 105 minutes.

## 2023-05-16 NOTE — ED NOTES
Nichelle from lab called with critical result of Troponin of 243 at 2233. Critical lab result read back to Nichelle.   Dr. Pichardo notified of critical lab result at 2233.  Critical lab result read back by Dr. Pichardo.

## 2023-05-16 NOTE — ASSESSMENT & PLAN NOTE
?sepsis vs adrenal insufficiency vs heart block  Doing well off of epinephrine, likely secondary to sepsis versus heart block, however resolving with antibiotics.  Status post 1 L bolus on admission  Cultures sent: No growth to date  Cortisol within normal limits, Pro-Osmani (within normal limits)  Broad spectrum abx: ceftriaxone  Resume diuresis today, can restart Aldactone per cardiology monitor first with Lasix

## 2023-05-16 NOTE — ASSESSMENT & PLAN NOTE
Patient with symptoms of frequency/urgency  Ceftriaxone started  -Follow urine culture/blood culture ngtd

## 2023-05-16 NOTE — ASSESSMENT & PLAN NOTE
Serial troponins levels now trending flat likely secondary to demand ischemia as recent cath without significant coronary artery disease, EKG reassuring for no acute ischemia.   - EP on board

## 2023-05-16 NOTE — CARE PLAN
The patient is Watcher - Medium risk of patient condition declining or worsening    Shift Goals  Clinical Goals: Stable Hemodynamics  Patient Goals: Get better  Family Goals: Feel better    Progress made toward(s) clinical / shift goals:   Problem: Knowledge Deficit - Standard  Goal: Patient and family/care givers will demonstrate understanding of plan of care, disease process/condition, diagnostic tests and medications  Outcome: Progressing     Problem: Skin Integrity  Goal: Skin integrity is maintained or improved  Outcome: Progressing     Problem: Fall Risk  Goal: Patient will remain free from falls  Outcome: Progressing     Problem: Hemodynamics  Goal: Patient's hemodynamics, fluid balance and neurologic status will be stable or improve  Outcome: Progressing

## 2023-05-16 NOTE — ED PROVIDER NOTES
ER Provider Note    Scribed for Syd Pichardo Ii, M.d. by Kaur Payton. 5/15/2023  9:15 PM    Primary Care Provider: Antoni Castillo M.D.    CHIEF COMPLAINT  Chief Complaint   Patient presents with    Hypotension     Hypotension x a couple weeks. Patient's  states they have a home monitor and today was worse than normal. Denies dizziness. Reports baseline SBP is in the low 100s.     Dehydration     X1 week. Patient takes lasix and has not been able to keep up with PO fluids.      EXTERNAL RECORDS REVIEWED  Office visit on 4/27/2023 with complaint of diarrhea.    HPI/ROS  LIMITATION TO HISTORY   Select: : None  OUTSIDE HISTORIAN(S):  None    Regla Meier is a 82 y.o. female who presents to the ED with shortness of breath onset last night. Regla reports that she was unable to sleep last night and is currently exhausted. She has associated symptoms of leg swelling, and exhaustion, but denies dizziness. She states she walks with a walker. She is still on Lasix and has a history of A-fib. She reports that she takes blood thinners for her heart and denies a history of blood clots.     PAST MEDICAL HISTORY  Past Medical History:   Diagnosis Date    Blindness of left eye     Congestive heart failure (HCC)     Hyperlipemia     Stroke (HCC) 08/01/2022       SURGICAL HISTORY  Past Surgical History:   Procedure Laterality Date    CYSTECTOMY  1963    CATARACT EXTRACTION WITH IOL         FAMILY HISTORY  Family History   Problem Relation Age of Onset    Other Mother         TIA     Other Father         Abdominal aneurysm     Alcohol abuse Brother     Heart Disease Brother     No Known Problems Daughter     No Known Problems Daughter        SOCIAL HISTORY   reports that she has never smoked. She has never used smokeless tobacco. She reports current alcohol use of about 0.6 oz of alcohol per week. She reports that she does not use drugs.    CURRENT MEDICATIONS  Current Discharge Medication List         CONTINUE these medications which have NOT CHANGED    Details   albuterol 108 (90 Base) MCG/ACT Aero Soln inhalation aerosol INHALE 2 PUFFS BY MOUTH EVERY 6 HOURS AS NEEDED FOR SHORTNESS OF BREATH  Qty: 7 Each, Refills: 0    Associated Diagnoses: Wheezing      potassium chloride SA (KDUR) 20 MEQ Tab CR Take 1 Tablet by mouth every day.  Qty: 30 Tablet, Refills: 11    Associated Diagnoses: Hypokalemia      torsemide (DEMADEX) 100 MG Tab Take 0.5 Tablets by mouth 2 times a day.  Qty: 30 Tablet, Refills: 2      Empagliflozin (JARDIANCE) 10 MG Tab tablet Take 1 Tablet by mouth every day.  Qty: 90 Tablet, Refills: 3    Associated Diagnoses: ACC/AHA stage C heart failure with preserved ejection fraction (HCC); Left ventricular diastolic dysfunction, NYHA class 3; Dyspnea on exertion      apixaban (ELIQUIS) 2.5mg Tab Take 1 Tablet by mouth 2 times a day.  Qty: 180 Tablet, Refills: 3    Associated Diagnoses: Paroxysmal atrial fibrillation (HCC)      spironolactone (ALDACTONE) 25 MG Tab Take 1 Tablet by mouth every day.  Qty: 90 Tablet, Refills: 3    Associated Diagnoses: Acute on chronic diastolic congestive heart failure (HCC)      rosuvastatin (CRESTOR) 5 MG Tab Take 1 Tablet by mouth every evening.  Qty: 100 Tablet, Refills: 4    Associated Diagnoses: Other heart failure (HCC); Cerebrovascular accident (CVA), unspecified mechanism (HCC)      fluticasone (FLONASE) 50 MCG/ACT nasal spray Administer 1 Spray into affected nostril(S) at bedtime.  Qty: 16 g, Refills: 1    Associated Diagnoses: Sinus congestion      Coenzyme Q10 (CO Q 10 PO) Take 1 Tablet by mouth every day.      Cyanocobalamin (VITAMIN B12 PO) Take 1 Tablet by mouth every day.      QUERCETIN PO Take 1 Tablet by mouth every day.      Lutein 40 MG Cap Take 40 mg by mouth every day.      TURMERIC PO Take 1 Capsule by mouth every day.             ALLERGIES  Atorvastatin, Pcn [penicillins], Amiodarone, and Penicillin g    PHYSICAL EXAM  /69   Pulse 72    "Temp 36.6 °C (97.9 °F) (Temporal)   Resp 16   Ht 1.575 m (5' 2\")   Wt 55.3 kg (122 lb)   SpO2 95%   BMI 22.31 kg/m²   Physical Exam  Vitals reviewed.   Constitutional:       Comments: Fatigued appearing 82 year old woman   HENT:      Head: Normocephalic and atraumatic.      Nose: Nose normal. No congestion.      Mouth/Throat:      Mouth: Mucous membranes are dry.   Eyes:      Extraocular Movements: Extraocular movements intact.      Conjunctiva/sclera: Conjunctivae normal.      Pupils: Pupils are equal, round, and reactive to light.   Neck:      Comments: No JVD  Cardiovascular:      Rate and Rhythm: Normal rate and regular rhythm.      Pulses: Normal pulses.   Abdominal:      General: There is no distension.      Tenderness: There is no abdominal tenderness.   Musculoskeletal:      Cervical back: Normal range of motion.      Right lower leg: Edema present.      Left lower leg: Edema present.   Neurological:      General: No focal deficit present.      Mental Status: She is alert.      Comments: Generalized weakness   Psychiatric:         Mood and Affect: Mood normal.       DIAGNOSTIC STUDIES    Labs:   Labs Reviewed   CBC WITH DIFFERENTIAL - Abnormal; Notable for the following components:       Result Value    MCHC 33.1 (*)     RDW 54.4 (*)     Monos (Absolute) 0.95 (*)     All other components within normal limits    Narrative:     Biotin intake of greater than 5 mg per day may interfere with  troponin levels, causing false low values.   COMP METABOLIC PANEL - Abnormal; Notable for the following components:    Chloride 95 (*)     Glucose 104 (*)     Bun 51 (*)     Creatinine 1.87 (*)     AST(SGOT) 72 (*)     Alkaline Phosphatase 138 (*)     All other components within normal limits    Narrative:     Biotin intake of greater than 5 mg per day may interfere with  troponin levels, causing false low values.   TROPONIN - Abnormal; Notable for the following components:    Troponin T 243 (*)     All other components " within normal limits    Narrative:     Biotin intake of greater than 5 mg per day may interfere with  troponin levels, causing false low values.   PROBRAIN NATRIURETIC PEPTIDE, NT - Abnormal; Notable for the following components:    NT-proBNP 47509 (*)     All other components within normal limits    Narrative:     Biotin intake of greater than 5 mg per day may interfere with  troponin levels, causing false low values.   URINALYSIS - Abnormal; Notable for the following components:    Character Cloudy (*)     Glucose 250 (*)     Ketones Trace (*)     Protein 300 (*)     Leukocyte Esterase Moderate (*)     Occult Blood Small (*)     All other components within normal limits   TSH WITH REFLEX TO FT4 - Abnormal; Notable for the following components:    TSH 17.500 (*)     All other components within normal limits   URINE MICROSCOPIC (W/UA) - Abnormal; Notable for the following components:    WBC  (*)     RBC 10-20 (*)     Bacteria Few (*)     Hyaline Cast 6-10 (*)     All other components within normal limits   ESTIMATED GFR - Abnormal; Notable for the following components:    GFR (CKD-EPI) 27 (*)     All other components within normal limits    Narrative:     Biotin intake of greater than 5 mg per day may interfere with  troponin levels, causing false low values.   TROPONIN - Abnormal; Notable for the following components:    Troponin T 237 (*)     All other components within normal limits   T3 FREE - Abnormal; Notable for the following components:    T3,Free 1.93 (*)     All other components within normal limits   MAGNESIUM - Abnormal; Notable for the following components:    Magnesium 2.6 (*)     All other components within normal limits   LACTIC ACID - Abnormal; Notable for the following components:    Lactic Acid 2.2 (*)     All other components within normal limits   CORRECTED CALCIUM    Narrative:     Biotin intake of greater than 5 mg per day may interfere with  troponin levels, causing false low values.  "  FREE THYROXINE   CORTISOL   PROCALCITONIN   FREE THYROXINE   BLOOD CULTURE    Narrative:     Per Hospital Policy: Only change Specimen Src: to \"Line\" if  specified by physician order.   BLOOD CULTURE    Narrative:     Per Hospital Policy: Only change Specimen Src: to \"Line\" if  specified by physician order.   URINE CULTURE(NEW)   LACTIC ACID   LACTIC ACID   URINALYSIS   URINE CULTURE(NEW)   CBC WITH DIFFERENTIAL   COMP METABOLIC PANEL   MAGNESIUM   TROPONIN        EKG:   I have independently interpreted this EKG  Results for orders placed or performed during the hospital encounter of 05/15/23   EKG   Result Value Ref Range    Report       [facility] Emergency Dept.    Test Date:  2023-05-15  Pt Name:    DANIELITO LEENELIMA              Department: ER  MRN:        [id number]                      Room:  Gender:     Female                       Technician: [id number]  :        1940                   Requested By:ER TRIAGE PROTOCOL  Order #:    [id number]                    Reading MD: Syd Pichardo II, MD    Measurements  Intervals                                Axis  Rate:       54                           P:  ID:                                      QRS:        257  QRSD:       121                          T:          142  QT:         524  QTc:        497    Interpretive Statements  Bradycardia  Inconsistent pwaves  Nonspecific IVCD with LAD  Anterior infarct, old  Nonspecific T abnormalities, lateral leads  Impression: Bradycardia looks regular but difficult to see pwave before every  QRS complex  Compared to ECG 2023 15:58:38  Electronically Signed On 5- 21:03:39 PDT by Syd esparza II, MD  Electronically Signed On 5- 23:26:50 PDT by Syd Pichardo II, MD     EKG   Result Value Ref Range    Report       [facility] Emergency Dept.    Test Date:  2023-05-15  Pt Name:    DANIELITO LEENELIMA              Department: ER  MRN:        [id number]              "         Room:       T610  Gender:     Female                       Technician: [id number]  :        1940                   Requested By:SYD ORNELAS II  Order #:    [id number]                    Reading MD: Syd Ornelas II, MD    Measurements  Intervals                                Axis  Rate:       47                           P:  NV:                                      QRS:        241  QRSD:       120                          T:          159  QT:         437  QTc:        387    Interpretive Statements  Irregular rhythm, bradycardia  Nonspecific IVCD with LAD  Inferior infarct, old  Impression: Slow atrial fibrillation vs heart block  Compared to ECG 05/15/2023 19:35:59  Sinus bradycardia no longer present  T-wave abnormality no longer present  Myocardial infarct finding still present  Electronically Signed  On 2023 2:33:04 PDT by Syd Ornelas II, MD     EKG   Result Value Ref Range    Report       [facility]    Test Date:  2023  Pt Name:    DANIELITO CRAWFORD              Department: 161  MRN:        [id number]                      Room:       [facility]  Gender:     Female                       Technician: [id number]  :        1940                   Requested By:PAUL CALDERA  Order #:    [id number]                    Reading MD:    Measurements  Intervals                                Axis  Rate:       57                           P:  NV:                                      QRS:        264  QRSD:       123                          T:          125  QT:         387  QTc:        377    Interpretive Statements  Atrial fibrillation  Nonspecific IVCD with LAD  Inferior infarct, old  Probable anterior infarct, age indeterminate  Compared to ECG 05/15/2023 23:29:12  No significant changes           Radiology:   The attending emergency physician has independently interpreted the diagnostic imaging associated with this visit and am waiting the final reading from the radiologist.    Preliminary interpretation is a follows: Right effusion, edema  Radiologist interpretation:   DX-CHEST-PORTABLE (1 VIEW)   Final Result         1.  Interstitial pulmonary parenchymal prominence suggest chronic underlying lung disease, component of interstitial edema and/or infiltrates not excluded.   2.  Small bilateral pleural effusions, right greater than left   3.  Cardiomegaly   4.  Atherosclerosis          COURSE & MEDICAL DECISION MAKING     ED Observation Status? Yes; I am placing the patient in to an observation status due to a diagnostic uncertainty as well as therapeutic intensity. Patient placed in observation status at 9:20 PM, 5/15/2023.     Observation plan is as follows: Labs and imaging    Upon Reevaluation, the patient's condition has: not improved; and will be escalated to hospitalization.    Patient discharged from ED Observation status at 10:56 PM (Time) (5/15/2023) (Date).     INITIAL ASSESSMENT, COURSE AND PLAN  Care Narrative:     9:20 PM - Patient seen and examined at bedside.  This is an emergent evaluation of a 82-year-old woman who has history of diastolic heart failure, atrial fibrillation (anticoagulated) who presents with generalized weakness, possibly having low blood pressures.  On exam she appears fatigued.  Blood pressure is in the 90s systolic.  She has bradycardia.  History is limited, she is not providing much for details.   says he has noticed she has not been as energetic for the past week.  She denies any chest pain.  No abdominal pain.  No vomiting.  Discussed plan of care, including EKG and labs. Patient agrees to the plan of care. The patient will be resuscitated with 1L NS IV and medicated. Ordered for UA, proBrain, Troponin, CMP, CBC w/ Diff and EKG to evaluate her symptoms.     10:36 PM - Patient has a blood pressure of 70 systolic and bradycardia. I will consult with Dr. Marcum, cardiology, ICU, and pharmacy for further treatment options. She has elevated  troponin over 200 and her BMP is over 14,000.  These are slightly increased from previous.  I reevaluated the patient at bedside and discussed starting epinephrine infusion 4 mg/ 250 mL.  I ultrasounded at bedside and there was signs consistent with pulmonary edema.  Fluids had been initiated when her blood pressure dropped but she received no more than 100 cc before it was stopped.  I believe this is cardiogenic shock.  Dr. Marcum agrees with this plan of starting epinephrine infusion and admit to ICU.  Unable to diurese at this time due to hypotension.  She may benefit from diuresis once blood pressure normal.    23:30 Dr. Hurley, intensivist, has evaluated her at bedside.  Plan to admit.  We also discussed abnormal urinalysis which has many white blood cell counts and a few bacteria.  She will be given empiric antibiotics Rocephin.  Urine and blood cultures pending.  Her blood pressure has risen up to nearly 90 systolic now.  Heart rate remains bradycardic.  EKG was repeated and it is irregular, there possibly could be a heart block.  Dr. Marcum updated and cardiology will consult, for the time being we will continue epinephrine infusion since this is improving her condition.          CRITICAL CARE  The very real possibilty of a deterioration of this patient's condition required the highest level of my preparedness for sudden, emergent intervention.  I provided critical care services, which included medication orders, frequent reevaluations of the patient's condition and response to treatment, ordering and reviewing test results, and discussing the case with various consultants.  The critical care time associated with the care of the patient was 40 minutes. Review chart for interventions. This time is exclusive of any other billable procedures.        PROBLEM LIST  #Cardiogenic shock   -Continue epinephrine infusion   -Diuresis tolerated once blood pressure normal   -Cardiology following, admitted to  ICU    #Abnormal UA, possibly UTI   -Cultures pending, given empiric Rocephin    #Elevated creatinine   -likely from hypotension    #Elevated TSH   -T4 normal and T3 borderline low        DISPOSITION AND DISCUSSIONS  I have discussed management of the patient with the following physicians and ELIO's:  Dr. Marcum (cardiology), Dr. Hurley (intensivist)    Discussion of management with other [facility] or appropriate source(s): [facility]          DISPOSITION:  Patient will be hospitalized by Dr. Hurley in guarded condition.    FINAL DIANGOSIS  1. Bradycardia    2. Hypotension, unspecified hypotension type    3. Cardiogenic shock (HCC)    4. Elevated TSH    5. Urine WBC increased          The note accurately reflects work and decisions made by me.  Syd Pichardo II, M.D.  5/16/2023  2:32 AM

## 2023-05-16 NOTE — ED NOTES
Bedside report received from JULIETTE Mcfadden and JoshuaRN, on Epinephrine drip at 0.01 mcg/kg/min, on oxygen at 2L via nasal cannula; hooked to cardiac monitor

## 2023-05-16 NOTE — CONSULTS
Cardiology Initial Consultation    Date of Service  5/16/2023    Referring Physician  Terry Juarez M.D.    Reason for Consultation  Hypotension and bradycardia and congestive heart failure    History of Presenting Illness  Belinda Meier is a 82 y.o. female with a past medical history of diastolic heart failure versus infiltrative cardiomyopathy who presented 5/15/2023 with hypotension, bradycardia and failure to thrive.  EKGs with junctional rhythm and sinus bradycardia.  Currently on a epinephrine drip.  Feels improved.  Followed in congestive heart failure clinic.  Technetium pyrophosphate scan negative.  On diuretics not on beta-blockers.  Denies smoking or alcohol use.  .  Denies chest pain.  Cardiac catheterization a few months ago without CAD.  Hospitalized in February with heart failure.  1 EKG consistent with atrial flutter.  Placed on anticoagulation appropriately.  Previous CVA.    Review of Systems  Review of Systems   Constitutional:  Positive for fatigue. Negative for chills and fever.   HENT:  Negative for congestion.    Eyes:  Negative for pain and discharge.   Respiratory:  Positive for shortness of breath. Negative for cough and wheezing.    Cardiovascular:  Positive for leg swelling. Negative for chest pain and palpitations.   Gastrointestinal:  Negative for abdominal pain, nausea and vomiting.   Musculoskeletal:  Negative for myalgias.   Skin:  Negative for rash.   Hematological:  Does not bruise/bleed easily.   Psychiatric/Behavioral:  The patient is not nervous/anxious.    All other systems reviewed and are negative.      Past Medical History   has a past medical history of Blindness of left eye, Congestive heart failure (HCC), Hyperlipemia, and Stroke (HCC) (08/01/2022).    Surgical History   has a past surgical history that includes cystectomy (1963) and cataract extraction with iol.    Family History  family history includes Alcohol abuse in her brother; Heart Disease in her  brother; No Known Problems in her daughter and daughter; Other in her father and mother.    Social History   reports that she has never smoked. She has never used smokeless tobacco. She reports current alcohol use of about 0.6 oz of alcohol per week. She reports that she does not use drugs.    Medications  Prior to Admission Medications   Prescriptions Last Dose Informant Patient Reported? Taking?   Coenzyme Q10 (CO Q 10 PO)  Patient Yes No   Sig: Take 1 Tablet by mouth every day.   Cyanocobalamin (VITAMIN B12 PO)  Patient Yes No   Sig: Take 1 Tablet by mouth every day.   Empagliflozin (JARDIANCE) 10 MG Tab tablet   No No   Sig: Take 1 Tablet by mouth every day.   Lutein 40 MG Cap  Patient Yes No   Sig: Take 40 mg by mouth every day.   QUERCETIN PO  Patient Yes No   Sig: Take 1 Tablet by mouth every day.   TURMERIC PO  Patient Yes No   Sig: Take 1 Capsule by mouth every day.   albuterol 108 (90 Base) MCG/ACT Aero Soln inhalation aerosol   No No   Sig: INHALE 2 PUFFS BY MOUTH EVERY 6 HOURS AS NEEDED FOR SHORTNESS OF BREATH   apixaban (ELIQUIS) 2.5mg Tab   No No   Sig: Take 1 Tablet by mouth 2 times a day.   fluticasone (FLONASE) 50 MCG/ACT nasal spray  Patient No No   Sig: Administer 1 Spray into affected nostril(S) at bedtime.   potassium chloride SA (KDUR) 20 MEQ Tab CR   No No   Sig: Take 1 Tablet by mouth every day.   rosuvastatin (CRESTOR) 5 MG Tab   No No   Sig: Take 1 Tablet by mouth every evening.   spironolactone (ALDACTONE) 25 MG Tab   No No   Sig: Take 1 Tablet by mouth every day.   torsemide (DEMADEX) 100 MG Tab   No No   Sig: Take 0.5 Tablets by mouth 2 times a day.      Facility-Administered Medications: None       Allergies  Allergies   Allergen Reactions    Atorvastatin Myalgia     Severe muscle weakness    Pcn [Penicillins] Hives    Amiodarone Rash     rash    Penicillin G        Vital signs in last 24 hours  Temp:  [35.9 °C (96.7 °F)-36.7 °C (98 °F)] 36.7 °C (98 °F)  Pulse:  [] 59  Resp:   [16-42] 22  BP: ()/(49-69) 101/56  SpO2:  [85 %-98 %] 94 %    Physical Exam  Physical Exam  Vitals reviewed.   Constitutional:       General: She is not in acute distress.     Appearance: She is well-developed. She is not diaphoretic.   Eyes:      Conjunctiva/sclera: Conjunctivae normal.   Neck:      Thyroid: No thyroid mass or thyromegaly.      Vascular: No JVD.      Trachea: No tracheal deviation.   Cardiovascular:      Rate and Rhythm: Regular rhythm. Bradycardia present.      Heart sounds: No murmur heard.  Pulmonary:      Effort: Pulmonary effort is normal. No respiratory distress.      Breath sounds: Rales present.   Chest:      Chest wall: No tenderness.   Abdominal:      Palpations: Abdomen is soft.      Tenderness: There is no abdominal tenderness.   Musculoskeletal:         General: Normal range of motion.      Right lower leg: Edema present.      Left lower leg: Edema present.   Skin:     General: Skin is warm and dry.   Neurological:      Mental Status: She is alert and oriented to person, place, and time.      Motor: No tremor.   Psychiatric:         Behavior: Behavior normal.         Lab Review  Lab Results   Component Value Date/Time    WBC 9.3 05/15/2023 09:04 PM    RBC 4.38 05/15/2023 09:04 PM    HEMOGLOBIN 13.3 05/15/2023 09:04 PM    HEMATOCRIT 40.2 05/15/2023 09:04 PM    MCV 91.8 05/15/2023 09:04 PM    MCH 30.4 05/15/2023 09:04 PM    MCHC 33.1 (L) 05/15/2023 09:04 PM    MPV 9.1 05/15/2023 09:04 PM      Lab Results   Component Value Date/Time    SODIUM 136 05/15/2023 09:04 PM    POTASSIUM 4.3 05/15/2023 09:04 PM    CHLORIDE 95 (L) 05/15/2023 09:04 PM    CO2 25 05/15/2023 09:04 PM    GLUCOSE 104 (H) 05/15/2023 09:04 PM    BUN 51 (H) 05/15/2023 09:04 PM    CREATININE 1.87 (H) 05/15/2023 09:04 PM      Lab Results   Component Value Date/Time    ASTSGOT 72 (H) 05/15/2023 09:04 PM    ALTSGPT 49 05/15/2023 09:04 PM     Lab Results   Component Value Date/Time    CHOLSTRLTOT 198 02/15/2023 02:28 AM      (H) 02/15/2023 02:28 AM    HDL 69 02/15/2023 02:28 AM    TRIGLYCERIDE 113 02/15/2023 02:28 AM    TROPONINT 237 (H) 05/15/2023 11:41 PM       Recent Labs     05/15/23  2104   NTPROBNP [id number]*       Cardiac Imaging and Procedures Review  EKG:  My personal interpretation of the EKG dated 5/16/2023 is sinus bradycardia with junctional rhythm.  Some PACs.  Previous EKGs similar.    Echocardiogram: 5/16/2023  CONCLUSIONS  Normal left ventricular systolic function.  Grade II diastolic dysfunction.  Mild mitral regurgitation.  Mild to moderate aortic insufficiency.  Mild tricuspid regurgitation.  Right ventricular systolic pressure is estimated to be  40-45  mmHg.     Cardiac Catheterization: 2/20/2023  Findings:  1.  Left main coronary artery:  Normal.  2.  Left anterior descending artery: 40% disease in proximal portion, calcified.    3.  Left circumflex coronary artery: Luminal irregularities.  Gives two marginal branches, which have no significant disease   4.  Right coronary artery: 50% disease in midportion..  This is a right dominant system.  5.  Left ventricular end diastolic pressure:  20 mmHg.  No signficant gradient across the aortic valve.  6.  Ascending aortic root angiogram showed normal sized ascending aorta, 1-2+ aortic regurgitation.     Imaging  Chest X-Ray: Bilateral pleural effusions        Assessment/Plan  No new Assessment & Plan notes have been filed under this hospital service since the last note was generated.  Service: Cardiac Electrophysiology  1.  Sinus bradycardia with junctional rhythm currently on epinephrine drip.  Somewhat hypotensive possible related to overdiuresis.  Not on any sinus node modulating drugs.  Currently on epinephrine.  Very likely will require pacing.  2.  Diastolic heart failure with possible infiltrative cardiomyopathy.  Technetium pyrophosphate scan negative for wild-type amyloidosis.  3.  Atrial fibrillation/flutter currently in sinus rhythm and junctional  rhythm.  4.  Anticoagulation.  On low-dose Eliquis.      Thank you for allowing me to participate in the care of this patient.      Please contact me with any questions.    Remigio Marcum M.D.   Cardiologist, [facility]  [phone]

## 2023-05-16 NOTE — RESPIRATORY CARE
COPD EDUCATION by COPD CLINICAL EDUCATOR  5/16/2023 at 1:43 PM by Laura Brito RRT     Patient reviewed by COPD education team. Patient does not have a diagnosis history of COPD, therefore does not qualify for the COPD program.

## 2023-05-16 NOTE — PROGRESS NOTES
Pt rosa maria in the 40s. Transfer orders canceled per MD Juarez. Epi restarted. EKG obtained, cards to review.

## 2023-05-16 NOTE — PROGRESS NOTES
Epi turned off at start of shift. Patient's HR 60-80's. MAP > 65. Patient also tolerating well on room air.  Plan of care discussed in rounds.  EP to see patient at bedside and guide plan of care.

## 2023-05-17 ENCOUNTER — APPOINTMENT (OUTPATIENT)
Dept: RADIOLOGY | Facility: MEDICAL CENTER | Age: 83
DRG: 242 | End: 2023-05-17
Attending: INTERNAL MEDICINE
Payer: MEDICARE

## 2023-05-17 ENCOUNTER — APPOINTMENT (OUTPATIENT)
Dept: CARDIOLOGY | Facility: MEDICAL CENTER | Age: 83
DRG: 242 | End: 2023-05-17
Attending: NURSE PRACTITIONER
Payer: MEDICARE

## 2023-05-17 LAB
ALBUMIN SERPL BCP-MCNC: 3 G/DL (ref 3.2–4.9)
ALBUMIN/GLOB SERPL: 1 G/DL
ALP SERPL-CCNC: 114 U/L (ref 30–99)
ALT SERPL-CCNC: 38 U/L (ref 2–50)
ANION GAP SERPL CALC-SCNC: 10 MMOL/L (ref 7–16)
AST SERPL-CCNC: 45 U/L (ref 12–45)
BASOPHILS # BLD AUTO: 0.6 % (ref 0–1.8)
BASOPHILS # BLD: 0.05 K/UL (ref 0–0.12)
BILIRUB SERPL-MCNC: 0.3 MG/DL (ref 0.1–1.5)
BUN SERPL-MCNC: 40 MG/DL (ref 8–22)
CALCIUM ALBUM COR SERPL-MCNC: 9.5 MG/DL (ref 8.5–10.5)
CALCIUM SERPL-MCNC: 8.7 MG/DL (ref 8.5–10.5)
CHLORIDE SERPL-SCNC: 99 MMOL/L (ref 96–112)
CO2 SERPL-SCNC: 26 MMOL/L (ref 20–33)
CREAT SERPL-MCNC: 1.36 MG/DL (ref 0.5–1.4)
EOSINOPHIL # BLD AUTO: 0.07 K/UL (ref 0–0.51)
EOSINOPHIL NFR BLD: 0.8 % (ref 0–6.9)
ERYTHROCYTE [DISTWIDTH] IN BLOOD BY AUTOMATED COUNT: 56.5 FL (ref 35.9–50)
GFR SERPLBLD CREATININE-BSD FMLA CKD-EPI: 39 ML/MIN/1.73 M 2
GLOBULIN SER CALC-MCNC: 3 G/DL (ref 1.9–3.5)
GLUCOSE SERPL-MCNC: 103 MG/DL (ref 65–99)
HCT VFR BLD AUTO: 39.4 % (ref 37–47)
HGB BLD-MCNC: 12.5 G/DL (ref 12–16)
IMM GRANULOCYTES # BLD AUTO: 0.03 K/UL (ref 0–0.11)
IMM GRANULOCYTES NFR BLD AUTO: 0.3 % (ref 0–0.9)
LYMPHOCYTES # BLD AUTO: 2.33 K/UL (ref 1–4.8)
LYMPHOCYTES NFR BLD: 26.4 % (ref 22–41)
MAGNESIUM SERPL-MCNC: 2.5 MG/DL (ref 1.5–2.5)
MCH RBC QN AUTO: 29.8 PG (ref 27–33)
MCHC RBC AUTO-ENTMCNC: 31.7 G/DL (ref 33.6–35)
MCV RBC AUTO: 94 FL (ref 81.4–97.8)
MONOCYTES # BLD AUTO: 0.89 K/UL (ref 0–0.85)
MONOCYTES NFR BLD AUTO: 10.1 % (ref 0–13.4)
NEUTROPHILS # BLD AUTO: 5.46 K/UL (ref 2–7.15)
NEUTROPHILS NFR BLD: 61.8 % (ref 44–72)
NRBC # BLD AUTO: 0 K/UL
NRBC BLD-RTO: 0 /100 WBC
PLATELET # BLD AUTO: 224 K/UL (ref 164–446)
PMV BLD AUTO: 8.9 FL (ref 9–12.9)
POTASSIUM SERPL-SCNC: 4.2 MMOL/L (ref 3.6–5.5)
PROT SERPL-MCNC: 6 G/DL (ref 6–8.2)
RBC # BLD AUTO: 4.19 M/UL (ref 4.2–5.4)
SODIUM SERPL-SCNC: 135 MMOL/L (ref 135–145)
WBC # BLD AUTO: 8.8 K/UL (ref 4.8–10.8)

## 2023-05-17 PROCEDURE — 33208 INSRT HEART PM ATRIAL & VENT: CPT

## 2023-05-17 PROCEDURE — 0JH606Z INSERTION OF PACEMAKER, DUAL CHAMBER INTO CHEST SUBCUTANEOUS TISSUE AND FASCIA, OPEN APPROACH: ICD-10-PCS | Performed by: INTERNAL MEDICINE

## 2023-05-17 PROCEDURE — 71045 X-RAY EXAM CHEST 1 VIEW: CPT

## 2023-05-17 PROCEDURE — 83735 ASSAY OF MAGNESIUM: CPT

## 2023-05-17 PROCEDURE — 99152 MOD SED SAME PHYS/QHP 5/>YRS: CPT | Performed by: INTERNAL MEDICINE

## 2023-05-17 PROCEDURE — 99291 CRITICAL CARE FIRST HOUR: CPT | Mod: GC | Performed by: INTERNAL MEDICINE

## 2023-05-17 PROCEDURE — 02H63JZ INSERTION OF PACEMAKER LEAD INTO RIGHT ATRIUM, PERCUTANEOUS APPROACH: ICD-10-PCS | Performed by: INTERNAL MEDICINE

## 2023-05-17 PROCEDURE — 02HK3JZ INSERTION OF PACEMAKER LEAD INTO RIGHT VENTRICLE, PERCUTANEOUS APPROACH: ICD-10-PCS | Performed by: INTERNAL MEDICINE

## 2023-05-17 PROCEDURE — 700111 HCHG RX REV CODE 636 W/ 250 OVERRIDE (IP): Performed by: INTERNAL MEDICINE

## 2023-05-17 PROCEDURE — 700111 HCHG RX REV CODE 636 W/ 250 OVERRIDE (IP)

## 2023-05-17 PROCEDURE — 33208 INSRT HEART PM ATRIAL & VENT: CPT | Mod: KX | Performed by: INTERNAL MEDICINE

## 2023-05-17 PROCEDURE — 85025 COMPLETE CBC W/AUTO DIFF WBC: CPT

## 2023-05-17 PROCEDURE — 80053 COMPREHEN METABOLIC PANEL: CPT

## 2023-05-17 PROCEDURE — A9270 NON-COVERED ITEM OR SERVICE: HCPCS | Performed by: INTERNAL MEDICINE

## 2023-05-17 PROCEDURE — 700117 HCHG RX CONTRAST REV CODE 255: Performed by: INTERNAL MEDICINE

## 2023-05-17 PROCEDURE — 99232 SBSQ HOSP IP/OBS MODERATE 35: CPT | Mod: FS | Performed by: STUDENT IN AN ORGANIZED HEALTH CARE EDUCATION/TRAINING PROGRAM

## 2023-05-17 PROCEDURE — 700105 HCHG RX REV CODE 258: Performed by: INTERNAL MEDICINE

## 2023-05-17 PROCEDURE — 700102 HCHG RX REV CODE 250 W/ 637 OVERRIDE(OP): Performed by: INTERNAL MEDICINE

## 2023-05-17 PROCEDURE — 93005 ELECTROCARDIOGRAM TRACING: CPT | Performed by: INTERNAL MEDICINE

## 2023-05-17 PROCEDURE — 770022 HCHG ROOM/CARE - ICU (200)

## 2023-05-17 PROCEDURE — 700101 HCHG RX REV CODE 250

## 2023-05-17 RX ORDER — CEFAZOLIN SODIUM 1 G/3ML
INJECTION, POWDER, FOR SOLUTION INTRAMUSCULAR; INTRAVENOUS
Status: COMPLETED
Start: 2023-05-17 | End: 2023-05-17

## 2023-05-17 RX ORDER — BUPIVACAINE HYDROCHLORIDE 5 MG/ML
INJECTION, SOLUTION EPIDURAL; INTRACAUDAL
Status: COMPLETED
Start: 2023-05-17 | End: 2023-05-17

## 2023-05-17 RX ORDER — ACETAMINOPHEN 325 MG/1
650 TABLET ORAL EVERY 4 HOURS PRN
Status: DISCONTINUED | OUTPATIENT
Start: 2023-05-17 | End: 2023-05-19 | Stop reason: HOSPADM

## 2023-05-17 RX ORDER — LIDOCAINE HYDROCHLORIDE 20 MG/ML
INJECTION, SOLUTION INFILTRATION; PERINEURAL
Status: COMPLETED
Start: 2023-05-17 | End: 2023-05-17

## 2023-05-17 RX ORDER — MIDAZOLAM HYDROCHLORIDE 1 MG/ML
INJECTION INTRAMUSCULAR; INTRAVENOUS
Status: COMPLETED
Start: 2023-05-17 | End: 2023-05-17

## 2023-05-17 RX ADMIN — IOHEXOL 20 ML: 350 INJECTION, SOLUTION INTRAVENOUS at 12:45

## 2023-05-17 RX ADMIN — ACETAMINOPHEN 650 MG: 325 TABLET, FILM COATED ORAL at 05:57

## 2023-05-17 RX ADMIN — CEFAZOLIN 1000 MG: 1 INJECTION, POWDER, FOR SOLUTION INTRAMUSCULAR; INTRAVENOUS at 11:49

## 2023-05-17 RX ADMIN — BUPIVACAINE HYDROCHLORIDE: 5 INJECTION, SOLUTION EPIDURAL; INTRACAUDAL; PERINEURAL at 11:48

## 2023-05-17 RX ADMIN — FENTANYL CITRATE 50 MCG: 50 INJECTION, SOLUTION INTRAMUSCULAR; INTRAVENOUS at 12:51

## 2023-05-17 RX ADMIN — CEFTRIAXONE SODIUM 1000 MG: 10 INJECTION, POWDER, FOR SOLUTION INTRAVENOUS at 23:07

## 2023-05-17 RX ADMIN — LIDOCAINE HYDROCHLORIDE: 20 INJECTION, SOLUTION INFILTRATION; PERINEURAL at 11:46

## 2023-05-17 RX ADMIN — ROSUVASTATIN 5 MG: 10 TABLET, FILM COATED ORAL at 17:30

## 2023-05-17 RX ADMIN — ACETAMINOPHEN 650 MG: 325 TABLET, FILM COATED ORAL at 14:09

## 2023-05-17 RX ADMIN — MIDAZOLAM HYDROCHLORIDE 1 MG: 1 INJECTION, SOLUTION INTRAMUSCULAR; INTRAVENOUS at 12:51

## 2023-05-17 RX ADMIN — CEFAZOLIN 2 G: 2 INJECTION, POWDER, FOR SOLUTION INTRAMUSCULAR; INTRAVENOUS at 19:54

## 2023-05-17 RX ADMIN — CEFAZOLIN 2000 MG: 1 INJECTION, POWDER, FOR SOLUTION INTRAMUSCULAR; INTRAVENOUS at 11:48

## 2023-05-17 ASSESSMENT — ENCOUNTER SYMPTOMS
SENSORY CHANGE: 0
BLOOD IN STOOL: 0
CLAUDICATION: 0
HEADACHES: 0
NEUROLOGICAL NEGATIVE: 1
CHEST TIGHTNESS: 0
ORTHOPNEA: 0
FLANK PAIN: 0
NAUSEA: 0
EYE PAIN: 0
FEVER: 0
MUSCULOSKELETAL NEGATIVE: 1
DEPRESSION: 0
WHEEZING: 0
SORE THROAT: 0
ALLERGIC/IMMUNOLOGIC NEGATIVE: 1
GASTROINTESTINAL NEGATIVE: 1
HEMATOLOGIC/LYMPHATIC NEGATIVE: 1
COUGH: 0
HEMOPTYSIS: 0
BLURRED VISION: 0
ENDOCRINE NEGATIVE: 1
SHORTNESS OF BREATH: 0
VOMITING: 0
DIAPHORESIS: 0
WEAKNESS: 0
BACK PAIN: 0
CONSTITUTIONAL NEGATIVE: 1
FATIGUE: 0
CONSTIPATION: 0
PALPITATIONS: 0
FOCAL WEAKNESS: 0
SPUTUM PRODUCTION: 0
RESPIRATORY NEGATIVE: 1
ABDOMINAL PAIN: 0
EYES NEGATIVE: 1
SINUS PAIN: 0
HEARTBURN: 0
CHILLS: 0
BRUISES/BLEEDS EASILY: 0
PSYCHIATRIC NEGATIVE: 1
DIZZINESS: 0
NERVOUS/ANXIOUS: 0
LIGHT-HEADEDNESS: 0
DIARRHEA: 0
MYALGIAS: 0

## 2023-05-17 ASSESSMENT — PAIN DESCRIPTION - PAIN TYPE
TYPE: ACUTE PAIN

## 2023-05-17 ASSESSMENT — FIBROSIS 4 INDEX: FIB4 SCORE: 2.64

## 2023-05-17 NOTE — PROGRESS NOTES
Cardiology Follow Up Progress Note    Date of Service  5/17/2023    Attending Physician  Terry Juarez M.D.    Chief Complaint   Hypotension and dehydration    HPI  Belinda Meier is a 82 y.o. female with a history of atrial fibrillation and HFpEF admitted 5/15/2023 with hypotension, bradycardia found to be in junctional bradycardia requiring pressors.    Interim Events  5/17/2023: Yesterday morning she converted out of junctional rhythm into sinus rhythm and was doing well, but early in the afternoon she went back into junctional bradycardia and became hypotensive requiring the initiation of pressors again.  No cardiac events overnight she was in sinus rhythm and atrial fibrillation intermittent rates of 40s to 80s overnight.  Vital signs stable.  Epi stopped at 1300 this afternoon.  SPO2 99 to 92% on 2 L nasal cannula.  She reports she is doing well today.  She does continue to have lower extremity edema, but it is mildly improved.  She usually uses an inhaler at home and has been off of it for 2 days, but does not feel more short of breath at this time.  No chest pain or palpitations. No shortness of breath, dyspnea on exertion, orthopnea or PND.  No dizziness or lightheadedness. No syncope or presyncope.      Review of Systems  Review of Systems   Constitutional: Negative.  Negative for chills, diaphoresis, fatigue and fever.   HENT: Negative.     Eyes: Negative.    Respiratory: Negative.  Negative for cough, chest tightness and shortness of breath.    Cardiovascular:  Positive for leg swelling. Negative for chest pain and palpitations.   Gastrointestinal: Negative.  Negative for constipation, diarrhea, nausea and vomiting.   Endocrine: Negative.    Genitourinary: Negative.  Negative for decreased urine volume, difficulty urinating, dysuria and frequency.   Musculoskeletal: Negative.    Skin: Negative.    Allergic/Immunologic: Negative.    Neurological: Negative.  Negative for dizziness and  light-headedness.   Hematological: Negative.  Does not bruise/bleed easily.   Psychiatric/Behavioral: Negative.         Vital signs in last 24 hours  Temp:  [36 °C (96.8 °F)-37.1 °C (98.8 °F)] 37.1 °C (98.8 °F)  Pulse:  [62-92] 69  Resp:  [9-56] 16  BP: ()/(58-83) 114/70  SpO2:  [90 %-99 %] 99 %    Physical Exam  Physical Exam  Vitals and nursing note reviewed.   Constitutional:       General: She is not in acute distress.     Appearance: Normal appearance. She is not toxic-appearing.   HENT:      Head: Normocephalic and atraumatic.      Right Ear: External ear normal.      Left Ear: External ear normal.      Nose: Nose normal.      Mouth/Throat:      Mouth: Mucous membranes are moist.      Pharynx: Oropharynx is clear.   Eyes:      General: No scleral icterus.     Extraocular Movements: Extraocular movements intact.      Conjunctiva/sclera: Conjunctivae normal.      Pupils: Pupils are equal, round, and reactive to light.   Neck:      Vascular: No JVD.   Cardiovascular:      Rate and Rhythm: Normal rate. Rhythm irregular.      Pulses: Normal pulses.      Heart sounds: Normal heart sounds. No murmur heard.     No friction rub. No gallop.   Pulmonary:      Effort: Pulmonary effort is normal.      Breath sounds: Normal breath sounds.   Abdominal:      General: Abdomen is flat. Bowel sounds are normal. There is no distension.      Palpations: Abdomen is soft.   Musculoskeletal:         General: Normal range of motion.      Cervical back: Normal range of motion and neck supple.      Right lower leg: Edema (1+) present.      Left lower leg: Edema (2+) present.   Skin:     General: Skin is warm and dry.      Capillary Refill: Capillary refill takes less than 2 seconds.      Coloration: Skin is not jaundiced.   Neurological:      General: No focal deficit present.      Mental Status: She is alert and oriented to person, place, and time.   Psychiatric:         Mood and Affect: Mood normal.         Behavior: Behavior  normal.         Lab Review  Lab Results   Component Value Date/Time    WBC 8.8 05/17/2023 03:00 AM    RBC 4.19 (L) 05/17/2023 03:00 AM    HEMOGLOBIN 12.5 05/17/2023 03:00 AM    HEMATOCRIT 39.4 05/17/2023 03:00 AM    MCV 94.0 05/17/2023 03:00 AM    MCH 29.8 05/17/2023 03:00 AM    MCHC 31.7 (L) 05/17/2023 03:00 AM    MPV 8.9 (L) 05/17/2023 03:00 AM      Lab Results   Component Value Date/Time    SODIUM 135 05/17/2023 03:00 AM    POTASSIUM 4.2 05/17/2023 03:00 AM    CHLORIDE 99 05/17/2023 03:00 AM    CO2 26 05/17/2023 03:00 AM    GLUCOSE 103 (H) 05/17/2023 03:00 AM    BUN 40 (H) 05/17/2023 03:00 AM    CREATININE 1.36 05/17/2023 03:00 AM      Lab Results   Component Value Date/Time    ASTSGOT 45 05/17/2023 03:00 AM    ALTSGPT 38 05/17/2023 03:00 AM     Lab Results   Component Value Date/Time    CHOLSTRLTOT 198 02/15/2023 02:28 AM     (H) 02/15/2023 02:28 AM    HDL 69 02/15/2023 02:28 AM    TRIGLYCERIDE 113 02/15/2023 02:28 AM    TROPONINT 185 (H) 05/16/2023 12:00 PM       Recent Labs     05/15/23  2104 05/16/23  1515   NTPROBNP 89597* 69010*         Assessment/Plan  #Junctional bradycardia  #Hypotension  #HFpEF  #Paroxysmal atrial fibrillation     Recommendations:  -She reports she is doing better today.  -She was weaned off epi gtt earlier this afternoon with no recurrence of hypotension.  -She has been in and out of junctional bradycardia, sinus rhythm and atrial fibrillation throughout the day.  -Repeat echocardiogram shows no significant changes.  She underwent PYP scan previously for concerns of infiltrative disease which was negative.  -She remains fluid overloaded on exam, but her lower extremity edema has shown mild improvement in addition to mild improvement in her NT-proBNP [place] to 28797 from [id number].  -Recommend the initiation of IV diuresis after PPM placement.  -Keep n.p.o.  Plan is for PPM placement later today.    Cardiology will continue to follow.    Thank you for allowing me to participate in the  care of Regla Meier .    Leah Jain PA-C, Cardiology  [facility], [address]  [address] [address]  [place] [address]  Phone: [phone]  Fax: [phone]    PLEASE NOTE: This Note was created using voice recognition Software. I have made every reasonable attempt to correct obvious errors, but I expect that there are errors of grammar and possibly content that I did not discover before finalizing the note.     I personally spent a total of 18 minutes which includes face-to-face time and non-face-to-face time spent on preparing to see the patient, reviewing hospital notes and tests, obtaining history from the patient, performing a medically appropriate exam, counseling and educating the patient, ordering medications/tests/procedures/referrals as clinically indicated, and documenting information in the electronic medical record.

## 2023-05-17 NOTE — PROGRESS NOTES
Patient back from cath lab. Left PPM site dressing CDI. Patient wakes to voice, AOx4. VSS. Patient's spouse updated at bedside

## 2023-05-17 NOTE — PROGRESS NOTES
"[facility] Progress Note      Admit Date: 5/15/2023    Resident(s): Nathalie Ness M.D.   Attending:  ENRIQUE THACKER/ Dr. Juarez     Patient ID:    Name:  Regla Meier   YOB: 1940  Age:  82 y.o.  female   MRN:  [id number]    Hospital Course (carried forward and updated):  \"Regla Meier is a 82 y.o. female with the past medical history of atrial fibrillation on Eliquis, chronic right sided heart failure with RVSP of 40-45 mmHg, dyslipidemia, and stroke with visual defects who presented to the ER with complaints of worsening fatigue and shortness of breath for the last week.  She denies any medication changes and continues to be compliant with her diuretics.  She also notes mild chest pain that is nonradiating.  She denies cough, nausea, vomiting, or recent sick contacts.  She has had some mild diarrhea  with urinary urgency and frequency.  Due to the patient having worsening symptoms she was brought to the ER where she was found to have a heart rate in the 40s with hypotension.  The patient was given 500cc bolus and started on epinephrine infusion.  She was admitted to the ICU for ongoing care.\"    5/15- admitted overnight  5/16- HDS, trialed off Epi failed in afternoon with bradycardia/junctional  5/17- HDS, Epi 0.01, Pacemaker today      Consultants:  Critical Care  EP Cardiology     Interval Events:    -No acute events overnight  - Patient is hemodynamically stable, on minimal dose of epinephrine t0.01   -Heart rate sustaining in 60s to 70s sinus rhythm  - Patiet without acute complaint     Daily Multi discipline Rounding Report:  Neuro: aox4, moves all, generalize pain  HR: Sinus 60-70s this am  SBP: 100-110s on 0.01 epi  Tmax: afebrile  GI: NPO Cardiac diet      bowel movement prior to arrival  UOP: adequate  Lines: 2PIV  Resp: RA (bilateral pleural effusions and some pulmonary edema however this has been chronic since chest x-ray on 3/23)  Vte: AC held for procedure   PPI/H2: Not " indicated  Antibx: Ceftriaxone (2/4)    - Reevaluate for diuresis   -Pacemaker todiday reevaluate after pacemaker    -Electrophysiology consulted appreciate recommendations and PPM  -Continue to monitor need for ICU level care.      Vitals Range last 24h:  Temp:  [35.8 °C (96.4 °F)-36.7 °C (98.1 °F)] 36.7 °C (98 °F)  Pulse:  [46-84] 69  Resp:  [14-56] 31  BP: ()/(51-83) 114/72  SpO2:  [82 %-98 %] 96 %      Intake/Output Summary (Last 24 hours) at 5/17/2023 0633  Last data filed at 5/17/2023 0600  Gross per 24 hour   Intake 570.08 ml   Output 1650 ml   Net -1079.92 ml          Review of Systems   Constitutional:  Positive for malaise/fatigue. Negative for chills and fever.   HENT:  Negative for ear discharge, ear pain, hearing loss, nosebleeds, sinus pain and sore throat.    Eyes:  Negative for blurred vision and pain.   Respiratory:  Negative for cough, hemoptysis, sputum production, shortness of breath and wheezing.    Cardiovascular:  Negative for chest pain, palpitations, orthopnea, claudication and leg swelling.   Gastrointestinal:  Negative for abdominal pain, blood in stool, constipation, diarrhea, heartburn, melena, nausea and vomiting.   Genitourinary:  Negative for dysuria, flank pain, frequency, hematuria and urgency.   Musculoskeletal:  Negative for back pain and myalgias.   Skin:  Negative for itching and rash.   Neurological:  Negative for dizziness, sensory change, focal weakness, weakness and headaches.   Endo/Heme/Allergies:  Negative for environmental allergies. Does not bruise/bleed easily.   Psychiatric/Behavioral:  Negative for depression. The patient is not nervous/anxious.        PHYSICAL EXAM:  Vitals:    05/17/23 0300 05/17/23 0400 05/17/23 0500 05/17/23 0600   BP: 106/77 115/71 105/66 114/72   Pulse: 76 70 73 69   Resp: 20 19 18 (!) 31   Temp:  36.7 °C (98 °F)     TempSrc:  Temporal     SpO2: 94% 97% 97% 96%   Weight:    60 kg (132 lb 4.4 oz)   Height:        Body mass index is 24.19  kg/m².    O2 therapy: Pulse Oximetry: 96 %, O2 (LPM): 2, O2 Delivery Device: Silicone Nasal Cannula         Physical Exam  General: Well developed, well nourished female, in no acute distress.  HEENT: NC/AT, PERRL, EOMI, no scleral icterus or conjunctival pallor, fair dentition, no nasal discharge or oral erythema or exudates.   Neck: Supple, No cervical or supraclavicular LAD  CV:RRR, no murmurs gallops or Rubs, JVP 7 cm H2O, positive HJ reflex  Pulm: Bibasilar crackles improved on examination no rales, or rhonchi, wheezing.  GI: Normal bowel sounds, abdomen soft, nontender, nondistended to deep or light palpation in all 4 quadrants, no HSM.  MSK: Radial and dorsalis pedis pulses 2+ and equal bilaterally, respectively.  Strength 5 out of 5 in upper and lower extremities.  No lower extremity edema today   Neuro: Patient is alert and oriented x3, no focal deficits  Psych: Appropriate mood and affect         Recent Labs     05/15/23  2341 05/16/23 0538 05/16/23  1515 05/17/23  0300   SODIUM  --  138 137 135   POTASSIUM  --  4.3 4.5 4.2   CHLORIDE  --  97 97 99   CO2  --  26 27 26   BUN  --  49* 47* 40*   CREATININE  --  1.87* 1.83* 1.36   MAGNESIUM 2.6* 2.5  --  2.5   CALCIUM  --  8.8 8.8 8.7       Recent Labs     05/15/23  2104 05/16/23  0538 05/16/23  1515 05/17/23  0300   ALTSGPT 49 45  --  38   ASTSGOT 72* 58*  --  45   ALKPHOSPHAT 138* 117*  --  114*   TBILIRUBIN 0.6 0.3  --  0.3   GLUCOSE 104* 137* 120* 103*       Recent Labs     05/15/23  2104 05/16/23  0538 05/17/23  0300   RBC 4.38 3.57* 4.19*   HEMOGLOBIN 13.3 11.7* 12.5   HEMATOCRIT 40.2 33.5* 39.4   PLATELETCT 257 223 224       Recent Labs     05/15/23  2104 05/16/23  0538 05/17/23  0300   WBC 9.3 8.9 8.8   NEUTSPOLYS 57.50 66.90 61.80   LYMPHOCYTES 30.90 23.00 26.40   MONOCYTES 10.20 8.80 10.10   EOSINOPHILS 0.40 0.20 0.80   BASOPHILS 0.60 0.60 0.60   ASTSGOT 72* 58* 45   ALTSGPT 49 45 38   ALKPHOSPHAT 138* 117* 114*   TBILIRUBIN 0.6 0.3 0.3          Meds:   rosuvastatin  5 mg      albuterol  2 Puff      EPINEPHrine (Adrenalin) infusion  0-0.5 mcg/kg/min (Ideal) 0.01 mcg/kg/min (05/16/23 1412)    senna-docusate  2 Tablet      And    polyethylene glycol/lytes  1 Packet      And    magnesium hydroxide  30 mL      And    bisacodyl  10 mg      [Held by provider] apixaban  2.5 mg      acetaminophen  650 mg      ondansetron  4 mg      ondansetron  4 mg      cefTRIAXone (ROCEPHIN) IV  1,000 mg          Procedures:  none    Imaging:  EC-ECHOCARDIOGRAM COMPLETE W/O CONT   Final Result      DX-CHEST-PORTABLE (1 VIEW)   Final Result         1.  Interstitial pulmonary parenchymal prominence suggest chronic underlying lung disease, component of interstitial edema and/or infiltrates not excluded.   2.  Small bilateral pleural effusions, right greater than left   3.  Cardiomegaly   4.  Atherosclerosis      CL-PERMANENT PACEMAKER INSERTION    (Results Pending)       ASSESSEMENT and PLAN:    * Hypotension- (present on admission)  Assessment & Plan  ?sepsis vs adrenal insufficiency vs heart block  Doing well off of epinephrine, likely secondary to sepsis versus heart block, however resolving with antibiotics.  Status post 1 L bolus on admission  Hold epinephrine gtt.  Cultures sent: No growth to date  Cortisol within normal limits, Pro-Osmani (within normal limits)  Broad spectrum abx: ceftriaxone  Will hold torsemide, Jardiance, and aldactone    Bradycardia  Assessment & Plan  Concern for junctional versus slow A-fib versus heart block on initial presentation  Epinephrine gtt. now trialing off with heart rate in the 60s and 70s  - Has been worked up for infiltrative disease in the past  - EP consulted: Pacemaker plan for today   -EPI titrate for HR    Pyuria- (present on admission)  Assessment & Plan  Patient with symptoms of frequency/urgency  Ceftriaxone started  -Follow urine culture/blood culture ngtd    Elevated TSH- (present on admission)  Assessment & Plan  Free T4  was 1.4  T3 was1.92 Normal 2.0  - Clinically closer to euthyroid unlikely contributor to presentation  - Monitor as outpatient    ERNESTINE (acute kidney injury) ([facility])  Assessment & Plan  Due to hypotension either secondary to sepsis versus cardiogenic shock  S/p 1 L IV fluid, now flat  - Monitor UOP/creat  -Avoid nephrotoxins    DNR (do not resuscitate)- (present on admission)  Assessment & Plan  Spoke to patient with  present about resuscitative measures and patient did not want heroics such as cardioversion/CPR.  She was amendable to intubation for a short period of time if it was for supportive measures to get her through an acute crisis, but would not want long term ventilation/tracheostomy.    History of CVA (cerebrovascular accident)- (present on admission)  Assessment & Plan  Hx of     Heart failure with preserved ejection fraction (HCC)- (present on admission)  Assessment & Plan  Last ECHO from 2/2023 with EF of 60%  Noted grade II diastolic failure  Mild volume overload, pleural effusions and pulmonary edema.  Chronic patient is on room air, JVP plus HJ reflex positive however not grossly volume overloaded and given recent hypotension would avoid intravascular volume depletion.  -Hold home diuresis reevaluate need after pacer       AF (atrial fibrillation) ([facility])- (present on admission)  Assessment & Plan  Pt is bradycardic  ?heart block, has had PYP scan on Tuesday 23 for possible infiltrative disease because of diastolic dysfunction still not elucidated.  Pt is not on rate controllers at home  Epinephrine infusion for HR > 50  Cont eliquis (held for PPM)    Chronic right-sided heart failure (HCC)- (present on admission)  Assessment & Plan  RVSP at 40-45 mmHg  Holding diuretics  Judicious fluid resuscitation     Elevated troponin  Assessment & Plan  Serial troponins levels now trending flat likely secondary to demand ischemia as recent cath without significant coronary artery disease, EKG reassuring for  no acute ischemia.   - EP on board    Legally blind in left eye, as defined in [place]- (present on admission)  Assessment & Plan  Due to retinal vein occlusion         DISPO: Patient remains critically ill requiring PPM placement and Epi gtt.     CODE STATUS: DNR / Emi    Quality Measures:  Feeding: Cardiac diet  Analgesia: Opioids as needed  Sedation: None  Thromboprophylaxis: Hold home AC for possible procedure  Head of bed: >30 degrees  Ulcer prophylaxis: None  Glycemic control: Not indicated  Bowel care: bowel regimen  Indwelling lines: PIV x2  Deescalation of antibiotics: Not necessary      Nathalie Ness M.D.

## 2023-05-17 NOTE — PROGRESS NOTES
Brief EP Note:    Patient admitted with junctional bradycardia requiring Epi gtt. Failed trail discontinuation of Epi gtt yesterday. She reverted from NSR to junctional rosa maria around 1400 after Epi gtt was off for approximately 6 hours. Epi gtt was restarted. Plan is for PPM implantation today with Dr. Marcum.     The risks, benefits, and alternatives to permanent pacemaker placement were discussed in great detail.    Specific risks mentioned to the patient including bleeding, cardiac perforation with possible tamponade possibly requiring pericardiocentesis or open heart surgery.    In addition the possibility of lead dislodgment (2-3%), pneumothorax (3%), hemothorax, infection were discussed.    Also, mentioned were the risk of death, stroke, and myocardial infarction.  The patient verbalized understanding of the potential complications and wishes to proceed with the procedure.    Reversal of DNR status during the procedure was discussed and patient verbalized understanding and was agreeable to proceed.     MADDIE Barahona.

## 2023-05-17 NOTE — PROGRESS NOTES
Monitor Summary    Patient sinus rhythm with a first degree HB through most of the day. Patient rosa maria in the 40's around 1400. ECG showed junctional rhythm. Back to sinus rhythm through the rest of shift.

## 2023-05-17 NOTE — CARE PLAN
The patient is Watcher - Medium risk of patient condition declining or worsening    Shift Goals  Clinical Goals: Titrate off pressors and oxygen  Patient Goals: feel better, out of ICU  Family Goals: updates    Progress made toward(s) clinical / shift goals: Patient able to turn self in bed and get up to commode safely. Epi gtt off for most of shift and then restarted for bradycardia.  Problem: Skin Integrity  Goal: Skin integrity is maintained or improved  Outcome: Progressing     Problem: Fall Risk  Goal: Patient will remain free from falls  Outcome: Progressing     Problem: Hemodynamics  Goal: Patient's hemodynamics, fluid balance and neurologic status will be stable or improve  Outcome: Progressing

## 2023-05-17 NOTE — CARE PLAN
The patient is Stable - Low risk of patient condition declining or worsening    Shift Goals  Clinical Goals: Hemodynamic stability  Patient Goals: Rest  Family Goals: Be with patient    Progress made toward(s) clinical / shift goals:  PPM placed    Patient is not progressing towards the following goals:      Problem: Fall Risk  Goal: Patient will remain free from falls  Outcome: Progressing     Problem: Hemodynamics  Goal: Patient's hemodynamics, fluid balance and neurologic status will be stable or improve  Outcome: Progressing   PPM placed, Epi stopped, VSS  Problem: Pain - Standard  Goal: Alleviation of pain or a reduction in pain to the patient’s comfort goal  Outcome: Progressing    Pain associated with PPM incision site, medicated with available PRN.

## 2023-05-17 NOTE — DOCUMENTATION QUERY
[facility]                                                                       Query Response Note      PATIENT:               DANIELITO CRAWFORD  ACCT #:                  [id number]  MRN:                     [id number]  :                      1940  ADMIT DATE:       5/15/2023 8:12 PM  DISCH DATE:          RESPONDING  PROVIDER #:        [id number]           QUERY TEXT:    Based on the clinical indicators outlined above, please clarify if the following has been treated/evaluated during this hospitalization:    NOTE:  If an appropriate response is not listed below, please respond with a new note.    Thank you.      The patient's Clinical Indicators include:   Critical Care Note: Shock. Recently received diuresis.   Cardiology Note: Hypotensive possible related to over diuresis.  5/15 Vitals: SBP 70-80s, MAP 50-60s    Risk factor:  Diuretics    Treatment: Epinephrine infusion, IVF, critical care consult    Thank you,  Ceasar Perea  Clinical   Connect via [employer]  Options provided:   -- Hypovolemic shock   -- Cardiogenic shock   -- Other shock, Please specify      Query created by: Ceasar Perea on 2023 10:04 AM    RESPONSE TEXT:    Cardiogenic shock          Electronically signed by:  AILYN TONG MD 2023 12:59 PM

## 2023-05-17 NOTE — CARE PLAN
The patient is Watcher - Medium risk of patient condition declining or worsening    Shift Goals  Clinical Goals: Hemodynamic stability  Patient Goals: Rest  Family Goals: Be with patient    Progress made toward(s) clinical / shift goals:    Problem: Knowledge Deficit - Standard  Goal: Patient and family/care givers will demonstrate understanding of plan of care, disease process/condition, diagnostic tests and medications  Outcome: Progressing     Problem: Skin Integrity  Goal: Skin integrity is maintained or improved  Outcome: Progressing     Problem: Hemodynamics  Goal: Patient's hemodynamics, fluid balance and neurologic status will be stable or improve  Outcome: Progressing     Problem: Pain - Standard  Goal: Alleviation of pain or a reduction in pain to the patient’s comfort goal  Outcome: Progressing

## 2023-05-18 PROBLEM — R00.1 SYMPTOMATIC BRADYCARDIA: Status: ACTIVE | Noted: 2023-05-18

## 2023-05-18 LAB
ALBUMIN SERPL BCP-MCNC: 2.9 G/DL (ref 3.2–4.9)
ALBUMIN/GLOB SERPL: 1 G/DL
ALP SERPL-CCNC: 104 U/L (ref 30–99)
ALT SERPL-CCNC: 28 U/L (ref 2–50)
ANION GAP SERPL CALC-SCNC: 12 MMOL/L (ref 7–16)
AST SERPL-CCNC: 37 U/L (ref 12–45)
BACTERIA UR CULT: NORMAL
BASOPHILS # BLD AUTO: 0.5 % (ref 0–1.8)
BASOPHILS # BLD: 0.04 K/UL (ref 0–0.12)
BILIRUB SERPL-MCNC: 0.3 MG/DL (ref 0.1–1.5)
BUN SERPL-MCNC: 35 MG/DL (ref 8–22)
CALCIUM ALBUM COR SERPL-MCNC: 9.6 MG/DL (ref 8.5–10.5)
CALCIUM SERPL-MCNC: 8.7 MG/DL (ref 8.5–10.5)
CHLORIDE SERPL-SCNC: 106 MMOL/L (ref 96–112)
CO2 SERPL-SCNC: 25 MMOL/L (ref 20–33)
CREAT SERPL-MCNC: 1.4 MG/DL (ref 0.5–1.4)
EKG IMPRESSION: NORMAL
EOSINOPHIL # BLD AUTO: 0.07 K/UL (ref 0–0.51)
EOSINOPHIL NFR BLD: 0.8 % (ref 0–6.9)
ERYTHROCYTE [DISTWIDTH] IN BLOOD BY AUTOMATED COUNT: 56.4 FL (ref 35.9–50)
GFR SERPLBLD CREATININE-BSD FMLA CKD-EPI: 38 ML/MIN/1.73 M 2
GLOBULIN SER CALC-MCNC: 2.8 G/DL (ref 1.9–3.5)
GLUCOSE SERPL-MCNC: 108 MG/DL (ref 65–99)
HCT VFR BLD AUTO: 38.8 % (ref 37–47)
HGB BLD-MCNC: 12.2 G/DL (ref 12–16)
IMM GRANULOCYTES # BLD AUTO: 0.05 K/UL (ref 0–0.11)
IMM GRANULOCYTES NFR BLD AUTO: 0.6 % (ref 0–0.9)
LYMPHOCYTES # BLD AUTO: 1.38 K/UL (ref 1–4.8)
LYMPHOCYTES NFR BLD: 16.1 % (ref 22–41)
MAGNESIUM SERPL-MCNC: 2.5 MG/DL (ref 1.5–2.5)
MCH RBC QN AUTO: 29.4 PG (ref 27–33)
MCHC RBC AUTO-ENTMCNC: 31.4 G/DL (ref 33.6–35)
MCV RBC AUTO: 93.5 FL (ref 81.4–97.8)
MONOCYTES # BLD AUTO: 0.82 K/UL (ref 0–0.85)
MONOCYTES NFR BLD AUTO: 9.6 % (ref 0–13.4)
NEUTROPHILS # BLD AUTO: 6.21 K/UL (ref 2–7.15)
NEUTROPHILS NFR BLD: 72.4 % (ref 44–72)
NRBC # BLD AUTO: 0 K/UL
NRBC BLD-RTO: 0 /100 WBC
PLATELET # BLD AUTO: 209 K/UL (ref 164–446)
PMV BLD AUTO: 8.5 FL (ref 9–12.9)
POTASSIUM SERPL-SCNC: 4.3 MMOL/L (ref 3.6–5.5)
PROT SERPL-MCNC: 5.7 G/DL (ref 6–8.2)
RBC # BLD AUTO: 4.15 M/UL (ref 4.2–5.4)
SIGNIFICANT IND 70042: NORMAL
SITE SITE: NORMAL
SODIUM SERPL-SCNC: 143 MMOL/L (ref 135–145)
SOURCE SOURCE: NORMAL
WBC # BLD AUTO: 8.6 K/UL (ref 4.8–10.8)

## 2023-05-18 PROCEDURE — 99232 SBSQ HOSP IP/OBS MODERATE 35: CPT | Mod: 24,FS | Performed by: STUDENT IN AN ORGANIZED HEALTH CARE EDUCATION/TRAINING PROGRAM

## 2023-05-18 PROCEDURE — 770020 HCHG ROOM/CARE - TELE (206)

## 2023-05-18 PROCEDURE — 99222 1ST HOSP IP/OBS MODERATE 55: CPT | Performed by: HOSPITALIST

## 2023-05-18 PROCEDURE — A9270 NON-COVERED ITEM OR SERVICE: HCPCS | Performed by: HOSPITALIST

## 2023-05-18 PROCEDURE — 83735 ASSAY OF MAGNESIUM: CPT

## 2023-05-18 PROCEDURE — 85025 COMPLETE CBC W/AUTO DIFF WBC: CPT

## 2023-05-18 PROCEDURE — 700102 HCHG RX REV CODE 250 W/ 637 OVERRIDE(OP): Performed by: HOSPITALIST

## 2023-05-18 PROCEDURE — A9270 NON-COVERED ITEM OR SERVICE: HCPCS | Performed by: INTERNAL MEDICINE

## 2023-05-18 PROCEDURE — 700105 HCHG RX REV CODE 258: Performed by: INTERNAL MEDICINE

## 2023-05-18 PROCEDURE — 700111 HCHG RX REV CODE 636 W/ 250 OVERRIDE (IP): Performed by: INTERNAL MEDICINE

## 2023-05-18 PROCEDURE — 700102 HCHG RX REV CODE 250 W/ 637 OVERRIDE(OP): Performed by: INTERNAL MEDICINE

## 2023-05-18 PROCEDURE — 93010 ELECTROCARDIOGRAM REPORT: CPT | Performed by: INTERNAL MEDICINE

## 2023-05-18 PROCEDURE — 80053 COMPREHEN METABOLIC PANEL: CPT

## 2023-05-18 PROCEDURE — 99233 SBSQ HOSP IP/OBS HIGH 50: CPT | Mod: GC | Performed by: INTERNAL MEDICINE

## 2023-05-18 PROCEDURE — 700111 HCHG RX REV CODE 636 W/ 250 OVERRIDE (IP): Performed by: STUDENT IN AN ORGANIZED HEALTH CARE EDUCATION/TRAINING PROGRAM

## 2023-05-18 RX ORDER — FUROSEMIDE 10 MG/ML
20 INJECTION INTRAMUSCULAR; INTRAVENOUS
Status: DISCONTINUED | OUTPATIENT
Start: 2023-05-18 | End: 2023-05-19 | Stop reason: HOSPADM

## 2023-05-18 RX ORDER — SPIRONOLACTONE 25 MG/1
25 TABLET ORAL
Status: DISCONTINUED | OUTPATIENT
Start: 2023-05-18 | End: 2023-05-19 | Stop reason: HOSPADM

## 2023-05-18 RX ADMIN — FUROSEMIDE 20 MG: 10 INJECTION, SOLUTION INTRAVENOUS at 09:18

## 2023-05-18 RX ADMIN — CEFAZOLIN 2 G: 2 INJECTION, POWDER, FOR SOLUTION INTRAMUSCULAR; INTRAVENOUS at 05:36

## 2023-05-18 RX ADMIN — ROSUVASTATIN 5 MG: 10 TABLET, FILM COATED ORAL at 17:22

## 2023-05-18 RX ADMIN — APIXABAN 2.5 MG: 2.5 TABLET, FILM COATED ORAL at 17:22

## 2023-05-18 RX ADMIN — SPIRONOLACTONE 25 MG: 25 TABLET ORAL at 11:34

## 2023-05-18 RX ADMIN — SENNOSIDES AND DOCUSATE SODIUM 2 TABLET: 50; 8.6 TABLET ORAL at 05:40

## 2023-05-18 RX ADMIN — ACETAMINOPHEN 650 MG: 325 TABLET, FILM COATED ORAL at 05:47

## 2023-05-18 ASSESSMENT — COGNITIVE AND FUNCTIONAL STATUS - GENERAL
MOVING FROM LYING ON BACK TO SITTING ON SIDE OF FLAT BED: A LITTLE
HELP NEEDED FOR BATHING: A LITTLE
TURNING FROM BACK TO SIDE WHILE IN FLAT BAD: A LITTLE
WALKING IN HOSPITAL ROOM: A LITTLE
DAILY ACTIVITIY SCORE: 20
DRESSING REGULAR LOWER BODY CLOTHING: A LITTLE
TOILETING: A LITTLE
CLIMB 3 TO 5 STEPS WITH RAILING: A LITTLE
SUGGESTED CMS G CODE MODIFIER MOBILITY: CK
MOVING TO AND FROM BED TO CHAIR: A LITTLE
SUGGESTED CMS G CODE MODIFIER DAILY ACTIVITY: CJ
DRESSING REGULAR UPPER BODY CLOTHING: A LITTLE
MOBILITY SCORE: 18
STANDING UP FROM CHAIR USING ARMS: A LITTLE

## 2023-05-18 ASSESSMENT — ENCOUNTER SYMPTOMS
DIZZINESS: 0
STRIDOR: 0
DIARRHEA: 0
NAUSEA: 0
MUSCULOSKELETAL NEGATIVE: 1
ABDOMINAL PAIN: 0
BLOOD IN STOOL: 0
FEVER: 0
LIGHT-HEADEDNESS: 0
HEMATOLOGIC/LYMPHATIC NEGATIVE: 1
ENDOCRINE NEGATIVE: 1
EYE PAIN: 0
MYALGIAS: 0
ALLERGIC/IMMUNOLOGIC NEGATIVE: 1
WHEEZING: 0
SORE THROAT: 0
CONSTIPATION: 0
SHORTNESS OF BREATH: 0
DIAPHORESIS: 0
GASTROINTESTINAL NEGATIVE: 1
NEUROLOGICAL NEGATIVE: 1
BLURRED VISION: 0
SENSORY CHANGE: 0
SPEECH CHANGE: 0
CHILLS: 0
PSYCHIATRIC NEGATIVE: 1
HEMOPTYSIS: 0
CHEST TIGHTNESS: 0
HEARTBURN: 0
VOMITING: 0
EYES NEGATIVE: 1
CLAUDICATION: 0
BRUISES/BLEEDS EASILY: 0
SINUS PAIN: 0
FATIGUE: 0
CONSTITUTIONAL NEGATIVE: 1
SPUTUM PRODUCTION: 0
HEADACHES: 0
FOCAL WEAKNESS: 0
WEAKNESS: 0
RESPIRATORY NEGATIVE: 1
COUGH: 0
FLANK PAIN: 0
PALPITATIONS: 0
ORTHOPNEA: 0
DEPRESSION: 0
NERVOUS/ANXIOUS: 0
BACK PAIN: 0

## 2023-05-18 ASSESSMENT — PATIENT HEALTH QUESTIONNAIRE - PHQ9
2. FEELING DOWN, DEPRESSED, IRRITABLE, OR HOPELESS: NOT AT ALL
SUM OF ALL RESPONSES TO PHQ9 QUESTIONS 1 AND 2: 0
SUM OF ALL RESPONSES TO PHQ9 QUESTIONS 1 AND 2: 0
2. FEELING DOWN, DEPRESSED, IRRITABLE, OR HOPELESS: NOT AT ALL
1. LITTLE INTEREST OR PLEASURE IN DOING THINGS: NOT AT ALL
1. LITTLE INTEREST OR PLEASURE IN DOING THINGS: NOT AT ALL

## 2023-05-18 ASSESSMENT — LIFESTYLE VARIABLES
EVER FELT BAD OR GUILTY ABOUT YOUR DRINKING: NO
ON A TYPICAL DAY WHEN YOU DRINK ALCOHOL HOW MANY DRINKS DO YOU HAVE: 0
AVERAGE NUMBER OF DAYS PER WEEK YOU HAVE A DRINK CONTAINING ALCOHOL: 0
ALCOHOL_USE: NO
TOTAL SCORE: 0
TOTAL SCORE: 0
HAVE PEOPLE ANNOYED YOU BY CRITICIZING YOUR DRINKING: NO
TOTAL SCORE: 0
HAVE YOU EVER FELT YOU SHOULD CUT DOWN ON YOUR DRINKING: NO
EVER HAD A DRINK FIRST THING IN THE MORNING TO STEADY YOUR NERVES TO GET RID OF A HANGOVER: NO
HOW MANY TIMES IN THE PAST YEAR HAVE YOU HAD 5 OR MORE DRINKS IN A DAY: 0
DOES PATIENT WANT TO STOP DRINKING: NO
CONSUMPTION TOTAL: NEGATIVE

## 2023-05-18 ASSESSMENT — FIBROSIS 4 INDEX
FIB4 SCORE: 2.71
FIB4 SCORE: 2.71

## 2023-05-18 NOTE — PROGRESS NOTES
Monitor strip unavailable    A-Fib 60-70s prior to PPM placement    Patient arrived from cath lab SR 60-70s    Converted back to A Fib 60s with intermittent pacing    BP stable throughout shift. Epi gtt DCd in cath lab. PPM site dressing CDI without hematoma or swelling.

## 2023-05-18 NOTE — DISCHARGE INSTRUCTIONS
Pacemaker Discharge Instructions/[facility]    1.  No showers until seen in follow up; may take sponge bath.  Keep dressing dry & in place until seen at for you follow up visit at the cardiology office.     2.  No lifting over 10 lbs with affected arm for six weeks.  3.  Do not raise affected arm above shoulder level or behind head for six weeks.  4.  Avoid excessive pushing, pulling, or twisting for six weeks.  5.  No driving for the first week.  6.  Call our office ([phone]) if you notice any increased swelling, redness, warmth, or drainage at the implant site.  7.  Needs to be seen in emergency if you develop fever > 101F or uncontrolled pain.  8.  Always check with device clinic before any planned MRI to see if device is MRI compatible.  9.  No routine dental work or cleanings for 3 months.  10.  May remove arm sling after one day, but please wear if you have trouble remembering to keep your arm down.  Please wear at night as a reminder.   11. Do not place cell phones or mobile devices directly over implanted device.   12. You will need to be seen at least twice in the device clinic for checks while the pacemaker is healing.  Expect appointment approximately one week after implant and 6-7 weeks post implant.          HF Patient Discharge Instructions  Monitor your weight daily, and maintain a weight chart, to track your weight changes.   Activity as tolerated, unless your Doctor has ordered otherwise. Other activity order: As tolerated.  Follow a low fat, low cholesterol, low salt diet unless instructed otherwise by your Doctor. Read the labels on the back of food products and track your intake of fat, cholesterol and salt.   Fluid Restriction Yes. If a Fluid Restriction has been ordered by your Doctor, measure fluids with a measuring cup to ensure that you are not exceeding the restriction. 64oz  No smoking.  Oxygen No. If your Doctor has ordered that you wear Oxygen at home, it is important to wear  it as ordered.  Did you receive an explanation from staff on the importance of taking each of your medications and why it is necessary to keep taking them unless your doctor says to stop? Yes  Were all of your questions answered about how to manage your heart failure and what to do if you have increased signs and symptoms after you go home? Yes  Do you feel like your heart failure care team involved you in the care treatment plan and allowed you to make decisions regarding your care while in the hospital and addressed any discharge needs you might have? Yes    See the educational handout provided at discharge for more information on monitoring your daily weight, activity and diet. This also explains more about Heart Failure, symptoms of a flare-up and some of the tests that you have undergone.     Warning Signs of a Flare-Up include:  Swelling in the ankles or lower legs.  Shortness of breath, while at rest, or while doing normal activities.   Shortness of breath at night when in bed, or coughing in bed.   Requiring more pillows to sleep at night, or needing to sit up at night to sleep.  Feeling weak, dizzy or fatigued.     When to call your Doctor:  Call [employer] seven days a week from 8:00 a.m. to 8:00 p.m. for medical questions [phone].  Call your Primary Care Physician or Cardiologist if:   You experience any pain radiating to your jaw or neck.  You have any difficulty breathing.  You experience weight gain of 3 lbs in a day or 5 lbs in a week.   You feel any palpitations or irregular heartbeats.  You become dizzy or lose consciousness.   If you have had an angiogram or had a pacemaker or AICD placed, and experience:  Bleeding, drainage or swelling at the surgical / puncture site.  Fever greater than 100.0 F  Shock from internal defibrillator.  Cool and / or numb extremities.     Please access the [facility] My HF Guide/Heart Failure Interactive Workbook:   [email or web address]

## 2023-05-18 NOTE — PROGRESS NOTES
4 Eyes Skin Assessment Completed by JULIETTE Stevens and JULIETTE Bolden.    Head WDL  Ears WDL  Nose WDL  Mouth WDL  Neck WDL  Breast/Chest Incision  Shoulder Blades WDL  Spine WDL  (R) Arm/Elbow/Hand WDL  (L) Arm/Elbow/Hand WDL  Abdomen WDL  Groin WDL  Scrotum/Coccyx/Buttocks WDL  (R) Leg WDL  (L) Leg WDL  (R) Heel/Foot/Toe WDL  (L) Heel/Foot/Toe WDL          Devices In Places Tele Box and Pulse Ox      Interventions In Place Gray Ear Foams, Pillows, Heels Loaded W/Pillows, and Pressure Redistribution Mattress    Possible Skin Injury No    Pictures Uploaded Into Epic N/A  Wound Consult Placed N/A  RN Wound Prevention Protocol Ordered No

## 2023-05-18 NOTE — CONSULTS
Hospital Medicine Consultation    Date of Service  5/18/2023    Referring Physician  Terry Juarez M.D.    Consulting Physician  Remigio Logan D.O.    Reason for Consultation  Transfer of care out of ICU for hypoxia and hypotension    History of Presenting Illness  Belinda Meier is an 82 y.o. female with a pmhx of afib on Eliquis, chronic HFpEF with RVSP:40-45%, DLD, CVA. She presented 5/15/2023 with shortness of breath over the prior week and malaise.  In the ED she was found to have HR in 40's and hypotensive.   She was admitted to the ICU and given epinephrine along with a fluid bolus. She had cardiology consult and consensus was possible overdiuresis. Electrophysiology was consulted and felt it was okay to hold off on pacemaker.      5/18:  Patient alert and sitting upright in a chair with clear speech. She is on 2L to keep her SpO2at 92%.  Without oxygen she desaturated to 87%.  Her  and daughter are at her bedside.  Dr Holden, cardiology, and myself evaluated patient and were in agreement to restart her aldactone and continue diuresis.      Review of Systems  Review of Systems   Constitutional:  Positive for malaise/fatigue. Negative for fever.   Respiratory:  Negative for cough, sputum production, shortness of breath and stridor.    Cardiovascular:  Negative for chest pain, palpitations and leg swelling.   Gastrointestinal:  Negative for abdominal pain and nausea.   Genitourinary:  Negative for dysuria.   Musculoskeletal:  Negative for back pain.   Neurological:  Negative for sensory change and speech change.   Psychiatric/Behavioral:  The patient is not nervous/anxious.        Past Medical History   has a past medical history of Blindness of left eye, Congestive heart failure (HCC), Hyperlipemia, and Stroke (HCC) (08/01/2022).    Surgical History   has a past surgical history that includes cystectomy (1963) and cataract extraction with iol.    Family History  family history includes Alcohol abuse in  her brother; Heart Disease in her brother; No Known Problems in her daughter and daughter; Other in her father and mother.    Social History   reports that she has never smoked. She has never used smokeless tobacco. She reports current alcohol use of about 0.6 oz of alcohol per week. She reports that she does not use drugs. She has children.  She is .    Medications  Current Facility-Administered Medications   Medication Dose Route Frequency Provider Last Rate Last Admin    furosemide (LASIX) injection 20 mg  20 mg Intravenous Q DAY Nathalie Ness M.D.   20 mg at 05/18/23 0918    spironolactone (ALDACTONE) tablet 25 mg  25 mg Oral Q DAY Remigio Logan D.O.   25 mg at 05/18/23 1134    acetaminophen (Tylenol) tablet 650 mg  650 mg Oral Q4HRS PRN Moises Acosta M.D.   650 mg at 05/18/23 0547    rosuvastatin (CRESTOR) tablet 5 mg  5 mg Oral Q EVENING Denise Hurley M.D.   5 mg at 05/18/23 1722    albuterol inhaler 2 Puff  2 Puff Inhalation Q6HRS PRN Terry Juarez M.D.   2 Puff at 05/16/23 1436    senna-docusate (PERICOLACE or SENOKOT S) 8.6-50 MG per tablet 2 Tablet  2 Tablet Oral BID Denise Hurley M.D.   2 Tablet at 05/18/23 0540    And    polyethylene glycol/lytes (MIRALAX) PACKET 1 Packet  1 Packet Oral QDAY PRN Denise Hurley M.D.        And    magnesium hydroxide (MILK OF MAGNESIA) suspension 30 mL  30 mL Oral QDAY PRN Denise Hurley M.D.        And    bisacodyl (DULCOLAX) suppository 10 mg  10 mg Rectal QDAY PRN Denise Hurley M.D.        apixaban (ELIQUIS) tablet 2.5 mg  2.5 mg Oral BID Denise Hurley M.D.   2.5 mg at 05/18/23 1722    ondansetron (ZOFRAN) syringe/vial injection 4 mg  4 mg Intravenous Q4HRS PRN Denise Hurley M.D.        ondansetron (ZOFRAN ODT) dispertab 4 mg  4 mg Oral Q4HRS PRN Denise Hurley M.D.           Allergies  Allergies   Allergen Reactions    Atorvastatin Myalgia     Severe muscle weakness    Pcn [Penicillins] Hives    Amiodarone Rash     rash        Physical Exam  Temp:  [35.8 °C (96.4 °F)-38.1 °C (100.6 °F)] 38.1 °C (100.6 °F)  Pulse:  [74-89] 89  Resp:  [12-26] 18  BP: ()/(55-73) 111/68  SpO2:  [91 %-97 %] 95 %    Physical Exam  Vitals reviewed.   Constitutional:       Appearance: Normal appearance. She is not diaphoretic.   HENT:      Head: Normocephalic and atraumatic.      Nose: Nose normal.      Mouth/Throat:      Mouth: Mucous membranes are moist.      Pharynx: No oropharyngeal exudate.   Eyes:      General: No scleral icterus.        Right eye: No discharge.         Left eye: No discharge.      Extraocular Movements: Extraocular movements intact.      Conjunctiva/sclera: Conjunctivae normal.   Cardiovascular:      Rate and Rhythm: Normal rate and regular rhythm.      Pulses:           Radial pulses are 2+ on the right side and 2+ on the left side.        Dorsalis pedis pulses are 2+ on the right side and 2+ on the left side.      Heart sounds: No murmur heard.  Pulmonary:      Effort: Pulmonary effort is normal. No respiratory distress.      Breath sounds: Decreased breath sounds present. No wheezing or rales.   Abdominal:      General: Bowel sounds are normal. There is no distension.      Palpations: Abdomen is soft.   Musculoskeletal:         General: No swelling or tenderness.      Cervical back: Neck supple. No tenderness. No muscular tenderness.      Right lower leg: No edema.      Left lower leg: No edema.   Skin:     Coloration: Skin is not jaundiced or pale.   Neurological:      General: No focal deficit present.      Mental Status: She is alert and oriented to person, place, and time. Mental status is at baseline.      Cranial Nerves: No cranial nerve deficit.   Psychiatric:         Mood and Affect: Mood normal.         Behavior: Behavior normal.         Fluids  Date 05/18/23 0700 - 05/19/23 0659   Shift 3608-5309 9923-6732 5519-3330 24 Hour Total   INTAKE   P.O. 240   240   IV Piggyback 0   0   Shift Total 240   240   OUTPUT    Urine 400   400   Shift Total 400   400   Weight (kg) 57.4 57.4 57.4 57.4       Laboratory  Recent Labs     05/16/23  0538 05/17/23  0300 05/18/23  0405   WBC 8.9 8.8 8.6   RBC 3.57* 4.19* 4.15*   HEMOGLOBIN 11.7* 12.5 12.2   HEMATOCRIT 33.5* 39.4 38.8   MCV 93.8 94.0 93.5   MCH 32.8 29.8 29.4   MCHC 34.9 31.7* 31.4*   RDW 54.1* 56.5* 56.4*   PLATELETCT 223 224 209   MPV 8.9* 8.9* 8.5*     Recent Labs     05/16/23  1515 05/17/23  0300 05/18/23  0405   SODIUM 137 135 143   POTASSIUM 4.5 4.2 4.3   CHLORIDE 97 99 106   CO2 27 26 25   GLUCOSE 120* 103* 108*   BUN 47* 40* 35*   CREATININE 1.83* 1.36 1.40   CALCIUM 8.8 8.7 8.7                     Imaging  DX-CHEST-PORTABLE (1 VIEW)   Final Result      No evidence of pneumothorax status post left cardiac pacer placement.      Right greater than left pleural effusions and interstitial pulmonary edema again noted.      EC-ECHOCARDIOGRAM COMPLETE W/O CONT   Final Result      DX-CHEST-PORTABLE (1 VIEW)   Final Result         1.  Interstitial pulmonary parenchymal prominence suggest chronic underlying lung disease, component of interstitial edema and/or infiltrates not excluded.   2.  Small bilateral pleural effusions, right greater than left   3.  Cardiomegaly   4.  Atherosclerosis      CL-PERMANENT PACEMAKER INSERTION    (Results Pending)       Assessment/Plan  * Hypotension- (present on admission)  Assessment & Plan  Improved with fluids  Monitor vitals, I/O's  There was initial concern of over diuresis but as of 5/18 there is concern of need of restarting diuresis    Bradycardia  Assessment & Plan  Holding beta blocker and monitor on tele  Cardiology consulting.    ERNESTINE (acute kidney injury) (HCC)  Assessment & Plan  5/18 BUN:35, Cr:1.4  Monitor labs, I/O's and vitals    DNR (do not resuscitate)- (present on admission)  Assessment & Plan  Intubation okay but no CPR    Acute on chronic respiratory failure with hypoxia (HCC)- (present on admission)  Assessment &  Plan  Encouraged deep inspiratory efforts but only pulling 500cc on IS.  Requires 2L O2 via NC as drops to 87% on room air just sitting.  Mobilize  Lasix and added aldactone.  Monitor renal function.  May need home oxygen.  RT per protocol    Heart failure with preserved ejection fraction (HCC)- (present on admission)  Assessment & Plan  5/16/2023 Echo:  CONCLUSIONS  Compared to the images of the prior study 2/15/23, there has been no   significant change.   Normal left ventricular systolic function.  The left ventricular ejection fraction is visually estimated to be 60%.  Diastolic function is difficult to assess with arrhythmia.  Normal right ventricular size and systolic function.  Moderate mitral regurgitation.  Mild aortic insufficiency.  Moderate tricuspid regurgitation.  Estimated right ventricular systolic pressure is 40 mmHg.  Pleural effusion present.    On lasix and starting back on aldactone 25mg  Dr Holden and I discussed treatment.    AF (atrial fibrillation) (HCC)- (present on admission)  Assessment & Plan  On apixaban 2.5mg BID  Holding beta blocker due to bradycardia  Monitor on telemetry    Legally blind in left eye, as defined in [place]- (present on admission)  Assessment & Plan  noted

## 2023-05-18 NOTE — PROGRESS NOTES
Cardiology Follow Up Progress Note    Date of Service  5/18/2023    Attending Physician  Remigio Logan M.D.    Chief Complaint   Hypotension and dehydration    HPI  Belinda Meier is a 82 y.o. female with a history of atrial fibrillation on OAC, HFpEF, elevated RVSP, stroke. Admitted 5/15/2023 with complaints of worsening fatigue and SOB x 1 week. In the ED she was found to be hypotensive, bradycardic, wit a junctional bradycardia rhythm, was placed on an epinephrine drip with good response, but returned to junctional bradycardia when discontinued. Pacemaker placed by Dr. Marcum 5/17/23.    Interim Events  Patient is found sitting up in a chair at the bedside, remains on 2 L NC, on room air at home. Does report improvement in her leg swelling but not back to her baseline, mild JVD noted. HR 60's to 80's intermittently paced.    Review of Systems  Review of Systems   Constitutional: Negative.  Negative for chills, diaphoresis, fatigue and fever.   HENT: Negative.     Eyes: Negative.    Respiratory: Negative.  Negative for cough, chest tightness and shortness of breath.    Cardiovascular:  Positive for leg swelling. Negative for chest pain and palpitations.   Gastrointestinal: Negative.  Negative for constipation, diarrhea, nausea and vomiting.   Endocrine: Negative.    Genitourinary: Negative.  Negative for decreased urine volume, difficulty urinating, dysuria and frequency.   Musculoskeletal: Negative.    Skin: Negative.    Allergic/Immunologic: Negative.    Neurological: Negative.  Negative for dizziness and light-headedness.   Hematological: Negative.  Does not bruise/bleed easily.   Psychiatric/Behavioral: Negative.         Vital signs in last 24 hours  Temp:  [35.8 °C (96.4 °F)-36.1 °C (97 °F)] 36.1 °C (97 °F)  Pulse:  [60-86] 86  Resp:  [12-52] 26  BP: ()/(58-77) 98/60  SpO2:  [91 %-100 %] 91 %    Physical Exam  Physical Exam  Vitals and nursing note reviewed.   Constitutional:       General: She is not  in acute distress.     Appearance: Normal appearance. She is not toxic-appearing.   HENT:      Head: Normocephalic and atraumatic.      Right Ear: External ear normal.      Left Ear: External ear normal.      Nose: Nose normal.      Mouth/Throat:      Mouth: Mucous membranes are moist.      Pharynx: Oropharynx is clear.   Eyes:      General: No scleral icterus.     Extraocular Movements: Extraocular movements intact.      Conjunctiva/sclera: Conjunctivae normal.      Pupils: Pupils are equal, round, and reactive to light.   Neck:      Vascular: No JVD.   Cardiovascular:      Rate and Rhythm: Normal rate.      Pulses: Normal pulses.      Heart sounds: Normal heart sounds. No murmur heard.     No friction rub. No gallop.   Pulmonary:      Effort: Pulmonary effort is normal. No respiratory distress.      Breath sounds: Rales (mild) present.   Abdominal:      General: Abdomen is flat. Bowel sounds are normal. There is no distension.      Palpations: Abdomen is soft.   Musculoskeletal:         General: Normal range of motion.      Cervical back: Normal range of motion and neck supple.      Right lower le+ Pitting Edema present.      Left lower le+ Pitting Edema present.   Skin:     General: Skin is warm and dry.      Capillary Refill: Capillary refill takes less than 2 seconds.      Coloration: Skin is not jaundiced.   Neurological:      General: No focal deficit present.      Mental Status: She is alert and oriented to person, place, and time.   Psychiatric:         Mood and Affect: Mood normal.         Behavior: Behavior normal.         Lab Review  Lab Results   Component Value Date/Time    WBC 8.6 2023 04:05 AM    RBC 4.15 (L) 2023 04:05 AM    HEMOGLOBIN 12.2 2023 04:05 AM    HEMATOCRIT 38.8 2023 04:05 AM    MCV 93.5 2023 04:05 AM    MCH 29.4 2023 04:05 AM    MCHC 31.4 (L) 2023 04:05 AM    MPV 8.5 (L) 2023 04:05 AM      Lab Results   Component Value Date/Time     SODIUM 143 05/18/2023 04:05 AM    POTASSIUM 4.3 05/18/2023 04:05 AM    CHLORIDE 106 05/18/2023 04:05 AM    CO2 25 05/18/2023 04:05 AM    GLUCOSE 108 (H) 05/18/2023 04:05 AM    BUN 35 (H) 05/18/2023 04:05 AM    CREATININE 1.40 05/18/2023 04:05 AM      Lab Results   Component Value Date/Time    ASTSGOT 37 05/18/2023 04:05 AM    ALTSGPT 28 05/18/2023 04:05 AM     Lab Results   Component Value Date/Time    CHOLSTRLTOT 198 02/15/2023 02:28 AM     (H) 02/15/2023 02:28 AM    HDL 69 02/15/2023 02:28 AM    TRIGLYCERIDE 113 02/15/2023 02:28 AM    TROPONINT 185 (H) 05/16/2023 12:00 PM       Recent Labs     05/15/23  2104 05/16/23  1515   NTPROBNP 45580* 24571*           Assessment/Plan  #Junctional bradycardia  #Hypotension  #HFpEF  #Paroxysmal atrial fibrillation     Recommendations:  -Pacemaker in place, no issues, working well  -no hematoma at the site, dressing C/D/I  -Reviewed pacemaker discharge instructions  -wean oxygen as tolerated encourage IS  -Ok to transition to oral diuretics, 50 mg torsemide BID and 25 mg spironolactone daily  -Pacer check outpatient 5/25/23  -Clinic follow up 5/30/23    Thank you for allowing me to participate in the care of Regla Meier .      Gina Guzman, MSN, APRN  [facility]  [phone]      PLEASE NOTE: This Note was created using voice recognition Software. I have made every reasonable attempt to correct obvious errors, but I expect that there are errors of grammar and possibly content that I did not discover before finalizing the note.     I personally spent a total of 14 minutes which includes face-to-face time and non-face-to-face time spent on preparing to see the patient, reviewing hospital notes and tests, obtaining history from the patient, performing a medically appropriate exam, counseling and educating the patient, ordering medications/tests/procedures/referrals as clinically indicated, and documenting information in the  electronic medical record.

## 2023-05-18 NOTE — CARE PLAN
The patient is Stable - Low risk of patient condition declining or worsening    Shift Goals  Clinical Goals: monitor HR, BP  Patient Goals: discharge  Family Goals: Be with patient    Progress made toward(s) clinical / shift goals:  Patient up to chair, commode, SBA with cane.   Problem: Mobility  Goal: Patient's capacity to carry out activities will improve  Description: Target End Date:  Prior to discharge or change in level of care    1.  Assess for barriers to mobility/activity  2.  Implement activity per interdisciplinary team recommendations  3.  Target activity level identified and patient/family/caregiver aware of goal  4.  Provide assistive devices  5.  Instruct patient/caregiver on proper use of assistive/adaptive devices  6.  Schedule activities and rest periods to decrease effects of fatigue  7.  Encourage mobilization to extent of ability  8.  Maintain proper body alignment  9.  Provide adequate pain management to allow progressive mobilization  10. Implement pace maker precautions as needed  Outcome: Progressing     Problem: Care Map:  Admission Optimal Outcome for the Heart Failure Patient  Goal: Admission:  Optimal Care of the heart failure patient  Description: Target End Date:  end of day 1  Outcome: Progressing       Patient is not progressing towards the following goals:

## 2023-05-18 NOTE — CARE PLAN
Monitor Summary: Hr 60-80s, intermittently paced                    The patient is Stable - Low risk of patient condition declining or worsening    Shift Goals  Clinical Goals: maintain Map>65  Patient Goals: Rest  Family Goals: Be with patient    Progress made toward(s) clinical / shift goals:  Very low/no pain amenable to tylenol x1 dose.  Uses call light appropriately. Pacing as needed      Problem: Knowledge Deficit - Standard  Goal: Patient and family/care givers will demonstrate understanding of plan of care, disease process/condition, diagnostic tests and medications  Outcome: Progressing     Problem: Fall Risk  Goal: Patient will remain free from falls  Outcome: Progressing     Problem: Hemodynamics  Goal: Patient's hemodynamics, fluid balance and neurologic status will be stable or improve  Outcome: Progressing     Problem: Pain - Standard  Goal: Alleviation of pain or a reduction in pain to the patient’s comfort goal  Outcome: Progressing       Patient is not progressing towards the following goals:

## 2023-05-18 NOTE — PROGRESS NOTES
"[facility] Progress Note      Admit Date: 5/15/2023    Resident(s): Nathalie Ness M.D.   Attending:  ENRIQUE THACKER/ Dr. Juarez     Patient ID:    Name:  Regla Meier   YOB: 1940  Age:  82 y.o.  female   MRN:  [id number]    Hospital Course (carried forward and updated):  \"Regla Meier is a 82 y.o. female with the past medical history of atrial fibrillation on Eliquis, chronic right sided heart failure with RVSP of 40-45 mmHg, dyslipidemia, and stroke with visual defects who presented to the ER with complaints of worsening fatigue and shortness of breath for the last week.  She denies any medication changes and continues to be compliant with her diuretics.  She also notes mild chest pain that is nonradiating.  She denies cough, nausea, vomiting, or recent sick contacts.  She has had some mild diarrhea  with urinary urgency and frequency.  Due to the patient having worsening symptoms she was brought to the ER where she was found to have a heart rate in the 40s with hypotension.  The patient was given 500cc bolus and started on epinephrine infusion.  She was admitted to the ICU for ongoing care.\"    5/15- admitted overnight  5/16- HDS, trialed off Epi failed in afternoon with bradycardia/junctional  5/17- HDS, Epi 0.01, Pacemaker placed  5/18- HDS, resume diuresis, off pressors>12 hours, transfer out     Consultants:  Critical Care  EP Cardiology     Interval Events:    -No acute events overnight  - Patient is hemodynamically stable, rate controlled on pacer without bradycardia  -Patient without acute complaint    Daily Multi discipline Rounding Report:  Neuro: aox4, moves all, generalize pain  HR: Sinus 80s this am  SBP: High 90s to 110s  Tmax: afebrile  GI: Cardiac diet    UOP: adequate  Lines: 2PIV  Resp: 2 L nasal cannula  Vte: AC   PPI/H2: Not indicated  Antibx: Ceftriaxone (2/4)    -Restart apixaban  - Trial diuresis  -Pacemaker todiday reevaluate after pacemaker  "   -Electrophysiology and neurology consulted appreciate recs  - Transferred to lower level of care    Vitals Range last 24h:  Temp:  [35.8 °C (96.4 °F)-36 °C (96.8 °F)] 35.8 °C (96.4 °F)  Pulse:  [60-85] 78  Resp:  [12-52] 12  BP: ()/(58-77) 98/60  SpO2:  [92 %-100 %] 95 %      Intake/Output Summary (Last 24 hours) at 5/18/2023 0802  Last data filed at 5/18/2023 0400  Gross per 24 hour   Intake 350.35 ml   Output 1000 ml   Net -649.65 ml          Review of Systems   Constitutional:  Negative for chills and fever.   HENT:  Negative for ear discharge, ear pain, hearing loss, nosebleeds, sinus pain and sore throat.    Eyes:  Negative for blurred vision and pain.   Respiratory:  Negative for cough, hemoptysis, sputum production, shortness of breath and wheezing.    Cardiovascular:  Negative for chest pain, palpitations, orthopnea, claudication and leg swelling.   Gastrointestinal:  Negative for abdominal pain, blood in stool, constipation, diarrhea, heartburn, melena, nausea and vomiting.   Genitourinary:  Negative for dysuria, flank pain, frequency, hematuria and urgency.   Musculoskeletal:  Negative for back pain and myalgias.   Skin:  Negative for itching and rash.   Neurological:  Negative for dizziness, sensory change, focal weakness, weakness and headaches.   Endo/Heme/Allergies:  Negative for environmental allergies. Does not bruise/bleed easily.   Psychiatric/Behavioral:  Negative for depression. The patient is not nervous/anxious.        PHYSICAL EXAM:  Vitals:    05/18/23 0500 05/18/23 0600 05/18/23 0644 05/18/23 0700   BP: 101/67 92/60 99/62 98/60   Pulse: 78 75 77 78   Resp: 15 15 20 12   Temp:       TempSrc:       SpO2: 92% 96% 93% 95%   Weight:       Height:        Body mass index is 24.19 kg/m².    O2 therapy: Pulse Oximetry: 95 %, O2 (LPM): 2, O2 Delivery Device: Silicone Nasal Cannula         Physical Exam  General: Well developed, well nourished female, in no acute distress.  HEENT: NC/AT, PERRL,  EOMI, no scleral icterus or conjunctival pallor, fair dentition, no nasal discharge or oral erythema or exudates.   Neck: Supple, No cervical or supraclavicular LAD  CV:RRR, no murmurs gallops or Rubs, JVP 8 cm H2O, positive HJ reflex  Pulm: Bibasilar crackles improved on examination no rales, or rhonchi, wheezing.  GI: Normal bowel sounds, abdomen soft, nontender, nondistended to deep or light palpation in all 4 quadrants, no HSM.  MSK: Radial and dorsalis pedis pulses 2+ and equal bilaterally, respectively.  Strength 5 out of 5 in upper and lower extremities.  No lower extremity edema today   Neuro: Patient is alert and oriented x3, no focal deficits  Psych: Appropriate mood and affect         Recent Labs     05/16/23 0538 05/16/23 1515 05/17/23 0300 05/18/23  0405   SODIUM 138 137 135 143   POTASSIUM 4.3 4.5 4.2 4.3   CHLORIDE 97 97 99 106   CO2 26 27 26 25   BUN 49* 47* 40* 35*   CREATININE 1.87* 1.83* 1.36 1.40   MAGNESIUM 2.5  --  2.5 2.5   CALCIUM 8.8 8.8 8.7 8.7       Recent Labs     05/16/23 0538 05/16/23 1515 05/17/23 0300 05/18/23  0405   ALTSGPT 45  --  38 28   ASTSGOT 58*  --  45 37   ALKPHOSPHAT 117*  --  114* 104*   TBILIRUBIN 0.3  --  0.3 0.3   GLUCOSE 137* 120* 103* 108*       Recent Labs     05/16/23 0538 05/17/23 0300 05/18/23  0405   RBC 3.57* 4.19* 4.15*   HEMOGLOBIN 11.7* 12.5 12.2   HEMATOCRIT 33.5* 39.4 38.8   PLATELETCT 223 224 209       Recent Labs     05/16/23 0538 05/17/23 0300 05/18/23  0405   WBC 8.9 8.8 8.6   NEUTSPOLYS 66.90 61.80 72.40*   LYMPHOCYTES 23.00 26.40 16.10*   MONOCYTES 8.80 10.10 9.60   EOSINOPHILS 0.20 0.80 0.80   BASOPHILS 0.60 0.60 0.50   ASTSGOT 58* 45 37   ALTSGPT 45 38 28   ALKPHOSPHAT 117* 114* 104*   TBILIRUBIN 0.3 0.3 0.3         Meds:   acetaminophen  650 mg      rosuvastatin  5 mg      albuterol  2 Puff      EPINEPHrine (Adrenalin) infusion  0-0.5 mcg/kg/min (Ideal) Stopped (05/17/23 1300)    senna-docusate  2 Tablet      And    polyethylene  glycol/lytes  1 Packet      And    magnesium hydroxide  30 mL      And    bisacodyl  10 mg      [Held by provider] apixaban  2.5 mg      ondansetron  4 mg      ondansetron  4 mg      cefTRIAXone (ROCEPHIN) IV  1,000 mg          Procedures:  none    Imaging:  DX-CHEST-PORTABLE (1 VIEW)   Final Result      No evidence of pneumothorax status post left cardiac pacer placement.      Right greater than left pleural effusions and interstitial pulmonary edema again noted.      EC-ECHOCARDIOGRAM COMPLETE W/O CONT   Final Result      DX-CHEST-PORTABLE (1 VIEW)   Final Result         1.  Interstitial pulmonary parenchymal prominence suggest chronic underlying lung disease, component of interstitial edema and/or infiltrates not excluded.   2.  Small bilateral pleural effusions, right greater than left   3.  Cardiomegaly   4.  Atherosclerosis      CL-PERMANENT PACEMAKER INSERTION    (Results Pending)       ASSESSEMENT and PLAN:    * Hypotension- (present on admission)  Assessment & Plan  ?sepsis vs adrenal insufficiency vs heart block  Doing well off of epinephrine, likely secondary to sepsis versus heart block, however resolving with antibiotics.  Status post 1 L bolus on admission  Cultures sent: No growth to date  Cortisol within normal limits, Pro-Osmani (within normal limits)  Broad spectrum abx: ceftriaxone  Resume diuresis today, can restart Aldactone per cardiology monitor first with Lasix    Bradycardia  Assessment & Plan  Concern for junctional versus slow A-fib versus heart block on initial presentation  Epinephrine gtt. now trialing off with heart rate in the 60s and 70s  -Pacemaker implanted, no bradycardia on telemetry  - EP consulted appreciate recommendations, likely interrogation later today    Pyuria- (present on admission)  Assessment & Plan  Patient with symptoms of frequency/urgency  Ceftriaxone started  -Follow urine culture/blood culture ngtd    Elevated TSH- (present on admission)  Assessment & Plan  Free T4  was 1.4  T3 was1.92 Normal 2.0  - Clinically closer to euthyroid unlikely contributor to presentation  - Monitor as outpatient    ERNESTINE (acute kidney injury) ([facility])  Assessment & Plan  Due to hypotension either secondary to sepsis versus cardiogenic shock now back to baseline, CKD 3.  -Avoid nephrotoxins    DNR (do not resuscitate)- (present on admission)  Assessment & Plan  Spoke to patient with  present about resuscitative measures and patient did not want heroics such as cardioversion/CPR.  She was amendable to intubation for a short period of time if it was for supportive measures to get her through an acute crisis, but would not want long term ventilation/tracheostomy.    History of CVA (cerebrovascular accident)- (present on admission)  Assessment & Plan  Hx of     Heart failure with preserved ejection fraction (HCC)- (present on admission)  Assessment & Plan  Last ECHO from 2/2023 with EF of 60%  Noted grade II diastolic failure  Mild volume overload, pleural effusions and pulmonary edema.  Chronic patient is on room air, JVP plus HJ reflex positive however not grossly volume overloaded and given recent hypotension would avoid intravascular volume depletion.  -Restart gentle diuresis continue to monitor need for titration      AF (atrial fibrillation) (HCC)- (present on admission)  Assessment & Plan  Pt is bradycardic  ?heart block, has had PYP scan on Tuesday 23 for possible infiltrative disease because of diastolic dysfunction still not elucidated.  Doing well off norepinephrine drip given paroxysmal A-fib not on rate controllers at home  Start eliquis (held for PPM)    Chronic right-sided heart failure (HCC)- (present on admission)  Assessment & Plan  RVSP at 40-45 mmHg  Holding diuretics  Judicious fluid resuscitation     Elevated troponin  Assessment & Plan  Serial troponins levels now trending flat likely secondary to demand ischemia as recent cath without significant coronary artery disease, EKG  reassuring for no acute ischemia.   - EP on board    Legally blind in left eye, as defined in [place]- (present on admission)  Assessment & Plan  Due to retinal vein occlusion         DISPO: Patient remains critically ill requiring PPM placement and Epi gtt.     CODE STATUS: DNR / Emi    Quality Measures:  Feeding: Cardiac diet  Analgesia: Opioids as needed  Sedation: None  Thromboprophylaxis: Hold home AC for possible procedure  Head of bed: >30 degrees  Ulcer prophylaxis: None  Glycemic control: Not indicated  Bowel care: bowel regimen  Indwelling lines: PIV x2  Deescalation of antibiotics: Not necessary      Nathalie Ness M.D.

## 2023-05-18 NOTE — PROGRESS NOTES
Report received from Greta SEGOVIA, assumed care of pt. Pt A&Ox4. Plan of care discussed with pt, labs and chart reviewed. All needs met at this time. Tele box on. On 1L O2 via nasal canula. Call light within reach, bed locked and in lowest position. All fall precautions and hourly rounding in place.

## 2023-05-19 ENCOUNTER — PHARMACY VISIT (OUTPATIENT)
Dept: PHARMACY | Facility: MEDICAL CENTER | Age: 83
End: 2023-05-19
Payer: COMMERCIAL

## 2023-05-19 VITALS
DIASTOLIC BLOOD PRESSURE: 60 MMHG | WEIGHT: 128.53 LBS | RESPIRATION RATE: 16 BRPM | SYSTOLIC BLOOD PRESSURE: 93 MMHG | OXYGEN SATURATION: 92 % | BODY MASS INDEX: 23.65 KG/M2 | HEART RATE: 68 BPM | HEIGHT: 62 IN | TEMPERATURE: 98.1 F

## 2023-05-19 LAB
ANION GAP SERPL CALC-SCNC: 10 MMOL/L (ref 7–16)
BUN SERPL-MCNC: 29 MG/DL (ref 8–22)
CALCIUM SERPL-MCNC: 8.4 MG/DL (ref 8.5–10.5)
CHLORIDE SERPL-SCNC: 103 MMOL/L (ref 96–112)
CO2 SERPL-SCNC: 26 MMOL/L (ref 20–33)
CREAT SERPL-MCNC: 1.03 MG/DL (ref 0.5–1.4)
ERYTHROCYTE [DISTWIDTH] IN BLOOD BY AUTOMATED COUNT: 54.6 FL (ref 35.9–50)
GFR SERPLBLD CREATININE-BSD FMLA CKD-EPI: 54 ML/MIN/1.73 M 2
GLUCOSE SERPL-MCNC: 105 MG/DL (ref 65–99)
HCT VFR BLD AUTO: 30.6 % (ref 37–47)
HGB BLD-MCNC: 11.2 G/DL (ref 12–16)
MAGNESIUM SERPL-MCNC: 2.4 MG/DL (ref 1.5–2.5)
MCH RBC QN AUTO: 35.2 PG (ref 27–33)
MCHC RBC AUTO-ENTMCNC: 36.6 G/DL (ref 33.6–35)
MCV RBC AUTO: 96.2 FL (ref 81.4–97.8)
PLATELET # BLD AUTO: 168 K/UL (ref 164–446)
PMV BLD AUTO: 8.5 FL (ref 9–12.9)
POTASSIUM SERPL-SCNC: 3.9 MMOL/L (ref 3.6–5.5)
RBC # BLD AUTO: 3.18 M/UL (ref 4.2–5.4)
SODIUM SERPL-SCNC: 139 MMOL/L (ref 135–145)
WBC # BLD AUTO: 8.3 K/UL (ref 4.8–10.8)

## 2023-05-19 PROCEDURE — 700111 HCHG RX REV CODE 636 W/ 250 OVERRIDE (IP): Performed by: STUDENT IN AN ORGANIZED HEALTH CARE EDUCATION/TRAINING PROGRAM

## 2023-05-19 PROCEDURE — RXMED WILLOW AMBULATORY MEDICATION CHARGE: Performed by: INTERNAL MEDICINE

## 2023-05-19 PROCEDURE — 80048 BASIC METABOLIC PNL TOTAL CA: CPT

## 2023-05-19 PROCEDURE — 99239 HOSP IP/OBS DSCHRG MGMT >30: CPT | Performed by: INTERNAL MEDICINE

## 2023-05-19 PROCEDURE — 700102 HCHG RX REV CODE 250 W/ 637 OVERRIDE(OP): Performed by: INTERNAL MEDICINE

## 2023-05-19 PROCEDURE — 83735 ASSAY OF MAGNESIUM: CPT

## 2023-05-19 PROCEDURE — 85027 COMPLETE CBC AUTOMATED: CPT

## 2023-05-19 PROCEDURE — 700102 HCHG RX REV CODE 250 W/ 637 OVERRIDE(OP): Performed by: HOSPITALIST

## 2023-05-19 PROCEDURE — A9270 NON-COVERED ITEM OR SERVICE: HCPCS | Performed by: HOSPITALIST

## 2023-05-19 PROCEDURE — A9270 NON-COVERED ITEM OR SERVICE: HCPCS | Performed by: INTERNAL MEDICINE

## 2023-05-19 PROCEDURE — 99232 SBSQ HOSP IP/OBS MODERATE 35: CPT | Mod: 24,FS | Performed by: STUDENT IN AN ORGANIZED HEALTH CARE EDUCATION/TRAINING PROGRAM

## 2023-05-19 RX ORDER — TORSEMIDE 100 MG/1
50 TABLET ORAL DAILY
Qty: 15 TABLET | Refills: 0 | Status: SHIPPED | OUTPATIENT
Start: 2023-05-19 | End: 2023-06-18

## 2023-05-19 RX ORDER — SPIRONOLACTONE 25 MG/1
25 TABLET ORAL DAILY
Qty: 30 TABLET | Refills: 0 | Status: SHIPPED | OUTPATIENT
Start: 2023-05-20 | End: 2023-05-31 | Stop reason: SDUPTHER

## 2023-05-19 RX ADMIN — FUROSEMIDE 20 MG: 10 INJECTION, SOLUTION INTRAVENOUS at 05:16

## 2023-05-19 RX ADMIN — SENNOSIDES AND DOCUSATE SODIUM 2 TABLET: 50; 8.6 TABLET ORAL at 05:16

## 2023-05-19 RX ADMIN — ACETAMINOPHEN 650 MG: 325 TABLET, FILM COATED ORAL at 00:17

## 2023-05-19 RX ADMIN — SPIRONOLACTONE 25 MG: 25 TABLET ORAL at 05:16

## 2023-05-19 RX ADMIN — APIXABAN 2.5 MG: 2.5 TABLET, FILM COATED ORAL at 05:16

## 2023-05-19 ASSESSMENT — FIBROSIS 4 INDEX: FIB4 SCORE: 3.37

## 2023-05-19 NOTE — HOSPITAL COURSE
Belinda Meier is an 82 y.o. female with a pmhx of afib on Eliquis, chronic HFpEF with RVSP:40-45%, DLD, CVA. She presented 5/15/2023 with shortness of breath over the prior week and malaise.  In the ED she was found to have HR in 40's and hypotensive.   She was admitted to the ICU and given epinephrine along with a fluid bolus. She had cardiology consult and consensus was possible overdiuresis. Electrophysiology was consulted and felt it was okay to hold off on pacemaker. Patient responded well to diuresis and was weaned to room air. Medical regimen was optimized per cardiology. Patient improved clinically and was agreeable to discharge. Patient was determined satisfactory for discharge with appropriate follow up.

## 2023-05-19 NOTE — PROGRESS NOTES
[facility] Cardiology Follow-up Note    Date of Service:    5/19/2023      Name:   Regla Meier   YOB: 1940  Age:   82 y.o.  female   MRN:   [id number]    Reason for cardiology consult: junctional rhythm, Hypotension, bradycardia and congestive heart failure    Attending Provider: Dr Montana    Primary Cardiologist: Dr Epps    HPI:  Belinda Meier is a 82 y.o. female with a past medical history of diastolic heart failure versus infiltrative cardiomyopathy, PAF, CVA who presented on 5/15/2023 with hypotension, bradycardia and failure to thrive.  EKGs with junctional rhythm and sinus bradycardia, requiring epinephrine drip.    She received PPM on 5/17 with Dr Marcum    Interim Events:  - Personal Telemetry interpretation: SR f PVCs  - Sitting up in chair, eating breakfast, feel well without complaints, lower extremities much less swollen  - Vitals: SBP 's, room air  - Labs reviewed: Renal function improving  - No complaints at pacemaker site, normal functioning and sensing  - I/O's: 1.4L UO yesterday  - Weight: 128 lbs today, 132 on admit    ROS  Constitutional: denies fatigue.  Weight decreasing.  Respiratory:  Denies shortness of breath, no cough.  Cardiovascular: denies chest pain.  less lower extremity edema.  Denies orthopnea or PND.  : + polyuria, no dysuria.  GI:  Denies nausea/vomiting.  No abdominal distention.  Neuro:  Denies dizziness, syncope.  Hem/lymph: Denies easy bleeding/bruising.      All other review of systems reviewed and negative.    Past medical, surgical, social, and family history reviewed and unchanged from admission except as noted in HPI.    Medications: Reviewed in MAR  Current Facility-Administered Medications   Medication Dose Frequency Provider Last Rate Last Admin    furosemide (LASIX) injection 20 mg  20 mg Q DAY Nathalie Ness M.D.   20 mg at 05/19/23 0516    spironolactone (ALDACTONE) tablet 25 mg  25 mg Q DAY Remigio Logan D.O.   25 mg at  "05/19/23 0516    acetaminophen (Tylenol) tablet 650 mg  650 mg Q4HRS PRN Moises Acosta M.D.   650 mg at 05/19/23 0017    rosuvastatin (CRESTOR) tablet 5 mg  5 mg Q EVENING Denise Hurley M.D.   5 mg at 05/18/23 1722    albuterol inhaler 2 Puff  2 Puff Q6HRS PRN Terry Juarez M.D.   2 Puff at 05/16/23 1436    senna-docusate (PERICOLACE or SENOKOT S) 8.6-50 MG per tablet 2 Tablet  2 Tablet BID Denise Hurley M.D.   2 Tablet at 05/19/23 0516    And    polyethylene glycol/lytes (MIRALAX) PACKET 1 Packet  1 Packet QDAY PRN Denise Hurley M.D.        And    magnesium hydroxide (MILK OF MAGNESIA) suspension 30 mL  30 mL QDAY PRN Denise Hurley M.D.        And    bisacodyl (DULCOLAX) suppository 10 mg  10 mg QDAY PRN Denise Hurley M.D.        apixaban (ELIQUIS) tablet 2.5 mg  2.5 mg BID Deinse Hurley M.D.   2.5 mg at 05/19/23 0516    ondansetron (ZOFRAN) syringe/vial injection 4 mg  4 mg Q4HRS PRN Denise Hurley M.D.        ondansetron (ZOFRAN ODT) dispertab 4 mg  4 mg Q4HRS PRN Denise Hurley M.D.       Last reviewed on 5/16/2023  2:22 PM by Odette Brito R.N.   Allergies   Allergen Reactions    Atorvastatin Myalgia     Severe muscle weakness    Pcn [Penicillins] Hives    Amiodarone Rash     rash       Physical Exam  Body mass index is 23.47 kg/m². BP 93/60   Pulse 68   Temp 36.7 °C (98.1 °F) (Temporal)   Resp 16   Ht 1.575 m (5' 2\")   Wt 58.2 kg (128 lb 4.9 oz)   SpO2 94%    Vitals:    05/18/23 2000 05/19/23 0006 05/19/23 0400 05/19/23 0700   BP: 111/68 99/63 103/65 93/60   Pulse:  75  68   Resp:  18  16   Temp:  36.3 °C (97.3 °F)  36.7 °C (98.1 °F)   TempSrc:  Temporal  Temporal   SpO2: 94% 96% 94% 94%   Weight:       Height:        Oxygen Therapy:  Pulse Oximetry: 94 %, O2 (LPM): 0, O2 Delivery Device: None - Room Air    General: no acute distress, chronically ill-appearing  Neck: No JVD, no bruits  Lungs: CTAB, normal effort. no wheezing, rales, or rhonchi  Heart: RRR, normal S1 " /S2, no murmur, no rub  EXT: 2+ bilateral lower extremity edema, 2+ radial pulses. 2+ pedal pulses.   Abdomen: soft, non tender, non distended  Neurological: No focal deficits, no facial asymmetry.  Normal speech  Psychiatric: Appropriate affect, alert and oriented x 3  Skin: Warm and dry extremities, no rashes    Labs (personally reviewed):     Lab Results   Component Value Date/Time    SODIUM 139 05/19/2023 02:49 AM    POTASSIUM 3.9 05/19/2023 02:49 AM    CHLORIDE 103 05/19/2023 02:49 AM    CO2 26 05/19/2023 02:49 AM    GLUCOSE 105 (H) 05/19/2023 02:49 AM    BUN 29 (H) 05/19/2023 02:49 AM    CREATININE 1.03 05/19/2023 02:49 AM     Lab Results   Component Value Date/Time    ALKPHOSPHAT 104 (H) 05/18/2023 04:05 AM    ASTSGOT 37 05/18/2023 04:05 AM    ALTSGPT 28 05/18/2023 04:05 AM    TBILIRUBIN 0.3 05/18/2023 04:05 AM      Lab Results   Component Value Date/Time    CHOLSTRLTOT 198 02/15/2023 02:28 AM     (H) 02/15/2023 02:28 AM    HDL 69 02/15/2023 02:28 AM    TRIGLYCERIDE 113 02/15/2023 02:28 AM       Cardiac Imaging and Procedures Review:      (Reviewed per Dr Holden's attestation note above)    Assessment and Medical Decision Making:    Paroxysmal atrial fibrillation   Junctional bradycardia  S/p PPM  - Apixaban restarted yesterday, 2.5 mg bid  - Site without hematoma, dressing C/D/I  - Reviewed pacemaker discharge instructions, added to AVS  - Wean oxygen as tolerated encourage IS     HFpEF  - Ok to transition to oral diuretics, 50 mg torsemide BID and 25 mg spironolactone daily  - Discussed monitoring daily weights at home  - Patient will have heart failure follow-up per below   - Restart home dose of Jardiance upon discharge as renal function has improved, class IIa indication     Future Appointments   Date Time Provider Department Center   5/26/2023  2:15 PM PACER CHECK-[facility] None   5/30/2023 12:45 PM Leah Jain P.A.-C. [facility] None       Thank you for allowing me to participate in this  patients care.  Please contact me with any questions or concerns.    Please see Dr Holden's attestation for additions and further recommendations.    I personally spent a total of 15 minutes which includes face-to-face time and non-face-to-face time spent on preparing to see the patient, reviewing hospital notes and tests, obtaining history from the patient, performing a medically appropriate exam, counseling and educating the patient, ordering medications/tests/procedures/referrals as clinically indicated, and documenting information in the electronic medical record.    PLEASE NOTE: Some of this dictation was created using voice recognition software. I have made every reasonable attempt to correct obvious errors, but I expect that there are errors of grammar and possibly content that I did not discover before finalizing the note.     MADDIE Dominguez.   [facility]  [phone]

## 2023-05-19 NOTE — ASSESSMENT & PLAN NOTE
Encouraged deep inspiratory efforts but only pulling 500cc on IS.  Requires 2L O2 via NC as drops to 87% on room air just sitting.  Mobilize  Lasix and added aldactone.  Monitor renal function.  May need home oxygen.  RT per protocol

## 2023-05-19 NOTE — CARE PLAN
The patient is Stable - Low risk of patient condition declining or worsening    Shift Goals  Clinical Goals: monitor HR  Patient Goals: rest  Family Goals: feel better    Progress made toward(s) clinical / shift goals:  Vitals within normal limits. Pt diuresing. Pt resting comfortably throughout shift.    Patient is not progressing towards the following goals:

## 2023-05-19 NOTE — ASSESSMENT & PLAN NOTE
Improved with fluids  Monitor vitals, I/O's  There was initial concern of over diuresis but as of 5/18 there is concern of need of restarting diuresis

## 2023-05-19 NOTE — DISCHARGE SUMMARY
Discharge Summary    CHIEF COMPLAINT ON ADMISSION  Chief Complaint   Patient presents with    Hypotension     Hypotension x a couple weeks. Patient's  states they have a home monitor and today was worse than normal. Denies dizziness. Reports baseline SBP is in the low 100s.     Dehydration     X1 week. Patient takes lasix and has not been able to keep up with PO fluids.        Reason for Admission  Other     Admission Date  5/15/2023    CODE STATUS  Prior    HPI & HOSPITAL COURSE  Belinda Meier is an 82 y.o. female with a pmhx of afib on Eliquis, chronic HFpEF with RVSP:40-45%, DLD, CVA. She presented 5/15/2023 with shortness of breath over the prior week and malaise.  In the ED she was found to have HR in 40's and hypotensive.   She was admitted to the ICU and given epinephrine along with a fluid bolus. She had cardiology consult and consensus was possible overdiuresis. Electrophysiology was consulted and felt it was okay to hold off on pacemaker. Patient responded well to diuresis and was weaned to room air. Medical regimen was optimized per cardiology. Patient improved clinically and was agreeable to discharge. Patient was determined satisfactory for discharge with appropriate follow up.    Therefore, she is discharged in fair and stable condition to home with close outpatient follow-up.    The patient met 2-midnight criteria for an inpatient stay at the time of discharge.    Discharge Date  5/19/2023    FOLLOW UP ITEMS POST DISCHARGE  Please follow up with PCP in 3-5 days for post hospitalization follow up and medication reconciliation.     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    Elevated troponin (POA: Unknown)    Chronic right-sided heart failure (HCC) (POA: Yes)    AF (atrial fibrillation) (HCC) (POA: Yes)     Heart failure with preserved ejection fraction (HCC) (Chronic) (POA: Yes)    History of CVA (cerebrovascular accident) (POA: Yes)    Acute on chronic respiratory failure with hypoxia (HCC) (POA: Yes)    DNR (do not resuscitate) (POA: Yes)    ERNESTINE (acute kidney injury) (HCC) (POA: Unknown)    Elevated TSH (POA: Yes)    Pyuria (POA: Yes)    Bradycardia (POA: Unknown)    Symptomatic bradycardia (POA: Yes)  Resolved Problems:    * No resolved hospital problems. *      FOLLOW UP  Future Appointments   Date Time Provider Department Center   5/26/2023  2:15 PM PACER CHECK-[facility] None   5/30/2023 12:45 PM DE[facility] Nicole None     Antoni Castillo M.D.  [facility]  [facility] [id number]  [phone]    Schedule an appointment as soon as possible for a visit  Please schedule a hospital stay follow up appointment.      MEDICATIONS ON DISCHARGE     Medication List        CHANGE how you take these medications        Instructions   torsemide 100 MG Tabs  What changed: when to take this  Commonly known as: DEMADEX   Take 0.5 Tablets by mouth every day for 30 days.  Dose: 50 mg            CONTINUE taking these medications        Instructions   albuterol 108 (90 Base) MCG/ACT Aers inhalation aerosol   INHALE 2 PUFFS BY MOUTH EVERY 6 HOURS AS NEEDED FOR SHORTNESS OF BREATH     apixaban 2.5mg Tabs  Commonly known as: ELIQUIS   Take 1 Tablet by mouth 2 times a day.  Dose: 2.5 mg     CO Q 10 PO   Take 1 Tablet by mouth every day.  Dose: 1 Tablet     fluticasone 50 MCG/ACT nasal spray  Commonly known as: FLONASE   Administer 1 Spray into affected nostril(S) at bedtime.  Dose: 1 Spray     Jardiance 10 MG Tabs tablet  Generic drug: Empagliflozin   Take 1 Tablet by mouth every day.  Dose: 10 mg     Lutein 40 MG Caps   Take 40 mg by mouth every day.  Dose: 40 mg     potassium chloride SA 20 MEQ Tbcr  Commonly known as: Kdur   Take 1 Tablet by mouth every day.  Dose: 20 mEq     QUERCETIN PO   Take 1 Tablet  by mouth every day.  Dose: 1 Tablet     rosuvastatin 5 MG Tabs  Commonly known as: CRESTOR   Take 1 Tablet by mouth every evening.  Dose: 5 mg     spironolactone 25 MG Tabs  Start taking on: May 20, 2023  Commonly known as: ALDACTONE   Take 1 Tablet by mouth every day for 30 days.  Dose: 25 mg     TURMERIC PO   Take 1 Capsule by mouth every day.  Dose: 1 Capsule     VITAMIN B12 PO   Take 1 Tablet by mouth every day.  Dose: 1 Tablet              Allergies  Allergies   Allergen Reactions    Atorvastatin Myalgia     Severe muscle weakness    Pcn [Penicillins] Hives    Amiodarone Rash     rash       DIET  No orders of the defined types were placed in this encounter.      ACTIVITY  As tolerated.  Weight bearing as tolerated    CONSULTATIONS  Cardiology  Critical care      LABORATORY  Lab Results   Component Value Date    SODIUM 139 05/19/2023    POTASSIUM 3.9 05/19/2023    CHLORIDE 103 05/19/2023    CO2 26 05/19/2023    GLUCOSE 105 (H) 05/19/2023    BUN 29 (H) 05/19/2023    CREATININE 1.03 05/19/2023        Lab Results   Component Value Date    WBC 8.3 05/19/2023    HEMOGLOBIN 11.2 (L) 05/19/2023    HEMATOCRIT 30.6 (L) 05/19/2023    PLATELETCT 168 05/19/2023        Total time of the discharge process exceeds 38 minutes.

## 2023-05-19 NOTE — PROGRESS NOTES
Monitor Summary  Sinus Rhythm  Frequent PVC's  .20/.12/.34

## 2023-05-19 NOTE — ASSESSMENT & PLAN NOTE
5/16/2023 Echo:  CONCLUSIONS  Compared to the images of the prior study 2/15/23, there has been no   significant change.   Normal left ventricular systolic function.  The left ventricular ejection fraction is visually estimated to be 60%.  Diastolic function is difficult to assess with arrhythmia.  Normal right ventricular size and systolic function.  Moderate mitral regurgitation.  Mild aortic insufficiency.  Moderate tricuspid regurgitation.  Estimated right ventricular systolic pressure is 40 mmHg.  Pleural effusion present.    On lasix and starting back on aldactone 25mg  Dr Holden and I discussed treatment.

## 2023-05-21 LAB
BACTERIA BLD CULT: NORMAL
BACTERIA BLD CULT: NORMAL
SIGNIFICANT IND 70042: NORMAL
SIGNIFICANT IND 70042: NORMAL
SITE SITE: NORMAL
SITE SITE: NORMAL
SOURCE SOURCE: NORMAL
SOURCE SOURCE: NORMAL

## 2023-05-22 ENCOUNTER — PATIENT OUTREACH (OUTPATIENT)
Dept: MEDICAL GROUP | Facility: PHYSICIAN GROUP | Age: 83
End: 2023-05-22
Payer: MEDICARE

## 2023-05-23 NOTE — PROGRESS NOTES
Subjective:     Discharge Questions  Discharge Date: 5/19/23  Outreach call: Date 05/23/23  Time: 9:44 AM   Now that you are home, how are you feeling?  Good  Did you receive any new prescriptions? No  Do you have any questions about your current medications or new medications (Review Med Rec)?  No  Do you have a follow up appointment scheduled with your PCP?  Yes Date/Time - 5/31/23 @ 2;20  Any issues or paperwork you wish to discuss with your PCP? No  Does this patient qualify for the CCM program?  No Pt is [place] out of state in [place]    Transitional Care  Number of attempts: 2  Current or previous attempts competed within two business days of discharge?  Yes  Provided education regarding treatment plan, medications, self-management, ADLs?  Yes  Has patient completed an Advanced Directive?  No  Care Manager phone number provided? No  Is there anything else I can help you with?  Yes RN scheduled HFV appt    Discharge Summary   Chief Complaint:  Hypotension   Admitting Dx:  5/15/23   Discharge Dx:  Hypotension    Notes:  Pt and daughter Ladonna answered questions. [place]    Regla Meier is a 82 y.o. female who presents for Hospital Follow-up.    HPI:   Hypotension, junctional bradycardia.    Hospitalized 5/15/2023 through 5/19/2023 for hypotension and junctional bradycardia.  Required admission to ICU with pressors to maintain normotension.  Had permanent pacemaker placed for junctional bradycardia.    At time of discharge she was mildly volume overloaded, diuretics included torsemide 50 mg daily, spironolactone 12.5 mg daily, empagliflozin 10 mg daily.  She is on potassium chloride 20 mEq daily.    She saw cardiology as outpatient on 5/30/2023, was recommended to increase her fluid intake due to continued low blood pressures.  Next cardiology outpatient visit is tomorrow.      Today she feels okay. She doesn't want to drink because she doesn't want to keep  getting up in the night to urinate.  She denies any lightheadedness or dizziness.  No falls.  No chest pain or dyspnea    Current medicines (including reconciliation performed today)  Current Outpatient Medications   Medication Sig Dispense Refill    torsemide (DEMADEX) 100 MG Tab Take 0.5 Tablets by mouth every day for 30 days. 15 Tablet 0    spironolactone (ALDACTONE) 25 MG Tab Take 1 Tablet by mouth every day for 30 days. 30 Tablet 0    albuterol 108 (90 Base) MCG/ACT Aero Soln inhalation aerosol INHALE 2 PUFFS BY MOUTH EVERY 6 HOURS AS NEEDED FOR SHORTNESS OF BREATH 7 Each 0    potassium chloride SA (KDUR) 20 MEQ Tab CR Take 1 Tablet by mouth every day. 30 Tablet 11    Empagliflozin (JARDIANCE) 10 MG Tab tablet Take 1 Tablet by mouth every day. 90 Tablet 3    apixaban (ELIQUIS) 2.5mg Tab Take 1 Tablet by mouth 2 times a day. 180 Tablet 3    rosuvastatin (CRESTOR) 5 MG Tab Take 1 Tablet by mouth every evening. 100 Tablet 4    fluticasone (FLONASE) 50 MCG/ACT nasal spray Administer 1 Spray into affected nostril(S) at bedtime. 16 g 1    Coenzyme Q10 (CO Q 10 PO) Take 1 Tablet by mouth every day.      Cyanocobalamin (VITAMIN B12 PO) Take 1 Tablet by mouth every day.      QUERCETIN PO Take 1 Tablet by mouth every day.      Lutein 40 MG Cap Take 40 mg by mouth every day.      TURMERIC PO Take 1 Capsule by mouth every day.       No current facility-administered medications for this visit.       Allergies:   Atorvastatin, Pcn [penicillins], and Amiodarone    Social History     Tobacco Use    Smoking status: Never    Smokeless tobacco: Never   Vaping Use    Vaping Use: Never used   Substance Use Topics    Alcohol use: Yes     Alcohol/week: 0.6 oz     Types: 1 Glasses of wine per week     Comment: occ glass of wine    Drug use: No     Objective:     Vitals:    05/31/23 1427   BP: (!) 86/49   BP Location: Left arm   Patient Position: Sitting   BP Cuff Size: Adult   Pulse: 80   Temp: 36.8 °C (98.3 °F)   TempSrc: Temporal  "  SpO2: 98%   Weight: 59.3 kg (130 lb 12.8 oz)   Height: 1.575 m (5' 2\")     Body mass index is 23.92 kg/m².    Physical Exam:  Constitutional: Sitting comfortably, in no acute distress, responds to questions appropriately.  Neck: No jugular venous distention  Heart: regular S1 S2, no murmurs, rub, or gallops  Lungs: Bibasilar inspiratory rales  Abdomen: Soft, nontender, nondistended  Extremities: 2+ pitting edema left lower extremity to the knee, 1+ pitting edema right lower extremity to knee  Skin: Warm and dry, no rashes or lesions    Assessment and Plan:   1. Acute on chronic heart failure with preserved ejection fraction (HCC)  2. Chronic heart failure with preserved ejection fraction (HCC)  3. Hypotension, unspecified hypotension type  4. Edema of both lower extremities  Difficult situation given her continued hypotension (thankfully asymptomatic) and uncontrolled hypervolemia.  She has not been drinking very much water because of frequent urination that she attributes partially to the diuretics.  She is currently taking torsemide 50 mg daily and spironolactone 25 mg daily.  I do note from her cardiology visit yesterday that her spironolactone dose was mentioned as 12.5 mg daily.  I recommended that she reduce her spironolactone dose to 12.5 mg daily and increase her fluid intake.  She will be seeing cardiology tomorrow, and just had a BMP drawn earlier today which has not resulted yet    5. Hospital discharge follow-up  - Chart and discharge summary were reviewed.   - Hospitalization and results reviewed with patient.   - Medications reviewed including instructions regarding high risk medications, dosing and side effects.  - Recommended Services: No services needed at this time  - Advance directive/POLST on file?  No     Follow-up:No follow-ups on file.    Face-to-face transitional care management services with HIGH (today's visit is within days post discharge & LACE+ score 59+) medical decision complexity " were provided.     LACE+ Historical Score Over Time (0-28: Low, 29-58: Medium, 59+: High): 76

## 2023-05-23 NOTE — PROGRESS NOTES
Transitional Care Management    Discharge Questions  Discharge Date: 5/19/23  Outreach call: Date 05/23/23  Time: 9:44 AM   Now that you are home, how are you feeling?  Good  Did you receive any new prescriptions? No  Do you have any questions about your current medications or new medications (Review Med Rec)?  No  Do you have a follow up appointment scheduled with your PCP?  Yes Date/Time - 5/31/23 @ 2;20  Any issues or paperwork you wish to discuss with your PCP? No  Does this patient qualify for the CCM program?  No Pt is moving out of state in June    Transitional Care  Number of attempts: 2  Current or previous attempts competed within two business days of discharge?  Yes  Provided education regarding treatment plan, medications, self-management, ADLs?  Yes  Has patient completed an Advanced Directive?  No  Care Manager phone number provided? No  Is there anything else I can help you with?  Yes RN scheduled HFV appt    Discharge Summary   Chief Complaint:  Hypotension   Admitting Dx:  5/15/23   Discharge Dx:  Hypotension    Notes:  Pt and daughter Ladonna answered questions. [place]

## 2023-05-24 ENCOUNTER — TELEPHONE (OUTPATIENT)
Dept: CARDIOLOGY | Facility: MEDICAL CENTER | Age: 83
End: 2023-05-24
Payer: MEDICARE

## 2023-05-24 NOTE — TELEPHONE ENCOUNTER
FYI--patients device transmitted via home monitor. RA lead thresholds per device testing >2.5 @ 0.40 ms--device is programmed at 5 v @ 1 at this time--does appear to be RA sensing on presenting--however very small pwaves.   She is scheduled for in office check 5/26.

## 2023-05-26 ENCOUNTER — NON-PROVIDER VISIT (OUTPATIENT)
Dept: CARDIOLOGY | Facility: MEDICAL CENTER | Age: 83
End: 2023-05-26

## 2023-05-26 ENCOUNTER — NON-PROVIDER VISIT (OUTPATIENT)
Dept: CARDIOLOGY | Facility: MEDICAL CENTER | Age: 83
End: 2023-05-26
Attending: PHYSICIAN ASSISTANT
Payer: MEDICARE

## 2023-05-26 DIAGNOSIS — Z95.0 CARDIAC PACEMAKER IN SITU: ICD-10-CM

## 2023-05-26 DIAGNOSIS — R00.1 BRADYCARDIA: ICD-10-CM

## 2023-05-26 PROCEDURE — 93280 PM DEVICE PROGR EVAL DUAL: CPT | Mod: 26 | Performed by: INTERNAL MEDICINE

## 2023-05-26 PROCEDURE — 93280 PM DEVICE PROGR EVAL DUAL: CPT | Performed by: INTERNAL MEDICINE

## 2023-05-27 NOTE — PROGRESS NOTES
Patient seen in clinic today--RA thresholds @ 2.5 @ 0.60 mv/1.75 @ 1 mv.  Patient to return in 2-3 weeks for recheck on atrial thresholds.    Wound site is healing well.  Patient advised to watch for increased redness, swelling,  oozing or fever--verbalized understanding.

## 2023-05-30 ENCOUNTER — OFFICE VISIT (OUTPATIENT)
Dept: CARDIOLOGY | Facility: MEDICAL CENTER | Age: 83
End: 2023-05-30
Attending: PHYSICIAN ASSISTANT
Payer: MEDICARE

## 2023-05-30 VITALS
WEIGHT: 128 LBS | RESPIRATION RATE: 17 BRPM | DIASTOLIC BLOOD PRESSURE: 60 MMHG | BODY MASS INDEX: 23.55 KG/M2 | OXYGEN SATURATION: 94 % | HEIGHT: 62 IN | HEART RATE: 78 BPM | SYSTOLIC BLOOD PRESSURE: 92 MMHG

## 2023-05-30 DIAGNOSIS — E78.2 MIXED HYPERLIPIDEMIA: ICD-10-CM

## 2023-05-30 DIAGNOSIS — R60.0 EDEMA OF BOTH LOWER EXTREMITIES: ICD-10-CM

## 2023-05-30 DIAGNOSIS — I48.0 PAROXYSMAL ATRIAL FIBRILLATION (HCC): ICD-10-CM

## 2023-05-30 DIAGNOSIS — N18.31 STAGE 3A CHRONIC KIDNEY DISEASE: ICD-10-CM

## 2023-05-30 DIAGNOSIS — I50.30 NYHA CLASS 3 HEART FAILURE WITH PRESERVED EJECTION FRACTION (HCC): ICD-10-CM

## 2023-05-30 DIAGNOSIS — D68.69 SECONDARY HYPERCOAGULABLE STATE (HCC): ICD-10-CM

## 2023-05-30 DIAGNOSIS — I51.89 LEFT VENTRICULAR DIASTOLIC DYSFUNCTION, NYHA CLASS 3: ICD-10-CM

## 2023-05-30 DIAGNOSIS — Z86.73 HISTORY OF STROKE: ICD-10-CM

## 2023-05-30 DIAGNOSIS — I50.30 ACC/AHA STAGE C HEART FAILURE WITH PRESERVED EJECTION FRACTION (HCC): ICD-10-CM

## 2023-05-30 DIAGNOSIS — Z95.0 CARDIAC PACEMAKER IN SITU: ICD-10-CM

## 2023-05-30 DIAGNOSIS — R93.1 AGATSTON CAC SCORE 200-399: ICD-10-CM

## 2023-05-30 PROCEDURE — 3078F DIAST BP <80 MM HG: CPT | Performed by: PHYSICIAN ASSISTANT

## 2023-05-30 PROCEDURE — 3074F SYST BP LT 130 MM HG: CPT | Performed by: PHYSICIAN ASSISTANT

## 2023-05-30 PROCEDURE — 99213 OFFICE O/P EST LOW 20 MIN: CPT | Performed by: PHYSICIAN ASSISTANT

## 2023-05-30 PROCEDURE — 99212 OFFICE O/P EST SF 10 MIN: CPT | Performed by: PHYSICIAN ASSISTANT

## 2023-05-30 PROCEDURE — 99212 OFFICE O/P EST SF 10 MIN: CPT

## 2023-05-30 PROCEDURE — 99214 OFFICE O/P EST MOD 30 MIN: CPT | Performed by: PHYSICIAN ASSISTANT

## 2023-05-30 RX ORDER — FUROSEMIDE 40 MG/1
40 TABLET ORAL DAILY
COMMUNITY
Start: 2023-05-06 | End: 2023-05-30

## 2023-05-30 ASSESSMENT — ENCOUNTER SYMPTOMS
BRUISES/BLEEDS EASILY: 0
BLURRED VISION: 0
DIZZINESS: 0
ORTHOPNEA: 0
PSYCHIATRIC NEGATIVE: 1
PND: 0
FEVER: 0
NAUSEA: 0
CLAUDICATION: 0
HEADACHES: 0
ABDOMINAL PAIN: 0
MYALGIAS: 0
COUGH: 0
DOUBLE VISION: 0
WEAKNESS: 1
CHILLS: 0
EYES NEGATIVE: 1
GASTROINTESTINAL NEGATIVE: 1
SHORTNESS OF BREATH: 1
MUSCULOSKELETAL NEGATIVE: 1
PALPITATIONS: 0
VOMITING: 0
LOSS OF CONSCIOUSNESS: 0

## 2023-05-30 ASSESSMENT — FIBROSIS 4 INDEX: FIB4 SCORE: 3.37

## 2023-05-30 NOTE — PROGRESS NOTES
Chief Complaint   Patient presents with    Congestive Heart Failure     F/V DX: Chronic heart failure with preserved ejection fraction (HCC)        Subjective     Belinda Meier is a 82 y.o. female with a history of atrial fibrillation, HFpEF, and junctional bradycardia s/p PPM who presents today for hospital follow up.      Her primary cardiologist is Dr. Epps. Last seen in clinic 4/27/2023 by me. At that exam, she had concerns of continued lower extremity edema and shortness of breath on exertion. Her BNP was elevated and she was fluid overloaded on exam. Her torsemide was increased and she was to repeat labs in 1 week with follow up in 3-4 weeks. 5/15/2023 she presented to the emergency department with hypotension and dehydration.  An EKG was performed which demonstrated junctional rhythm and sinus bradycardia.  She was started on pressors and admitted to the hospital.  Her beta-blocker was held and she improved slightly, but after weaning from pressors her hypotension and junctional bradycardia returned again.  She underwent successful PPM placement 5/17/2023. She was thought to be fluid overloaded by the hospitalist team and her diuretics were held. She improved clinically and was discharged in stable condition.     Today, she reports she is not feeling well. She continues to feel week and has continued lower extremity edema. She feels some intermittent cramping in her lower legs and still feels quite weak. She does still have occasional shortness of breath alleviated by inhalers. No chest pain or palpitations. No orthopnea or PND. No dizziness or lightheadedness. No syncope or presyncope.     Her  has been monitoring her blood pressure with some readings over the past week in the 80s systolic. She does not stay well hydrated and often only drinks less than 32oz.     She will be moving out of the area June 21 to be closer to family.     Past Medical History:   Diagnosis Date    Blindness of left eye      Congestive heart failure (HCC)     Hyperlipemia     Stroke (HCC) 08/01/2022     Past Surgical History:   Procedure Laterality Date    CYSTECTOMY  1963    CATARACT EXTRACTION WITH IOL       Family History   Problem Relation Age of Onset    Other Mother         TIA     Other Father         Abdominal aneurysm     Alcohol abuse Brother     Heart Disease Brother     No Known Problems Daughter     No Known Problems Daughter      Social History     Socioeconomic History    Marital status:      Spouse name: Not on file    Number of children: Not on file    Years of education: Not on file    Highest education level: Not on file   Occupational History    Not on file   Tobacco Use    Smoking status: Never    Smokeless tobacco: Never   Vaping Use    Vaping Use: Never used   Substance and Sexual Activity    Alcohol use: Yes     Alcohol/week: 0.6 oz     Types: 1 Glasses of wine per week     Comment: occ glass of wine    Drug use: No    Sexual activity: Yes     Partners: Male     Comment:  for 57 years. Documented on 4/10/19.   Other Topics Concern    Not on file   Social History Narrative    Not on file     Social Determinants of Health     Financial Resource Strain: Low Risk  (3/3/2023)    Overall Financial Resource Strain (CARDIA)     Difficulty of Paying Living Expenses: Not hard at all   Food Insecurity: Not on file   Transportation Needs: Unknown (3/3/2023)    PRAPARE - Transportation     Lack of Transportation (Medical): No     Lack of Transportation (Non-Medical): Not on file   Physical Activity: Not on file   Stress: Not on file   Social Connections: Not on file   Intimate Partner Violence: Not on file   Housing Stability: Low Risk  (3/3/2023)    Housing Stability Vital Sign     Unable to Pay for Housing in the Last Year: No     Number of Places Lived in the Last Year: 1     Unstable Housing in the Last Year: No     Allergies   Allergen Reactions    Atorvastatin Myalgia     Severe muscle weakness    Pcn  [Penicillins] Hives    Amiodarone Rash     rash     Outpatient Encounter Medications as of 5/30/2023   Medication Sig Dispense Refill    torsemide (DEMADEX) 100 MG Tab Take 0.5 Tablets by mouth every day for 30 days. 15 Tablet 0    spironolactone (ALDACTONE) 25 MG Tab Take 1 Tablet by mouth every day for 30 days. 30 Tablet 0    albuterol 108 (90 Base) MCG/ACT Aero Soln inhalation aerosol INHALE 2 PUFFS BY MOUTH EVERY 6 HOURS AS NEEDED FOR SHORTNESS OF BREATH 7 Each 0    potassium chloride SA (KDUR) 20 MEQ Tab CR Take 1 Tablet by mouth every day. 30 Tablet 11    Empagliflozin (JARDIANCE) 10 MG Tab tablet Take 1 Tablet by mouth every day. 90 Tablet 3    apixaban (ELIQUIS) 2.5mg Tab Take 1 Tablet by mouth 2 times a day. 180 Tablet 3    rosuvastatin (CRESTOR) 5 MG Tab Take 1 Tablet by mouth every evening. 100 Tablet 4    fluticasone (FLONASE) 50 MCG/ACT nasal spray Administer 1 Spray into affected nostril(S) at bedtime. 16 g 1    Coenzyme Q10 (CO Q 10 PO) Take 1 Tablet by mouth every day.      Cyanocobalamin (VITAMIN B12 PO) Take 1 Tablet by mouth every day.      QUERCETIN PO Take 1 Tablet by mouth every day.      Lutein 40 MG Cap Take 40 mg by mouth every day.      TURMERIC PO Take 1 Capsule by mouth every day.      [DISCONTINUED] furosemide (LASIX) 40 MG Tab Take 40 mg by mouth every day.       No facility-administered encounter medications on file as of 5/30/2023.     Review of Systems   Constitutional:  Positive for malaise/fatigue. Negative for chills and fever.   HENT: Negative.     Eyes: Negative.  Negative for blurred vision and double vision.   Respiratory:  Positive for shortness of breath (occasional aleviated by inhalers). Negative for cough.    Cardiovascular:  Positive for leg swelling. Negative for chest pain, palpitations, orthopnea, claudication and PND.        Spasms in her legs   Gastrointestinal: Negative.  Negative for abdominal pain, nausea and vomiting.   Genitourinary: Negative.   "  Musculoskeletal: Negative.  Negative for myalgias.        Spasm in ankles   Skin:  Positive for itching. Negative for rash.   Neurological:  Positive for weakness. Negative for dizziness, loss of consciousness and headaches.   Endo/Heme/Allergies: Negative.  Does not bruise/bleed easily.   Psychiatric/Behavioral: Negative.                Objective     BP 92/60   Pulse 78   Resp 17   Ht 1.575 m (5' 2\")   Wt 58.1 kg (128 lb)   SpO2 94%   BMI 23.41 kg/m²     Physical Exam  Vitals reviewed.   Constitutional:       General: She is not in acute distress.     Appearance: Normal appearance.   HENT:      Head: Normocephalic and atraumatic.      Right Ear: External ear normal.      Left Ear: External ear normal.   Eyes:      General: No scleral icterus.     Extraocular Movements: Extraocular movements intact.      Conjunctiva/sclera: Conjunctivae normal.      Pupils: Pupils are equal, round, and reactive to light.   Cardiovascular:      Rate and Rhythm: Normal rate and regular rhythm.      Pulses: Normal pulses.      Heart sounds: Normal heart sounds. No murmur heard.     No friction rub. No gallop.      Comments: PPM insertion site is clean and dry with steristrips in place  Pulmonary:      Effort: Pulmonary effort is normal.      Breath sounds: Decreased breath sounds present.   Abdominal:      General: Bowel sounds are normal.      Palpations: Abdomen is soft.      Tenderness: There is no abdominal tenderness.   Musculoskeletal:         General: Normal range of motion.      Cervical back: Normal range of motion and neck supple.      Right lower leg: Edema (2+) present.      Left lower leg: Edema (2+) present.   Skin:     General: Skin is warm and dry.      Capillary Refill: Capillary refill takes less than 2 seconds.   Neurological:      General: No focal deficit present.      Mental Status: She is alert and oriented to person, place, and time.   Psychiatric:         Mood and Affect: Mood normal.         Behavior: " Behavior normal.         Judgment: Judgment normal.       Lab Results   Component Value Date/Time    CHOLSTRLTOT 198 02/15/2023 02:28 AM     (H) 02/15/2023 02:28 AM    HDL 69 02/15/2023 02:28 AM    TRIGLYCERIDE 113 02/15/2023 02:28 AM       Lab Results   Component Value Date/Time    SODIUM 139 05/19/2023 02:49 AM    POTASSIUM 3.9 05/19/2023 02:49 AM    CHLORIDE 103 05/19/2023 02:49 AM    CO2 26 05/19/2023 02:49 AM    GLUCOSE 105 (H) 05/19/2023 02:49 AM    BUN 29 (H) 05/19/2023 02:49 AM    CREATININE 1.03 05/19/2023 02:49 AM     Lab Results   Component Value Date/Time    ALKPHOSPHAT 104 (H) 05/18/2023 04:05 AM    ASTSGOT 37 05/18/2023 04:05 AM    ALTSGPT 28 05/18/2023 04:05 AM    TBILIRUBIN 0.3 05/18/2023 04:05 AM       Cardiovascular imaging and procedures:     Echocardiogram 5/16/2023  CONCLUSIONS  Compared to the images of the prior study 2/15/23, there has been no   significant change.   Normal left ventricular systolic function.  The left ventricular ejection fraction is visually estimated to be 60%.  Diastolic function is difficult to assess with arrhythmia.  Normal right ventricular size and systolic function.  Moderate mitral regurgitation.  Mild aortic insufficiency.  Moderate tricuspid regurgitation.  Estimated right ventricular systolic pressure is 40 mmHg.  Pleural effusion present.    Echocardiogram 2/15/2023  CONCLUSIONS  Normal left ventricular systolic function.  Grade II diastolic dysfunction.  Mild mitral regurgitation.  Mild to moderate aortic insufficiency.  Mild tricuspid regurgitation.  Right ventricular systolic pressure is estimated to be  40-45  mmHg.     NM stress test 7/11/2022  Myocardial Perfusion   Report   NUCLEAR IMAGING INTERPRETATION   * Small sized, equivocal, non-reversible, mild severity defects in the apical   septal wall and mid inferolateral wall. Both most consistent with artifact    given the distribution, less likely small scars.    * SSS 2. SDS 0. No reversible  ischemia.    * Normal left ventricular size, ejection fraction, and wall motion.   ECG INTERPRETATION   Negative stress ECG for ischemia.     CT Calcium scoring 9/13/2022  FINDINGS:     Coronary calcification:  LMA - 0.0  LCX - 0.0  LAD - 348.8  RCA - 0.0  PDA - 0.0     Total Calcium Score: 348.8     Percentile: Calcium score is above the 75th percentile for the patient's age and sex.     Other findings:  Heart: There is small pericardial effusion..  Lungs: There are small bilateral pleural effusions..  Mediastinum: Normal.  Upper abdomen: Normal.     IMPRESSION:     Calcium Score of 100-399 AND >75th percentile:           Assessment & Plan     1. ACC/AHA stage C heart failure with preserved ejection fraction (HCC)  Basic Metabolic Panel    MAGNESIUM      2. NYHA class 3 heart failure with preserved ejection fraction (HCC)  Basic Metabolic Panel    MAGNESIUM      3. Left ventricular diastolic dysfunction, NYHA class 3  Basic Metabolic Panel    MAGNESIUM      4. Edema of both lower extremities        5. Paroxysmal atrial fibrillation (HCC)        6. Secondary hypercoagulable state (HCC)        7. History of stroke        8. Cardiac pacemaker in situ        9. Agatston CAC score 200-399        10. Mixed hyperlipidemia        11. Stage 3a chronic kidney disease (HCC)            Medical Decision Making: Today's Assessment/Status/Plan:        HFpEF  Lower extremity edema and cramping  - Continued concerns of weakness and lower extremity edema  - She remains volume overloaded on exam.  - Given a recent hospital stay for dehydration and suspected overdiuresis, we will not adjust her diuretics today. She had some hypotension on initial readings which improved after consumption of water in office. Instructed her to drink 64 oz of fluid daily. She will commit to increasing her fluid intake until our next follow up in 2 days, Discussed taking torsemide in the morning and spironolactone in the afternoon to reduce the additive  effects. Consider holding spironolactone tomorrow if BP is not improved.  - Repeat BMP and Mg today or tomorrow to assess kidney function and for any electrolyte abnormalities.   - Continue daily weights and blood pressures.  - Continue spironolactone 12.5mg daily and jardiance 10mg daily for now.  - Reviewed ED precautions.      Paroxysmal Atrial Fibrillation  History of stroke  - Asymptomatic  - Regular rate and rhythm on exam. Rate is well self- controlled.  - Continue eliquis 2.5mg BID for CVA prevention    Junctional bradycardia  - S/p PPM placement 5/17/2023  - Per recent device check, device is sensing and functioning normally. Minor adjustments to thresholds were made.  - Continue regular device checks.  - Continue follow up with EP.     Elevated coronary calcium score  - No symptoms  - Stress testing 7/2022 suggestive of artifact vs scar, but with no reversible ischemia  - Continue rosuvastatin 5mg daily and anticoagulation with eliquis.      CKD  -Stable  - Repeat BMP per above    Follow up in 2 days.    Thank you for allowing me to participate in the care of Regla Dougherty Tennicolas .    Leah Jain PA-C, Cardiology  [facility], [facility]  [address] [address]  Phone: [phone]  Fax: [phone]    PLEASE NOTE: This note was created using voice recognition software. I have made every reasonable attempt to correct obvious errors, but I expect that there are errors of grammar and possibly content that I did not discover before finalizing the note.     I personally spent a total of 37 minutes which includes face-to-face time and non-face-to-face time spent on preparing to see the patient, reviewing hospital notes and tests, obtaining history from the patient, performing a medically appropriate exam, counseling and educating the patient, ordering medications/tests/procedures/referrals as clinically indicated, and  documenting information in the electronic medical record.

## 2023-05-30 NOTE — PATIENT INSTRUCTIONS
Please drink 64oz of fluid daily.   Take torsemide in the morning and spironolactone in the afternoon.   Please complete your labs.  I will see you back Thursday.

## 2023-05-30 NOTE — CARDIAC REMOTE MONITOR - SCAN
Device transmission reviewed. Device demonstrated appropriate function.       Electronically Signed by: Moises Acosta M.D.    6/10/2023  9:27 AM

## 2023-05-31 ENCOUNTER — OFFICE VISIT (OUTPATIENT)
Dept: MEDICAL GROUP | Facility: PHYSICIAN GROUP | Age: 83
End: 2023-05-31
Payer: MEDICARE

## 2023-05-31 ENCOUNTER — HOSPITAL ENCOUNTER (OUTPATIENT)
Dept: LAB | Facility: MEDICAL CENTER | Age: 83
End: 2023-05-31
Attending: PHYSICIAN ASSISTANT
Payer: MEDICARE

## 2023-05-31 VITALS
WEIGHT: 130.8 LBS | SYSTOLIC BLOOD PRESSURE: 86 MMHG | DIASTOLIC BLOOD PRESSURE: 49 MMHG | HEART RATE: 80 BPM | OXYGEN SATURATION: 98 % | HEIGHT: 62 IN | BODY MASS INDEX: 24.07 KG/M2 | TEMPERATURE: 98.3 F

## 2023-05-31 DIAGNOSIS — I95.9 HYPOTENSION, UNSPECIFIED HYPOTENSION TYPE: ICD-10-CM

## 2023-05-31 DIAGNOSIS — I50.33 ACUTE ON CHRONIC HEART FAILURE WITH PRESERVED EJECTION FRACTION (HCC): Chronic | ICD-10-CM

## 2023-05-31 DIAGNOSIS — I50.30 NYHA CLASS 3 HEART FAILURE WITH PRESERVED EJECTION FRACTION (HCC): ICD-10-CM

## 2023-05-31 DIAGNOSIS — Z09 HOSPITAL DISCHARGE FOLLOW-UP: ICD-10-CM

## 2023-05-31 DIAGNOSIS — I51.89 LEFT VENTRICULAR DIASTOLIC DYSFUNCTION, NYHA CLASS 3: ICD-10-CM

## 2023-05-31 DIAGNOSIS — I50.30 ACC/AHA STAGE C HEART FAILURE WITH PRESERVED EJECTION FRACTION (HCC): ICD-10-CM

## 2023-05-31 DIAGNOSIS — R60.0 EDEMA OF BOTH LOWER EXTREMITIES: ICD-10-CM

## 2023-05-31 DIAGNOSIS — I50.32 CHRONIC HEART FAILURE WITH PRESERVED EJECTION FRACTION (HCC): Chronic | ICD-10-CM

## 2023-05-31 PROBLEM — R82.81 PYURIA: Status: RESOLVED | Noted: 2023-05-16 | Resolved: 2023-05-31

## 2023-05-31 PROCEDURE — 36415 COLL VENOUS BLD VENIPUNCTURE: CPT

## 2023-05-31 PROCEDURE — 3074F SYST BP LT 130 MM HG: CPT | Performed by: FAMILY MEDICINE

## 2023-05-31 PROCEDURE — 3078F DIAST BP <80 MM HG: CPT | Performed by: FAMILY MEDICINE

## 2023-05-31 PROCEDURE — 83735 ASSAY OF MAGNESIUM: CPT

## 2023-05-31 PROCEDURE — 99495 TRANSJ CARE MGMT MOD F2F 14D: CPT | Performed by: FAMILY MEDICINE

## 2023-05-31 PROCEDURE — 80048 BASIC METABOLIC PNL TOTAL CA: CPT

## 2023-05-31 RX ORDER — SPIRONOLACTONE 25 MG/1
12.5 TABLET ORAL DAILY
Qty: 15 TABLET | Refills: 0
Start: 2023-05-31 | End: 2023-06-30

## 2023-05-31 ASSESSMENT — FIBROSIS 4 INDEX: FIB4 SCORE: 3.37

## 2023-05-31 NOTE — OP REPORT
[facility]     PROCEDURE PERFORMED: Permanent Pacemaker Implantation    DATE OF SERVICE: 5/17/2023    : Moises Acosta MD    ASSISTANT: None    ANESTHESIA: Local and moderate sedation (start time 1205, stop time 1245, total dose given 2 mg IV versed, 50 mcg IV fentanyl)    EBL: 30 cc    SPECIMENS: None    INDICATION(S):  Sick sinus syndrome    COMPLICATION(S): None    DESCRIPTION OF PROCEDURE:  After informed written consent, the patient was brought to the electrophysiology lab in the fasting, unsedated state. The patient was prepped and draped in the usual sterile fashion. The procedure was performed under moderate sedation with local anesthetic. A left upper extremity venogram was performed, demonstrating a patent subclavian vein. A left infraclavicular incision was made with a scalpel and the pectoral device pocket was created using a combination of blunt dissection and electrocautery. The modified Seldinger technique was used to gain access to the left axillary vein. A peel-away hemostasis sheath was placed in the vein. Under fluoroscopic guidance, the pacemaker leads were introduced into the heart. The ventricular lead was advanced to the RVOT and then lowered into position at the RV apex. The atrial lead was positioned on R atrial appendage. The leads were tested and had satisfactory sensing and pacing parameters. High output ventricular pacing did not produce extracardiac stimulation. The leads were sutured to the underlying pectoral muscle with interrupted silk over a silastic suture sleeve. The device pocket was irrigated with antibiotic solution, inspected, and no bleeding was seen. The leads were connected to the pacemaker pulse generator and the device was inserted into the pocket. The wound was closed with three layers of absorbable sutures. Following recovery from sedation, the patient was transferred to a monitored bed in good condition.     IMPLANTED DEVICE INFORMATION:  Pulse generator is  a MDT W3DR01 Serial # [id number]    LEAD INFORMATION:  1)Right atrial lead is a MDT model #5076 , serial #[id number] ,P wave 0.4 millivolts, threshold 1.0 Volts at 0.4 milliseconds, pacing impedance 437 Ohms.    2)Right ventricular lead is a MDT model #5076 , serial #[id number] ,R wave 8.6 millivolts, threshold 0.5 Volts at 0.4 milliseconds, pacing impedance 570 Ohms.    DEVICE PROGRAMMING:  DDDR 60 -120 ppm    FLUOROSCOPY TIME: 7 minutes    IMPRESSIONS:  1. Successful dual chamber pacemaker implantation    RECOMMENDATIONS:  1. Transfer to monitored bed  2. Chest x-ray  3. Device interrogation prior to hospital discharge  4. Followup in device clinic

## 2023-06-01 LAB
ANION GAP SERPL CALC-SCNC: 10 MMOL/L (ref 7–16)
BUN SERPL-MCNC: 31 MG/DL (ref 8–22)
CALCIUM SERPL-MCNC: 9.1 MG/DL (ref 8.5–10.5)
CHLORIDE SERPL-SCNC: 97 MMOL/L (ref 96–112)
CO2 SERPL-SCNC: 26 MMOL/L (ref 20–33)
CREAT SERPL-MCNC: 1.15 MG/DL (ref 0.5–1.4)
GFR SERPLBLD CREATININE-BSD FMLA CKD-EPI: 48 ML/MIN/1.73 M 2
GLUCOSE SERPL-MCNC: 71 MG/DL (ref 65–99)
MAGNESIUM SERPL-MCNC: 2.6 MG/DL (ref 1.5–2.5)
POTASSIUM SERPL-SCNC: 4.5 MMOL/L (ref 3.6–5.5)
SODIUM SERPL-SCNC: 133 MMOL/L (ref 135–145)

## 2023-06-05 ENCOUNTER — TELEPHONE (OUTPATIENT)
Dept: CARDIOLOGY | Facility: MEDICAL CENTER | Age: 83
End: 2023-06-05
Payer: MEDICARE

## 2023-06-06 ENCOUNTER — TELEPHONE (OUTPATIENT)
Dept: MEDICAL GROUP | Facility: PHYSICIAN GROUP | Age: 83
End: 2023-06-06
Payer: MEDICARE

## 2023-06-06 NOTE — TELEPHONE ENCOUNTER
Corrie with the [facility] Medical Examiners office called to let you know that Regla has passed away due to natural causes and would like you to sign the death certificate.

## 2023-06-13 ENCOUNTER — TELEPHONE (OUTPATIENT)
Dept: HEALTH INFORMATION MANAGEMENT | Facility: OTHER | Age: 83
End: 2023-06-13

## 2023-07-24 ENCOUNTER — TELEPHONE (OUTPATIENT)
Dept: CARDIOLOGY | Facility: MEDICAL CENTER | Age: 83
End: 2023-07-24
Payer: MEDICARE

## 2023-07-26 ENCOUNTER — TELEPHONE (OUTPATIENT)
Dept: CARDIOLOGY | Facility: MEDICAL CENTER | Age: 83
End: 2023-07-26
Payer: MEDICARE

## 2023-07-26 NOTE — TELEPHONE ENCOUNTER
To AM, please advise. Would you like to defer to PCP? It does look like they contacted him as well.

## 2023-07-26 NOTE — TELEPHONE ENCOUNTER
AM    Caller:  - [facility] Medical Examiners Office    Topic/issue: Needs Death Certificate signed.     Callback Number:  [phone]    Thank you,   Anai CAMPBELL

## 2023-07-26 NOTE — TELEPHONE ENCOUNTER
Leah Jain P.A.-C.  You 1 minute ago (2:47 PM)     I personally contacted the medical examiner's office. A death certificate is not something a PA can sign, I would have to defer to my supervising physician. They did say in her case that they reached out to a Dr. Sierra for his signature so there is nothing we need to do from our office in this matter.

## 2023-08-09 ENCOUNTER — TELEPHONE (OUTPATIENT)
Dept: CARDIOLOGY | Facility: MEDICAL CENTER | Age: 83
End: 2023-08-09
Payer: MEDICARE

## 2023-08-09 NOTE — TELEPHONE ENCOUNTER
AM    Caller: [facility] Medical Examiners Office    Topic/issue: [facility] Medical Examiners Office is requesting to speak with MR . Patient has been  for over a month and is not able to be cremated until MR signs a death certificate .    [facility] Medical Examiners Office states this is extremely important and needs to be taken care of right away .    Callback Number: [phone]     Thank you   Radha LAZAR

## 2023-11-29 ENCOUNTER — PATIENT MESSAGE (OUTPATIENT)
Dept: HEALTH INFORMATION MANAGEMENT | Facility: OTHER | Age: 83
End: 2023-11-29

## 2024-05-08 ENCOUNTER — DOCUMENTATION (OUTPATIENT)
Dept: HEALTH INFORMATION MANAGEMENT | Facility: OTHER | Age: 84
End: 2024-05-08
Payer: MEDICARE